# Patient Record
Sex: FEMALE | Race: BLACK OR AFRICAN AMERICAN | Employment: OTHER | URBAN - METROPOLITAN AREA
[De-identification: names, ages, dates, MRNs, and addresses within clinical notes are randomized per-mention and may not be internally consistent; named-entity substitution may affect disease eponyms.]

---

## 2017-05-16 ENCOUNTER — TELEPHONE (OUTPATIENT)
Dept: CARDIOLOGY CLINIC | Age: 77
End: 2017-05-16

## 2017-05-17 RX ORDER — ATENOLOL 25 MG/1
25 TABLET ORAL DAILY
Qty: 30 TABLET | Refills: 0 | Status: SHIPPED | OUTPATIENT
Start: 2017-05-17 | End: 2017-05-18 | Stop reason: SDUPTHER

## 2017-05-17 RX ORDER — HYDROCHLOROTHIAZIDE 12.5 MG/1
12.5 CAPSULE, GELATIN COATED ORAL DAILY
Qty: 30 CAPSULE | Refills: 0 | Status: SHIPPED | OUTPATIENT
Start: 2017-05-17 | End: 2017-05-22 | Stop reason: SDUPTHER

## 2017-05-18 ENCOUNTER — OFFICE VISIT (OUTPATIENT)
Dept: CARDIOLOGY CLINIC | Age: 77
End: 2017-05-18

## 2017-05-18 VITALS
HEART RATE: 68 BPM | WEIGHT: 142.4 LBS | DIASTOLIC BLOOD PRESSURE: 70 MMHG | SYSTOLIC BLOOD PRESSURE: 120 MMHG | BODY MASS INDEX: 23.7 KG/M2

## 2017-05-18 DIAGNOSIS — E78.00 PURE HYPERCHOLESTEROLEMIA: ICD-10-CM

## 2017-05-18 DIAGNOSIS — M51.26 HERNIATED LUMBAR DISC WITHOUT MYELOPATHY: ICD-10-CM

## 2017-05-18 DIAGNOSIS — N18.2 CHRONIC RENAL IMPAIRMENT, STAGE 2 (MILD): ICD-10-CM

## 2017-05-18 DIAGNOSIS — I25.10 CORONARY ARTERY DISEASE WITHOUT ANGINA PECTORIS, UNSPECIFIED VESSEL OR LESION TYPE, UNSPECIFIED WHETHER NATIVE OR TRANSPLANTED HEART: Primary | ICD-10-CM

## 2017-05-18 DIAGNOSIS — I10 ESSENTIAL HYPERTENSION: ICD-10-CM

## 2017-05-18 DIAGNOSIS — I25.110 CORONARY ARTERY DISEASE INVOLVING NATIVE CORONARY ARTERY OF NATIVE HEART WITH UNSTABLE ANGINA PECTORIS (HCC): ICD-10-CM

## 2017-05-18 PROCEDURE — 1036F TOBACCO NON-USER: CPT | Performed by: NURSE PRACTITIONER

## 2017-05-18 PROCEDURE — G8599 NO ASA/ANTIPLAT THER USE RNG: HCPCS | Performed by: NURSE PRACTITIONER

## 2017-05-18 PROCEDURE — 4040F PNEUMOC VAC/ADMIN/RCVD: CPT | Performed by: NURSE PRACTITIONER

## 2017-05-18 PROCEDURE — 1090F PRES/ABSN URINE INCON ASSESS: CPT | Performed by: NURSE PRACTITIONER

## 2017-05-18 PROCEDURE — 99204 OFFICE O/P NEW MOD 45 MIN: CPT | Performed by: NURSE PRACTITIONER

## 2017-05-18 PROCEDURE — G8427 DOCREV CUR MEDS BY ELIG CLIN: HCPCS | Performed by: NURSE PRACTITIONER

## 2017-05-18 PROCEDURE — G8420 CALC BMI NORM PARAMETERS: HCPCS | Performed by: NURSE PRACTITIONER

## 2017-05-18 PROCEDURE — 93000 ELECTROCARDIOGRAM COMPLETE: CPT | Performed by: NURSE PRACTITIONER

## 2017-05-18 RX ORDER — ATENOLOL 25 MG/1
25 TABLET ORAL DAILY
Qty: 90 TABLET | Refills: 3 | Status: SHIPPED | OUTPATIENT
Start: 2017-05-18 | End: 2018-06-07 | Stop reason: SDUPTHER

## 2017-05-18 RX ORDER — LATANOPROST 50 UG/ML
1 SOLUTION/ DROPS OPHTHALMIC NIGHTLY
COMMUNITY

## 2017-05-22 ENCOUNTER — TELEPHONE (OUTPATIENT)
Dept: CARDIOLOGY CLINIC | Age: 77
End: 2017-05-22

## 2017-05-22 RX ORDER — HYDROCHLOROTHIAZIDE 12.5 MG/1
25 CAPSULE, GELATIN COATED ORAL DAILY
Qty: 30 CAPSULE | Refills: 3 | Status: SHIPPED | OUTPATIENT
Start: 2017-05-22 | End: 2017-11-01

## 2017-05-22 RX ORDER — HYDROCHLOROTHIAZIDE 25 MG/1
25 TABLET ORAL DAILY
Qty: 30 TABLET | Refills: 3 | Status: SHIPPED | OUTPATIENT
Start: 2017-05-22 | End: 2017-09-28 | Stop reason: SDUPTHER

## 2017-07-27 ENCOUNTER — HOSPITAL ENCOUNTER (OUTPATIENT)
Dept: MAMMOGRAPHY | Age: 77
Discharge: HOME | End: 2017-07-27
Attending: FAMILY MEDICINE | Admitting: FAMILY MEDICINE

## 2017-07-27 DIAGNOSIS — Z12.31 VISIT FOR SCREENING MAMMOGRAM: ICD-10-CM

## 2017-09-22 ENCOUNTER — TELEPHONE (OUTPATIENT)
Dept: PRIMARY CARE CLINIC | Age: 77
End: 2017-09-22

## 2017-09-28 RX ORDER — HYDROCHLOROTHIAZIDE 25 MG/1
25 TABLET ORAL DAILY
Qty: 30 TABLET | Refills: 4 | Status: SHIPPED | OUTPATIENT
Start: 2017-09-28 | End: 2017-11-30 | Stop reason: SDUPTHER

## 2017-09-28 RX ORDER — HYDROCHLOROTHIAZIDE 25 MG/1
25 TABLET ORAL DAILY
Qty: 30 TABLET | Refills: 4 | Status: SHIPPED | OUTPATIENT
Start: 2017-09-28 | End: 2017-09-28 | Stop reason: SDUPTHER

## 2017-09-29 DIAGNOSIS — M51.26 HERNIATED LUMBAR DISC WITHOUT MYELOPATHY: Primary | ICD-10-CM

## 2017-11-01 ENCOUNTER — OFFICE VISIT (OUTPATIENT)
Dept: PRIMARY CARE CLINIC | Age: 77
End: 2017-11-01

## 2017-11-01 VITALS
HEART RATE: 70 BPM | OXYGEN SATURATION: 98 % | SYSTOLIC BLOOD PRESSURE: 122 MMHG | TEMPERATURE: 98.6 F | DIASTOLIC BLOOD PRESSURE: 64 MMHG | BODY MASS INDEX: 24.86 KG/M2 | HEIGHT: 64 IN | WEIGHT: 145.6 LBS

## 2017-11-01 DIAGNOSIS — E11.8 TYPE 2 DIABETES MELLITUS WITH COMPLICATION, WITHOUT LONG-TERM CURRENT USE OF INSULIN (HCC): ICD-10-CM

## 2017-11-01 DIAGNOSIS — Z91.81 AT HIGH RISK FOR FALLS: ICD-10-CM

## 2017-11-01 DIAGNOSIS — I10 ESSENTIAL HYPERTENSION: Primary | ICD-10-CM

## 2017-11-01 DIAGNOSIS — Z78.0 MENOPAUSE: ICD-10-CM

## 2017-11-01 DIAGNOSIS — M81.6 LOCALIZED OSTEOPOROSIS WITHOUT CURRENT PATHOLOGICAL FRACTURE: ICD-10-CM

## 2017-11-01 DIAGNOSIS — N18.30 STAGE 3 CHRONIC KIDNEY DISEASE (HCC): ICD-10-CM

## 2017-11-01 DIAGNOSIS — Z12.11 COLON CANCER SCREENING: ICD-10-CM

## 2017-11-01 DIAGNOSIS — K59.00 CONSTIPATION, UNSPECIFIED CONSTIPATION TYPE: ICD-10-CM

## 2017-11-01 DIAGNOSIS — E55.9 VITAMIN D DEFICIENCY: ICD-10-CM

## 2017-11-01 DIAGNOSIS — E78.00 PURE HYPERCHOLESTEROLEMIA: ICD-10-CM

## 2017-11-01 DIAGNOSIS — M51.26 HERNIATED LUMBAR DISC WITHOUT MYELOPATHY: ICD-10-CM

## 2017-11-01 PROBLEM — N18.2 CHRONIC RENAL IMPAIRMENT, STAGE 2 (MILD): Status: RESOLVED | Noted: 2017-05-18 | Resolved: 2017-11-01

## 2017-11-01 PROCEDURE — G8419 CALC BMI OUT NRM PARAM NOF/U: HCPCS | Performed by: INTERNAL MEDICINE

## 2017-11-01 PROCEDURE — G8427 DOCREV CUR MEDS BY ELIG CLIN: HCPCS | Performed by: INTERNAL MEDICINE

## 2017-11-01 PROCEDURE — 4005F PHARM THX FOR OP RXD: CPT | Performed by: INTERNAL MEDICINE

## 2017-11-01 PROCEDURE — 1123F ACP DISCUSS/DSCN MKR DOCD: CPT | Performed by: INTERNAL MEDICINE

## 2017-11-01 PROCEDURE — 1036F TOBACCO NON-USER: CPT | Performed by: INTERNAL MEDICINE

## 2017-11-01 PROCEDURE — G8400 PT W/DXA NO RESULTS DOC: HCPCS | Performed by: INTERNAL MEDICINE

## 2017-11-01 PROCEDURE — G8484 FLU IMMUNIZE NO ADMIN: HCPCS | Performed by: INTERNAL MEDICINE

## 2017-11-01 PROCEDURE — 99215 OFFICE O/P EST HI 40 MIN: CPT | Performed by: INTERNAL MEDICINE

## 2017-11-01 PROCEDURE — 1090F PRES/ABSN URINE INCON ASSESS: CPT | Performed by: INTERNAL MEDICINE

## 2017-11-01 PROCEDURE — G8599 NO ASA/ANTIPLAT THER USE RNG: HCPCS | Performed by: INTERNAL MEDICINE

## 2017-11-01 PROCEDURE — 4040F PNEUMOC VAC/ADMIN/RCVD: CPT | Performed by: INTERNAL MEDICINE

## 2017-11-01 RX ORDER — DOCUSATE SODIUM 100 MG/1
100 CAPSULE, LIQUID FILLED ORAL 2 TIMES DAILY
Qty: 60 CAPSULE | Refills: 5 | Status: SHIPPED | OUTPATIENT
Start: 2017-11-01 | End: 2019-01-31

## 2017-11-01 ASSESSMENT — PATIENT HEALTH QUESTIONNAIRE - PHQ9
SUM OF ALL RESPONSES TO PHQ QUESTIONS 1-9: 0
SUM OF ALL RESPONSES TO PHQ9 QUESTIONS 1 & 2: 0
1. LITTLE INTEREST OR PLEASURE IN DOING THINGS: 0
2. FEELING DOWN, DEPRESSED OR HOPELESS: 0

## 2017-11-01 ASSESSMENT — ENCOUNTER SYMPTOMS
RESPIRATORY NEGATIVE: 1
GASTROINTESTINAL NEGATIVE: 1
ALLERGIC/IMMUNOLOGIC NEGATIVE: 1
EYES NEGATIVE: 1

## 2017-11-01 NOTE — PROGRESS NOTES
Subjective:      Patient ID: Anna Ho is a 68 y.o. female. HPI  Patient presents to establish care:  Past Medical History:   Diagnosis Date    Angina     Chronic kidney disease     Fibromyalgia     Hyperlipidemia     Hypertension     MVP (mitral valve prolapse)     s 5/18/2017    Type II or unspecified type diabetes mellitus without mention of complication, not stated as uncontrolled      Past Surgical History:   Procedure Laterality Date    CHOLECYSTECTOMY      CORONARY ANGIOPLASTY WITH STENT PLACEMENT      2 stents    HYSTERECTOMY      LUMBAR DISCECTOMY  10/03/12    lumbar discectomy, 3-4 left     Social History   Substance Use Topics    Smoking status: Never Smoker    Smokeless tobacco: Not on file    Alcohol use No     Current Outpatient Prescriptions   Medication Sig Dispense Refill    docusate sodium (COLACE) 100 MG capsule Take 1 capsule by mouth 2 times daily 60 capsule 5    hydrochlorothiazide (HYDRODIURIL) 25 MG tablet Take 1 tablet by mouth daily 30 tablet 4    latanoprost (XALATAN) 0.005 % ophthalmic solution 1 drop nightly      atenolol (TENORMIN) 25 MG tablet Take 1 tablet by mouth daily 90 tablet 3    tiZANidine (ZANAFLEX) 4 MG tablet Take 1/2 tablet in AM, 1/2 tablet around 5:00 PM, and 1 full tablet before bedtime as needed for muscle spasms. 30 tablet 3    Coenzyme Q10 (COQ10) 100 MG CAPS Take  by mouth.  Potassium Chloride (KLOR-CON 10 PO) Take  by mouth.  Cyanocobalamin (B-12) 500 MCG TABS Take  by mouth.  gemfibrozil (LOPID) 600 MG tablet Take 600 mg by mouth daily.  insulin glargine (LANTUS SOLOSTAR) 100 UNIT/ML injection Inject  into the skin nightly.  FOLIC ACID by Does not apply route.  Lansoprazole (PREVACID PO) Take  by mouth. No current facility-administered medications for this visit.       Allergies   Allergen Reactions    Actos [Pioglitazone Hydrochloride]     Celebrex [Celecoxib]     Cipro Xr     Demerol Helena to complete a self tracking handout on Blood Sugars , Blood Pressures  and Weights and instructed them to bring it with them to her next appointment. Discussed use, benefit, and side effects of prescribed medications. Barriers to medication compliance addressed. All patient questions answered. Pt voiced understanding. Patient is taking over the counter meds and discussed as to how they interact with prescription medications. Return in one month.

## 2017-11-01 NOTE — PROGRESS NOTES
On the basis of positive falls risk screening, assessment and plan is as follows: False positive screen, patient has good balance.

## 2017-11-07 ENCOUNTER — TELEPHONE (OUTPATIENT)
Dept: PAIN MANAGEMENT | Age: 77
End: 2017-11-07

## 2017-11-20 NOTE — TELEPHONE ENCOUNTER
Second message was left for this patient to call [facility] regarding their referral.  Pt states she wants to speak to her referring provider first regarding this. She states should have been referred for fibro. 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 Advised that whatever dx she is referred for, we would need imaging for that area.  She will discuss with her

## 2017-11-27 NOTE — TELEPHONE ENCOUNTER
Final message was left for the patient to call [facility] regarding their referral. If we do not receive a call back within a week, referral will be cancelled per office protocol.

## 2017-11-30 ENCOUNTER — OFFICE VISIT (OUTPATIENT)
Dept: CARDIOLOGY CLINIC | Age: 77
End: 2017-11-30

## 2017-11-30 VITALS
SYSTOLIC BLOOD PRESSURE: 118 MMHG | HEART RATE: 78 BPM | DIASTOLIC BLOOD PRESSURE: 60 MMHG | BODY MASS INDEX: 25.63 KG/M2 | WEIGHT: 147 LBS

## 2017-11-30 DIAGNOSIS — I10 ESSENTIAL HYPERTENSION: ICD-10-CM

## 2017-11-30 DIAGNOSIS — N18.30 STAGE 3 CHRONIC KIDNEY DISEASE (HCC): ICD-10-CM

## 2017-11-30 DIAGNOSIS — E78.00 PURE HYPERCHOLESTEROLEMIA: Primary | ICD-10-CM

## 2017-11-30 PROCEDURE — 1036F TOBACCO NON-USER: CPT | Performed by: INTERNAL MEDICINE

## 2017-11-30 PROCEDURE — G8484 FLU IMMUNIZE NO ADMIN: HCPCS | Performed by: INTERNAL MEDICINE

## 2017-11-30 PROCEDURE — 1123F ACP DISCUSS/DSCN MKR DOCD: CPT | Performed by: INTERNAL MEDICINE

## 2017-11-30 PROCEDURE — 99214 OFFICE O/P EST MOD 30 MIN: CPT | Performed by: NURSE PRACTITIONER

## 2017-11-30 PROCEDURE — G8419 CALC BMI OUT NRM PARAM NOF/U: HCPCS | Performed by: INTERNAL MEDICINE

## 2017-11-30 PROCEDURE — 4040F PNEUMOC VAC/ADMIN/RCVD: CPT | Performed by: INTERNAL MEDICINE

## 2017-11-30 PROCEDURE — 1090F PRES/ABSN URINE INCON ASSESS: CPT | Performed by: INTERNAL MEDICINE

## 2017-11-30 PROCEDURE — G8427 DOCREV CUR MEDS BY ELIG CLIN: HCPCS | Performed by: INTERNAL MEDICINE

## 2017-11-30 PROCEDURE — G8599 NO ASA/ANTIPLAT THER USE RNG: HCPCS | Performed by: INTERNAL MEDICINE

## 2017-11-30 PROCEDURE — G8400 PT W/DXA NO RESULTS DOC: HCPCS | Performed by: INTERNAL MEDICINE

## 2017-11-30 RX ORDER — HYDROCHLOROTHIAZIDE 25 MG/1
25 TABLET ORAL DAILY
Qty: 90 TABLET | Refills: 4 | Status: SHIPPED | OUTPATIENT
Start: 2017-11-30 | End: 2018-06-27 | Stop reason: SDUPTHER

## 2018-01-03 ENCOUNTER — OFFICE VISIT (OUTPATIENT)
Dept: PRIMARY CARE CLINIC | Age: 78
End: 2018-01-03

## 2018-01-03 VITALS
DIASTOLIC BLOOD PRESSURE: 68 MMHG | TEMPERATURE: 99.4 F | HEART RATE: 64 BPM | BODY MASS INDEX: 25.78 KG/M2 | OXYGEN SATURATION: 98 % | WEIGHT: 151 LBS | SYSTOLIC BLOOD PRESSURE: 124 MMHG | HEIGHT: 64 IN

## 2018-01-03 DIAGNOSIS — R09.82 POST-NASAL DRAINAGE: ICD-10-CM

## 2018-01-03 DIAGNOSIS — I10 ESSENTIAL HYPERTENSION: Primary | ICD-10-CM

## 2018-01-03 PROCEDURE — 1123F ACP DISCUSS/DSCN MKR DOCD: CPT | Performed by: INTERNAL MEDICINE

## 2018-01-03 PROCEDURE — 1036F TOBACCO NON-USER: CPT | Performed by: INTERNAL MEDICINE

## 2018-01-03 PROCEDURE — G8427 DOCREV CUR MEDS BY ELIG CLIN: HCPCS | Performed by: INTERNAL MEDICINE

## 2018-01-03 PROCEDURE — G8419 CALC BMI OUT NRM PARAM NOF/U: HCPCS | Performed by: INTERNAL MEDICINE

## 2018-01-03 PROCEDURE — G8400 PT W/DXA NO RESULTS DOC: HCPCS | Performed by: INTERNAL MEDICINE

## 2018-01-03 PROCEDURE — 1090F PRES/ABSN URINE INCON ASSESS: CPT | Performed by: INTERNAL MEDICINE

## 2018-01-03 PROCEDURE — 99213 OFFICE O/P EST LOW 20 MIN: CPT | Performed by: INTERNAL MEDICINE

## 2018-01-03 PROCEDURE — 4040F PNEUMOC VAC/ADMIN/RCVD: CPT | Performed by: INTERNAL MEDICINE

## 2018-01-03 PROCEDURE — G8484 FLU IMMUNIZE NO ADMIN: HCPCS | Performed by: INTERNAL MEDICINE

## 2018-01-03 RX ORDER — LORATADINE 10 MG/1
10 TABLET ORAL DAILY
Qty: 30 TABLET | Refills: 5 | Status: SHIPPED | OUTPATIENT
Start: 2018-01-03 | End: 2019-01-15

## 2018-01-03 RX ORDER — AMOXICILLIN 250 MG/1
250 CAPSULE ORAL 3 TIMES DAILY
Qty: 30 CAPSULE | Refills: 0 | Status: SHIPPED | OUTPATIENT
Start: 2018-01-03 | End: 2018-01-13

## 2018-01-13 ASSESSMENT — ENCOUNTER SYMPTOMS
GASTROINTESTINAL NEGATIVE: 1
ALLERGIC/IMMUNOLOGIC NEGATIVE: 1
RESPIRATORY NEGATIVE: 1
EYES NEGATIVE: 1

## 2018-01-13 ASSESSMENT — PATIENT HEALTH QUESTIONNAIRE - PHQ9
2. FEELING DOWN, DEPRESSED OR HOPELESS: 0
SUM OF ALL RESPONSES TO PHQ QUESTIONS 1-9: 0
1. LITTLE INTEREST OR PLEASURE IN DOING THINGS: 0
SUM OF ALL RESPONSES TO PHQ9 QUESTIONS 1 & 2: 0

## 2018-02-09 NOTE — TELEPHONE ENCOUNTER
Patient requesting refill on her medications cyclabenzaprine 10 mg taken 3 times daily  and also estradiol 10 mg qd sent to the [facility] in [place] please advise

## 2018-02-12 RX ORDER — BACLOFEN 10 MG/1
10 TABLET ORAL 3 TIMES DAILY
Qty: 90 TABLET | Refills: 5 | Status: SHIPPED | OUTPATIENT
Start: 2018-02-12 | End: 2018-06-27 | Stop reason: SDUPTHER

## 2018-03-29 RX ORDER — POTASSIUM CHLORIDE 750 MG/1
10 TABLET, FILM COATED, EXTENDED RELEASE ORAL DAILY
Qty: 90 TABLET | Refills: 0 | Status: SHIPPED | OUTPATIENT
Start: 2018-03-29 | End: 2018-06-27 | Stop reason: SDUPTHER

## 2018-05-23 ENCOUNTER — TELEPHONE (OUTPATIENT)
Dept: PRIMARY CARE CLINIC | Age: 78
End: 2018-05-23

## 2018-05-23 NOTE — TELEPHONE ENCOUNTER
Patient calling to report estradiol 0.025 giving the patient water retention and bloating just not feeling very well please advise [phone] and cell [phone] please advise

## 2018-06-07 RX ORDER — ATENOLOL 25 MG/1
25 TABLET ORAL DAILY
Qty: 90 TABLET | Refills: 0 | Status: SHIPPED | OUTPATIENT
Start: 2018-06-07 | End: 2018-12-28 | Stop reason: SDUPTHER

## 2018-06-27 ENCOUNTER — OFFICE VISIT (OUTPATIENT)
Dept: PRIMARY CARE CLINIC | Age: 78
End: 2018-06-27

## 2018-06-27 VITALS
TEMPERATURE: 98 F | RESPIRATION RATE: 18 BRPM | BODY MASS INDEX: 29.12 KG/M2 | DIASTOLIC BLOOD PRESSURE: 55 MMHG | OXYGEN SATURATION: 97 % | HEART RATE: 68 BPM | WEIGHT: 167 LBS | SYSTOLIC BLOOD PRESSURE: 125 MMHG

## 2018-06-27 DIAGNOSIS — E11.8 TYPE 2 DIABETES MELLITUS WITH COMPLICATION, WITHOUT LONG-TERM CURRENT USE OF INSULIN (HCC): ICD-10-CM

## 2018-06-27 DIAGNOSIS — R53.83 OTHER FATIGUE: Primary | ICD-10-CM

## 2018-06-27 DIAGNOSIS — I10 ESSENTIAL HYPERTENSION: ICD-10-CM

## 2018-06-27 DIAGNOSIS — E55.9 VITAMIN D DEFICIENCY: ICD-10-CM

## 2018-06-27 DIAGNOSIS — R53.83 OTHER FATIGUE: ICD-10-CM

## 2018-06-27 DIAGNOSIS — N18.30 STAGE 3 CHRONIC KIDNEY DISEASE (HCC): ICD-10-CM

## 2018-06-27 DIAGNOSIS — M79.7 FIBROMYALGIA: ICD-10-CM

## 2018-06-27 DIAGNOSIS — E78.00 PURE HYPERCHOLESTEROLEMIA: ICD-10-CM

## 2018-06-27 LAB
A/G RATIO: 1.4 (ref 1.1–2.2)
ALBUMIN SERPL-MCNC: 4.4 G/DL (ref 3.4–5)
ALP BLD-CCNC: 76 U/L (ref 40–129)
ALT SERPL-CCNC: 15 U/L (ref 10–40)
ANION GAP SERPL CALCULATED.3IONS-SCNC: 12 MMOL/L (ref 3–16)
AST SERPL-CCNC: 18 U/L (ref 15–37)
BASOPHILS ABSOLUTE: 0 K/UL (ref 0–0.2)
BASOPHILS RELATIVE PERCENT: 0.9 %
BILIRUB SERPL-MCNC: 0.4 MG/DL (ref 0–1)
BILIRUBIN URINE: NEGATIVE
BLOOD, URINE: NEGATIVE
BUN BLDV-MCNC: 23 MG/DL (ref 7–20)
CALCIUM SERPL-MCNC: 9.9 MG/DL (ref 8.3–10.6)
CHLORIDE BLD-SCNC: 101 MMOL/L (ref 99–110)
CLARITY: CLEAR
CO2: 29 MMOL/L (ref 21–32)
COLOR: YELLOW
CREAT SERPL-MCNC: 1 MG/DL (ref 0.6–1.2)
CREATININE URINE: 89.1 MG/DL (ref 28–259)
EOSINOPHILS ABSOLUTE: 0.2 K/UL (ref 0–0.6)
EOSINOPHILS RELATIVE PERCENT: 3.9 %
GFR AFRICAN AMERICAN: >60
GFR NON-AFRICAN AMERICAN: 54
GLOBULIN: 3.1 G/DL
GLUCOSE BLD-MCNC: 126 MG/DL (ref 70–99)
GLUCOSE URINE: NEGATIVE MG/DL
HCT VFR BLD CALC: 33.5 % (ref 36–48)
HEMOGLOBIN: 11.3 G/DL (ref 12–16)
KETONES, URINE: NEGATIVE MG/DL
LEUKOCYTE ESTERASE, URINE: NEGATIVE
LYMPHOCYTES ABSOLUTE: 1.2 K/UL (ref 1–5.1)
LYMPHOCYTES RELATIVE PERCENT: 27.5 %
MCH RBC QN AUTO: 33.5 PG (ref 26–34)
MCHC RBC AUTO-ENTMCNC: 33.7 G/DL (ref 31–36)
MCV RBC AUTO: 99.3 FL (ref 80–100)
MICROALBUMIN UR-MCNC: <1.2 MG/DL
MICROALBUMIN/CREAT UR-RTO: NORMAL MG/G (ref 0–30)
MICROSCOPIC EXAMINATION: NORMAL
MONOCYTES ABSOLUTE: 0.3 K/UL (ref 0–1.3)
MONOCYTES RELATIVE PERCENT: 6.5 %
NEUTROPHILS ABSOLUTE: 2.7 K/UL (ref 1.7–7.7)
NEUTROPHILS RELATIVE PERCENT: 61.2 %
NITRITE, URINE: NEGATIVE
PDW BLD-RTO: 14.5 % (ref 12.4–15.4)
PH UA: 5.5
PLATELET # BLD: 231 K/UL (ref 135–450)
PMV BLD AUTO: 8.7 FL (ref 5–10.5)
POTASSIUM SERPL-SCNC: 4.1 MMOL/L (ref 3.5–5.1)
PROTEIN UA: NEGATIVE MG/DL
RBC # BLD: 3.37 M/UL (ref 4–5.2)
SODIUM BLD-SCNC: 142 MMOL/L (ref 136–145)
SPECIFIC GRAVITY UA: 1.02
TOTAL PROTEIN: 7.5 G/DL (ref 6.4–8.2)
TSH REFLEX FT4: 1.19 UIU/ML (ref 0.27–4.2)
URINE TYPE: NORMAL
UROBILINOGEN, URINE: 0.2 E.U./DL
WBC # BLD: 4.4 K/UL (ref 4–11)

## 2018-06-27 PROCEDURE — 1036F TOBACCO NON-USER: CPT | Performed by: INTERNAL MEDICINE

## 2018-06-27 PROCEDURE — G8427 DOCREV CUR MEDS BY ELIG CLIN: HCPCS | Performed by: INTERNAL MEDICINE

## 2018-06-27 PROCEDURE — G8400 PT W/DXA NO RESULTS DOC: HCPCS | Performed by: INTERNAL MEDICINE

## 2018-06-27 PROCEDURE — 4040F PNEUMOC VAC/ADMIN/RCVD: CPT | Performed by: INTERNAL MEDICINE

## 2018-06-27 PROCEDURE — G8419 CALC BMI OUT NRM PARAM NOF/U: HCPCS | Performed by: INTERNAL MEDICINE

## 2018-06-27 PROCEDURE — 99214 OFFICE O/P EST MOD 30 MIN: CPT | Performed by: INTERNAL MEDICINE

## 2018-06-27 PROCEDURE — 1090F PRES/ABSN URINE INCON ASSESS: CPT | Performed by: INTERNAL MEDICINE

## 2018-06-27 PROCEDURE — 1123F ACP DISCUSS/DSCN MKR DOCD: CPT | Performed by: INTERNAL MEDICINE

## 2018-06-27 RX ORDER — HYDROCHLOROTHIAZIDE 25 MG/1
25 TABLET ORAL DAILY
Qty: 90 TABLET | Refills: 4 | Status: SHIPPED | OUTPATIENT
Start: 2018-06-27 | End: 2018-12-28

## 2018-06-27 RX ORDER — FOLIC ACID 1 MG/1
1 TABLET ORAL DAILY
Qty: 90 TABLET | Refills: 3 | Status: SHIPPED | OUTPATIENT
Start: 2018-06-27 | End: 2019-07-03 | Stop reason: SDUPTHER

## 2018-06-27 RX ORDER — POTASSIUM CHLORIDE 750 MG/1
10 TABLET, FILM COATED, EXTENDED RELEASE ORAL DAILY
Qty: 90 TABLET | Refills: 0 | Status: SHIPPED | OUTPATIENT
Start: 2018-06-27 | End: 2018-09-08 | Stop reason: SDUPTHER

## 2018-06-27 RX ORDER — BACLOFEN 10 MG/1
10 TABLET ORAL 3 TIMES DAILY
Qty: 90 TABLET | Refills: 5 | Status: SHIPPED | OUTPATIENT
Start: 2018-06-27 | End: 2019-02-15 | Stop reason: SDUPTHER

## 2018-06-27 NOTE — PROGRESS NOTES
Subjective:      Patient ID: Toya Baca is a 66 y.o. female. HPI  Patient presents for evaluation of the followin. Other fatigue present for over a month. NO associated fever or chills. Patient has been treated for fibromyalgia for years. Patient has not noticed an increase in muscle pain , just fatigue. No shortness of breath or leg edema. Some cold intolerance. No rashes or joint swelling. 2. Essential hypertension . Patient is controlled on HCTZ 25 mg qd and atenolol 25 mg qd. It has been present for years. History of stage 2 CKD. No headaches or dizziness. No history of CHF. BP Readings from Last 3 Encounters:   18 (!) 125/55   18 124/68   17 118/60                   3. Pure hypercholesterolemia treated with  Lopid 600 mg qd. NO myalgias or weakness. Hyperlipidemia present for years. 4. Stage 3 chronic kidney disease has been stable for years. Patient knows to avoid NSAIDs. Work on bp control. 5. Vitamin D deficiency on supplement . Goal 50-80. 6. Type 2 diabetes mellitus with complication, without long-term current use of insulin (HCC) has been controlled with diet. NO polydipsia or polyuria. Patient has stage 2 CKD but no CHF. 7. Fibromyalgia stable on Chronic Narcotic Therapy by pain specialist.      Current Outpatient Prescriptions on File Prior to Visit   Medication Sig Dispense Refill    atenolol (TENORMIN) 25 MG tablet Take 1 tablet by mouth daily 90 tablet 0    loratadine (CLARITIN) 10 MG tablet Take 1 tablet by mouth daily 30 tablet 5    docusate sodium (COLACE) 100 MG capsule Take 1 capsule by mouth 2 times daily 60 capsule 5    latanoprost (XALATAN) 0.005 % ophthalmic solution 1 drop nightly      Coenzyme Q10 (COQ10) 100 MG CAPS Take  by mouth.  Cyanocobalamin (B-12) 500 MCG TABS Take  by mouth.  gemfibrozil (LOPID) 600 MG tablet Take 600 mg by mouth daily.         insulin glargine (LANTUS SOLOSTAR) Creatinine Urine Ratio   7. Fibromyalgia stable under care of pain management. Needs refill on baclofen. baclofen (LIORESAL) 10 MG tablet           Plan:      Helena received counseling on the following healthy behaviors: medication adherence    Patient given educational materials on After visit summary with diagnosis and treatment plan. I have instructed Helena to complete a self tracking handout on Blood Sugars , Blood Pressures  and Weights and instructed them to bring it with them to her next appointment. Discussed use, benefit, and side effects of prescribed medications. Barriers to medication compliance addressed. All patient questions answered. Pt voiced understanding. Patient is taking over the counter meds and discussed as to how they interact with prescription medications.

## 2018-06-28 LAB
ESTIMATED AVERAGE GLUCOSE: 171.4 MG/DL
HBA1C MFR BLD: 7.6 %
VITAMIN B-12: >2000 PG/ML (ref 211–911)
VITAMIN D 25-HYDROXY: 69.2 NG/ML

## 2018-06-29 DIAGNOSIS — D64.9 NORMOCHROMIC NORMOCYTIC ANEMIA: Primary | ICD-10-CM

## 2018-07-01 PROBLEM — E11.8 TYPE 2 DIABETES MELLITUS WITH COMPLICATION, WITHOUT LONG-TERM CURRENT USE OF INSULIN (HCC): Status: ACTIVE | Noted: 2018-07-01

## 2018-07-01 PROBLEM — E55.9 VITAMIN D DEFICIENCY: Status: ACTIVE | Noted: 2018-07-01

## 2018-07-01 PROBLEM — M79.7 FIBROMYALGIA: Status: ACTIVE | Noted: 2018-07-01

## 2018-07-01 ASSESSMENT — ENCOUNTER SYMPTOMS
RESPIRATORY NEGATIVE: 1
ALLERGIC/IMMUNOLOGIC NEGATIVE: 1
GASTROINTESTINAL NEGATIVE: 1
EYES NEGATIVE: 1

## 2018-07-27 ENCOUNTER — TELEPHONE (OUTPATIENT)
Dept: PRIMARY CARE CLINIC | Age: 78
End: 2018-07-27

## 2018-07-27 DIAGNOSIS — D64.9 NORMOCHROMIC NORMOCYTIC ANEMIA: ICD-10-CM

## 2018-07-27 LAB
BASOPHILS ABSOLUTE: 0 K/UL (ref 0–0.2)
BASOPHILS RELATIVE PERCENT: 0.8 %
EOSINOPHILS ABSOLUTE: 0.2 K/UL (ref 0–0.6)
EOSINOPHILS RELATIVE PERCENT: 4.3 %
FOLATE: >20 NG/ML (ref 4.78–24.2)
HCT VFR BLD CALC: 33.6 % (ref 36–48)
HEMOGLOBIN: 11.3 G/DL (ref 12–16)
IRON SATURATION: 31 % (ref 15–50)
IRON: 103 UG/DL (ref 37–145)
LYMPHOCYTES ABSOLUTE: 1.2 K/UL (ref 1–5.1)
LYMPHOCYTES RELATIVE PERCENT: 29.5 %
MCH RBC QN AUTO: 33.4 PG (ref 26–34)
MCHC RBC AUTO-ENTMCNC: 33.6 G/DL (ref 31–36)
MCV RBC AUTO: 99.4 FL (ref 80–100)
MONOCYTES ABSOLUTE: 0.3 K/UL (ref 0–1.3)
MONOCYTES RELATIVE PERCENT: 7.2 %
NEUTROPHILS ABSOLUTE: 2.3 K/UL (ref 1.7–7.7)
NEUTROPHILS RELATIVE PERCENT: 58.2 %
PDW BLD-RTO: 14.1 % (ref 12.4–15.4)
PLATELET # BLD: 225 K/UL (ref 135–450)
PMV BLD AUTO: 8.7 FL (ref 5–10.5)
RBC # BLD: 3.38 M/UL (ref 4–5.2)
TOTAL IRON BINDING CAPACITY: 328 UG/DL (ref 260–445)
WBC # BLD: 4 K/UL (ref 4–11)

## 2018-08-08 ENCOUNTER — TELEPHONE (OUTPATIENT)
Dept: PRIMARY CARE CLINIC | Age: 78
End: 2018-08-08

## 2018-08-08 NOTE — TELEPHONE ENCOUNTER
Patient requesting refills on her lantus solostar 100 units with 8mm syringes patient injects 25 units hs per sliding scale. Patient also requesting xray of her left arm had discuss the issue with the doctor at the last visit.  Please advise [phone]  Patient wants meds sent to the [facility] [phone] [facility] in [place]

## 2018-08-09 DIAGNOSIS — Z79.4 TYPE 2 DIABETES MELLITUS WITH COMPLICATION, WITH LONG-TERM CURRENT USE OF INSULIN (HCC): Primary | ICD-10-CM

## 2018-08-09 DIAGNOSIS — M79.2 RADICULAR PAIN IN LEFT ARM: Primary | ICD-10-CM

## 2018-08-09 DIAGNOSIS — E11.8 TYPE 2 DIABETES MELLITUS WITH COMPLICATION, WITH LONG-TERM CURRENT USE OF INSULIN (HCC): Primary | ICD-10-CM

## 2018-08-14 ENCOUNTER — TELEPHONE (OUTPATIENT)
Dept: PRIMARY CARE CLINIC | Age: 78
End: 2018-08-14

## 2018-08-17 DIAGNOSIS — E11.8 TYPE 2 DIABETES MELLITUS WITH COMPLICATION, WITH LONG-TERM CURRENT USE OF INSULIN (HCC): Primary | ICD-10-CM

## 2018-08-17 DIAGNOSIS — Z79.4 TYPE 2 DIABETES MELLITUS WITH COMPLICATION, WITH LONG-TERM CURRENT USE OF INSULIN (HCC): Primary | ICD-10-CM

## 2018-08-21 DIAGNOSIS — Z79.4 TYPE 2 DIABETES MELLITUS WITH COMPLICATION, WITH LONG-TERM CURRENT USE OF INSULIN (HCC): ICD-10-CM

## 2018-08-21 DIAGNOSIS — E11.8 TYPE 2 DIABETES MELLITUS WITH COMPLICATION, WITH LONG-TERM CURRENT USE OF INSULIN (HCC): ICD-10-CM

## 2018-08-23 NOTE — TELEPHONE ENCOUNTER
[facility] in approved [facility]  Also called [facility] pharmacy to have them put the lantus prescription on profile for the patient in the event she runs out or needs a refill she can access it at the local pharmacy

## 2018-09-08 DIAGNOSIS — I10 ESSENTIAL HYPERTENSION: ICD-10-CM

## 2018-09-11 RX ORDER — POTASSIUM CHLORIDE 750 MG/1
TABLET, FILM COATED, EXTENDED RELEASE ORAL
Qty: 90 TABLET | Refills: 0 | Status: SHIPPED | OUTPATIENT
Start: 2018-09-11 | End: 2019-01-16 | Stop reason: SDUPTHER

## 2018-09-27 ENCOUNTER — PROCEDURE VISIT (OUTPATIENT)
Dept: NEUROLOGY | Age: 78
End: 2018-09-27
Payer: MEDICARE

## 2018-09-27 DIAGNOSIS — G56.03 BILATERAL CARPAL TUNNEL SYNDROME: Primary | ICD-10-CM

## 2018-09-27 PROCEDURE — 95886 MUSC TEST DONE W/N TEST COMP: CPT | Performed by: PSYCHIATRY & NEUROLOGY

## 2018-09-27 PROCEDURE — 95911 NRV CNDJ TEST 9-10 STUDIES: CPT | Performed by: PSYCHIATRY & NEUROLOGY

## 2018-10-02 ENCOUNTER — TELEPHONE (OUTPATIENT)
Dept: PRIMARY CARE CLINIC | Age: 78
End: 2018-10-02

## 2018-10-03 NOTE — TELEPHONE ENCOUNTER
IT is safer to stay off of the estrogen. Start taking over the counter estroven and take one daily.  This will help the estrogen withdrawal.

## 2018-10-05 ENCOUNTER — TELEPHONE (OUTPATIENT)
Dept: PRIMARY CARE CLINIC | Age: 78
End: 2018-10-05

## 2018-10-09 PROBLEM — G56.03 BILATERAL CARPAL TUNNEL SYNDROME: Status: ACTIVE | Noted: 2018-10-09

## 2018-11-26 DIAGNOSIS — G56.03 BILATERAL CARPAL TUNNEL SYNDROME: Primary | ICD-10-CM

## 2018-12-04 RX ORDER — HYDROCHLOROTHIAZIDE 25 MG/1
TABLET ORAL
Qty: 14 TABLET | Refills: 0 | Status: SHIPPED | OUTPATIENT
Start: 2018-12-04 | End: 2018-12-28

## 2018-12-28 ENCOUNTER — TELEPHONE (OUTPATIENT)
Dept: CARDIOLOGY CLINIC | Age: 78
End: 2018-12-28

## 2018-12-28 RX ORDER — ATENOLOL 25 MG/1
25 TABLET ORAL DAILY
Qty: 90 TABLET | Refills: 0 | Status: SHIPPED | OUTPATIENT
Start: 2018-12-28 | End: 2018-12-28 | Stop reason: SDUPTHER

## 2018-12-28 RX ORDER — HYDROCHLOROTHIAZIDE 25 MG/1
TABLET ORAL
Qty: 90 TABLET | Refills: 0 | Status: SHIPPED | OUTPATIENT
Start: 2018-12-28 | End: 2018-12-28 | Stop reason: SDUPTHER

## 2018-12-28 RX ORDER — ATENOLOL 25 MG/1
25 TABLET ORAL DAILY
Qty: 90 TABLET | Refills: 0 | Status: SHIPPED | OUTPATIENT
Start: 2018-12-28 | End: 2019-04-09 | Stop reason: SDUPTHER

## 2018-12-28 RX ORDER — HYDROCHLOROTHIAZIDE 25 MG/1
TABLET ORAL
Qty: 90 TABLET | Refills: 0 | Status: SHIPPED | OUTPATIENT
Start: 2018-12-28 | End: 2019-03-28 | Stop reason: SDUPTHER

## 2019-01-15 ENCOUNTER — OFFICE VISIT (OUTPATIENT)
Dept: CARDIOLOGY CLINIC | Age: 79
End: 2019-01-15
Payer: MEDICARE

## 2019-01-15 VITALS
DIASTOLIC BLOOD PRESSURE: 61 MMHG | HEART RATE: 66 BPM | BODY MASS INDEX: 27.55 KG/M2 | SYSTOLIC BLOOD PRESSURE: 121 MMHG | WEIGHT: 158 LBS

## 2019-01-15 DIAGNOSIS — E78.00 PURE HYPERCHOLESTEROLEMIA: Primary | ICD-10-CM

## 2019-01-15 DIAGNOSIS — I10 ESSENTIAL HYPERTENSION: ICD-10-CM

## 2019-01-15 PROCEDURE — G8484 FLU IMMUNIZE NO ADMIN: HCPCS | Performed by: INTERNAL MEDICINE

## 2019-01-15 PROCEDURE — 4040F PNEUMOC VAC/ADMIN/RCVD: CPT | Performed by: INTERNAL MEDICINE

## 2019-01-15 PROCEDURE — G8419 CALC BMI OUT NRM PARAM NOF/U: HCPCS | Performed by: INTERNAL MEDICINE

## 2019-01-15 PROCEDURE — 1090F PRES/ABSN URINE INCON ASSESS: CPT | Performed by: INTERNAL MEDICINE

## 2019-01-15 PROCEDURE — G8427 DOCREV CUR MEDS BY ELIG CLIN: HCPCS | Performed by: INTERNAL MEDICINE

## 2019-01-15 PROCEDURE — 1123F ACP DISCUSS/DSCN MKR DOCD: CPT | Performed by: INTERNAL MEDICINE

## 2019-01-15 PROCEDURE — 1101F PT FALLS ASSESS-DOCD LE1/YR: CPT | Performed by: INTERNAL MEDICINE

## 2019-01-15 PROCEDURE — 1036F TOBACCO NON-USER: CPT | Performed by: INTERNAL MEDICINE

## 2019-01-15 PROCEDURE — G8400 PT W/DXA NO RESULTS DOC: HCPCS | Performed by: INTERNAL MEDICINE

## 2019-01-15 PROCEDURE — 99214 OFFICE O/P EST MOD 30 MIN: CPT | Performed by: INTERNAL MEDICINE

## 2019-01-16 DIAGNOSIS — I10 ESSENTIAL HYPERTENSION: ICD-10-CM

## 2019-01-17 RX ORDER — POTASSIUM CHLORIDE 750 MG/1
TABLET, FILM COATED, EXTENDED RELEASE ORAL
Qty: 90 TABLET | Refills: 0 | Status: SHIPPED | OUTPATIENT
Start: 2019-01-17 | End: 2019-04-19 | Stop reason: SDUPTHER

## 2019-01-18 ENCOUNTER — TELEPHONE (OUTPATIENT)
Dept: PRIMARY CARE CLINIC | Age: 79
End: 2019-01-18

## 2019-01-21 ENCOUNTER — NURSE TRIAGE (OUTPATIENT)
Dept: OTHER | Facility: CLINIC | Age: 79
End: 2019-01-21

## 2019-01-21 ENCOUNTER — TELEPHONE (OUTPATIENT)
Dept: PRIMARY CARE CLINIC | Age: 79
End: 2019-01-21

## 2019-01-29 ENCOUNTER — TELEPHONE (OUTPATIENT)
Dept: PRIMARY CARE CLINIC | Age: 79
End: 2019-01-29

## 2019-01-31 ENCOUNTER — OFFICE VISIT (OUTPATIENT)
Dept: PRIMARY CARE CLINIC | Age: 79
End: 2019-01-31
Payer: MEDICARE

## 2019-01-31 VITALS
HEART RATE: 60 BPM | HEIGHT: 64 IN | OXYGEN SATURATION: 100 % | WEIGHT: 152.2 LBS | TEMPERATURE: 97.8 F | DIASTOLIC BLOOD PRESSURE: 62 MMHG | SYSTOLIC BLOOD PRESSURE: 128 MMHG | BODY MASS INDEX: 25.99 KG/M2 | RESPIRATION RATE: 12 BRPM

## 2019-01-31 DIAGNOSIS — K52.9 CHRONIC DIARRHEA: Primary | ICD-10-CM

## 2019-01-31 DIAGNOSIS — K52.9 CHRONIC DIARRHEA: ICD-10-CM

## 2019-01-31 PROCEDURE — G8484 FLU IMMUNIZE NO ADMIN: HCPCS | Performed by: INTERNAL MEDICINE

## 2019-01-31 PROCEDURE — G8400 PT W/DXA NO RESULTS DOC: HCPCS | Performed by: INTERNAL MEDICINE

## 2019-01-31 PROCEDURE — 1036F TOBACCO NON-USER: CPT | Performed by: INTERNAL MEDICINE

## 2019-01-31 PROCEDURE — 99213 OFFICE O/P EST LOW 20 MIN: CPT | Performed by: INTERNAL MEDICINE

## 2019-01-31 PROCEDURE — G8419 CALC BMI OUT NRM PARAM NOF/U: HCPCS | Performed by: INTERNAL MEDICINE

## 2019-01-31 PROCEDURE — 4040F PNEUMOC VAC/ADMIN/RCVD: CPT | Performed by: INTERNAL MEDICINE

## 2019-01-31 PROCEDURE — 1123F ACP DISCUSS/DSCN MKR DOCD: CPT | Performed by: INTERNAL MEDICINE

## 2019-01-31 PROCEDURE — G8427 DOCREV CUR MEDS BY ELIG CLIN: HCPCS | Performed by: INTERNAL MEDICINE

## 2019-01-31 PROCEDURE — 1090F PRES/ABSN URINE INCON ASSESS: CPT | Performed by: INTERNAL MEDICINE

## 2019-01-31 PROCEDURE — 1101F PT FALLS ASSESS-DOCD LE1/YR: CPT | Performed by: INTERNAL MEDICINE

## 2019-01-31 RX ORDER — LOPERAMIDE HYDROCHLORIDE 2 MG/1
2 CAPSULE ORAL 4 TIMES DAILY PRN
Qty: 60 CAPSULE | Refills: 1 | Status: SHIPPED | OUTPATIENT
Start: 2019-01-31 | End: 2019-01-31 | Stop reason: SDUPTHER

## 2019-01-31 RX ORDER — LOPERAMIDE HYDROCHLORIDE 2 MG/1
2 CAPSULE ORAL 4 TIMES DAILY PRN
Qty: 60 CAPSULE | Refills: 1 | Status: SHIPPED | OUTPATIENT
Start: 2019-01-31 | End: 2019-02-10

## 2019-01-31 RX ORDER — BRIMONIDINE TARTRATE, TIMOLOL MALEATE 2; 5 MG/ML; MG/ML
1 SOLUTION/ DROPS OPHTHALMIC EVERY 12 HOURS
COMMUNITY
End: 2020-11-24

## 2019-01-31 ASSESSMENT — PATIENT HEALTH QUESTIONNAIRE - PHQ9
SUM OF ALL RESPONSES TO PHQ9 QUESTIONS 1 & 2: 0
1. LITTLE INTEREST OR PLEASURE IN DOING THINGS: 0
2. FEELING DOWN, DEPRESSED OR HOPELESS: 0
SUM OF ALL RESPONSES TO PHQ QUESTIONS 1-9: 0
SUM OF ALL RESPONSES TO PHQ QUESTIONS 1-9: 0

## 2019-01-31 ASSESSMENT — ENCOUNTER SYMPTOMS
DIARRHEA: 1
CONSTIPATION: 0
ABDOMINAL PAIN: 0
VOMITING: 0
SHORTNESS OF BREATH: 0
COUGH: 0
NAUSEA: 0

## 2019-02-01 LAB
ALBUMIN SERPL-MCNC: 4.5 G/DL (ref 3.4–5)
ALP BLD-CCNC: 85 U/L (ref 40–129)
ALT SERPL-CCNC: 11 U/L (ref 10–40)
ANION GAP SERPL CALCULATED.3IONS-SCNC: 13 MMOL/L (ref 3–16)
AST SERPL-CCNC: 15 U/L (ref 15–37)
BASOPHILS ABSOLUTE: 0 K/UL (ref 0–0.2)
BASOPHILS RELATIVE PERCENT: 0.3 %
BILIRUB SERPL-MCNC: 0.3 MG/DL (ref 0–1)
BILIRUBIN DIRECT: <0.2 MG/DL (ref 0–0.3)
BILIRUBIN, INDIRECT: NORMAL MG/DL (ref 0–1)
BUN BLDV-MCNC: 18 MG/DL (ref 7–20)
CALCIUM SERPL-MCNC: 10.2 MG/DL (ref 8.3–10.6)
CHLORIDE BLD-SCNC: 100 MMOL/L (ref 99–110)
CO2: 29 MMOL/L (ref 21–32)
CREAT SERPL-MCNC: 1 MG/DL (ref 0.6–1.2)
EOSINOPHILS ABSOLUTE: 0.1 K/UL (ref 0–0.6)
EOSINOPHILS RELATIVE PERCENT: 1.1 %
GFR AFRICAN AMERICAN: >60
GFR NON-AFRICAN AMERICAN: 54
GLUCOSE BLD-MCNC: 125 MG/DL (ref 70–99)
HCT VFR BLD CALC: 33.7 % (ref 36–48)
HEMOGLOBIN: 11.6 G/DL (ref 12–16)
LYMPHOCYTES ABSOLUTE: 1.1 K/UL (ref 1–5.1)
LYMPHOCYTES RELATIVE PERCENT: 23.4 %
MCH RBC QN AUTO: 33.7 PG (ref 26–34)
MCHC RBC AUTO-ENTMCNC: 34.3 G/DL (ref 31–36)
MCV RBC AUTO: 98.4 FL (ref 80–100)
MONOCYTES ABSOLUTE: 0.4 K/UL (ref 0–1.3)
MONOCYTES RELATIVE PERCENT: 9 %
NEUTROPHILS ABSOLUTE: 3.1 K/UL (ref 1.7–7.7)
NEUTROPHILS RELATIVE PERCENT: 66.2 %
PDW BLD-RTO: 14.7 % (ref 12.4–15.4)
PHOSPHORUS: 3.2 MG/DL (ref 2.5–4.9)
PLATELET # BLD: 276 K/UL (ref 135–450)
PMV BLD AUTO: 8.9 FL (ref 5–10.5)
POTASSIUM SERPL-SCNC: 3.7 MMOL/L (ref 3.5–5.1)
RBC # BLD: 3.42 M/UL (ref 4–5.2)
SODIUM BLD-SCNC: 142 MMOL/L (ref 136–145)
TOTAL PROTEIN: 7.8 G/DL (ref 6.4–8.2)
WBC # BLD: 4.7 K/UL (ref 4–11)

## 2019-02-06 LAB — C DIFFICILE TOXIN, EIA: NORMAL

## 2019-02-07 ENCOUNTER — TELEPHONE (OUTPATIENT)
Dept: PRIMARY CARE CLINIC | Age: 79
End: 2019-02-07

## 2019-02-07 DIAGNOSIS — A04.72 C. DIFFICILE DIARRHEA: Primary | ICD-10-CM

## 2019-02-07 LAB
CLOSTRIDIUM DIFFICILE DNA AMPLIFICATION: ABNORMAL
CLOSTRIDIUM DIFFICILE DNA AMPLIFICATION: ABNORMAL
ORGANISM: ABNORMAL

## 2019-02-07 RX ORDER — VANCOMYCIN HYDROCHLORIDE 125 MG/1
125 CAPSULE ORAL 4 TIMES DAILY
Qty: 40 CAPSULE | Refills: 0 | Status: SHIPPED | OUTPATIENT
Start: 2019-02-07 | End: 2019-02-25 | Stop reason: SDUPTHER

## 2019-02-15 DIAGNOSIS — M79.7 FIBROMYALGIA: ICD-10-CM

## 2019-02-18 RX ORDER — BACLOFEN 10 MG/1
TABLET ORAL
Qty: 90 TABLET | Refills: 4 | Status: SHIPPED | OUTPATIENT
Start: 2019-02-18 | End: 2019-06-17

## 2019-02-25 ENCOUNTER — OFFICE VISIT (OUTPATIENT)
Dept: PRIMARY CARE CLINIC | Age: 79
End: 2019-02-25
Payer: MEDICARE

## 2019-02-25 VITALS
SYSTOLIC BLOOD PRESSURE: 121 MMHG | HEART RATE: 56 BPM | OXYGEN SATURATION: 98 % | BODY MASS INDEX: 26.85 KG/M2 | RESPIRATION RATE: 18 BRPM | WEIGHT: 154 LBS | TEMPERATURE: 98 F | DIASTOLIC BLOOD PRESSURE: 59 MMHG

## 2019-02-25 DIAGNOSIS — A09 DIARRHEA OF INFECTIOUS ORIGIN: Primary | ICD-10-CM

## 2019-02-25 DIAGNOSIS — E11.8 TYPE 2 DIABETES MELLITUS WITH COMPLICATION, WITH LONG-TERM CURRENT USE OF INSULIN (HCC): ICD-10-CM

## 2019-02-25 DIAGNOSIS — A04.72 C. DIFFICILE DIARRHEA: ICD-10-CM

## 2019-02-25 DIAGNOSIS — Z79.4 TYPE 2 DIABETES MELLITUS WITH COMPLICATION, WITH LONG-TERM CURRENT USE OF INSULIN (HCC): ICD-10-CM

## 2019-02-25 DIAGNOSIS — I10 ESSENTIAL HYPERTENSION: ICD-10-CM

## 2019-02-25 DIAGNOSIS — G56.03 BILATERAL CARPAL TUNNEL SYNDROME: ICD-10-CM

## 2019-02-25 PROCEDURE — 1090F PRES/ABSN URINE INCON ASSESS: CPT | Performed by: INTERNAL MEDICINE

## 2019-02-25 PROCEDURE — 1036F TOBACCO NON-USER: CPT | Performed by: INTERNAL MEDICINE

## 2019-02-25 PROCEDURE — 4040F PNEUMOC VAC/ADMIN/RCVD: CPT | Performed by: INTERNAL MEDICINE

## 2019-02-25 PROCEDURE — G8419 CALC BMI OUT NRM PARAM NOF/U: HCPCS | Performed by: INTERNAL MEDICINE

## 2019-02-25 PROCEDURE — G8400 PT W/DXA NO RESULTS DOC: HCPCS | Performed by: INTERNAL MEDICINE

## 2019-02-25 PROCEDURE — G8484 FLU IMMUNIZE NO ADMIN: HCPCS | Performed by: INTERNAL MEDICINE

## 2019-02-25 PROCEDURE — G8427 DOCREV CUR MEDS BY ELIG CLIN: HCPCS | Performed by: INTERNAL MEDICINE

## 2019-02-25 PROCEDURE — 99214 OFFICE O/P EST MOD 30 MIN: CPT | Performed by: INTERNAL MEDICINE

## 2019-02-25 PROCEDURE — 1123F ACP DISCUSS/DSCN MKR DOCD: CPT | Performed by: INTERNAL MEDICINE

## 2019-02-25 PROCEDURE — 1101F PT FALLS ASSESS-DOCD LE1/YR: CPT | Performed by: INTERNAL MEDICINE

## 2019-02-25 RX ORDER — GREEN TEA/HOODIA GORDONII 315-12.5MG
1 CAPSULE ORAL DAILY
Qty: 30 TABLET | Refills: 5 | Status: SHIPPED | OUTPATIENT
Start: 2019-02-25 | End: 2019-03-27

## 2019-02-25 RX ORDER — VANCOMYCIN HYDROCHLORIDE 125 MG/1
125 CAPSULE ORAL 4 TIMES DAILY
Qty: 40 CAPSULE | Refills: 0 | Status: SHIPPED | OUTPATIENT
Start: 2019-02-25 | End: 2019-09-13 | Stop reason: SDUPTHER

## 2019-02-25 ASSESSMENT — ENCOUNTER SYMPTOMS
ABDOMINAL PAIN: 0
BLOATING: 0
COUGH: 0
VOMITING: 0
FLATUS: 0
DIARRHEA: 1

## 2019-03-06 ENCOUNTER — TELEPHONE (OUTPATIENT)
Dept: PRIMARY CARE CLINIC | Age: 79
End: 2019-03-06

## 2019-03-06 DIAGNOSIS — A04.72 C. DIFFICILE DIARRHEA: ICD-10-CM

## 2019-03-06 DIAGNOSIS — Z79.4 TYPE 2 DIABETES MELLITUS WITH COMPLICATION, WITH LONG-TERM CURRENT USE OF INSULIN (HCC): ICD-10-CM

## 2019-03-06 DIAGNOSIS — E11.8 TYPE 2 DIABETES MELLITUS WITH COMPLICATION, WITH LONG-TERM CURRENT USE OF INSULIN (HCC): ICD-10-CM

## 2019-03-07 LAB
CREATININE URINE: 76.2 MG/DL (ref 28–259)
MICROALBUMIN UR-MCNC: <1.2 MG/DL
MICROALBUMIN/CREAT UR-RTO: NORMAL MG/G (ref 0–30)

## 2019-03-07 ASSESSMENT — ENCOUNTER SYMPTOMS
VISUAL CHANGE: 0
ORTHOPNEA: 0
EYES NEGATIVE: 1
ALLERGIC/IMMUNOLOGIC NEGATIVE: 1
RESPIRATORY NEGATIVE: 1
BLURRED VISION: 0
SHORTNESS OF BREATH: 0

## 2019-03-07 ASSESSMENT — PATIENT HEALTH QUESTIONNAIRE - PHQ9
SUM OF ALL RESPONSES TO PHQ QUESTIONS 1-9: 2
1. LITTLE INTEREST OR PLEASURE IN DOING THINGS: 1
SUM OF ALL RESPONSES TO PHQ9 QUESTIONS 1 & 2: 2
SUM OF ALL RESPONSES TO PHQ QUESTIONS 1-9: 2
2. FEELING DOWN, DEPRESSED OR HOPELESS: 1

## 2019-03-08 LAB — C DIFFICILE TOXIN, EIA: NORMAL

## 2019-03-28 RX ORDER — HYDROCHLOROTHIAZIDE 25 MG/1
TABLET ORAL
Qty: 90 TABLET | Refills: 2 | Status: SHIPPED | OUTPATIENT
Start: 2019-03-28 | End: 2019-04-19

## 2019-04-10 RX ORDER — ATENOLOL 25 MG/1
25 TABLET ORAL DAILY
Qty: 90 TABLET | Refills: 3 | Status: SHIPPED | OUTPATIENT
Start: 2019-04-10 | End: 2019-05-09

## 2019-04-12 ENCOUNTER — TELEPHONE (OUTPATIENT)
Dept: PRIMARY CARE CLINIC | Age: 79
End: 2019-04-12

## 2019-04-19 ENCOUNTER — OFFICE VISIT (OUTPATIENT)
Dept: PRIMARY CARE CLINIC | Age: 79
End: 2019-04-19
Payer: MEDICARE

## 2019-04-19 VITALS
WEIGHT: 156 LBS | HEART RATE: 63 BPM | SYSTOLIC BLOOD PRESSURE: 150 MMHG | TEMPERATURE: 98.4 F | RESPIRATION RATE: 16 BRPM | BODY MASS INDEX: 27.64 KG/M2 | DIASTOLIC BLOOD PRESSURE: 70 MMHG | HEIGHT: 63 IN | OXYGEN SATURATION: 97 %

## 2019-04-19 DIAGNOSIS — I10 ESSENTIAL HYPERTENSION: ICD-10-CM

## 2019-04-19 DIAGNOSIS — Z23 NEED FOR PNEUMOCOCCAL VACCINATION: ICD-10-CM

## 2019-04-19 DIAGNOSIS — N18.30 STAGE 3 CHRONIC KIDNEY DISEASE (HCC): ICD-10-CM

## 2019-04-19 DIAGNOSIS — E11.8 TYPE 2 DIABETES MELLITUS WITH COMPLICATION, WITH LONG-TERM CURRENT USE OF INSULIN (HCC): ICD-10-CM

## 2019-04-19 DIAGNOSIS — A04.72 C. DIFFICILE DIARRHEA: ICD-10-CM

## 2019-04-19 DIAGNOSIS — Z79.4 TYPE 2 DIABETES MELLITUS WITH COMPLICATION, WITH LONG-TERM CURRENT USE OF INSULIN (HCC): ICD-10-CM

## 2019-04-19 DIAGNOSIS — Z78.0 MENOPAUSE: ICD-10-CM

## 2019-04-19 DIAGNOSIS — E55.9 VITAMIN D DEFICIENCY: ICD-10-CM

## 2019-04-19 DIAGNOSIS — D53.9 MACROCYTIC ANEMIA: Primary | ICD-10-CM

## 2019-04-19 PROCEDURE — 1123F ACP DISCUSS/DSCN MKR DOCD: CPT | Performed by: INTERNAL MEDICINE

## 2019-04-19 PROCEDURE — G8400 PT W/DXA NO RESULTS DOC: HCPCS | Performed by: INTERNAL MEDICINE

## 2019-04-19 PROCEDURE — 99214 OFFICE O/P EST MOD 30 MIN: CPT | Performed by: INTERNAL MEDICINE

## 2019-04-19 PROCEDURE — 4040F PNEUMOC VAC/ADMIN/RCVD: CPT | Performed by: INTERNAL MEDICINE

## 2019-04-19 PROCEDURE — 1036F TOBACCO NON-USER: CPT | Performed by: INTERNAL MEDICINE

## 2019-04-19 PROCEDURE — G8419 CALC BMI OUT NRM PARAM NOF/U: HCPCS | Performed by: INTERNAL MEDICINE

## 2019-04-19 PROCEDURE — G8427 DOCREV CUR MEDS BY ELIG CLIN: HCPCS | Performed by: INTERNAL MEDICINE

## 2019-04-19 PROCEDURE — 90670 PCV13 VACCINE IM: CPT | Performed by: INTERNAL MEDICINE

## 2019-04-19 PROCEDURE — G0009 ADMIN PNEUMOCOCCAL VACCINE: HCPCS | Performed by: INTERNAL MEDICINE

## 2019-04-19 PROCEDURE — 1090F PRES/ABSN URINE INCON ASSESS: CPT | Performed by: INTERNAL MEDICINE

## 2019-04-19 RX ORDER — VANCOMYCIN HYDROCHLORIDE 125 MG/1
125 CAPSULE ORAL 4 TIMES DAILY
Qty: 84 CAPSULE | Refills: 0 | Status: SHIPPED | OUTPATIENT
Start: 2019-04-19 | End: 2019-05-10

## 2019-04-19 RX ORDER — POTASSIUM CHLORIDE 750 MG/1
TABLET, FILM COATED, EXTENDED RELEASE ORAL
Qty: 90 TABLET | Refills: 3 | Status: SHIPPED | OUTPATIENT
Start: 2019-04-19 | End: 2019-06-17

## 2019-04-19 RX ORDER — LISINOPRIL AND HYDROCHLOROTHIAZIDE 12.5; 1 MG/1; MG/1
0.5 TABLET ORAL DAILY
Qty: 45 TABLET | Refills: 3 | Status: SHIPPED | OUTPATIENT
Start: 2019-04-19 | End: 2019-04-29 | Stop reason: SINTOL

## 2019-04-19 ASSESSMENT — ENCOUNTER SYMPTOMS
DIARRHEA: 1
ABDOMINAL PAIN: 0
COUGH: 0
VOMITING: 0
ALLERGIC/IMMUNOLOGIC NEGATIVE: 1
EYES NEGATIVE: 1
RESPIRATORY NEGATIVE: 1
SHORTNESS OF BREATH: 0

## 2019-04-19 NOTE — PROGRESS NOTES
2019     Franci Sow (:  1940) is a 66 y.o. female, here for evaluation of the following medical concerns:    HPI   Diagnosis Orders   1. Macrocytic anemia noted on last cbc, need to rule out folate or B 12 deficiency. Not on any medications that will cause it. Some fatigue. 2. Essential hypertension  Not controlled on hct and atenolol. With diabetes would like renal protection. Labs Renal Latest Ref Rng & Units 2019 2018 10/3/2012 10/5/2011   BUN 7 - 20 mg/dL 18 23(H) 11 20   Cr 0.6 - 1.2 mg/dL 1.0 1.0 1.0 1.32(H)   K 3.5 - 5.1 mmol/L 3.7 4.1 3. 0(L) 3. 2(L)   Na 136 - 145 mmol/L 142 142 139 141      BP Readings from Last 3 Encounters:   19 (!) 122/53   19 (!) 150/70   19 (!) 121/59   no headaches or dizziness or leg edema. No HERRERA or shortness of breath. Htn has been present for years. 3. Stage 3 chronic kidney disease ([facility] [address] improved with normal GFR now. Continue to monitor and avoid nephrotoxic med's. 4. Type 2 diabetes mellitus with complication, with long-term current use of insulin (HCC) need a1c to further evaluate. Treated with lantus 25 untis nightly. No hypoglycemia. No polydipsia or polyuria. No neuropathic foot pain. Lab Results   Component Value Date    LABA1C 7.6 2018    LABA1C 8.1 (H) 10/05/2011     Lab Results   Component Value Date    LABMICR <1.20 2019    LDLCALC 80 10/05/2011    CREATININE 1.0 2019                     5. Vitamin D deficiency level of 69  2018, needs follow up level. Taking d2 50,000 every two weeks, and nephro cap and folic acid daily. 6. Need for pneumococcal vaccination     7. C. difficile diarrhea with symptoms resolved with vancomycin, but returned within one week of completion of antibiotic but not as bad. 8. Menopause for over 20 years, needs bone density study.       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    Medication Sig Taking? Authorizing Provider   atenolol (TENORMIN) 25 MG tablet Take 1 tablet by mouth daily  Estle Foot, APRN - CNP   hydrochlorothiazide (HYDRODIURIL) 25 MG tablet TAKE ONE TABLET BY MOUTH DAILY  Estle Foot, APRN - CNP   baclofen (LIORESAL) 10 MG tablet TAKE ONE TABLET BY MOUTH THREE TIMES A DAY  Miguel Goodson MD   brimonidine-timolol (COMBIGAN) 0.2-0.5 % ophthalmic solution Place 1 drop into both eyes every 12 hours  Historical Provider, MD   potassium chloride (KLOR-CON) 10 MEQ extended release tablet TAKE ONE TABLET BY MOUTH DAILY  Miguel Goodson MD   insulin glargine (LANTUS SOLOSTAR) 100 UNIT/ML injection pen Inject 25 Units into the skin nightly Lease refill 8/23/18 per patient requests  Miguel Goodson MD   Insulin Pen Needle 31G X 8 MM MISC 1 each by Does not apply route daily  Miguel Goodson MD   folic acid (FOLVITE) 1 MG tablet Take 1 tablet by mouth daily  Miguel Goodson MD   latanoprost (XALATAN) 0.005 % ophthalmic solution 1 drop nightly  Historical Provider, MD   Coenzyme Q10 (COQ10) 100 MG CAPS Take  by mouth. Historical Provider, MD   Cyanocobalamin (B-12) 500 MCG TABS Take  by mouth. Historical Provider, MD   Lansoprazole (PREVACID PO) Take  by mouth. Historical Provider, MD        Social History     Tobacco Use    Smoking status: Never Smoker    Smokeless tobacco: Never Used   Substance Use Topics    Alcohol use: No        There were no vitals filed for this visit. Estimated body mass index is 26.85 kg/m² as calculated from the following:    Height as of 1/31/19: 5' 3.5\" (1.613 m). Weight as of 2/25/19: 154 lb (69.9 kg). Physical Exam   Constitutional: She is oriented to person, place, and time. She appears well-developed and well-nourished. No distress. HENT:   Head: Normocephalic and atraumatic. Nose: Nose normal.   Mouth/Throat: No oropharyngeal exudate.    Eyes: Pupils are equal, round, and reactive to light. Conjunctivae and EOM are normal. Right eye exhibits no discharge. Left eye exhibits no discharge. Neck: Normal range of motion. Neck supple. Cardiovascular: Normal rate, regular rhythm, normal heart sounds and intact distal pulses. Exam reveals no gallop and no friction rub. No murmur heard. Pulmonary/Chest: Effort normal and breath sounds normal. No respiratory distress. She has no wheezes. Abdominal: Soft. Bowel sounds are normal. She exhibits no distension and no mass. There is no tenderness. There is no rebound and no guarding. Musculoskeletal: Normal range of motion. She exhibits no edema or tenderness. Neurological: She is alert and oriented to person, place, and time. She has normal reflexes. No cranial nerve deficit. Coordination normal.   Sensory intact of feet with tuning fork 256. Skin: Skin is warm and dry. No rash noted. She is not diaphoretic. No erythema. Psychiatric: She has a normal mood and affect. Her behavior is normal.   Vitals reviewed. ASSESSMENT/PLAN:   Diagnosis Orders   1. Macrocytic anemia : see HPI CBC Auto Differential    Vitamin B12    FOLATE   2. Essential hypertension not controlled stop hctz and start lisinopril hct 10/12.5 mg , 1/2 qd for diabetic renal protection. TSH WITH REFLEX TO FT4    potassium chloride (KLOR-CON) 10 MEQ extended release tablet    DISCONTINUED: lisinopril-hydrochlorothiazide (PRINZIDE;ZESTORETIC) 10-12.5 MG per tablet   3. Stage 3 chronic kidney disease (HCC)improved. Labs Renal Latest Ref Rng & Units 1/31/2019 6/27/2018 10/3/2012 10/5/2011   BUN 7 - 20 mg/dL 18 23(H) 11 20   Cr 0.6 - 1.2 mg/dL 1.0 1.0 1.0 1.32(H)   K 3.5 - 5.1 mmol/L 3.7 4.1 3. 0(L) 3. 2(L)   Na 136 - 145 mmol/L 142 142 139 141        4. Type 2 diabetes mellitus with complication, with long-term current use of insulin (HCC) not symptomatic monitor a1c and use ace for renal protection.  Hemoglobin A1C    Comprehensive Metabolic Panel    Microalbumin / Creatinine

## 2019-04-29 ENCOUNTER — OFFICE VISIT (OUTPATIENT)
Dept: PRIMARY CARE CLINIC | Age: 79
End: 2019-04-29
Payer: MEDICARE

## 2019-04-29 ENCOUNTER — NURSE TRIAGE (OUTPATIENT)
Dept: OTHER | Facility: CLINIC | Age: 79
End: 2019-04-29

## 2019-04-29 VITALS
TEMPERATURE: 98.2 F | DIASTOLIC BLOOD PRESSURE: 53 MMHG | RESPIRATION RATE: 12 BRPM | WEIGHT: 152.2 LBS | HEART RATE: 50 BPM | BODY MASS INDEX: 26.97 KG/M2 | OXYGEN SATURATION: 100 % | SYSTOLIC BLOOD PRESSURE: 122 MMHG | HEIGHT: 63 IN

## 2019-04-29 DIAGNOSIS — R53.83 FATIGUE, UNSPECIFIED TYPE: ICD-10-CM

## 2019-04-29 DIAGNOSIS — T78.40XA ALLERGIC REACTION, INITIAL ENCOUNTER: Primary | ICD-10-CM

## 2019-04-29 DIAGNOSIS — I10 ESSENTIAL HYPERTENSION: ICD-10-CM

## 2019-04-29 PROCEDURE — 1123F ACP DISCUSS/DSCN MKR DOCD: CPT | Performed by: FAMILY MEDICINE

## 2019-04-29 PROCEDURE — G8427 DOCREV CUR MEDS BY ELIG CLIN: HCPCS | Performed by: FAMILY MEDICINE

## 2019-04-29 PROCEDURE — G8419 CALC BMI OUT NRM PARAM NOF/U: HCPCS | Performed by: FAMILY MEDICINE

## 2019-04-29 PROCEDURE — G8400 PT W/DXA NO RESULTS DOC: HCPCS | Performed by: FAMILY MEDICINE

## 2019-04-29 PROCEDURE — 99213 OFFICE O/P EST LOW 20 MIN: CPT | Performed by: FAMILY MEDICINE

## 2019-04-29 PROCEDURE — 4040F PNEUMOC VAC/ADMIN/RCVD: CPT | Performed by: FAMILY MEDICINE

## 2019-04-29 PROCEDURE — 1090F PRES/ABSN URINE INCON ASSESS: CPT | Performed by: FAMILY MEDICINE

## 2019-04-29 PROCEDURE — 1036F TOBACCO NON-USER: CPT | Performed by: FAMILY MEDICINE

## 2019-04-29 NOTE — TELEPHONE ENCOUNTER
Reason for Disposition   Swelling began after taking a drug    Protocols used: FACE SWELLING-ADULT-[place]    Received call from Kimberly Wilburn in [address]. Is on Lisinopril. developed cough. Takes Zyrtec for allergies. Cough. Woke this morning with Lips swollen , left side of jaw around mouth also swollen. Visible to others. No swelling to tongue or throat. No respiratory difficulty. She did take some sugar free cough medicine last night. She state she is not able to take Benadryl due to her glaucoma. Call soft transferred to Louis Torres in [address] to schedule appointment.

## 2019-04-29 NOTE — TELEPHONE ENCOUNTER
Reason for Disposition   Message left on identified voicemail    Protocols used: NO CONTACT OR DUPLICATE CONTACT CALL-ADULT-[place]    Received voicemail at [phone]. Called patient back, and left message for patient to call back.

## 2019-04-29 NOTE — PATIENT INSTRUCTIONS
feeling better. Symptoms can come back after a shot. · Wear medical alert jewelry that lists your allergies. You can buy this at most drugstores. · If your child has a severe allergy, make sure that his or her teachers, babysitters, coaches, and other caregivers know about the allergy. They should have an epinephrine shot, know how and when to give it, and have a plan to take your child to the hospital.  When should you call for help? Give an epinephrine shot if:    · You think you are having a severe allergic reaction.     · You have symptoms in more than one body area, such as mild nausea and an itchy mouth.    After giving an epinephrine shot call 911, even if you feel better.   Call 911 if:    · You have symptoms of a severe allergic reaction. These may include:  ? Sudden raised, red areas (hives) all over your body. ? Swelling of the throat, mouth, lips, or tongue. ? Trouble breathing. ? Passing out (losing consciousness). Or you may feel very lightheaded or suddenly feel weak, confused, or restless.     · You have been given an epinephrine shot, even if you feel better.    Call your doctor now or seek immediate medical care if:    · You have symptoms of an allergic reaction, such as:  ? A rash or hives (raised, red areas on the skin). ? Itching. ? Swelling. ? Belly pain, nausea, or vomiting.    Watch closely for changes in your health, and be sure to contact your doctor if:    · You do not get better as expected. Where can you learn more? Go to [email or web address]://[email or web address].[email or web address] [email or web address] org and sign in to your Rennovia account. Enter B683 in the Handshake box to learn more about \"Allergic Reaction: Care Instructions. \"     If you do not have an account, please click on the \"Sign Up Now\" link. Current as of: June 27, 2018  Content Version: 11.9  © 4017-7931 [employer]. Care instructions adapted under license by [employer] [facility] ([facility]).  If you have questions about a medical condition or this instruction, always ask your healthcare professional. [address] any warranty or liability for your use of this information. Patient Education        Learning About ACE Inhibitors  Introduction    ACE (angiotensin-converting enzyme) inhibitors stop the release of an enzyme. This enzyme makes your blood vessels smaller. Without it, your blood vessels relax and get bigger. This lowers your blood pressure. These medicines also increase how much water and salt go into your urine. This also lowers blood pressure. You may take this kind of medicine if you have high blood pressure. Or you may take it if you have heart problems, kidney problems, or diabetes. If you have coronary artery disease, this medicine can help prevent heart attacks and strokes. Examples  · Benazepril (Lotensin)  · Lisinopril (Prinivil, Zestril)  · Ramipril (Altace)  This is not a complete list.  Possible side effects  Side effects may include:  · A cough. · Low blood pressure. This can make you feel dizzy or weak. · Too much potassium in your body. · Swelling. This may be a sign of an allergic reaction. You may have other side effects or reactions not listed here. Check the information that comes with your medicine. What to know about taking this medicine  · ACE inhibitors can cause a dry cough. Talk to your doctor if you have a dry cough. You may need a different medicine. · These medicines can cause an allergic reaction. This can cause a little swelling. Or it can cause red bumps on your skin that hurt. In rare cases, the swelling may make it hard for you to breathe. · Do not take this medicine if you are pregnant. And don't take it if you plan to become pregnant. · Take your medicines exactly as prescribed. Call your doctor if you think you are having a problem with your medicine. · Check with your doctor or pharmacist before you use any other medicines. This includes over-the-counter medicines. Make sure your doctor knows all of the medicines, vitamins, herbal products, and supplements you take. Taking some medicines together can cause problems. · You may need regular blood tests. Where can you learn more? Go to https://Arkadiumpeanastacioeweb.Appticles. org and sign in to your Kout account. Enter P050 in the [facility] box to learn more about \"Learning About ACE Inhibitors. \"     If you do not have an account, please click on the \"Sign Up Now\" link. Current as of: July 22, 2018  Content Version: 11.9  © 7581-6077 [employer]. Care instructions adapted under license by [facility] ([facility]). If you have questions about a medical condition or this instruction, always ask your healthcare professional. [address] any warranty or liability for your use of this information.

## 2019-04-30 ASSESSMENT — PATIENT HEALTH QUESTIONNAIRE - PHQ9
SUM OF ALL RESPONSES TO PHQ QUESTIONS 1-9: 0
1. LITTLE INTEREST OR PLEASURE IN DOING THINGS: 0
SUM OF ALL RESPONSES TO PHQ9 QUESTIONS 1 & 2: 0
2. FEELING DOWN, DEPRESSED OR HOPELESS: 0
SUM OF ALL RESPONSES TO PHQ QUESTIONS 1-9: 0

## 2019-05-02 DIAGNOSIS — R53.83 FATIGUE, UNSPECIFIED TYPE: ICD-10-CM

## 2019-05-02 LAB
HCT VFR BLD CALC: 30 % (ref 36–48)
HEMOGLOBIN: 10.2 G/DL (ref 12–16)
MCH RBC QN AUTO: 34.9 PG (ref 26–34)
MCHC RBC AUTO-ENTMCNC: 34 G/DL (ref 31–36)
MCV RBC AUTO: 102.5 FL (ref 80–100)
PDW BLD-RTO: 14.3 % (ref 12.4–15.4)
PLATELET # BLD: 258 K/UL (ref 135–450)
PMV BLD AUTO: 8.2 FL (ref 5–10.5)
RBC # BLD: 2.92 M/UL (ref 4–5.2)
TSH REFLEX: 2.14 UIU/ML (ref 0.27–4.2)
WBC # BLD: 3.5 K/UL (ref 4–11)

## 2019-05-08 ENCOUNTER — OFFICE VISIT (OUTPATIENT)
Dept: ORTHOPEDIC SURGERY | Age: 79
End: 2019-05-08
Payer: MEDICARE

## 2019-05-08 VITALS
DIASTOLIC BLOOD PRESSURE: 60 MMHG | SYSTOLIC BLOOD PRESSURE: 120 MMHG | RESPIRATION RATE: 16 BRPM | HEART RATE: 47 BPM | HEIGHT: 63 IN | BODY MASS INDEX: 26.93 KG/M2 | WEIGHT: 152 LBS

## 2019-05-08 DIAGNOSIS — G56.02 CARPAL TUNNEL SYNDROME OF LEFT WRIST: Primary | ICD-10-CM

## 2019-05-08 PROCEDURE — G8419 CALC BMI OUT NRM PARAM NOF/U: HCPCS | Performed by: ORTHOPAEDIC SURGERY

## 2019-05-08 PROCEDURE — 20526 THER INJECTION CARP TUNNEL: CPT | Performed by: ORTHOPAEDIC SURGERY

## 2019-05-08 PROCEDURE — 1090F PRES/ABSN URINE INCON ASSESS: CPT | Performed by: ORTHOPAEDIC SURGERY

## 2019-05-08 PROCEDURE — 99203 OFFICE O/P NEW LOW 30 MIN: CPT | Performed by: ORTHOPAEDIC SURGERY

## 2019-05-08 PROCEDURE — G8427 DOCREV CUR MEDS BY ELIG CLIN: HCPCS | Performed by: ORTHOPAEDIC SURGERY

## 2019-05-08 NOTE — Clinical Note
Dear  Madai Gomez MD,Thank you very much for your referral or Ms. Dajuan Atwood to me for evaluation and treatment of her Hand & Wrist condition. I appreciate your confidence in me and thank you for allowing me the opportunity to care for your patients. If I can be of any further assistance to you on this or any other patient, please do not hesitate to contact me. Sincerely,Herminio Zapata MD

## 2019-05-08 NOTE — PROGRESS NOTES
Ms. Kacy Muller is a 66 y.o. right handed retiree  who is seen today in Hand Surgical Consultation at the request of Roberto Nick MD.    She presents today regarding Left greater than Right symptoms which have been present for approximately many years. A history of antecedent trauma or injury is Absent. She reports symptoms to include moderate numbness & tingling in the Median Innervated Digits. Hand symptoms do Frequently awaken her from sleep. She reports moderate pain and burning located in the palmar left wrist. Symptoms are worsening over time. Previous treatment has included wrist splints used for several months, which has been not very effective. She does not claim relation of her symptoms to her required work activities. She has undergone electrodiagnostic testing. The patient's , past medical history, medications, allergies,  family history, social history, and review of systems have been reviewed, and dated and are recorded in the chart. Physical Exam:  Ms. Mitch Hernandez most recent vitals:  Vitals  BP: 120/60  Pulse: (!) 47  Resp: 16  Height: 5' 3\" (160 cm)  Weight: 152 lb (68.9 kg)    She is well nourished, oriented to person, place & time. She demonstrates appropriate mood and affect as well as normal gait and station. Skin: Skin color, texture, turgor normal. No rashes or lesions bilaterally. 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 Sensation is subjectively tingling in the Median Innervated Digits on the Left, greater than Right and objectively present in the same distribution bilaterally. Vascular examination reveals normal and good capillary refill bilaterally  Swelling is mild in the distal volar forearm on the Left, greater than Right  Muscular strength is clinically appropriate bilaterally.   Examination for Carpal Tunnel Syndrome shows Carpal Tunnel Compression Test to be Mildly Positive on the right & Moderately Positive on the left. The patient displays moderate baseline symptoms to potentially confound the exam.  The thenar musculature is mildly atrophied & weakened. Examination for Stenosing Tenosynovitis demonstrates no evidence of tenderness, thickening or nodularity at the A-1 pulleys of the digits bilaterally. There no palpable triggering or any finger or thumb. Examination of the first dorsal extensor compartments of the wrist demonstrates no thickening or fullness. There is no tenderness to palpation over the extensor tendons. Pain is not elicited with resisted thumb extension, and Finklestein's maneuver is negative bilaterally. Review of Electrodiagnostic Testing:  Test performed on: 9/27/18    NERVE CONDUCTION STUDY:  RIGHT   Median Nerve: Sensory Latency: 4.58  Motor Latency: 4.53  Ulnar Nerve:  Conduction Velocity:  60    LEFT  Median Nerve: Sensory Latency: 4.69  Motor Latency: 4.32  Ulnar Nerve:  Conduction Velocity:  60    EMG:  RIGHT  Normal    LEFT  Normal        Impression:  Ms. Sosa Gonzalez is showing clinical evidence of Carpal Tunnel Syndrome and presents requesting further treatment. Plan:  After discussion of the treatment options available for carpal tunnel syndrome and review of Ms. Myriam Epps's previous treatments, we have selected to proceed with an injection to the left carpal tunnel(s). I have outlined for her the nature of the injection, and the pre, alicja and post injection considerations and the appropriate expectations for this injection. We discussed appropriate expectations for symptom resolution & likely duration of the relief. She voiced an understanding of these, had her questions answered fully and did wish to proceed. Procedure: left Carpal Tunnel Injection  [first Injection]:   After full discussion of the nature of this process and outlining a treatment plan with Ms. Dajuan Atwood, we discussed the complications, limitations, expectations, alternatives, and risks of injection to the carpal tunnel. She understood this information well and verbally consented to this treatment. The skin of the symptomatic extremity was prepped with Isopropyl Alcohol and under aseptic conditions the carpal tunnel was injected with a combination of 1 ml of 0.25% Bupivacaine without Epinephrine and 40 mg of Triamcinolone (40 mg/ml). There was good filling of the carpal tunnel. A  dry, sterile bandage was applied and she tolerated the injection without difficulty. I advised her of the expected response, possible reactions and the instructions for care of the hand. I advised the patient of the expected response, possible reactions and the instructions for care of the hand. I have also discussed with Ms. Dajuan Atwood the other treatment options available to her for this condition. We have today selected to proceed with treatment by injection with steroid medication. She and I have agreed that if our current course of Injection treatment does not prove to be effective over the short term future, that she will schedule a follow-up appointment to discuss and select an alternate course of therapy including possibly further conservative treatment or surgical treatment. With regard to her right wrist:  After discussion of the treatment options available for carpal tunnel syndrome, I have outlined for Ms. Dajuan Atwood a conservative plan of treatment consisting of: the use of wrist splints, activity modification, and the judicious use of over-the-counter anti-inflammatory medications. Appropriately sized removable carpal tunnel orthosis already has. Instructions were given regarding splint use and wear as well as suggestions for use of the other modalities discussed. She voiced an appropriate understanding of our discussion and of the expectations of treatment.     I have also discussed with Ms. Steffanie Ashford the other treatment options available to her for this condition. We have today selected to proceed with conservative management. She and I have agreed that if our current course of conservative treatment does not prove to be effective over the short term future, that she will schedule a follow-up appointment to discuss and select an alternate course of therapy including possibly injection or surgical treatment. Ms. Steffanie Ashford has been given a full verbal list of instructions and precautions related to her present condition. I have asked her to followup with me in the office at the prescribed time. She is also specifically requested to call or return to the office sooner if her symptoms change or worsen prior to the next scheduled appointment.

## 2019-05-09 ENCOUNTER — OFFICE VISIT (OUTPATIENT)
Dept: PRIMARY CARE CLINIC | Age: 79
End: 2019-05-09
Payer: MEDICARE

## 2019-05-09 VITALS
OXYGEN SATURATION: 97 % | RESPIRATION RATE: 18 BRPM | HEART RATE: 52 BPM | TEMPERATURE: 98.5 F | DIASTOLIC BLOOD PRESSURE: 65 MMHG | BODY MASS INDEX: 27.28 KG/M2 | SYSTOLIC BLOOD PRESSURE: 136 MMHG | WEIGHT: 154 LBS

## 2019-05-09 DIAGNOSIS — R22.0 FACIAL SWELLING: ICD-10-CM

## 2019-05-09 DIAGNOSIS — A09 DIARRHEA OF INFECTIOUS ORIGIN: Primary | ICD-10-CM

## 2019-05-09 PROCEDURE — 1123F ACP DISCUSS/DSCN MKR DOCD: CPT | Performed by: INTERNAL MEDICINE

## 2019-05-09 PROCEDURE — 1036F TOBACCO NON-USER: CPT | Performed by: INTERNAL MEDICINE

## 2019-05-09 PROCEDURE — 99213 OFFICE O/P EST LOW 20 MIN: CPT | Performed by: INTERNAL MEDICINE

## 2019-05-09 PROCEDURE — G8400 PT W/DXA NO RESULTS DOC: HCPCS | Performed by: INTERNAL MEDICINE

## 2019-05-09 PROCEDURE — 1090F PRES/ABSN URINE INCON ASSESS: CPT | Performed by: INTERNAL MEDICINE

## 2019-05-09 PROCEDURE — G8427 DOCREV CUR MEDS BY ELIG CLIN: HCPCS | Performed by: INTERNAL MEDICINE

## 2019-05-09 PROCEDURE — G8419 CALC BMI OUT NRM PARAM NOF/U: HCPCS | Performed by: INTERNAL MEDICINE

## 2019-05-09 PROCEDURE — 4040F PNEUMOC VAC/ADMIN/RCVD: CPT | Performed by: INTERNAL MEDICINE

## 2019-05-09 RX ORDER — ATENOLOL 25 MG/1
12.5 TABLET ORAL DAILY
Qty: 45 TABLET | Refills: 3 | Status: SHIPPED | OUTPATIENT
Start: 2019-05-09 | End: 2019-05-23

## 2019-05-09 ASSESSMENT — ENCOUNTER SYMPTOMS
SORE THROAT: 0
NAUSEA: 0
VISUAL CHANGE: 0
FLATUS: 0
SWOLLEN GLANDS: 0
BLOATING: 0
COUGH: 0
EYES NEGATIVE: 1
ALLERGIC/IMMUNOLOGIC NEGATIVE: 1
DIARRHEA: 1
RESPIRATORY NEGATIVE: 1
ABDOMINAL PAIN: 0
VOMITING: 0

## 2019-05-09 ASSESSMENT — PATIENT HEALTH QUESTIONNAIRE - PHQ9
SUM OF ALL RESPONSES TO PHQ QUESTIONS 1-9: 0
1. LITTLE INTEREST OR PLEASURE IN DOING THINGS: 0
SUM OF ALL RESPONSES TO PHQ9 QUESTIONS 1 & 2: 0
SUM OF ALL RESPONSES TO PHQ QUESTIONS 1-9: 0
2. FEELING DOWN, DEPRESSED OR HOPELESS: 0

## 2019-05-09 NOTE — PROGRESS NOTES
2019     Jerry Gonzales (:  1940) is a 66 y.o. female, here for evaluation of the following medical concerns:    Diarrhea    This is a new (Patient was diagnosed with C. diff over the bowel and treated with vancomycin for a week and improved after week symptoms return and was treated for 10 days with vancomycin. Stool starting to form narrowing no more diarrhea. Tinea to monitor.) problem. The current episode started 1 to 4 weeks ago. The problem has been gradually improving. Diarrhea characteristics: Starting to form. No watery stools no blood. The patient states that diarrhea does not awaken her from sleep. Pertinent negatives include no abdominal pain, arthralgias, bloating, chills, coughing, fever, headaches, increased  flatus, myalgias, sweats, URI, vomiting or weight loss. Nothing aggravates the symptoms. There are no known risk factors. Treatments tried: Treated with vancomycin twice. The treatment provided significant relief. There is no history of inflammatory bowel disease or a recent abdominal surgery. Other   Chronicity: Last visit patient presented with facial swelling and lip swelling and cough. She was on lisinopril that was discontinued. The problem has been resolved (Symptoms resolved within 3 days of discontinuing lisinopril.). Pertinent negatives include no abdominal pain, arthralgias, chills, coughing, fatigue, fever, headaches, joint swelling, myalgias, nausea, neck pain, rash, sore throat, swollen glands, urinary symptoms, vertigo, visual change, vomiting or weakness. Exacerbated by: Lisinopril cough symptoms. This was placed on allergy list. Treatments tried: Discontinuation of lisinopril. The treatment provided significant relief.      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    Pure hypercholesterolemia    Essential hypertension    Stage 3 chronic kidney disease (HCC)    Vitamin D deficiency    Fibromyalgia    Type 2 vancomycin (VANCOCIN) 125 MG capsule Take 1 capsule by mouth 4 times daily for 21 days Yes Marcelo Noriega MD   potassium chloride (KLOR-CON) 10 MEQ extended release tablet TAKE ONE TABLET BY MOUTH DAILY Yes Marcelo Noriega MD   baclofen (LIORESAL) 10 MG tablet TAKE ONE TABLET BY MOUTH THREE TIMES A DAY Yes Marcelo Noriega MD   brimonidine-timolol (COMBIGAN) 0.2-0.5 % ophthalmic solution Place 1 drop into both eyes every 12 hours Yes Historical Provider, MD   insulin glargine (LANTUS SOLOSTAR) 100 UNIT/ML injection pen Inject 25 Units into the skin nightly Lease refill 8/23/18 per patient requests Yes Marcelo Noriega MD   Insulin Pen Needle 31G X 8 MM MISC 1 each by Does not apply route daily Yes Marcelo Noriega MD   folic acid (FOLVITE) 1 MG tablet Take 1 tablet by mouth daily Yes Marcelo Noriega MD   latanoprost (XALATAN) 0.005 % ophthalmic solution 1 drop nightly Yes Historical Provider, MD   Coenzyme Q10 (COQ10) 100 MG CAPS Take  by mouth. Yes Historical Provider, MD   Cyanocobalamin (B-12) 500 MCG TABS Take  by mouth. Yes Historical Provider, MD   Lansoprazole (PREVACID PO) Take  by mouth. Yes Historical Provider, MD        Social History     Tobacco Use    Smoking status: Never Smoker    Smokeless tobacco: Never Used   Substance Use Topics    Alcohol use: No        Vitals:    05/09/19 0841   BP: 136/65   Pulse: 52   Resp: 18   Temp: 98.5 °F (36.9 °C)   TempSrc: Oral   SpO2: 97%   Weight: 154 lb (69.9 kg)     Estimated body mass index is 27.28 kg/m² as calculated from the following:    Height as of 5/8/19: 5' 3\" (1.6 m). Weight as of this encounter: 154 lb (69.9 kg). Physical Exam   Constitutional: She is oriented to person, place, and time. She appears well-developed and well-nourished. No distress. HENT:   Head: Normocephalic and atraumatic.    Right Ear: External ear normal.   Left Ear: External ear normal.   Nose: Nose normal.   Mouth/Throat: Oropharynx is clear and moist. No oropharyngeal exudate. Eyes: Pupils are equal, round, and reactive to light. Conjunctivae and EOM are normal. Right eye exhibits no discharge. Left eye exhibits no discharge. No scleral icterus. Neck: Normal range of motion. Neck supple. No JVD present. No tracheal deviation present. No thyromegaly present. Cardiovascular: Normal rate, regular rhythm, normal heart sounds and intact distal pulses. Exam reveals no gallop. No murmur heard. Pulmonary/Chest: Effort normal and breath sounds normal. No respiratory distress. She has no wheezes. She has no rales. She exhibits no tenderness. Abdominal: Soft. Bowel sounds are normal. She exhibits no distension and no mass. There is no tenderness. There is no rebound and no guarding. Musculoskeletal: Normal range of motion. She exhibits no edema, tenderness or deformity. Neurological: She is alert and oriented to person, place, and time. No cranial nerve deficit. She exhibits normal muscle tone. Coordination normal.   Skin: Skin is warm and dry. No rash noted. She is not diaphoretic. No erythema. No pallor. Psychiatric: She has a normal mood and affect. Her behavior is normal. Judgment and thought content normal.       ASSESSMENT/PLAN:   Diagnosis Orders   1. Diarrhea of infectious origin with positive C. diff culture resolved after second round of vancomycin. Continue to monitor. 2. Facial swelling secondary to ACE inhibitor with cough. Symptoms have resolved. Blood pressure controlled with atenolol 25 mg daily with pulse rates low normal 52 and patient notices that it's 47 intermittently at home. Reduce atenolol 25 mg daily-12.5 mg daily. The patient is unable to split tablet to take it every other day. And will follow-up. Helena received counseling on the following healthy behaviors: medication adherence    Patient given educational materials on after visit summary with diagnosis and treatment plan.     I have instructed Helena to complete a self tracking handout on Blood Pressures  and Weights and instructed them to bring it with them to her next appointment. Discussed use, benefit, and side effects of prescribed medications. Barriers to medication compliance addressed. All patient questions answered. Pt voiced understanding. An electronic signature was used to authenticate this note.     --Itzel Smiley MD on 5/9/2019 at 9:52 AM

## 2019-05-10 DIAGNOSIS — Z79.4 TYPE 2 DIABETES MELLITUS WITH COMPLICATION, WITH LONG-TERM CURRENT USE OF INSULIN (HCC): ICD-10-CM

## 2019-05-10 DIAGNOSIS — I10 ESSENTIAL HYPERTENSION: ICD-10-CM

## 2019-05-10 DIAGNOSIS — E11.8 TYPE 2 DIABETES MELLITUS WITH COMPLICATION, WITH LONG-TERM CURRENT USE OF INSULIN (HCC): ICD-10-CM

## 2019-05-10 DIAGNOSIS — D53.9 MACROCYTIC ANEMIA: ICD-10-CM

## 2019-05-10 LAB
A/G RATIO: 1.2 (ref 1.1–2.2)
ALBUMIN SERPL-MCNC: 3.9 G/DL (ref 3.4–5)
ALP BLD-CCNC: 68 U/L (ref 40–129)
ALT SERPL-CCNC: 16 U/L (ref 10–40)
ANION GAP SERPL CALCULATED.3IONS-SCNC: 14 MMOL/L (ref 3–16)
AST SERPL-CCNC: 15 U/L (ref 15–37)
BASOPHILS ABSOLUTE: 0 K/UL (ref 0–0.2)
BASOPHILS RELATIVE PERCENT: 0.6 %
BILIRUB SERPL-MCNC: <0.2 MG/DL (ref 0–1)
BUN BLDV-MCNC: 25 MG/DL (ref 7–20)
CALCIUM SERPL-MCNC: 9.6 MG/DL (ref 8.3–10.6)
CHLORIDE BLD-SCNC: 103 MMOL/L (ref 99–110)
CHOLESTEROL, TOTAL: 245 MG/DL (ref 0–199)
CO2: 23 MMOL/L (ref 21–32)
CREAT SERPL-MCNC: 1.2 MG/DL (ref 0.6–1.2)
CREATININE URINE: 169.9 MG/DL (ref 28–259)
EOSINOPHILS ABSOLUTE: 0 K/UL (ref 0–0.6)
EOSINOPHILS RELATIVE PERCENT: 0.1 %
FOLATE: >20 NG/ML (ref 4.78–24.2)
GFR AFRICAN AMERICAN: 52
GFR NON-AFRICAN AMERICAN: 43
GLOBULIN: 3.3 G/DL
GLUCOSE BLD-MCNC: 113 MG/DL (ref 70–99)
HCT VFR BLD CALC: 30.3 % (ref 36–48)
HDLC SERPL-MCNC: 60 MG/DL (ref 40–60)
HEMOGLOBIN: 10.1 G/DL (ref 12–16)
LDL CHOLESTEROL CALCULATED: 168 MG/DL
LYMPHOCYTES ABSOLUTE: 0.8 K/UL (ref 1–5.1)
LYMPHOCYTES RELATIVE PERCENT: 14.8 %
MCH RBC QN AUTO: 34.8 PG (ref 26–34)
MCHC RBC AUTO-ENTMCNC: 33.5 G/DL (ref 31–36)
MCV RBC AUTO: 103.9 FL (ref 80–100)
MICROALBUMIN UR-MCNC: 3.6 MG/DL
MICROALBUMIN/CREAT UR-RTO: 21.2 MG/G (ref 0–30)
MONOCYTES ABSOLUTE: 0.3 K/UL (ref 0–1.3)
MONOCYTES RELATIVE PERCENT: 5.7 %
NEUTROPHILS ABSOLUTE: 4.1 K/UL (ref 1.7–7.7)
NEUTROPHILS RELATIVE PERCENT: 78.8 %
PDW BLD-RTO: 14.5 % (ref 12.4–15.4)
PLATELET # BLD: 249 K/UL (ref 135–450)
PMV BLD AUTO: 8.7 FL (ref 5–10.5)
POTASSIUM SERPL-SCNC: 4 MMOL/L (ref 3.5–5.1)
RBC # BLD: 2.91 M/UL (ref 4–5.2)
SODIUM BLD-SCNC: 140 MMOL/L (ref 136–145)
TOTAL PROTEIN: 7.2 G/DL (ref 6.4–8.2)
TRIGL SERPL-MCNC: 86 MG/DL (ref 0–150)
TSH REFLEX FT4: 2.2 UIU/ML (ref 0.27–4.2)
VITAMIN B-12: >2000 PG/ML (ref 211–911)
VLDLC SERPL CALC-MCNC: 17 MG/DL
WBC # BLD: 5.2 K/UL (ref 4–11)

## 2019-05-11 LAB
ESTIMATED AVERAGE GLUCOSE: 148.5 MG/DL
HBA1C MFR BLD: 6.8 %

## 2019-05-12 DIAGNOSIS — E78.00 PURE HYPERCHOLESTEROLEMIA: Primary | ICD-10-CM

## 2019-05-12 RX ORDER — EZETIMIBE 10 MG/1
10 TABLET ORAL DAILY
Qty: 30 TABLET | Refills: 3 | Status: SHIPPED | OUTPATIENT
Start: 2019-05-12 | End: 2019-10-01 | Stop reason: SDUPTHER

## 2019-05-22 ENCOUNTER — NURSE TRIAGE (OUTPATIENT)
Dept: OTHER | Facility: CLINIC | Age: 79
End: 2019-05-22

## 2019-05-23 ENCOUNTER — OFFICE VISIT (OUTPATIENT)
Dept: PRIMARY CARE CLINIC | Age: 79
End: 2019-05-23
Payer: MEDICARE

## 2019-05-23 VITALS
HEART RATE: 50 BPM | SYSTOLIC BLOOD PRESSURE: 140 MMHG | HEIGHT: 63 IN | DIASTOLIC BLOOD PRESSURE: 60 MMHG | WEIGHT: 149 LBS | BODY MASS INDEX: 26.4 KG/M2

## 2019-05-23 DIAGNOSIS — I10 ESSENTIAL HYPERTENSION: Primary | ICD-10-CM

## 2019-05-23 DIAGNOSIS — R42 DIZZINESS: ICD-10-CM

## 2019-05-23 DIAGNOSIS — R00.1 BRADYCARDIA: ICD-10-CM

## 2019-05-23 PROCEDURE — G8427 DOCREV CUR MEDS BY ELIG CLIN: HCPCS | Performed by: INTERNAL MEDICINE

## 2019-05-23 PROCEDURE — 4040F PNEUMOC VAC/ADMIN/RCVD: CPT | Performed by: INTERNAL MEDICINE

## 2019-05-23 PROCEDURE — G8419 CALC BMI OUT NRM PARAM NOF/U: HCPCS | Performed by: INTERNAL MEDICINE

## 2019-05-23 PROCEDURE — 1090F PRES/ABSN URINE INCON ASSESS: CPT | Performed by: INTERNAL MEDICINE

## 2019-05-23 PROCEDURE — 1036F TOBACCO NON-USER: CPT | Performed by: INTERNAL MEDICINE

## 2019-05-23 PROCEDURE — 99214 OFFICE O/P EST MOD 30 MIN: CPT | Performed by: INTERNAL MEDICINE

## 2019-05-23 PROCEDURE — 1123F ACP DISCUSS/DSCN MKR DOCD: CPT | Performed by: INTERNAL MEDICINE

## 2019-05-23 PROCEDURE — G8400 PT W/DXA NO RESULTS DOC: HCPCS | Performed by: INTERNAL MEDICINE

## 2019-05-23 RX ORDER — AMLODIPINE BESYLATE 2.5 MG/1
2.5 TABLET ORAL DAILY
Qty: 30 TABLET | Refills: 3 | Status: SHIPPED | OUTPATIENT
Start: 2019-05-23 | End: 2019-06-17 | Stop reason: SINTOL

## 2019-05-23 ASSESSMENT — ENCOUNTER SYMPTOMS
SWOLLEN GLANDS: 0
ALLERGIC/IMMUNOLOGIC NEGATIVE: 1
RESPIRATORY NEGATIVE: 1
VISUAL CHANGE: 0
EYES NEGATIVE: 1
NAUSEA: 0
VOMITING: 0
SORE THROAT: 0
BLOATING: 0
ABDOMINAL PAIN: 0
COUGH: 0
FLATUS: 0

## 2019-05-23 NOTE — PROGRESS NOTES
Negative for rash. Allergic/Immunologic: Negative. Neurological: Positive for dizziness and light-headedness. Negative for vertigo, weakness and headaches. Hematological: Negative. Psychiatric/Behavioral: Negative. Prior to Visit Medications    Medication Sig Taking? Authorizing Provider   ezetimibe (ZETIA) 10 MG tablet Take 1 tablet by mouth daily Yes Mathew Quezada MD   atenolol (TENORMIN) 25 MG tablet Take 0.5 tablets by mouth daily Does not need yet, letting you know of dosage change. Yes Mathew Quezada MD   potassium chloride (KLOR-CON) 10 MEQ extended release tablet TAKE ONE TABLET BY MOUTH DAILY Yes Mathew Quezada MD   baclofen (LIORESAL) 10 MG tablet TAKE ONE TABLET BY MOUTH THREE TIMES A DAY Yes Mathew Quezada MD   brimonidine-timolol (COMBIGAN) 0.2-0.5 % ophthalmic solution Place 1 drop into both eyes every 12 hours Yes Historical Provider, MD   insulin glargine (LANTUS SOLOSTAR) 100 UNIT/ML injection pen Inject 25 Units into the skin nightly Lease refill 8/23/18 per patient requests Yes Mathew Quezada MD   Insulin Pen Needle 31G X 8 MM MISC 1 each by Does not apply route daily Yes Mathew Quezada MD   folic acid (FOLVITE) 1 MG tablet Take 1 tablet by mouth daily Yes Mathew Quezada MD   latanoprost (XALATAN) 0.005 % ophthalmic solution 1 drop nightly Yes Historical Provider, MD   Coenzyme Q10 (COQ10) 100 MG CAPS Take  by mouth. Yes Historical Provider, MD   Cyanocobalamin (B-12) 500 MCG TABS Take  by mouth. Yes Historical Provider, MD   Lansoprazole (PREVACID PO) Take  by mouth.    Yes Historical Provider, MD        Social History     Tobacco Use    Smoking status: Never Smoker    Smokeless tobacco: Never Used   Substance Use Topics    Alcohol use: No        Vitals:    05/23/19 0933   BP: (!) 149/66   Pulse: 52   Weight: 149 lb (67.6 kg)   Height: 5' 3\" (1.6 m)     Estimated body mass index is 26.39 kg/m² as calculated from the following:    Height as of this encounter: 5' 3\" (1.6 m). Weight as of this encounter: 149 lb (67.6 kg). Physical Exam   Constitutional: She is oriented to person, place, and time. She appears well-nourished. No distress. Eyes: Conjunctivae and EOM are normal.   Neck: Normal range of motion. Neck supple. No thyromegaly present. Cardiovascular: Regular rhythm and normal heart sounds. Bradycardia present. Pulmonary/Chest: Breath sounds normal.   Abdominal: Soft. She exhibits no distension. Musculoskeletal: Normal range of motion. She exhibits no edema. Neurological: She is alert and oriented to person, place, and time. Skin: Skin is warm and dry. No rash noted. No erythema. No pallor. Psychiatric: She has a normal mood and affect. Her behavior is normal. Judgment and thought content normal.     Helena was seen today for dizziness. Diagnoses and all orders for this visit:    Essential hypertension replacing  Atenolol with Norvasc b/e bradycardia   -     amLODIPine (NORVASC) 2.5 MG tablet;  Take 1 tablet by mouth daily    Dizziness  SE of meds     Bradycardia  DC Atenolol         --Ann Vogt MD on 5/23/2019 at 10:08 AM

## 2019-06-17 ENCOUNTER — OFFICE VISIT (OUTPATIENT)
Dept: PRIMARY CARE CLINIC | Age: 79
End: 2019-06-17
Payer: MEDICARE

## 2019-06-17 VITALS
SYSTOLIC BLOOD PRESSURE: 150 MMHG | OXYGEN SATURATION: 96 % | BODY MASS INDEX: 27.99 KG/M2 | DIASTOLIC BLOOD PRESSURE: 66 MMHG | RESPIRATION RATE: 18 BRPM | TEMPERATURE: 98 F | HEART RATE: 66 BPM | WEIGHT: 158 LBS

## 2019-06-17 DIAGNOSIS — R51.9 LEFT TEMPORAL HEADACHE: Primary | ICD-10-CM

## 2019-06-17 PROCEDURE — 1123F ACP DISCUSS/DSCN MKR DOCD: CPT | Performed by: INTERNAL MEDICINE

## 2019-06-17 PROCEDURE — 1090F PRES/ABSN URINE INCON ASSESS: CPT | Performed by: INTERNAL MEDICINE

## 2019-06-17 PROCEDURE — G8427 DOCREV CUR MEDS BY ELIG CLIN: HCPCS | Performed by: INTERNAL MEDICINE

## 2019-06-17 PROCEDURE — 99213 OFFICE O/P EST LOW 20 MIN: CPT | Performed by: INTERNAL MEDICINE

## 2019-06-17 PROCEDURE — G8400 PT W/DXA NO RESULTS DOC: HCPCS | Performed by: INTERNAL MEDICINE

## 2019-06-17 PROCEDURE — 1036F TOBACCO NON-USER: CPT | Performed by: INTERNAL MEDICINE

## 2019-06-17 PROCEDURE — 4040F PNEUMOC VAC/ADMIN/RCVD: CPT | Performed by: INTERNAL MEDICINE

## 2019-06-17 PROCEDURE — G8419 CALC BMI OUT NRM PARAM NOF/U: HCPCS | Performed by: INTERNAL MEDICINE

## 2019-06-17 RX ORDER — NEBIVOLOL 2.5 MG/1
2.5 TABLET ORAL DAILY
Qty: 30 TABLET | Refills: 5 | Status: SHIPPED | OUTPATIENT
Start: 2019-06-17 | End: 2019-12-26 | Stop reason: SDUPTHER

## 2019-06-17 RX ORDER — SPIRONOLACTONE 25 MG/1
25 TABLET ORAL DAILY
Qty: 30 TABLET | Refills: 5 | Status: SHIPPED | OUTPATIENT
Start: 2019-06-17 | End: 2019-12-12 | Stop reason: SDUPTHER

## 2019-06-17 ASSESSMENT — ENCOUNTER SYMPTOMS
VISUAL CHANGE: 0
ABDOMINAL PAIN: 0
SORE THROAT: 0
SHORTNESS OF BREATH: 0
PHOTOPHOBIA: 0
EYES NEGATIVE: 1
VOMITING: 0
EYE REDNESS: 0
DIARRHEA: 0
SCALP TENDERNESS: 0
SWOLLEN GLANDS: 0
ORTHOPNEA: 0
RESPIRATORY NEGATIVE: 1
SINUS PRESSURE: 0
EYE PAIN: 0
COUGH: 0
BLURRED VISION: 0
NAUSEA: 0
ALLERGIC/IMMUNOLOGIC NEGATIVE: 1

## 2019-06-17 NOTE — PROGRESS NOTES
2019     Liliya Mahmood (:  1940) is a 78 y.o. female, here for evaluation of the following medical concerns:    Headache    This is a new (started 19) problem. The current episode started 1 to 4 weeks ago. The problem occurs constantly. The problem has been waxing and waning. The pain is located in the left unilateral (left parietal area. radiates to left neck.) region. The pain quality is not similar to prior headaches (rarely has headaches prior to 19.). The quality of the pain is described as aching. The pain is at a severity of 8/10. The pain is severe. Associated symptoms include dizziness and a loss of balance. Pertinent negatives include no abdominal pain, abnormal behavior, anorexia, blurred vision, coughing, ear pain, eye pain, eye redness, fever, hearing loss, muscle aches, nausea, neck pain, numbness, phonophobia, photophobia, scalp tenderness, seizures, sinus pressure, sore throat, swollen glands, tingling, tinnitus, visual change, vomiting, weakness or weight loss. Nothing aggravates the symptoms. She has tried acetaminophen for the symptoms. The treatment provided no relief. Hypertension   This is a chronic problem. The current episode started more than 1 year ago. The problem is unchanged. The problem is uncontrolled. Pertinent negatives include no anxiety, blurred vision, chest pain, headaches, malaise/fatigue, neck pain, orthopnea, palpitations, peripheral edema, PND, shortness of breath or sweats. There are no associated agents to hypertension. Risk factors for coronary artery disease include dyslipidemia, diabetes mellitus and post-menopausal state.      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    Pure hypercholesterolemia    Essential hypertension    Stage 3 chronic kidney disease (HCC)    Vitamin D deficiency    Fibromyalgia    Type 2 diabetes mellitus with complication, with long-term current use of insulin ([address]   

## 2019-06-24 ENCOUNTER — APPOINTMENT (OUTPATIENT)
Dept: CT IMAGING | Age: 79
DRG: 390 | End: 2019-06-24
Payer: MEDICARE

## 2019-06-24 ENCOUNTER — HOSPITAL ENCOUNTER (INPATIENT)
Age: 79
LOS: 3 days | Discharge: HOME | DRG: 390 | End: 2019-06-27
Attending: EMERGENCY MEDICINE | Admitting: INTERNAL MEDICINE
Payer: MEDICARE

## 2019-06-24 DIAGNOSIS — K56.609 SMALL BOWEL OBSTRUCTION (HCC): Primary | ICD-10-CM

## 2019-06-24 LAB
A/G RATIO: 1.2 (ref 1.1–2.2)
ALBUMIN SERPL-MCNC: 4.5 G/DL (ref 3.4–5)
ALP BLD-CCNC: 94 U/L (ref 40–129)
ALT SERPL-CCNC: 17 U/L (ref 10–40)
ANION GAP SERPL CALCULATED.3IONS-SCNC: 14 MMOL/L (ref 3–16)
AST SERPL-CCNC: 31 U/L (ref 15–37)
BACTERIA: ABNORMAL /HPF
BASE EXCESS VENOUS: 5 (ref -3–3)
BASOPHILS ABSOLUTE: 0 K/UL (ref 0–0.2)
BASOPHILS ABSOLUTE: 0 K/UL (ref 0–0.2)
BASOPHILS RELATIVE PERCENT: 0.1 %
BASOPHILS RELATIVE PERCENT: 0.3 %
BILIRUB SERPL-MCNC: 0.3 MG/DL (ref 0–1)
BILIRUBIN URINE: NEGATIVE
BLOOD, URINE: NEGATIVE
BUN BLDV-MCNC: 15 MG/DL (ref 7–20)
CALCIUM SERPL-MCNC: 10.3 MG/DL (ref 8.3–10.6)
CHLORIDE BLD-SCNC: 102 MMOL/L (ref 99–110)
CLARITY: CLEAR
CO2: 25 MMOL/L (ref 21–32)
COLOR: YELLOW
CREAT SERPL-MCNC: 0.8 MG/DL (ref 0.6–1.2)
EOSINOPHILS ABSOLUTE: 0 K/UL (ref 0–0.6)
EOSINOPHILS ABSOLUTE: 0.1 K/UL (ref 0–0.6)
EOSINOPHILS RELATIVE PERCENT: 0.1 %
EOSINOPHILS RELATIVE PERCENT: 0.7 %
EPITHELIAL CELLS, UA: ABNORMAL /HPF
GFR AFRICAN AMERICAN: >60
GFR NON-AFRICAN AMERICAN: >60
GLOBULIN: 3.9 G/DL
GLUCOSE BLD-MCNC: 117 MG/DL (ref 70–99)
GLUCOSE BLD-MCNC: 144 MG/DL (ref 70–99)
GLUCOSE URINE: NEGATIVE MG/DL
HCO3 VENOUS: 29.2 MMOL/L (ref 23–29)
HCT VFR BLD CALC: 35.7 % (ref 36–48)
HCT VFR BLD CALC: 35.9 % (ref 36–48)
HEMOGLOBIN: 11.5 G/DL (ref 12–16)
HEMOGLOBIN: 12.1 G/DL (ref 12–16)
KETONES, URINE: ABNORMAL MG/DL
LACTATE: 0.94 MMOL/L (ref 0.4–2)
LACTIC ACID: 1.7 MMOL/L (ref 0.4–2)
LEUKOCYTE ESTERASE, URINE: NEGATIVE
LYMPHOCYTES ABSOLUTE: 0.6 K/UL (ref 1–5.1)
LYMPHOCYTES ABSOLUTE: 0.8 K/UL (ref 1–5.1)
LYMPHOCYTES RELATIVE PERCENT: 5.1 %
LYMPHOCYTES RELATIVE PERCENT: 9.6 %
MCH RBC QN AUTO: 32.9 PG (ref 26–34)
MCH RBC QN AUTO: 33.8 PG (ref 26–34)
MCHC RBC AUTO-ENTMCNC: 32.4 G/DL (ref 31–36)
MCHC RBC AUTO-ENTMCNC: 33.6 G/DL (ref 31–36)
MCV RBC AUTO: 100.6 FL (ref 80–100)
MCV RBC AUTO: 101.6 FL (ref 80–100)
MICROSCOPIC EXAMINATION: YES
MONOCYTES ABSOLUTE: 0.3 K/UL (ref 0–1.3)
MONOCYTES ABSOLUTE: 0.3 K/UL (ref 0–1.3)
MONOCYTES RELATIVE PERCENT: 2.6 %
MONOCYTES RELATIVE PERCENT: 3.7 %
NEUTROPHILS ABSOLUTE: 10.3 K/UL (ref 1.7–7.7)
NEUTROPHILS ABSOLUTE: 7.6 K/UL (ref 1.7–7.7)
NEUTROPHILS RELATIVE PERCENT: 85.7 %
NEUTROPHILS RELATIVE PERCENT: 92.1 %
NITRITE, URINE: NEGATIVE
O2 SAT, VEN: 83 %
PCO2, VEN: 42.4 MM HG (ref 40–50)
PDW BLD-RTO: 14.1 % (ref 12.4–15.4)
PDW BLD-RTO: 14.4 % (ref 12.4–15.4)
PERFORMED ON: ABNORMAL
PERFORMED ON: ABNORMAL
PH UA: 5.5 (ref 5–8)
PH VENOUS: 7.45 (ref 7.35–7.45)
PLATELET # BLD: 260 K/UL (ref 135–450)
PLATELET # BLD: 270 K/UL (ref 135–450)
PMV BLD AUTO: 8 FL (ref 5–10.5)
PMV BLD AUTO: 8.4 FL (ref 5–10.5)
PO2, VEN: 46 MM HG
POC SAMPLE TYPE: ABNORMAL
POTASSIUM REFLEX MAGNESIUM: 4.2 MMOL/L (ref 3.5–5.1)
PROTEIN UA: ABNORMAL MG/DL
RBC # BLD: 3.51 M/UL (ref 4–5.2)
RBC # BLD: 3.57 M/UL (ref 4–5.2)
RBC UA: ABNORMAL /HPF (ref 0–2)
SODIUM BLD-SCNC: 141 MMOL/L (ref 136–145)
SPECIFIC GRAVITY UA: 1.02 (ref 1–1.03)
TCO2 CALC VENOUS: 30 MMOL/L
TOTAL PROTEIN: 8.4 G/DL (ref 6.4–8.2)
URINE TYPE: ABNORMAL
UROBILINOGEN, URINE: 0.2 E.U./DL
WBC # BLD: 11.2 K/UL (ref 4–11)
WBC # BLD: 8.9 K/UL (ref 4–11)
WBC UA: ABNORMAL /HPF (ref 0–5)

## 2019-06-24 PROCEDURE — 85025 COMPLETE CBC W/AUTO DIFF WBC: CPT

## 2019-06-24 PROCEDURE — 74176 CT ABD & PELVIS W/O CONTRAST: CPT

## 2019-06-24 PROCEDURE — 96376 TX/PRO/DX INJ SAME DRUG ADON: CPT

## 2019-06-24 PROCEDURE — 99222 1ST HOSP IP/OBS MODERATE 55: CPT | Performed by: SURGERY

## 2019-06-24 PROCEDURE — 96375 TX/PRO/DX INJ NEW DRUG ADDON: CPT

## 2019-06-24 PROCEDURE — 4500000025 HC ED LEVEL 5 PROCEDURE

## 2019-06-24 PROCEDURE — 83605 ASSAY OF LACTIC ACID: CPT

## 2019-06-24 PROCEDURE — 0D9670Z DRAINAGE OF STOMACH WITH DRAINAGE DEVICE, VIA NATURAL OR ARTIFICIAL OPENING: ICD-10-PCS | Performed by: INTERNAL MEDICINE

## 2019-06-24 PROCEDURE — 36415 COLL VENOUS BLD VENIPUNCTURE: CPT

## 2019-06-24 PROCEDURE — 6360000002 HC RX W HCPCS: Performed by: PHYSICIAN ASSISTANT

## 2019-06-24 PROCEDURE — 96374 THER/PROPH/DIAG INJ IV PUSH: CPT

## 2019-06-24 PROCEDURE — 82803 BLOOD GASES ANY COMBINATION: CPT

## 2019-06-24 PROCEDURE — 81001 URINALYSIS AUTO W/SCOPE: CPT

## 2019-06-24 PROCEDURE — 99285 EMERGENCY DEPT VISIT HI MDM: CPT

## 2019-06-24 PROCEDURE — 2580000003 HC RX 258: Performed by: STUDENT IN AN ORGANIZED HEALTH CARE EDUCATION/TRAINING PROGRAM

## 2019-06-24 PROCEDURE — 2580000003 HC RX 258: Performed by: PHYSICIAN ASSISTANT

## 2019-06-24 PROCEDURE — 1200000000 HC SEMI PRIVATE

## 2019-06-24 PROCEDURE — 80053 COMPREHEN METABOLIC PANEL: CPT

## 2019-06-24 PROCEDURE — 96361 HYDRATE IV INFUSION ADD-ON: CPT

## 2019-06-24 RX ORDER — ACETAMINOPHEN 325 MG/1
650 TABLET ORAL EVERY 4 HOURS PRN
Status: CANCELLED | OUTPATIENT
Start: 2019-06-24

## 2019-06-24 RX ORDER — VIT B COMP NO.3/FOLIC/C/BIOTIN 1 MG-60 MG
1 TABLET ORAL
COMMUNITY
End: 2020-01-27 | Stop reason: SDUPTHER

## 2019-06-24 RX ORDER — ERGOCALCIFEROL 1.25 MG/1
50000 CAPSULE ORAL
COMMUNITY

## 2019-06-24 RX ORDER — MORPHINE SULFATE 4 MG/ML
4 INJECTION, SOLUTION INTRAMUSCULAR; INTRAVENOUS ONCE
Status: COMPLETED | OUTPATIENT
Start: 2019-06-24 | End: 2019-06-24

## 2019-06-24 RX ORDER — NEBIVOLOL 5 MG/1
2.5 TABLET ORAL DAILY
Status: DISCONTINUED | OUTPATIENT
Start: 2019-06-25 | End: 2019-06-24

## 2019-06-24 RX ORDER — DOCUSATE SODIUM 100 MG/1
100 CAPSULE, LIQUID FILLED ORAL DAILY PRN
COMMUNITY
End: 2019-08-09 | Stop reason: SDUPTHER

## 2019-06-24 RX ORDER — ONDANSETRON 2 MG/ML
4 INJECTION INTRAMUSCULAR; INTRAVENOUS EVERY 6 HOURS PRN
Status: DISCONTINUED | OUTPATIENT
Start: 2019-06-24 | End: 2019-06-24

## 2019-06-24 RX ORDER — ACETAMINOPHEN 325 MG/1
650 TABLET ORAL EVERY 6 HOURS PRN
COMMUNITY

## 2019-06-24 RX ORDER — ONDANSETRON 2 MG/ML
4 INJECTION INTRAMUSCULAR; INTRAVENOUS EVERY 6 HOURS PRN
Status: DISCONTINUED | OUTPATIENT
Start: 2019-06-24 | End: 2019-06-27 | Stop reason: HOSPADM

## 2019-06-24 RX ORDER — SPIRONOLACTONE 25 MG/1
25 TABLET ORAL DAILY
Status: DISCONTINUED | OUTPATIENT
Start: 2019-06-25 | End: 2019-06-27 | Stop reason: HOSPADM

## 2019-06-24 RX ORDER — SODIUM CHLORIDE 0.9 % (FLUSH) 0.9 %
10 SYRINGE (ML) INJECTION PRN
Status: DISCONTINUED | OUTPATIENT
Start: 2019-06-24 | End: 2019-06-27 | Stop reason: HOSPADM

## 2019-06-24 RX ORDER — ASPIRIN 81 MG/1
81 TABLET ORAL DAILY
COMMUNITY

## 2019-06-24 RX ORDER — SODIUM CHLORIDE 0.9 % (FLUSH) 0.9 %
10 SYRINGE (ML) INJECTION PRN
Status: CANCELLED | OUTPATIENT
Start: 2019-06-24

## 2019-06-24 RX ORDER — NICOTINE POLACRILEX 4 MG
15 LOZENGE BUCCAL PRN
Status: DISCONTINUED | OUTPATIENT
Start: 2019-06-24 | End: 2019-06-27 | Stop reason: HOSPADM

## 2019-06-24 RX ORDER — SODIUM CHLORIDE 0.9 % (FLUSH) 0.9 %
10 SYRINGE (ML) INJECTION EVERY 12 HOURS SCHEDULED
Status: CANCELLED | OUTPATIENT
Start: 2019-06-24

## 2019-06-24 RX ORDER — DEXTROSE MONOHYDRATE 25 G/50ML
12.5 INJECTION, SOLUTION INTRAVENOUS PRN
Status: DISCONTINUED | OUTPATIENT
Start: 2019-06-24 | End: 2019-06-24 | Stop reason: SDUPTHER

## 2019-06-24 RX ORDER — 0.9 % SODIUM CHLORIDE 0.9 %
1000 INTRAVENOUS SOLUTION INTRAVENOUS ONCE
Status: COMPLETED | OUTPATIENT
Start: 2019-06-24 | End: 2019-06-24

## 2019-06-24 RX ORDER — BACLOFEN 10 MG/1
10 TABLET ORAL 3 TIMES DAILY PRN
Status: ON HOLD | COMMUNITY
End: 2019-06-27 | Stop reason: HOSPADM

## 2019-06-24 RX ORDER — SODIUM CHLORIDE 0.9 % (FLUSH) 0.9 %
10 SYRINGE (ML) INJECTION EVERY 12 HOURS SCHEDULED
Status: DISCONTINUED | OUTPATIENT
Start: 2019-06-24 | End: 2019-06-27 | Stop reason: HOSPADM

## 2019-06-24 RX ORDER — SODIUM CHLORIDE 9 MG/ML
INJECTION, SOLUTION INTRAVENOUS CONTINUOUS
Status: DISCONTINUED | OUTPATIENT
Start: 2019-06-24 | End: 2019-06-25

## 2019-06-24 RX ORDER — CYCLOPENTOLATE HYDROCHLORIDE 10 MG/ML
1 SOLUTION/ DROPS OPHTHALMIC DAILY
COMMUNITY

## 2019-06-24 RX ORDER — DEXTROSE MONOHYDRATE 50 MG/ML
100 INJECTION, SOLUTION INTRAVENOUS PRN
Status: DISCONTINUED | OUTPATIENT
Start: 2019-06-24 | End: 2019-06-27 | Stop reason: HOSPADM

## 2019-06-24 RX ORDER — DEXTROSE MONOHYDRATE 25 G/50ML
12.5 INJECTION, SOLUTION INTRAVENOUS PRN
Status: DISCONTINUED | OUTPATIENT
Start: 2019-06-24 | End: 2019-06-27 | Stop reason: HOSPADM

## 2019-06-24 RX ADMIN — ONDANSETRON 4 MG: 2 INJECTION INTRAMUSCULAR; INTRAVENOUS at 15:03

## 2019-06-24 RX ADMIN — SODIUM CHLORIDE 1000 ML: 9 INJECTION, SOLUTION INTRAVENOUS at 15:02

## 2019-06-24 RX ADMIN — MORPHINE SULFATE 4 MG: 4 INJECTION INTRAVENOUS at 15:02

## 2019-06-24 RX ADMIN — SODIUM CHLORIDE: 9 INJECTION, SOLUTION INTRAVENOUS at 18:03

## 2019-06-24 RX ADMIN — MORPHINE SULFATE 4 MG: 4 INJECTION INTRAVENOUS at 16:20

## 2019-06-24 ASSESSMENT — ENCOUNTER SYMPTOMS
DIARRHEA: 0
NAUSEA: 1
CHEST TIGHTNESS: 0
ABDOMINAL DISTENTION: 1
ABDOMINAL PAIN: 1
WHEEZING: 0
COUGH: 0
VOMITING: 1
SHORTNESS OF BREATH: 0

## 2019-06-24 ASSESSMENT — PAIN SCALES - GENERAL
PAINLEVEL_OUTOF10: 3
PAINLEVEL_OUTOF10: 10
PAINLEVEL_OUTOF10: 0

## 2019-06-24 ASSESSMENT — PAIN DESCRIPTION - PAIN TYPE
TYPE: ACUTE PAIN

## 2019-06-24 ASSESSMENT — PAIN DESCRIPTION - DESCRIPTORS
DESCRIPTORS: SHARP
DESCRIPTORS: SHARP

## 2019-06-24 ASSESSMENT — PAIN DESCRIPTION - ONSET: ONSET: GRADUAL

## 2019-06-24 ASSESSMENT — PAIN DESCRIPTION - FREQUENCY: FREQUENCY: INTERMITTENT

## 2019-06-24 ASSESSMENT — PAIN DESCRIPTION - LOCATION
LOCATION: ABDOMEN

## 2019-06-24 ASSESSMENT — PAIN DESCRIPTION - PROGRESSION: CLINICAL_PROGRESSION: GRADUALLY IMPROVING

## 2019-06-24 NOTE — ED TRIAGE NOTES
Pt to ed c/o abdominal pain starting this morning at 10am. She was visiting her son at the hospital and she ate a granola bar

## 2019-06-25 PROBLEM — E11.9 TYPE II OR UNSPECIFIED TYPE DIABETES MELLITUS WITHOUT MENTION OF COMPLICATION, NOT STATED AS UNCONTROLLED: Status: ACTIVE | Noted: 2018-08-09

## 2019-06-25 LAB
ABO/RH: NORMAL
ALBUMIN SERPL-MCNC: 4 G/DL (ref 3.4–5)
ALP BLD-CCNC: 78 U/L (ref 40–129)
ALT SERPL-CCNC: 12 U/L (ref 10–40)
ANION GAP SERPL CALCULATED.3IONS-SCNC: 11 MMOL/L (ref 3–16)
ANION GAP SERPL CALCULATED.3IONS-SCNC: 12 MMOL/L (ref 3–16)
ANTIBODY SCREEN: NORMAL
AST SERPL-CCNC: 18 U/L (ref 15–37)
BASOPHILS ABSOLUTE: 0 K/UL (ref 0–0.2)
BASOPHILS RELATIVE PERCENT: 0.2 %
BILIRUB SERPL-MCNC: 0.4 MG/DL (ref 0–1)
BILIRUBIN DIRECT: <0.2 MG/DL (ref 0–0.3)
BILIRUBIN, INDIRECT: NORMAL MG/DL (ref 0–1)
BUN BLDV-MCNC: 14 MG/DL (ref 7–20)
BUN BLDV-MCNC: 14 MG/DL (ref 7–20)
CALCIUM SERPL-MCNC: 9 MG/DL (ref 8.3–10.6)
CALCIUM SERPL-MCNC: 9 MG/DL (ref 8.3–10.6)
CHLORIDE BLD-SCNC: 107 MMOL/L (ref 99–110)
CHLORIDE BLD-SCNC: 108 MMOL/L (ref 99–110)
CO2: 23 MMOL/L (ref 21–32)
CO2: 24 MMOL/L (ref 21–32)
CREAT SERPL-MCNC: 0.8 MG/DL (ref 0.6–1.2)
CREAT SERPL-MCNC: 0.8 MG/DL (ref 0.6–1.2)
EOSINOPHILS ABSOLUTE: 0.1 K/UL (ref 0–0.6)
EOSINOPHILS RELATIVE PERCENT: 1.8 %
GFR AFRICAN AMERICAN: >60
GFR AFRICAN AMERICAN: >60
GFR NON-AFRICAN AMERICAN: >60
GFR NON-AFRICAN AMERICAN: >60
GLUCOSE BLD-MCNC: 76 MG/DL (ref 70–99)
GLUCOSE BLD-MCNC: 79 MG/DL (ref 70–99)
GLUCOSE BLD-MCNC: 79 MG/DL (ref 70–99)
GLUCOSE BLD-MCNC: 81 MG/DL (ref 70–99)
GLUCOSE BLD-MCNC: 86 MG/DL (ref 70–99)
GLUCOSE BLD-MCNC: 87 MG/DL (ref 70–99)
GLUCOSE BLD-MCNC: 93 MG/DL (ref 70–99)
HCT VFR BLD CALC: 33.8 % (ref 36–48)
HEMOGLOBIN: 11.2 G/DL (ref 12–16)
INR BLD: 1.04 (ref 0.86–1.14)
LACTIC ACID: 0.8 MMOL/L (ref 0.4–2)
LACTIC ACID: 1.4 MMOL/L (ref 0.4–2)
LYMPHOCYTES ABSOLUTE: 1.3 K/UL (ref 1–5.1)
LYMPHOCYTES RELATIVE PERCENT: 16.4 %
MAGNESIUM: 1.4 MG/DL (ref 1.8–2.4)
MCH RBC QN AUTO: 33.3 PG (ref 26–34)
MCHC RBC AUTO-ENTMCNC: 33 G/DL (ref 31–36)
MCV RBC AUTO: 100.9 FL (ref 80–100)
MONOCYTES ABSOLUTE: 0.5 K/UL (ref 0–1.3)
MONOCYTES RELATIVE PERCENT: 5.8 %
NEUTROPHILS ABSOLUTE: 6.1 K/UL (ref 1.7–7.7)
NEUTROPHILS RELATIVE PERCENT: 75.8 %
PDW BLD-RTO: 14.5 % (ref 12.4–15.4)
PERFORMED ON: NORMAL
PHOSPHORUS: 3.1 MG/DL (ref 2.5–4.9)
PLATELET # BLD: 254 K/UL (ref 135–450)
PMV BLD AUTO: 8.3 FL (ref 5–10.5)
POTASSIUM REFLEX MAGNESIUM: 3.5 MMOL/L (ref 3.5–5.1)
POTASSIUM SERPL-SCNC: 3.5 MMOL/L (ref 3.5–5.1)
PROTHROMBIN TIME: 11.8 SEC (ref 9.8–13)
RBC # BLD: 3.35 M/UL (ref 4–5.2)
SODIUM BLD-SCNC: 142 MMOL/L (ref 136–145)
SODIUM BLD-SCNC: 143 MMOL/L (ref 136–145)
TOTAL PROTEIN: 7.2 G/DL (ref 6.4–8.2)
WBC # BLD: 8.1 K/UL (ref 4–11)

## 2019-06-25 PROCEDURE — 85610 PROTHROMBIN TIME: CPT

## 2019-06-25 PROCEDURE — C9113 INJ PANTOPRAZOLE SODIUM, VIA: HCPCS | Performed by: STUDENT IN AN ORGANIZED HEALTH CARE EDUCATION/TRAINING PROGRAM

## 2019-06-25 PROCEDURE — 86900 BLOOD TYPING SEROLOGIC ABO: CPT

## 2019-06-25 PROCEDURE — 36415 COLL VENOUS BLD VENIPUNCTURE: CPT

## 2019-06-25 PROCEDURE — 2500000003 HC RX 250 WO HCPCS: Performed by: STUDENT IN AN ORGANIZED HEALTH CARE EDUCATION/TRAINING PROGRAM

## 2019-06-25 PROCEDURE — 83735 ASSAY OF MAGNESIUM: CPT

## 2019-06-25 PROCEDURE — 2580000003 HC RX 258: Performed by: STUDENT IN AN ORGANIZED HEALTH CARE EDUCATION/TRAINING PROGRAM

## 2019-06-25 PROCEDURE — 99233 SBSQ HOSP IP/OBS HIGH 50: CPT | Performed by: SURGERY

## 2019-06-25 PROCEDURE — 85025 COMPLETE CBC W/AUTO DIFF WBC: CPT

## 2019-06-25 PROCEDURE — 1200000000 HC SEMI PRIVATE

## 2019-06-25 PROCEDURE — 83605 ASSAY OF LACTIC ACID: CPT

## 2019-06-25 PROCEDURE — 84100 ASSAY OF PHOSPHORUS: CPT

## 2019-06-25 PROCEDURE — 86850 RBC ANTIBODY SCREEN: CPT

## 2019-06-25 PROCEDURE — 86901 BLOOD TYPING SEROLOGIC RH(D): CPT

## 2019-06-25 PROCEDURE — 80076 HEPATIC FUNCTION PANEL: CPT

## 2019-06-25 PROCEDURE — 6360000002 HC RX W HCPCS: Performed by: STUDENT IN AN ORGANIZED HEALTH CARE EDUCATION/TRAINING PROGRAM

## 2019-06-25 PROCEDURE — 6370000000 HC RX 637 (ALT 250 FOR IP): Performed by: INTERNAL MEDICINE

## 2019-06-25 PROCEDURE — 6360000002 HC RX W HCPCS: Performed by: INTERNAL MEDICINE

## 2019-06-25 PROCEDURE — 80048 BASIC METABOLIC PNL TOTAL CA: CPT

## 2019-06-25 PROCEDURE — 2580000003 HC RX 258: Performed by: INTERNAL MEDICINE

## 2019-06-25 RX ORDER — DEXTROSE, SODIUM CHLORIDE, AND POTASSIUM CHLORIDE 5; .45; .15 G/100ML; G/100ML; G/100ML
INJECTION INTRAVENOUS CONTINUOUS
Status: DISCONTINUED | OUTPATIENT
Start: 2019-06-25 | End: 2019-06-26

## 2019-06-25 RX ORDER — ACETAMINOPHEN 10 MG/ML
1000 INJECTION, SOLUTION INTRAVENOUS EVERY 6 HOURS PRN
Status: DISCONTINUED | OUTPATIENT
Start: 2019-06-25 | End: 2019-06-26

## 2019-06-25 RX ORDER — PANTOPRAZOLE SODIUM 40 MG/10ML
40 INJECTION, POWDER, LYOPHILIZED, FOR SOLUTION INTRAVENOUS DAILY
Status: DISCONTINUED | OUTPATIENT
Start: 2019-06-25 | End: 2019-06-26

## 2019-06-25 RX ORDER — HYDRALAZINE HYDROCHLORIDE 20 MG/ML
5 INJECTION INTRAMUSCULAR; INTRAVENOUS EVERY 6 HOURS PRN
Status: DISCONTINUED | OUTPATIENT
Start: 2019-06-25 | End: 2019-06-27 | Stop reason: HOSPADM

## 2019-06-25 RX ADMIN — POTASSIUM CHLORIDE, DEXTROSE MONOHYDRATE AND SODIUM CHLORIDE: 150; 5; 450 INJECTION, SOLUTION INTRAVENOUS at 18:35

## 2019-06-25 RX ADMIN — PANTOPRAZOLE SODIUM 40 MG: 40 INJECTION, POWDER, FOR SOLUTION INTRAVENOUS at 18:37

## 2019-06-25 RX ADMIN — SODIUM CHLORIDE: 9 INJECTION, SOLUTION INTRAVENOUS at 14:17

## 2019-06-25 RX ADMIN — SPIRONOLACTONE 25 MG: 25 TABLET, FILM COATED ORAL at 08:31

## 2019-06-25 RX ADMIN — SODIUM CHLORIDE: 9 INJECTION, SOLUTION INTRAVENOUS at 02:35

## 2019-06-25 RX ADMIN — DEXTROSE MONOHYDRATE 100 ML/HR: 50 INJECTION, SOLUTION INTRAVENOUS at 09:35

## 2019-06-25 RX ADMIN — ACETAMINOPHEN 1000 MG: 10 INJECTION, SOLUTION INTRAVENOUS at 23:37

## 2019-06-25 RX ADMIN — SODIUM CHLORIDE 10 ML: 9 INJECTION, SOLUTION INTRAMUSCULAR; INTRAVENOUS; SUBCUTANEOUS at 18:38

## 2019-06-25 RX ADMIN — Medication 10 ML: at 08:31

## 2019-06-25 RX ADMIN — INSULIN GLARGINE 5 UNITS: 100 INJECTION, SOLUTION SUBCUTANEOUS at 02:35

## 2019-06-25 RX ADMIN — ENOXAPARIN SODIUM 40 MG: 40 INJECTION SUBCUTANEOUS at 08:31

## 2019-06-25 RX ADMIN — Medication 10 ML: at 19:53

## 2019-06-25 ASSESSMENT — PAIN DESCRIPTION - LOCATION
LOCATION: ABDOMEN
LOCATION: ABDOMEN
LOCATION: HEAD
LOCATION: GENERALIZED

## 2019-06-25 ASSESSMENT — PAIN DESCRIPTION - PAIN TYPE
TYPE: ACUTE PAIN
TYPE: ACUTE PAIN
TYPE: CHRONIC PAIN
TYPE: ACUTE PAIN

## 2019-06-25 ASSESSMENT — PAIN DESCRIPTION - DESCRIPTORS
DESCRIPTORS: SHARP
DESCRIPTORS: HEADACHE

## 2019-06-25 ASSESSMENT — PAIN SCALES - GENERAL
PAINLEVEL_OUTOF10: 6
PAINLEVEL_OUTOF10: 0
PAINLEVEL_OUTOF10: 5
PAINLEVEL_OUTOF10: 0

## 2019-06-25 ASSESSMENT — PAIN DESCRIPTION - ORIENTATION
ORIENTATION: LEFT
ORIENTATION: MID

## 2019-06-25 ASSESSMENT — PAIN - FUNCTIONAL ASSESSMENT: PAIN_FUNCTIONAL_ASSESSMENT: ACTIVITIES ARE NOT PREVENTED

## 2019-06-25 ASSESSMENT — PAIN DESCRIPTION - FREQUENCY: FREQUENCY: INTERMITTENT

## 2019-06-25 ASSESSMENT — PAIN DESCRIPTION - ONSET: ONSET: GRADUAL

## 2019-06-25 ASSESSMENT — PAIN DESCRIPTION - PROGRESSION: CLINICAL_PROGRESSION: GRADUALLY IMPROVING

## 2019-06-25 NOTE — H&P
nerves: II-XII intact, grossly non-focal.  Psychiatric:  Alert and oriented, thought content appropriate, normal insight  Capillary Refill: Brisk,< 3 seconds   Peripheral Pulses: +2 palpable, equal bilaterally       Labs:     Recent Labs     06/24/19  1445   WBC 8.9   HGB 12.1   HCT 35.9*        Recent Labs     06/24/19  1445      K 4.2      CO2 25   BUN 15   CREATININE 0.8   CALCIUM 10.3     Recent Labs     06/24/19  1445   AST 31   ALT 17   BILITOT 0.3   ALKPHOS 94     No results for input(s): INR in the last 72 hours. No results for input(s): Ebonie Roam in the last 72 hours. Urinalysis:      Lab Results   Component Value Date    NITRU Negative 06/24/2019    WBCUA 3-5 06/24/2019    BACTERIA 1+ 06/24/2019    RBCUA 0-2 06/24/2019    BLOODU Negative 06/24/2019    SPECGRAV 1.025 06/24/2019    GLUCOSEU Negative 06/24/2019       Radiology:     CXR: I have reviewed the CXR with the following interpretation: N/A  EKG:  I have reviewed the EKG with the following interpretation: N/A    CT ABDOMEN PELVIS WO CONTRAST Additional Contrast? None   Final Result      High-grade small bowel obstruction with transition point noted. Transition point appears to be in the mid to lower abdomen difficult to visualize well. It is near images 148 through 151. There is inflammation in the left upper quadrant surrounding a significantly dilated loop of small bowel, indeterminate                ASSESSMENT/PLAN:    Active Hospital Problems    Diagnosis Date Noted    SBO (small bowel obstruction) ([facility] [address] [K56.609] 06/24/2019     Small bowel obstruction: Abdominal pain progressively worse, diffuse tenderness with guarding on admission. CT with high-grade small bowel obstruction with transition point. General surgery following. Patient n.p.o.  NG tube in place for decompression. Lactic acid levels ordered, negative till now.   Continue IV fluids    Type 2 diabetes insulin-dependent: Decreased p.o. intake today, as well as n.p.o. status now. We will just give 5 units of Lantus tonight. Will follow blood glucose trend and adjust accordingly. Hypertension: She has had multiple side effects from antihypertensive medications  Currently she is only on spironolactone 25 mill grams once daily which we will continue    Chronic medical problems  Hypertension  Fibromyalgia  Hyperlipidemia  CKD stage III      DVT Prophylaxis: Lovenox  Diet: Diet NPO Effective Now  Code Status: Prior    PT/OT Eval Status: N/A    Dispo -Admit as inpatient. I anticipate hospitalization spanning more than two midnights for investigation and treatment of the above medically necessary diagnoses. Tata Gomez MD    Thank you Evin Tavarez MD for the opportunity to be involved in this patient's care. If you have any questions or concerns please feel free to contact me at [phone].

## 2019-06-25 NOTE — ED PROVIDER NOTES
[facility]          PHYSICIAN ASSISTANT NOTE     Date of evaluation: 6/24/2019    ADDENDUM:      Care of this patient was assumed from my colleague, Mrs. Mendel Sleeper. The patient was seen for Abdominal Pain  . The patient's initial evaluation and plan have been discussed with the prior provider who initially evaluated the patient. Nursing Notes, Past Medical Hx, Past Surgical Hx, Social Hx, Allergies, and Family Hx were all reviewed. Diagnostic Results     EKG   Please prior provider note for any ECG interpretation. RADIOLOGY:  CT ABDOMEN PELVIS WO CONTRAST Additional Contrast? None   Final Result      High-grade small bowel obstruction with transition point noted. Transition point appears to be in the mid to lower abdomen difficult to visualize well. It is near images 148 through 151.        There is inflammation in the left upper quadrant surrounding a significantly dilated loop of small bowel, indeterminate                LABS:   Results for orders placed or performed during the hospital encounter of 06/24/19   CBC Auto Differential   Result Value Ref Range    WBC 8.9 4.0 - 11.0 K/uL    RBC 3.57 (L) 4.00 - 5.20 M/uL    Hemoglobin 12.1 12.0 - 16.0 g/dL    Hematocrit 35.9 (L) 36.0 - 48.0 %    .6 (H) 80.0 - 100.0 fL    MCH 33.8 26.0 - 34.0 pg    MCHC 33.6 31.0 - 36.0 g/dL    RDW 14.4 12.4 - 15.4 %    Platelets 604 215 - 073 K/uL    MPV 8.0 5.0 - 10.5 fL    Neutrophils % 85.7 %    Lymphocytes % 9.6 %    Monocytes % 3.7 %    Eosinophils % 0.7 %    Basophils % 0.3 %    Neutrophils # 7.6 1.7 - 7.7 K/uL    Lymphocytes # 0.8 (L) 1.0 - 5.1 K/uL    Monocytes # 0.3 0.0 - 1.3 K/uL    Eosinophils # 0.1 0.0 - 0.6 K/uL    Basophils # 0.0 0.0 - 0.2 K/uL   Comprehensive Metabolic Panel w/ Reflex to MG   Result Value Ref Range    Sodium 141 136 - 145 mmol/L    Potassium reflex Magnesium 4.2 3.5 - 5.1 mmol/L    Chloride 102 99 - 110 mmol/L    CO2 25 21 - 32 mmol/L    Anion Gap 14  0.9 % sodium chloride infusion       CONSULTS:  [address] / ASSESSMENT / Bert Ronit is admitted to the Emergency Department for evaluation of her chief complaint as described in the history of present illness. Complete history and physical was performed by me and my attending. Nursing notes, past medical history, surgical history, family history and social history were reviewed and addressed in the HPI. Angela Reinoso is a 78 y.o. female who presents to the emergency department with a complaint of abdominal pain, nausea and vomiting. On CT scan, the patient demonstrates a high-grade SBO. Her labs are overall unremarkable and reassuring. At the time of turnover, the patient was pending surgery evaluation and recommendation. Surgery saw the patient in the ED and placed an NG for decompression. They recommend admission to medicine given her comorbidities and will follow for possible surgical intervention. The case was discussed with the Hospitalist physician who will assume care of the patient for continued evaluation and management. I discussed this plan at length the patient who verbalizes understanding and is in agreement. The patient was seen and evaluated by myself and the attending physician, Guy Bartlett MD who agrees with my assessment, treatment and plan. Clinical Impression     1.  Small bowel obstruction [facility])        Disposition     DISPOSITION Admitted 06/24/2019 06:45:39 PM         [address] [place] [address]  06/24/19 2006

## 2019-06-26 LAB
ALBUMIN SERPL-MCNC: 3.8 G/DL (ref 3.4–5)
ANION GAP SERPL CALCULATED.3IONS-SCNC: 12 MMOL/L (ref 3–16)
BASOPHILS ABSOLUTE: 0 K/UL (ref 0–0.2)
BASOPHILS RELATIVE PERCENT: 0.3 %
BUN BLDV-MCNC: 9 MG/DL (ref 7–20)
CALCIUM SERPL-MCNC: 9.5 MG/DL (ref 8.3–10.6)
CHLORIDE BLD-SCNC: 104 MMOL/L (ref 99–110)
CO2: 25 MMOL/L (ref 21–32)
CREAT SERPL-MCNC: 0.8 MG/DL (ref 0.6–1.2)
EOSINOPHILS ABSOLUTE: 0.2 K/UL (ref 0–0.6)
EOSINOPHILS RELATIVE PERCENT: 4.8 %
GFR AFRICAN AMERICAN: >60
GFR NON-AFRICAN AMERICAN: >60
GLUCOSE BLD-MCNC: 105 MG/DL (ref 70–99)
GLUCOSE BLD-MCNC: 111 MG/DL (ref 70–99)
GLUCOSE BLD-MCNC: 129 MG/DL (ref 70–99)
GLUCOSE BLD-MCNC: 62 MG/DL (ref 70–99)
GLUCOSE BLD-MCNC: 69 MG/DL (ref 70–99)
GLUCOSE BLD-MCNC: 91 MG/DL (ref 70–99)
GLUCOSE BLD-MCNC: 99 MG/DL (ref 70–99)
HCT VFR BLD CALC: 31.6 % (ref 36–48)
HEMOGLOBIN: 10.5 G/DL (ref 12–16)
LYMPHOCYTES ABSOLUTE: 1 K/UL (ref 1–5.1)
LYMPHOCYTES RELATIVE PERCENT: 24 %
MAGNESIUM: 1.3 MG/DL (ref 1.8–2.4)
MCH RBC QN AUTO: 33.3 PG (ref 26–34)
MCHC RBC AUTO-ENTMCNC: 33.2 G/DL (ref 31–36)
MCV RBC AUTO: 100.1 FL (ref 80–100)
MONOCYTES ABSOLUTE: 0.3 K/UL (ref 0–1.3)
MONOCYTES RELATIVE PERCENT: 6.9 %
NEUTROPHILS ABSOLUTE: 2.7 K/UL (ref 1.7–7.7)
NEUTROPHILS RELATIVE PERCENT: 64 %
PDW BLD-RTO: 14.4 % (ref 12.4–15.4)
PERFORMED ON: ABNORMAL
PERFORMED ON: NORMAL
PERFORMED ON: NORMAL
PHOSPHORUS: 2.3 MG/DL (ref 2.5–4.9)
PLATELET # BLD: 229 K/UL (ref 135–450)
PMV BLD AUTO: 8.2 FL (ref 5–10.5)
POTASSIUM SERPL-SCNC: 3.3 MMOL/L (ref 3.5–5.1)
RBC # BLD: 3.16 M/UL (ref 4–5.2)
SODIUM BLD-SCNC: 141 MMOL/L (ref 136–145)
WBC # BLD: 4.2 K/UL (ref 4–11)

## 2019-06-26 PROCEDURE — 1200000000 HC SEMI PRIVATE

## 2019-06-26 PROCEDURE — 85025 COMPLETE CBC W/AUTO DIFF WBC: CPT

## 2019-06-26 PROCEDURE — 36415 COLL VENOUS BLD VENIPUNCTURE: CPT

## 2019-06-26 PROCEDURE — 2580000003 HC RX 258: Performed by: INTERNAL MEDICINE

## 2019-06-26 PROCEDURE — 2580000003 HC RX 258: Performed by: STUDENT IN AN ORGANIZED HEALTH CARE EDUCATION/TRAINING PROGRAM

## 2019-06-26 PROCEDURE — 6360000002 HC RX W HCPCS: Performed by: STUDENT IN AN ORGANIZED HEALTH CARE EDUCATION/TRAINING PROGRAM

## 2019-06-26 PROCEDURE — C9113 INJ PANTOPRAZOLE SODIUM, VIA: HCPCS | Performed by: STUDENT IN AN ORGANIZED HEALTH CARE EDUCATION/TRAINING PROGRAM

## 2019-06-26 PROCEDURE — 6370000000 HC RX 637 (ALT 250 FOR IP): Performed by: INTERNAL MEDICINE

## 2019-06-26 PROCEDURE — 6360000002 HC RX W HCPCS: Performed by: INTERNAL MEDICINE

## 2019-06-26 PROCEDURE — 80069 RENAL FUNCTION PANEL: CPT

## 2019-06-26 PROCEDURE — 99232 SBSQ HOSP IP/OBS MODERATE 35: CPT | Performed by: SURGERY

## 2019-06-26 PROCEDURE — 2500000003 HC RX 250 WO HCPCS: Performed by: STUDENT IN AN ORGANIZED HEALTH CARE EDUCATION/TRAINING PROGRAM

## 2019-06-26 PROCEDURE — 6370000000 HC RX 637 (ALT 250 FOR IP): Performed by: STUDENT IN AN ORGANIZED HEALTH CARE EDUCATION/TRAINING PROGRAM

## 2019-06-26 PROCEDURE — 83735 ASSAY OF MAGNESIUM: CPT

## 2019-06-26 RX ORDER — MAGNESIUM SULFATE IN WATER 40 MG/ML
4 INJECTION, SOLUTION INTRAVENOUS ONCE
Status: COMPLETED | OUTPATIENT
Start: 2019-06-26 | End: 2019-06-26

## 2019-06-26 RX ORDER — ACETAMINOPHEN 500 MG
1000 TABLET ORAL EVERY 6 HOURS PRN
Status: DISCONTINUED | OUTPATIENT
Start: 2019-06-26 | End: 2019-06-27 | Stop reason: HOSPADM

## 2019-06-26 RX ORDER — CARVEDILOL 6.25 MG/1
6.25 TABLET ORAL 2 TIMES DAILY WITH MEALS
Status: DISCONTINUED | OUTPATIENT
Start: 2019-06-26 | End: 2019-06-27 | Stop reason: HOSPADM

## 2019-06-26 RX ORDER — PANTOPRAZOLE SODIUM 40 MG/1
40 TABLET, DELAYED RELEASE ORAL
Status: DISCONTINUED | OUTPATIENT
Start: 2019-06-27 | End: 2019-06-27 | Stop reason: HOSPADM

## 2019-06-26 RX ADMIN — CARVEDILOL 6.25 MG: 6.25 TABLET, FILM COATED ORAL at 18:31

## 2019-06-26 RX ADMIN — HYDRALAZINE HYDROCHLORIDE 5 MG: 20 INJECTION INTRAMUSCULAR; INTRAVENOUS at 03:11

## 2019-06-26 RX ADMIN — ACETAMINOPHEN 1000 MG: 500 TABLET ORAL at 23:20

## 2019-06-26 RX ADMIN — HYDRALAZINE HYDROCHLORIDE 5 MG: 20 INJECTION INTRAMUSCULAR; INTRAVENOUS at 16:47

## 2019-06-26 RX ADMIN — MAGNESIUM SULFATE HEPTAHYDRATE 4 G: 40 INJECTION, SOLUTION INTRAVENOUS at 08:10

## 2019-06-26 RX ADMIN — Medication 10 ML: at 21:00

## 2019-06-26 RX ADMIN — ENOXAPARIN SODIUM 40 MG: 40 INJECTION SUBCUTANEOUS at 08:11

## 2019-06-26 RX ADMIN — POTASSIUM CHLORIDE, DEXTROSE MONOHYDRATE AND SODIUM CHLORIDE: 150; 5; 450 INJECTION, SOLUTION INTRAVENOUS at 03:06

## 2019-06-26 RX ADMIN — Medication 10 ML: at 08:11

## 2019-06-26 RX ADMIN — POTASSIUM PHOSPHATE, MONOBASIC AND POTASSIUM PHOSPHATE, DIBASIC 30 MMOL: 224; 236 INJECTION, SOLUTION INTRAVENOUS at 08:10

## 2019-06-26 RX ADMIN — CARVEDILOL 6.25 MG: 6.25 TABLET, FILM COATED ORAL at 11:33

## 2019-06-26 RX ADMIN — PANTOPRAZOLE SODIUM 40 MG: 40 INJECTION, POWDER, FOR SOLUTION INTRAVENOUS at 08:10

## 2019-06-26 ASSESSMENT — PAIN SCALES - GENERAL
PAINLEVEL_OUTOF10: 0
PAINLEVEL_OUTOF10: 2
PAINLEVEL_OUTOF10: 0
PAINLEVEL_OUTOF10: 3

## 2019-06-26 ASSESSMENT — PAIN DESCRIPTION - LOCATION
LOCATION: ABDOMEN
LOCATION: ABDOMEN

## 2019-06-26 ASSESSMENT — PAIN DESCRIPTION - PAIN TYPE
TYPE: ACUTE PAIN
TYPE: ACUTE PAIN

## 2019-06-26 ASSESSMENT — PAIN DESCRIPTION - PROGRESSION: CLINICAL_PROGRESSION: GRADUALLY IMPROVING

## 2019-06-26 NOTE — PLAN OF CARE
Problem: Falls - Risk of:  Goal: Will remain free from falls  Description  Will remain free from falls  Outcome: Ongoing     Problem: Pain:  Goal: Pain level will decrease  Description  Pain level will decrease  Outcome: Ongoing     Problem: Nausea/Vomiting:  Goal: Absence of nausea/vomiting  Description  Absence of nausea/vomiting  6/26/2019 0127 by Dana Cunningham RN  Outcome: Ongoing

## 2019-06-27 ENCOUNTER — TELEPHONE (OUTPATIENT)
Dept: PRIMARY CARE CLINIC | Age: 79
End: 2019-06-27

## 2019-06-27 VITALS
HEART RATE: 79 BPM | HEIGHT: 63 IN | WEIGHT: 154 LBS | OXYGEN SATURATION: 98 % | SYSTOLIC BLOOD PRESSURE: 155 MMHG | TEMPERATURE: 98.4 F | RESPIRATION RATE: 17 BRPM | BODY MASS INDEX: 27.29 KG/M2 | DIASTOLIC BLOOD PRESSURE: 71 MMHG

## 2019-06-27 LAB
ALBUMIN SERPL-MCNC: 3.6 G/DL (ref 3.4–5)
ANION GAP SERPL CALCULATED.3IONS-SCNC: 11 MMOL/L (ref 3–16)
BASOPHILS ABSOLUTE: 0 K/UL (ref 0–0.2)
BASOPHILS RELATIVE PERCENT: 0.2 %
BUN BLDV-MCNC: 10 MG/DL (ref 7–20)
CALCIUM SERPL-MCNC: 9.2 MG/DL (ref 8.3–10.6)
CHLORIDE BLD-SCNC: 103 MMOL/L (ref 99–110)
CO2: 25 MMOL/L (ref 21–32)
CREAT SERPL-MCNC: 0.9 MG/DL (ref 0.6–1.2)
EOSINOPHILS ABSOLUTE: 0.1 K/UL (ref 0–0.6)
EOSINOPHILS RELATIVE PERCENT: 3.9 %
GFR AFRICAN AMERICAN: >60
GFR NON-AFRICAN AMERICAN: >60
GLUCOSE BLD-MCNC: 115 MG/DL (ref 70–99)
GLUCOSE BLD-MCNC: 118 MG/DL (ref 70–99)
HCT VFR BLD CALC: 31.4 % (ref 36–48)
HEMOGLOBIN: 10.6 G/DL (ref 12–16)
LYMPHOCYTES ABSOLUTE: 1 K/UL (ref 1–5.1)
LYMPHOCYTES RELATIVE PERCENT: 26.3 %
MAGNESIUM: 1.7 MG/DL (ref 1.8–2.4)
MCH RBC QN AUTO: 34.2 PG (ref 26–34)
MCHC RBC AUTO-ENTMCNC: 33.7 G/DL (ref 31–36)
MCV RBC AUTO: 101.4 FL (ref 80–100)
MONOCYTES ABSOLUTE: 0.4 K/UL (ref 0–1.3)
MONOCYTES RELATIVE PERCENT: 9.8 %
NEUTROPHILS ABSOLUTE: 2.2 K/UL (ref 1.7–7.7)
NEUTROPHILS RELATIVE PERCENT: 59.8 %
PDW BLD-RTO: 14.7 % (ref 12.4–15.4)
PERFORMED ON: ABNORMAL
PHOSPHORUS: 3.7 MG/DL (ref 2.5–4.9)
PLATELET # BLD: 215 K/UL (ref 135–450)
PMV BLD AUTO: 8.2 FL (ref 5–10.5)
POTASSIUM SERPL-SCNC: 3.5 MMOL/L (ref 3.5–5.1)
RBC # BLD: 3.1 M/UL (ref 4–5.2)
SODIUM BLD-SCNC: 139 MMOL/L (ref 136–145)
WBC # BLD: 3.7 K/UL (ref 4–11)

## 2019-06-27 PROCEDURE — 36415 COLL VENOUS BLD VENIPUNCTURE: CPT

## 2019-06-27 PROCEDURE — 85025 COMPLETE CBC W/AUTO DIFF WBC: CPT

## 2019-06-27 PROCEDURE — 2580000003 HC RX 258: Performed by: INTERNAL MEDICINE

## 2019-06-27 PROCEDURE — 99232 SBSQ HOSP IP/OBS MODERATE 35: CPT | Performed by: SURGERY

## 2019-06-27 PROCEDURE — 6370000000 HC RX 637 (ALT 250 FOR IP): Performed by: INTERNAL MEDICINE

## 2019-06-27 PROCEDURE — 83735 ASSAY OF MAGNESIUM: CPT

## 2019-06-27 PROCEDURE — 6360000002 HC RX W HCPCS: Performed by: INTERNAL MEDICINE

## 2019-06-27 PROCEDURE — 80069 RENAL FUNCTION PANEL: CPT

## 2019-06-27 PROCEDURE — 6370000000 HC RX 637 (ALT 250 FOR IP): Performed by: STUDENT IN AN ORGANIZED HEALTH CARE EDUCATION/TRAINING PROGRAM

## 2019-06-27 RX ORDER — POTASSIUM CHLORIDE 20 MEQ/1
40 TABLET, EXTENDED RELEASE ORAL ONCE
Status: COMPLETED | OUTPATIENT
Start: 2019-06-27 | End: 2019-06-27

## 2019-06-27 RX ADMIN — Medication 10 ML: at 08:53

## 2019-06-27 RX ADMIN — ENOXAPARIN SODIUM 40 MG: 40 INJECTION SUBCUTANEOUS at 08:52

## 2019-06-27 RX ADMIN — PANTOPRAZOLE SODIUM 40 MG: 40 TABLET, DELAYED RELEASE ORAL at 06:55

## 2019-06-27 RX ADMIN — CARVEDILOL 6.25 MG: 6.25 TABLET, FILM COATED ORAL at 08:52

## 2019-06-27 RX ADMIN — POTASSIUM CHLORIDE 40 MEQ: 20 TABLET, EXTENDED RELEASE ORAL at 08:52

## 2019-06-27 RX ADMIN — MAGNESIUM OXIDE TAB 400 MG (241.3 MG ELEMENTAL MG) 400 MG: 400 (241.3 MG) TAB at 08:52

## 2019-06-27 ASSESSMENT — PAIN SCALES - GENERAL
PAINLEVEL_OUTOF10: 0
PAINLEVEL_OUTOF10: 0

## 2019-06-27 ASSESSMENT — PAIN DESCRIPTION - LOCATION: LOCATION: ABDOMEN

## 2019-06-27 ASSESSMENT — PAIN DESCRIPTION - PAIN TYPE: TYPE: ACUTE PAIN

## 2019-06-27 NOTE — DISCHARGE SUMMARY
Supple, with full range of motion. No jugular venous distention. Trachea midline. Respiratory:  Normal respiratory effort. Clear to auscultation, bilaterally without Rales/Wheezes/Rhonchi. Cardiovascular:  Regular rate and rhythm with normal S1/S2 without murmurs, rubs or gallops. Abdomen: Soft, non-tender, non-distended with normal bowel sounds. Musculoskeletal:  No clubbing, cyanosis or edema bilaterally. Full range of motion without deformity. Skin: Skin color, texture, turgor normal.  No rashes or lesions. Neurologic:  Neurovascularly intact without any focal sensory/motor deficits. Cranial nerves: II-XII intact, grossly non-focal.  Psychiatric:  Alert and oriented, thought content appropriate, normal insight  Capillary Refill: Brisk,< 3 seconds   Peripheral Pulses: +2 palpable, equal bilaterally       Labs: For convenience and continuity at follow-up the following most recent labs are provided:      CBC:    Lab Results   Component Value Date    WBC 3.7 06/27/2019    HGB 10.6 06/27/2019    HCT 31.4 06/27/2019     06/27/2019       Renal:    Lab Results   Component Value Date     06/27/2019    K 3.5 06/27/2019    K 3.5 06/25/2019     06/27/2019    CO2 25 06/27/2019    BUN 10 06/27/2019    CREATININE 0.9 06/27/2019    CALCIUM 9.2 06/27/2019    PHOS 3.7 06/27/2019         Significant Diagnostic Studies    Radiology:   CT ABDOMEN PELVIS WO CONTRAST Additional Contrast? None   Final Result      High-grade small bowel obstruction with transition point noted. Transition point appears to be in the mid to lower abdomen difficult to visualize well. It is near images 148 through 151.        There is inflammation in the left upper quadrant surrounding a significantly dilated loop of small bowel, indeterminate                   Consults:     IP CONSULT TO GENERAL SURGERY  IP CONSULT TO HOSPITALIST    Disposition:  Home      Condition at Discharge: Stable    Discharge Instructions/Follow-up:  Follow

## 2019-06-27 NOTE — TELEPHONE ENCOUNTER
Patient called stating she'd been admitted into [facility]. 6/24/19-6/27/19, for bowel blockage. Patient was instructed to follow up with dr. Cesia Brown in one week but Dr. Cesia Brown does not have any appts available in July. Patient also stated that there was a medication nebivolol (BYSTOLIC) 2.5 MG tablet      that needed a prior auth. Patient Nerissa Maria is waiting for prior auth so they can fill the prescription for her.      Please Advise    Call Back Number [phone]

## 2019-06-27 NOTE — PROGRESS NOTES
Interval note:      Patient was examined at bedside. Patient states she continues to feel better. Denies abdominal pain. NG tube connected to LWS and was flushed. AVSS. Abdomen is non tender to palpation, non distended, no guarding rigidity. NG tube with minimal gastric output, 60 cm at the nare.  Will continue to monitor.        Adal Bill MD  General Surgery Resident  06/25/19 3:05 AM  [phone]
Patient alert and oriented times 4, BP elevated, scheduled medicatoins given, all other VS WNL, afebrile, on room air. Patient denies pain and NV. Eating and tolerating breakfast, call light in place, calls out appropriately when in need. Patient is UAL, voids without difficulty and has had a BM this morning.
Pt alert and oriented. VSS. Pt tolerating diet. Ambulates with a steady gait. Call light within reach.  Will continue to monitor
Surgery Daily Progress Note      CC: abdominal pain    Subjective :  Doing well, passing flatus having bowel movements. Wound like to go home. ROS: A 14 point review of systems was conducted, significant findings as noted in HPI. All other systems negative. Objective     Exam:  Vitals:    06/26/19 1831 06/26/19 2011 06/26/19 2327 06/27/19 0412   BP:  (!) 149/67 (!) 146/73 (!) 161/79   Pulse: 85 76 77 78   Resp:  16 16 16   Temp:  98.3 °F (36.8 °C) 97.9 °F (36.6 °C) 98.2 °F (36.8 °C)   TempSrc:  Oral Oral Oral   SpO2:  99% 98% 98%   Weight:       Height:             General appearance: alert, no acute distress, grooming appropriate  Neuro: A&Ox3, no focal deficits, sensation intact  Chest/Lungs: normal effort, on RA  Cardiovascular: RRR, no murmurs/gallops/rubs  Abdomen: soft, non-tender, non-distended, no guarding/rigidity  Extremities: no edema, no cyanosis    ASSESSMENT/PLAN:   This is a 78 y.o. female who presented with high grade bowel obstruction - resolved.     - having bowel function  - tolerating diet  - pan for discharge home today     Louie Kumari MD   Gen Surg PGY 1  6/27/2019  6:26 AM  [phone]
Surgery Daily Progress Note      CC: abdominal pain    Subjective :  Patient reports pain resolved. NG tube with minimal output. Denies hunger this am. Passing flatus. No BM. Ambulating without difficulty. ROS: A 14 point review of systems was conducted, significant findings as noted in HPI. All other systems negative. Objective     Exam:  Vitals:    06/24/19 2045 06/24/19 2330 06/24/19 2352 06/25/19 0245   BP: (!) 167/76 135/78 (!) 149/64 (!) 148/76   Pulse: 85 88 78 85   Resp: 16 16 16 16   Temp: 99 °F (37.2 °C) 98.9 °F (37.2 °C) 98.3 °F (36.8 °C) 99.4 °F (37.4 °C)   TempSrc: Oral Oral Oral Oral   SpO2: 98% 96% 97% 98%   Weight:       Height:             General appearance: alert, no acute distress, grooming appropriate  Neuro: A&Ox3, no focal deficits, sensation intact  HEENT: NG tube at 60 cm - light green - gastric  Chest/Lungs: normal effort, on RA  Cardiovascular: RRR, no murmurs/gallops/rubs  Abdomen: soft, non-tender, non-distended, no guarding/rigidity  Extremities: no edema, no cyanosis      ASSESSMENT/PLAN:   This is a 78 y.o. female who presented with severe abdominal pain, nausea, and vomiting. Presenting symptoms, physical exam and radiographic findings consistent with high grade bowel obstruction. CT scan reviewed with radiology. Given patients past medical history, she would be a high risk for surgery. Will manage with NGT decompression, NPO/IVF, serial abdominal exams, q6 hours lactic acids. - pain resolved  - NG tube clamp trial, will reassess at 10:15am if < 200cc will remove and advance to clears. - IVF  - Encourage IS and deep breathing  - Encourage OOB, ambulation  - will discuss with staff      Stanly Felty, MD  Gen.  Surgery Resident PGY1  06/25/19  6:15 AM  [phone]
confirmed that she is not taking any antibiotics currently.     Melissa Adorno  PharmD Candidate [id number]  6/24/2019 9:16 PM
less than 2 seconds. She is not diaphoretic. Psychiatric: She has a normal mood and affect. Her behavior is normal.         Labs:   Recent Labs     06/24/19  2133 06/25/19  0602 06/26/19  0533   WBC 11.2* 8.1 4.2   HGB 11.5* 11.2* 10.5*   HCT 35.7* 33.8* 31.6*    254 229     Recent Labs     06/24/19  1445 06/25/19  0602 06/26/19  0533    142  143 141   K 4.2 3.5  3.5 3.3*    107  108 104   CO2 25 24  23 25   BUN 15 14  14 9   CREATININE 0.8 0.8  0.8 0.8   CALCIUM 10.3 9.0  9.0 9.5   PHOS  --  3.1 2.3*     Recent Labs     06/24/19  1445 06/25/19  0602   AST 31 18   ALT 17 12   BILIDIR  --  <0.2   BILITOT 0.3 0.4   ALKPHOS 94 78     Recent Labs     06/25/19  0601   INR 1.04     No results for input(s): CKTOTAL, TROPONINI in the last 72 hours. Urinalysis:      Lab Results   Component Value Date    NITRU Negative 06/24/2019    WBCUA 3-5 06/24/2019    BACTERIA 1+ 06/24/2019    RBCUA 0-2 06/24/2019    BLOODU Negative 06/24/2019    SPECGRAV 1.025 06/24/2019    GLUCOSEU Negative 06/24/2019       Radiology:  CT ABDOMEN PELVIS WO CONTRAST Additional Contrast? None   Final Result      High-grade small bowel obstruction with transition point noted. Transition point appears to be in the mid to lower abdomen difficult to visualize well. It is near images 148 through 151. There is inflammation in the left upper quadrant surrounding a significantly dilated loop of small bowel, indeterminate                    Assessment & Plan  Principal Problem:    Small bowel obstruction (HCC)  Active Problems:    Hypertension    Type II or unspecified type diabetes mellitus without mention of complication, not stated as uncontrolled  Resolved Problems:    * No resolved hospital problems. *     Small bowel obstruction  Likely related to previous surgeries. Clinically appears to be improving. NG is out. Continue with the clear liquid diet.   Diet advancement per

## 2019-07-02 ENCOUNTER — HOSPITAL ENCOUNTER (OUTPATIENT)
Dept: MRI IMAGING | Age: 79
Discharge: HOME | End: 2019-07-02
Payer: MEDICARE

## 2019-07-02 DIAGNOSIS — R51.9 LEFT TEMPORAL HEADACHE: ICD-10-CM

## 2019-07-02 PROCEDURE — 70551 MRI BRAIN STEM W/O DYE: CPT

## 2019-07-03 ENCOUNTER — OFFICE VISIT (OUTPATIENT)
Dept: PRIMARY CARE CLINIC | Age: 79
End: 2019-07-03
Payer: MEDICARE

## 2019-07-03 VITALS
SYSTOLIC BLOOD PRESSURE: 150 MMHG | TEMPERATURE: 98.2 F | WEIGHT: 153 LBS | BODY MASS INDEX: 27.11 KG/M2 | OXYGEN SATURATION: 98 % | HEIGHT: 63 IN | DIASTOLIC BLOOD PRESSURE: 62 MMHG | HEART RATE: 66 BPM | RESPIRATION RATE: 12 BRPM

## 2019-07-03 DIAGNOSIS — I10 ESSENTIAL HYPERTENSION: ICD-10-CM

## 2019-07-03 DIAGNOSIS — E78.00 PURE HYPERCHOLESTEROLEMIA: ICD-10-CM

## 2019-07-03 DIAGNOSIS — K56.609 SMALL BOWEL OBSTRUCTION (HCC): ICD-10-CM

## 2019-07-03 PROCEDURE — 1123F ACP DISCUSS/DSCN MKR DOCD: CPT | Performed by: INTERNAL MEDICINE

## 2019-07-03 PROCEDURE — 1036F TOBACCO NON-USER: CPT | Performed by: INTERNAL MEDICINE

## 2019-07-03 PROCEDURE — 1090F PRES/ABSN URINE INCON ASSESS: CPT | Performed by: INTERNAL MEDICINE

## 2019-07-03 PROCEDURE — G8427 DOCREV CUR MEDS BY ELIG CLIN: HCPCS | Performed by: INTERNAL MEDICINE

## 2019-07-03 PROCEDURE — G8400 PT W/DXA NO RESULTS DOC: HCPCS | Performed by: INTERNAL MEDICINE

## 2019-07-03 PROCEDURE — 4040F PNEUMOC VAC/ADMIN/RCVD: CPT | Performed by: INTERNAL MEDICINE

## 2019-07-03 PROCEDURE — 99214 OFFICE O/P EST MOD 30 MIN: CPT | Performed by: INTERNAL MEDICINE

## 2019-07-03 PROCEDURE — 1111F DSCHRG MED/CURRENT MED MERGE: CPT | Performed by: INTERNAL MEDICINE

## 2019-07-03 PROCEDURE — G8419 CALC BMI OUT NRM PARAM NOF/U: HCPCS | Performed by: INTERNAL MEDICINE

## 2019-07-03 RX ORDER — FOLIC ACID 1 MG/1
1 TABLET ORAL DAILY
Qty: 90 TABLET | Refills: 3 | Status: SHIPPED | OUTPATIENT
Start: 2019-07-03 | End: 2020-07-08 | Stop reason: SDUPTHER

## 2019-07-03 ASSESSMENT — ENCOUNTER SYMPTOMS
ABDOMINAL PAIN: 1
EYE DISCHARGE: 0
RESPIRATORY NEGATIVE: 1
EYES NEGATIVE: 1

## 2019-07-03 NOTE — PATIENT INSTRUCTIONS
Stay on your current dose of Bystolic and spironolactone over the next month every day and see Dr. Gemma Valenzuela for a blood pressure check in a month. Continue all of your other medications.

## 2019-07-03 NOTE — PROGRESS NOTES
CAPS Take 1 capsule by mouth daily       Cyanocobalamin (B-12) 500 MCG TABS Take 1 tablet by mouth Twice a Week       lansoprazole (PREVACID) 15 MG delayed release capsule Take 15 mg by mouth daily          Immunization History   Administered Date(s) Administered    Influenza Vaccine, unspecified formulation 01/13/2007    Influenza Virus Vaccine 10/04/2012    Pneumococcal Conjugate 13-valent ([id number]) 04/19/2019    Pneumococcal Polysaccharide ([id number]) 01/13/2007       Past Medical History:   Diagnosis Date    Angina     Bilateral carpal tunnel syndrome 10/9/2018    Chronic kidney disease     Fibromyalgia     Hyperlipidemia     Hypertension     MVP (mitral valve prolapse)     s 5/18/2017    Type II or unspecified type diabetes mellitus without mention of complication, not stated as uncontrolled      Past Surgical History:   Procedure Laterality Date    CHOLECYSTECTOMY      CORONARY ANGIOPLASTY WITH STENT PLACEMENT      2 stents    HYSTERECTOMY      LUMBAR DISCECTOMY  10/03/12    lumbar discectomy, 3-4 left    SMALL INTESTINE SURGERY      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 HENT: Negative. Eyes: Negative. Negative for discharge. Respiratory: Negative. Gastrointestinal: Positive for abdominal pain. Genitourinary: Negative for difficulty urinating. Musculoskeletal: Negative for arthralgias. Skin: Negative for rash. Neurological: Negative. Psychiatric/Behavioral: Negative. Negative for agitation and confusion. The patient is not nervous/anxious. All other systems reviewed and are negative.       Vitals:    07/03/19 1523 07/03/19 1526 07/03/19 1537   BP: (!) 180/80 (!) 188/80 (!) 150/62   Pulse: 66     Resp: 12     Temp: 98.2 °F (36.8 °C)     SpO2: 98%     Weight: 153 lb coming down. Stay on current medication doses      1. Pure hypercholesterolemia    - folic acid (FOLVITE) 1 MG tablet; Take 1 tablet by mouth daily  Dispense: 90 tablet; Refill: 3    2. Small bowel obstruction ([facility] [place]      3. Essential hypertension         No follow-ups on file.

## 2019-07-04 ASSESSMENT — PATIENT HEALTH QUESTIONNAIRE - PHQ9
SUM OF ALL RESPONSES TO PHQ9 QUESTIONS 1 & 2: 2
SUM OF ALL RESPONSES TO PHQ QUESTIONS 1-9: 2
1. LITTLE INTEREST OR PLEASURE IN DOING THINGS: 1
SUM OF ALL RESPONSES TO PHQ QUESTIONS 1-9: 2
2. FEELING DOWN, DEPRESSED OR HOPELESS: 1

## 2019-07-30 ENCOUNTER — HOSPITAL ENCOUNTER (OUTPATIENT)
Dept: MAMMOGRAPHY | Age: 79
Discharge: HOME | End: 2019-08-04
Payer: MEDICARE

## 2019-07-30 DIAGNOSIS — Z12.31 VISIT FOR SCREENING MAMMOGRAM: ICD-10-CM

## 2019-07-30 PROCEDURE — 77067 SCR MAMMO BI INCL CAD: CPT

## 2019-08-02 NOTE — PROGRESS NOTES
Patient not reached. Preop instructions left on voice mail.  Number___[phone]____________    -Date__8/13/19_____time__0930_____arrival__0800 hosp-endo__________  -Nothing to eat or drink after midnight  -Responsible adult 25 or older to stay on site while you are here and drive you home and stay with you after  -Follow any instructions your doctors office has given you  -Bring a complete list of all your medications and supplements  -If you normally take the following medications in the morning please do so with a small    sip of water-heart,blood pressure,seizure,breathing or thyroid-avoid water pilll Do not take blood pressure medications ending in \"jimmy\" or \"pril\" the AM of surgery or the martin prior  -You may use your inhalers  -Take half of your normal dose of any long acting insulins the night before-do not take    any diabetic medications in the morning  -Follow your doctors instructions regarding blood thinners  -Any questions call your surgeons office  Anesthesia attempts to review all Endo charts prior to surgery and will place any PAT orders,Surgery patients will have orders placed based on history in chart which may not be complete  ENDOSCOPY PATIENTS ONLY-FOLLOW YOUR DOCTORS BOWEL PREP INSTRUCTIONS,THIS MAY INCLUDE TAKING A SECOND PORTION OF YOUR PREP AFTER MIDNIGHT

## 2019-08-05 ENCOUNTER — ANESTHESIA EVENT (OUTPATIENT)
Dept: ENDOSCOPY | Age: 79
End: 2019-08-05
Payer: MEDICARE

## 2019-08-09 ENCOUNTER — OFFICE VISIT (OUTPATIENT)
Dept: PRIMARY CARE CLINIC | Age: 79
End: 2019-08-09
Payer: MEDICARE

## 2019-08-09 VITALS
HEART RATE: 56 BPM | BODY MASS INDEX: 27.1 KG/M2 | SYSTOLIC BLOOD PRESSURE: 138 MMHG | WEIGHT: 153 LBS | TEMPERATURE: 97.7 F | DIASTOLIC BLOOD PRESSURE: 68 MMHG | RESPIRATION RATE: 18 BRPM | OXYGEN SATURATION: 98 %

## 2019-08-09 DIAGNOSIS — R10.9 CHRONIC ABDOMINAL PAIN: ICD-10-CM

## 2019-08-09 DIAGNOSIS — E11.8 TYPE 2 DIABETES MELLITUS WITH COMPLICATION, WITH LONG-TERM CURRENT USE OF INSULIN (HCC): ICD-10-CM

## 2019-08-09 DIAGNOSIS — I10 HYPERTENSION, ESSENTIAL: ICD-10-CM

## 2019-08-09 DIAGNOSIS — Z79.4 TYPE 2 DIABETES MELLITUS WITH COMPLICATION, WITH LONG-TERM CURRENT USE OF INSULIN (HCC): ICD-10-CM

## 2019-08-09 DIAGNOSIS — G89.29 CHRONIC ABDOMINAL PAIN: ICD-10-CM

## 2019-08-09 DIAGNOSIS — N18.30 STAGE 3 CHRONIC KIDNEY DISEASE (HCC): Primary | ICD-10-CM

## 2019-08-09 PROCEDURE — 1123F ACP DISCUSS/DSCN MKR DOCD: CPT | Performed by: INTERNAL MEDICINE

## 2019-08-09 PROCEDURE — G8427 DOCREV CUR MEDS BY ELIG CLIN: HCPCS | Performed by: INTERNAL MEDICINE

## 2019-08-09 PROCEDURE — 1036F TOBACCO NON-USER: CPT | Performed by: INTERNAL MEDICINE

## 2019-08-09 PROCEDURE — 4040F PNEUMOC VAC/ADMIN/RCVD: CPT | Performed by: INTERNAL MEDICINE

## 2019-08-09 PROCEDURE — G8419 CALC BMI OUT NRM PARAM NOF/U: HCPCS | Performed by: INTERNAL MEDICINE

## 2019-08-09 PROCEDURE — 1090F PRES/ABSN URINE INCON ASSESS: CPT | Performed by: INTERNAL MEDICINE

## 2019-08-09 PROCEDURE — G8400 PT W/DXA NO RESULTS DOC: HCPCS | Performed by: INTERNAL MEDICINE

## 2019-08-09 PROCEDURE — 99214 OFFICE O/P EST MOD 30 MIN: CPT | Performed by: INTERNAL MEDICINE

## 2019-08-09 RX ORDER — POLYETHYLENE GLYCOL 3350 17 G/17G
17 POWDER, FOR SOLUTION ORAL DAILY
Qty: 1530 G | Refills: 5 | Status: SHIPPED | OUTPATIENT
Start: 2019-08-09 | End: 2019-09-08

## 2019-08-09 RX ORDER — DOCUSATE SODIUM 100 MG/1
100 CAPSULE, LIQUID FILLED ORAL 3 TIMES DAILY
Qty: 90 CAPSULE | Refills: 5 | Status: SHIPPED | OUTPATIENT
Start: 2019-08-09 | End: 2020-04-03

## 2019-08-09 ASSESSMENT — ENCOUNTER SYMPTOMS
EYES NEGATIVE: 1
EYE REDNESS: 0
PHOTOPHOBIA: 0
VOMITING: 0
RESPIRATORY NEGATIVE: 1
DIARRHEA: 0
SORE THROAT: 0
ALLERGIC/IMMUNOLOGIC NEGATIVE: 1
EYE PAIN: 0
SHORTNESS OF BREATH: 0
COUGH: 0
ABDOMINAL PAIN: 0
SINUS PRESSURE: 0
NAUSEA: 0

## 2019-08-09 ASSESSMENT — PATIENT HEALTH QUESTIONNAIRE - PHQ9
SUM OF ALL RESPONSES TO PHQ QUESTIONS 1-9: 0
SUM OF ALL RESPONSES TO PHQ9 QUESTIONS 1 & 2: 0
2. FEELING DOWN, DEPRESSED OR HOPELESS: 0
SUM OF ALL RESPONSES TO PHQ QUESTIONS 1-9: 0
1. LITTLE INTEREST OR PLEASURE IN DOING THINGS: 0

## 2019-08-09 NOTE — PROGRESS NOTES
2019     Abdirashid Duong (:  1940) is a 78 y.o. female, here for evaluation of the following medical concerns:    HPI  2019 10:31 AM MD Lucas Noel MD Results   Orders Requiring a Screening Form     Procedure Order Status Form Status    MRI BRAIN WO CONTRAST Completed Created   Radiation Dose Estimates     No radiation information found for this patient   Narrative   EXAM: MRI BRAIN WO CONTRAST       INDICATION: Left temporal headache.       COMPARISON: None       TECHNIQUE: Multiplanar, multisequence MR imaging of the head obtained. IV contrast: None.       FINDINGS:       MIDLINE STRUCTURES: The sella turcica and pituitary gland are normal. Craniovertebral alignment and cerebellar tonsils are normal.       VENTRICLES: Diffuse cerebral atrophy and superimposed ventriculomegaly.       INTRACRANIAL HEMORRHAGE: None.       BRAIN PARENCHYMA: No evidence of acute diffusion restriction.  Foci of T2 and FLAIR hyperintensity are demonstrated in the bilateral cerebral hemisphere periventricular and, to a lesser extent, subcortical white matter structures. There are no    extra-axial collections. Intravenous contrast is not administered.       INTRACRANIAL VASCULATURE: Major intracranial vessels are grossly patent.       ORBITS: Normal.       BONE MARROW: Bone marrow signal within normal limits.       VISUALIZED CERVICAL SPINE: Normal       PARANASAL SINUSES/MASTOID BONES: No acute sinusitis or mastoiditis.       EXTRACRANIAL SOFT TISSUES: Normal           Impression       1.  Diffuse cerebral atrophy and superimposed moderately advanced small vessel ischemic disease.  No evidence of recent infarct, intracranial hemorrhage, or mass lesion.         Narrative   BILATERAL DIGITAL SCREENING MAMMOGRAM WITH CAD ON 2019       HISTORY: No current breast complaints       COMPARISON: Mammograms 12/10/2014, 2017       LIMITATIONS: None       LIFETIME RISK: 2.2%       FINDINGS:

## 2019-08-13 ENCOUNTER — HOSPITAL ENCOUNTER (OUTPATIENT)
Age: 79
Setting detail: OUTPATIENT SURGERY
Discharge: HOME | End: 2019-08-13
Attending: INTERNAL MEDICINE | Admitting: INTERNAL MEDICINE
Payer: MEDICARE

## 2019-08-13 ENCOUNTER — ANESTHESIA (OUTPATIENT)
Dept: ENDOSCOPY | Age: 79
End: 2019-08-13
Payer: MEDICARE

## 2019-08-13 VITALS
WEIGHT: 152.06 LBS | TEMPERATURE: 96.8 F | RESPIRATION RATE: 10 BRPM | SYSTOLIC BLOOD PRESSURE: 123 MMHG | DIASTOLIC BLOOD PRESSURE: 52 MMHG | BODY MASS INDEX: 26.94 KG/M2 | HEIGHT: 63 IN | HEART RATE: 55 BPM | OXYGEN SATURATION: 100 %

## 2019-08-13 VITALS — SYSTOLIC BLOOD PRESSURE: 97 MMHG | DIASTOLIC BLOOD PRESSURE: 47 MMHG | OXYGEN SATURATION: 99 %

## 2019-08-13 LAB
GLUCOSE BLD-MCNC: 93 MG/DL (ref 70–99)
GLUCOSE BLD-MCNC: 94 MG/DL (ref 70–99)
PERFORMED ON: NORMAL
PERFORMED ON: NORMAL

## 2019-08-13 PROCEDURE — 2580000003 HC RX 258: Performed by: ANESTHESIOLOGY

## 2019-08-13 PROCEDURE — 7100000010 HC PHASE II RECOVERY - FIRST 15 MIN: Performed by: INTERNAL MEDICINE

## 2019-08-13 PROCEDURE — 7100000011 HC PHASE II RECOVERY - ADDTL 15 MIN: Performed by: INTERNAL MEDICINE

## 2019-08-13 PROCEDURE — 6360000002 HC RX W HCPCS: Performed by: NURSE ANESTHETIST, CERTIFIED REGISTERED

## 2019-08-13 PROCEDURE — 3700000001 HC ADD 15 MINUTES (ANESTHESIA): Performed by: INTERNAL MEDICINE

## 2019-08-13 PROCEDURE — 2500000003 HC RX 250 WO HCPCS: Performed by: NURSE ANESTHETIST, CERTIFIED REGISTERED

## 2019-08-13 PROCEDURE — 3609009500 HC COLONOSCOPY DIAGNOSTIC OR SCREENING: Performed by: INTERNAL MEDICINE

## 2019-08-13 PROCEDURE — 2709999900 HC NON-CHARGEABLE SUPPLY: Performed by: INTERNAL MEDICINE

## 2019-08-13 PROCEDURE — 7100000000 HC PACU RECOVERY - FIRST 15 MIN: Performed by: INTERNAL MEDICINE

## 2019-08-13 PROCEDURE — 7100000001 HC PACU RECOVERY - ADDTL 15 MIN: Performed by: INTERNAL MEDICINE

## 2019-08-13 PROCEDURE — 3700000000 HC ANESTHESIA ATTENDED CARE: Performed by: INTERNAL MEDICINE

## 2019-08-13 RX ORDER — FENTANYL CITRATE 50 UG/ML
25 INJECTION, SOLUTION INTRAMUSCULAR; INTRAVENOUS EVERY 5 MIN PRN
Status: DISCONTINUED | OUTPATIENT
Start: 2019-08-13 | End: 2019-08-13 | Stop reason: HOSPADM

## 2019-08-13 RX ORDER — HYDROMORPHONE HCL 110MG/55ML
0.5 PATIENT CONTROLLED ANALGESIA SYRINGE INTRAVENOUS EVERY 5 MIN PRN
Status: DISCONTINUED | OUTPATIENT
Start: 2019-08-13 | End: 2019-08-13 | Stop reason: HOSPADM

## 2019-08-13 RX ORDER — DIPHENHYDRAMINE HYDROCHLORIDE 50 MG/ML
12.5 INJECTION INTRAMUSCULAR; INTRAVENOUS
Status: DISCONTINUED | OUTPATIENT
Start: 2019-08-13 | End: 2019-08-13 | Stop reason: HOSPADM

## 2019-08-13 RX ORDER — HYDROMORPHONE HCL 110MG/55ML
0.25 PATIENT CONTROLLED ANALGESIA SYRINGE INTRAVENOUS EVERY 5 MIN PRN
Status: DISCONTINUED | OUTPATIENT
Start: 2019-08-13 | End: 2019-08-13 | Stop reason: HOSPADM

## 2019-08-13 RX ORDER — SODIUM CHLORIDE 0.9 % (FLUSH) 0.9 %
10 SYRINGE (ML) INJECTION EVERY 12 HOURS SCHEDULED
Status: DISCONTINUED | OUTPATIENT
Start: 2019-08-13 | End: 2019-08-13 | Stop reason: HOSPADM

## 2019-08-13 RX ORDER — MEPERIDINE HYDROCHLORIDE 25 MG/ML
12.5 INJECTION INTRAMUSCULAR; INTRAVENOUS; SUBCUTANEOUS EVERY 5 MIN PRN
Status: DISCONTINUED | OUTPATIENT
Start: 2019-08-13 | End: 2019-08-13 | Stop reason: HOSPADM

## 2019-08-13 RX ORDER — PROPOFOL 10 MG/ML
INJECTION, EMULSION INTRAVENOUS PRN
Status: DISCONTINUED | OUTPATIENT
Start: 2019-08-13 | End: 2019-08-13 | Stop reason: SDUPTHER

## 2019-08-13 RX ORDER — SODIUM CHLORIDE 0.9 % (FLUSH) 0.9 %
10 SYRINGE (ML) INJECTION PRN
Status: DISCONTINUED | OUTPATIENT
Start: 2019-08-13 | End: 2019-08-13 | Stop reason: HOSPADM

## 2019-08-13 RX ORDER — LIDOCAINE HYDROCHLORIDE 20 MG/ML
INJECTION, SOLUTION INFILTRATION; PERINEURAL PRN
Status: DISCONTINUED | OUTPATIENT
Start: 2019-08-13 | End: 2019-08-13 | Stop reason: SDUPTHER

## 2019-08-13 RX ORDER — FENTANYL CITRATE 50 UG/ML
50 INJECTION, SOLUTION INTRAMUSCULAR; INTRAVENOUS EVERY 5 MIN PRN
Status: DISCONTINUED | OUTPATIENT
Start: 2019-08-13 | End: 2019-08-13 | Stop reason: HOSPADM

## 2019-08-13 RX ORDER — SODIUM CHLORIDE 9 MG/ML
INJECTION, SOLUTION INTRAVENOUS CONTINUOUS
Status: DISCONTINUED | OUTPATIENT
Start: 2019-08-13 | End: 2019-08-13 | Stop reason: HOSPADM

## 2019-08-13 RX ADMIN — PROPOFOL 50 MG: 10 INJECTION, EMULSION INTRAVENOUS at 08:56

## 2019-08-13 RX ADMIN — LIDOCAINE HYDROCHLORIDE 100 MG: 20 INJECTION, SOLUTION INFILTRATION; PERINEURAL at 08:54

## 2019-08-13 RX ADMIN — PROPOFOL 30 MG: 10 INJECTION, EMULSION INTRAVENOUS at 09:02

## 2019-08-13 RX ADMIN — PROPOFOL 30 MG: 10 INJECTION, EMULSION INTRAVENOUS at 08:54

## 2019-08-13 RX ADMIN — SODIUM CHLORIDE: 9 INJECTION, SOLUTION INTRAVENOUS at 08:41

## 2019-08-13 RX ADMIN — PROPOFOL 40 MG: 10 INJECTION, EMULSION INTRAVENOUS at 08:59

## 2019-08-13 RX ADMIN — PROPOFOL 30 MG: 10 INJECTION, EMULSION INTRAVENOUS at 09:09

## 2019-08-13 RX ADMIN — PHENYLEPHRINE HYDROCHLORIDE 100 MCG: 10 INJECTION INTRAVENOUS at 09:02

## 2019-08-13 ASSESSMENT — PULMONARY FUNCTION TESTS
PIF_VALUE: 0
PIF_VALUE: 0
PIF_VALUE: 1
PIF_VALUE: 0
PIF_VALUE: 1
PIF_VALUE: 0
PIF_VALUE: 0
PIF_VALUE: 1
PIF_VALUE: 1
PIF_VALUE: 0
PIF_VALUE: 1
PIF_VALUE: 0
PIF_VALUE: 1
PIF_VALUE: 1
PIF_VALUE: 0
PIF_VALUE: 1
PIF_VALUE: 1

## 2019-08-13 ASSESSMENT — PAIN - FUNCTIONAL ASSESSMENT: PAIN_FUNCTIONAL_ASSESSMENT: 0-10

## 2019-08-13 NOTE — ANESTHESIA PRE PROCEDURE
Department of Anesthesiology  Preprocedure Note       Name:  Gillian Sharma   Age:  78 y.o.  :  1940                                          MRN:  [id number]         Date:  2019      Surgeon: Clint Arteaga):  Layne Sanchez MD    Procedure: COLONOSCOPY DIAGNOSTIC (N/A )    Medications prior to admission:   Prior to Admission medications    Medication Sig Start Date End Date Taking?  Authorizing Provider   docusate sodium (COLACE) 100 MG capsule Take 1 capsule by mouth three times daily 19   Courtney Barreto MD   polyethylene glycol [facility]) powder Take 17 g by mouth daily 19  Courtney Barreto MD   folic acid (FOLVITE) 1 MG tablet Take 1 tablet by mouth daily 7/3/19   Saima Jimenez MD   aspirin 81 MG EC tablet Take 81 mg by mouth daily    Historical Provider, MD   vitamin D (ERGOCALCIFEROL) 93770 units CAPS capsule Take 50,000 Units by mouth every 14 days    Historical Provider, MD   acetaminophen (TYLENOL) 325 MG tablet Take 650 mg by mouth every 6 hours as needed for Pain    Historical Provider, MD   B complex-vitamin C-folic acid (NEPHRO-BARNEY) 1 MG tablet Take 1 tablet by mouth daily (with breakfast)    Historical Provider, MD   Multiple Vitamins-Minerals (OCUVITE EYE + MULTI) TABS Take 1 tablet by mouth daily    Historical Provider, MD   nebivolol (BYSTOLIC) 2.5 MG tablet Take 1 tablet by mouth daily 19   Courtney Barreto MD   spironolactone (ALDACTONE) 25 MG tablet Take 1 tablet by mouth daily Stop potassium 19   Courtney Barreto MD   ezetimibe (ZETIA) 10 MG tablet Take 1 tablet by mouth daily 19   Courtney Barreto MD   brimonidine-timolol (COMBIGAN) 0.2-0.5 % ophthalmic solution Place 1 drop into both eyes every 12 hours    Historical Provider, MD   insulin glargine (LANTUS SOLOSTAR) 100 UNIT/ML injection pen Inject 25 Units into the skin nightly Lease refill 18 per patient requests  Patient taking differently: Inject into the

## 2019-08-13 NOTE — H&P
tablet Take 1 tablet by mouth daily Stop potassium 30 tablet 5    ezetimibe (ZETIA) 10 MG tablet Take 1 tablet by mouth daily 30 tablet 3    brimonidine-timolol (COMBIGAN) 0.2-0.5 % ophthalmic solution Place 1 drop into both eyes every 12 hours      insulin glargine (LANTUS SOLOSTAR) 100 UNIT/ML injection pen Inject 25 Units into the skin nightly Lease refill 8/23/18 per patient requests (Patient taking differently: Inject into the skin nightly Indications: sliding scale up to 22 units Lease refill 8/23/18 per patient requests) 8 mL 5    latanoprost (XALATAN) 0.005 % ophthalmic solution Place 1 drop into both eyes nightly       Coenzyme Q10 (COQ10) 100 MG CAPS Take 1 capsule by mouth daily       Cyanocobalamin (B-12) 500 MCG TABS Take 1 tablet by mouth Twice a Week       lansoprazole (PREVACID) 15 MG delayed release capsule Take 15 mg by mouth daily           Allergies:  Sulfa antibiotics; Lisinopril-hydrochlorothiazide; Ace inhibitors; Actos [pioglitazone hydrochloride]; Celebrex [celecoxib]; Cipro xr; Demerol; Doxycycline; Doxycycline calcium; Elavil [amitriptyline hcl]; Estradiol; Levofloxacin; Neurontin [gabapentin]; Niaspan [niacin er]; Nsaids; Oxycodone; Phenytoin sodium extended; Prednisone; Pregabalin; and Simvastatin    History of allergic reaction to anesthesia:  No    Social History:   TOBACCO:   reports that she has never smoked. She has never used smokeless tobacco.  ETOH:   reports that she does not drink alcohol. DRUGS:   reports that she does not use drugs. Family History:       Problem Relation Age of Onset    Vision Loss Sister        PHYSICAL EXAM:      There were no vitals taken for this visit.  I        Heart:  Normal apical impulse, regular rate and rhythm, normal S1 and S2, no S3 or S4, and no murmur noted    Lungs:  No increased work of breathing, good air exchange, clear to auscultation bilaterally, no crackles or wheezing    Abdomen:  Normal bowel sounds, soft, non-distended, non-tender, no masses palpated, no hepatosplenomegally      ASA Grade:  ASA 2 - Patient with mild systemic disease with no functional limitations    Mallampati Class:  Class I: Soft palate, uvula, fauces, pillars visible  __________  Class II: Soft palate, uvula, fauces visible  ____X_____   Class III: Soft palate, base of uvula visible  __________  Class IV: Hard palate only visible   __________        ASSESSMENT AND PLAN:    1. Patient is a 78 y.o. female here for colonoscopy with anesthesia  2. Procedure options, risks and benefits reviewed with patient. Patient expresses understanding.      Lenyn Sharp MD  [address]  8/13/2019

## 2019-08-13 NOTE — PROGRESS NOTES
Adm to pacu from endo per cart awakening. Respirations easy & symmetrical. Abdomen soft. Denies pain. Family at bedside.

## 2019-08-28 ENCOUNTER — TELEPHONE (OUTPATIENT)
Dept: ADMINISTRATIVE | Age: 79
End: 2019-08-28

## 2019-09-09 ENCOUNTER — TELEPHONE (OUTPATIENT)
Dept: PRIMARY CARE CLINIC | Age: 79
End: 2019-09-09

## 2019-09-09 DIAGNOSIS — R19.7 ACUTE DIARRHEA: Primary | ICD-10-CM

## 2019-09-10 DIAGNOSIS — R19.7 ACUTE DIARRHEA: ICD-10-CM

## 2019-09-10 DIAGNOSIS — E11.8 TYPE 2 DIABETES MELLITUS WITH COMPLICATION, WITH LONG-TERM CURRENT USE OF INSULIN (HCC): ICD-10-CM

## 2019-09-10 DIAGNOSIS — Z79.4 TYPE 2 DIABETES MELLITUS WITH COMPLICATION, WITH LONG-TERM CURRENT USE OF INSULIN (HCC): ICD-10-CM

## 2019-09-10 DIAGNOSIS — N18.30 STAGE 3 CHRONIC KIDNEY DISEASE (HCC): ICD-10-CM

## 2019-09-10 LAB
A/G RATIO: 1.5 (ref 1.1–2.2)
ALBUMIN SERPL-MCNC: 4.6 G/DL (ref 3.4–5)
ALP BLD-CCNC: 86 U/L (ref 40–129)
ALT SERPL-CCNC: 12 U/L (ref 10–40)
ANION GAP SERPL CALCULATED.3IONS-SCNC: 12 MMOL/L (ref 3–16)
AST SERPL-CCNC: 17 U/L (ref 15–37)
BASOPHILS ABSOLUTE: 0 K/UL (ref 0–0.2)
BASOPHILS RELATIVE PERCENT: 0.9 %
BILIRUB SERPL-MCNC: 0.3 MG/DL (ref 0–1)
BUN BLDV-MCNC: 15 MG/DL (ref 7–20)
CALCIUM SERPL-MCNC: 10.2 MG/DL (ref 8.3–10.6)
CHLORIDE BLD-SCNC: 103 MMOL/L (ref 99–110)
CO2: 26 MMOL/L (ref 21–32)
CREAT SERPL-MCNC: 1 MG/DL (ref 0.6–1.2)
CREATININE URINE: 162.8 MG/DL (ref 28–259)
EOSINOPHILS ABSOLUTE: 0 K/UL (ref 0–0.6)
EOSINOPHILS RELATIVE PERCENT: 1.2 %
GFR AFRICAN AMERICAN: >60
GFR NON-AFRICAN AMERICAN: 53
GLOBULIN: 3 G/DL
GLUCOSE BLD-MCNC: 95 MG/DL (ref 70–99)
HCT VFR BLD CALC: 33.9 % (ref 36–48)
HEMOGLOBIN: 11.4 G/DL (ref 12–16)
LYMPHOCYTES ABSOLUTE: 1.3 K/UL (ref 1–5.1)
LYMPHOCYTES RELATIVE PERCENT: 34 %
MCH RBC QN AUTO: 33.9 PG (ref 26–34)
MCHC RBC AUTO-ENTMCNC: 33.6 G/DL (ref 31–36)
MCV RBC AUTO: 100.9 FL (ref 80–100)
MICROALBUMIN UR-MCNC: <1.2 MG/DL
MICROALBUMIN/CREAT UR-RTO: NORMAL MG/G (ref 0–30)
MONOCYTES ABSOLUTE: 0.4 K/UL (ref 0–1.3)
MONOCYTES RELATIVE PERCENT: 10.5 %
NEUTROPHILS ABSOLUTE: 2.1 K/UL (ref 1.7–7.7)
NEUTROPHILS RELATIVE PERCENT: 53.4 %
PDW BLD-RTO: 14.1 % (ref 12.4–15.4)
PHOSPHORUS: 4.2 MG/DL (ref 2.5–4.9)
PLATELET # BLD: 240 K/UL (ref 135–450)
PMV BLD AUTO: 8.5 FL (ref 5–10.5)
POTASSIUM SERPL-SCNC: 4.3 MMOL/L (ref 3.5–5.1)
RBC # BLD: 3.36 M/UL (ref 4–5.2)
SODIUM BLD-SCNC: 141 MMOL/L (ref 136–145)
TOTAL PROTEIN: 7.6 G/DL (ref 6.4–8.2)
WBC # BLD: 3.9 K/UL (ref 4–11)

## 2019-09-11 LAB
ESTIMATED AVERAGE GLUCOSE: 148.5 MG/DL
HBA1C MFR BLD: 6.8 %

## 2019-09-12 LAB — FRUCTOSAMINE: 268 UMOL/L (ref 170–285)

## 2019-09-13 ENCOUNTER — TELEPHONE (OUTPATIENT)
Dept: PRIMARY CARE CLINIC | Age: 79
End: 2019-09-13

## 2019-09-13 DIAGNOSIS — A04.72 C. DIFFICILE DIARRHEA: ICD-10-CM

## 2019-09-13 RX ORDER — VANCOMYCIN HYDROCHLORIDE 125 MG/1
125 CAPSULE ORAL 4 TIMES DAILY
Qty: 40 CAPSULE | Refills: 1 | OUTPATIENT
Start: 2019-09-13 | End: 2019-10-14 | Stop reason: SDUPTHER

## 2019-09-13 NOTE — TELEPHONE ENCOUNTER
vancomycin (VANCOCIN) 125 MG capsule     Called into pharmacy per Tracey Vital patient will  stool sample kit on Monday

## 2019-09-18 LAB — C. DIFFICILE TOXIN MOLECULAR: NORMAL

## 2019-10-14 ENCOUNTER — TELEPHONE (OUTPATIENT)
Dept: PRIMARY CARE CLINIC | Age: 79
End: 2019-10-14

## 2019-10-14 DIAGNOSIS — A09 DIARRHEA OF INFECTIOUS ORIGIN: Primary | ICD-10-CM

## 2019-10-14 DIAGNOSIS — A04.72 C. DIFFICILE DIARRHEA: ICD-10-CM

## 2019-10-14 RX ORDER — VANCOMYCIN HYDROCHLORIDE 125 MG/1
125 CAPSULE ORAL 4 TIMES DAILY
Qty: 40 CAPSULE | Refills: 1 | Status: SHIPPED | OUTPATIENT
Start: 2019-10-14 | End: 2019-10-24

## 2019-11-22 ENCOUNTER — OFFICE VISIT (OUTPATIENT)
Dept: PRIMARY CARE CLINIC | Age: 79
End: 2019-11-22
Payer: MEDICARE

## 2019-11-22 VITALS
SYSTOLIC BLOOD PRESSURE: 136 MMHG | DIASTOLIC BLOOD PRESSURE: 72 MMHG | TEMPERATURE: 98 F | BODY MASS INDEX: 27.81 KG/M2 | HEART RATE: 58 BPM | OXYGEN SATURATION: 99 % | WEIGHT: 157 LBS

## 2019-11-22 DIAGNOSIS — Z79.4 TYPE 2 DIABETES MELLITUS WITH COMPLICATION, WITH LONG-TERM CURRENT USE OF INSULIN (HCC): ICD-10-CM

## 2019-11-22 DIAGNOSIS — I10 HYPERTENSION, ESSENTIAL: ICD-10-CM

## 2019-11-22 DIAGNOSIS — R19.7 DIARRHEA, UNSPECIFIED TYPE: ICD-10-CM

## 2019-11-22 DIAGNOSIS — E11.8 TYPE 2 DIABETES MELLITUS WITH COMPLICATION, WITH LONG-TERM CURRENT USE OF INSULIN (HCC): ICD-10-CM

## 2019-11-22 DIAGNOSIS — K52.9 CHRONIC DIARRHEA: Primary | ICD-10-CM

## 2019-11-22 PROCEDURE — G8427 DOCREV CUR MEDS BY ELIG CLIN: HCPCS | Performed by: INTERNAL MEDICINE

## 2019-11-22 PROCEDURE — 99214 OFFICE O/P EST MOD 30 MIN: CPT | Performed by: INTERNAL MEDICINE

## 2019-11-22 PROCEDURE — G8400 PT W/DXA NO RESULTS DOC: HCPCS | Performed by: INTERNAL MEDICINE

## 2019-11-22 PROCEDURE — 4040F PNEUMOC VAC/ADMIN/RCVD: CPT | Performed by: INTERNAL MEDICINE

## 2019-11-22 PROCEDURE — G8417 CALC BMI ABV UP PARAM F/U: HCPCS | Performed by: INTERNAL MEDICINE

## 2019-11-22 PROCEDURE — 1090F PRES/ABSN URINE INCON ASSESS: CPT | Performed by: INTERNAL MEDICINE

## 2019-11-22 PROCEDURE — G8484 FLU IMMUNIZE NO ADMIN: HCPCS | Performed by: INTERNAL MEDICINE

## 2019-11-22 PROCEDURE — 1123F ACP DISCUSS/DSCN MKR DOCD: CPT | Performed by: INTERNAL MEDICINE

## 2019-11-22 PROCEDURE — 1036F TOBACCO NON-USER: CPT | Performed by: INTERNAL MEDICINE

## 2019-11-22 ASSESSMENT — ENCOUNTER SYMPTOMS
NAUSEA: 0
SHORTNESS OF BREATH: 0
EYES NEGATIVE: 1
COUGH: 0
SORE THROAT: 0
SINUS PRESSURE: 0
RESPIRATORY NEGATIVE: 1
EYE REDNESS: 0
ABDOMINAL PAIN: 1
EYE PAIN: 0
VOMITING: 0
PHOTOPHOBIA: 0
ALLERGIC/IMMUNOLOGIC NEGATIVE: 1
DIARRHEA: 0

## 2019-11-24 PROBLEM — R19.7 DIARRHEA: Status: ACTIVE | Noted: 2019-11-24

## 2019-11-24 PROBLEM — K52.9 CHRONIC DIARRHEA: Status: ACTIVE | Noted: 2019-11-24

## 2019-12-11 DIAGNOSIS — K52.9 CHRONIC DIARRHEA: ICD-10-CM

## 2019-12-11 DIAGNOSIS — E11.8 TYPE 2 DIABETES MELLITUS WITH COMPLICATION, WITH LONG-TERM CURRENT USE OF INSULIN (HCC): ICD-10-CM

## 2019-12-11 DIAGNOSIS — Z79.4 TYPE 2 DIABETES MELLITUS WITH COMPLICATION, WITH LONG-TERM CURRENT USE OF INSULIN (HCC): ICD-10-CM

## 2019-12-12 ENCOUNTER — TELEPHONE (OUTPATIENT)
Dept: PRIMARY CARE CLINIC | Age: 79
End: 2019-12-12

## 2019-12-12 DIAGNOSIS — K52.9 CHRONIC DIARRHEA: Primary | ICD-10-CM

## 2019-12-12 LAB
C DIFF TOXIN/ANTIGEN: NORMAL
C. DIFFICILE TOXIN MOLECULAR: ABNORMAL
CRYPTOSPORIDIUM ANTIGEN STOOL: NORMAL
ESTIMATED AVERAGE GLUCOSE: 139.9 MG/DL
GIARDIA ANTIGEN STOOL: NORMAL
HBA1C MFR BLD: 6.5 %
ORGANISM: ABNORMAL
WHITE BLOOD CELLS (WBC), STOOL: ABNORMAL

## 2019-12-12 RX ORDER — SPIRONOLACTONE 25 MG/1
25 TABLET ORAL DAILY
Qty: 30 TABLET | Refills: 5 | Status: SHIPPED | OUTPATIENT
Start: 2019-12-12 | End: 2020-05-12 | Stop reason: SDUPTHER

## 2019-12-12 RX ORDER — METRONIDAZOLE 500 MG/1
500 TABLET ORAL 3 TIMES DAILY
Qty: 42 TABLET | Refills: 0 | Status: SHIPPED | OUTPATIENT
Start: 2019-12-12 | End: 2019-12-26

## 2019-12-13 LAB
ALLERGEN CLAMS IGE: <0.1 KU/L
ALLERGEN CODFISH IGE: <0.1 KU/L
ALLERGEN CORN IGE: <0.1 KU/L
ALLERGEN COW MILK IGE: <0.1 KU/L
ALLERGEN EGG WHITE IGE: <0.1 KU/L
ALLERGEN PEANUT (F13) IGE: <0.1 KU/L
ALLERGEN SCALLOP IGE: <0.1 KU/L
ALLERGEN SEE NOTE: NORMAL
ALLERGEN SHRIMP IGE: <0.1 KU/L
ALLERGEN SOYBEAN IGE: <0.1 KU/L
ALLERGEN WALNUT IGE: <0.1 KU/L
ALLERGEN WHEAT IGE: 0.18 KU/L
FECAL NEUTRAL FAT: NORMAL
FECAL SPLIT FATS: NORMAL
IGE: 78 KU/L

## 2019-12-17 LAB
OVA AND PARASITE TRICHROME: NORMAL
OVA AND PARASITE WET MOUNT: NEGATIVE

## 2019-12-24 ENCOUNTER — TELEPHONE (OUTPATIENT)
Dept: PRIMARY CARE CLINIC | Age: 79
End: 2019-12-24

## 2019-12-26 RX ORDER — NEBIVOLOL 2.5 MG/1
2.5 TABLET ORAL DAILY
Qty: 30 TABLET | Refills: 5 | Status: SHIPPED | OUTPATIENT
Start: 2019-12-26 | End: 2020-07-08 | Stop reason: SDUPTHER

## 2020-01-23 ENCOUNTER — TELEPHONE (OUTPATIENT)
Dept: PRIMARY CARE CLINIC | Age: 80
End: 2020-01-23

## 2020-01-23 NOTE — TELEPHONE ENCOUNTER
Patient states her Lantus insulin should have gone to [facility]. Can [facility] please [facility] which I have added into her chart.

## 2020-01-27 RX ORDER — VIT B COMP NO.3/FOLIC/C/BIOTIN 1 MG-60 MG
1 TABLET ORAL
Qty: 90 TABLET | Refills: 0 | Status: SHIPPED | OUTPATIENT
Start: 2020-01-27 | End: 2020-01-27 | Stop reason: SDUPTHER

## 2020-01-27 RX ORDER — VIT B COMP NO.3/FOLIC/C/BIOTIN 1 MG-60 MG
1 TABLET ORAL
Qty: 90 TABLET | Refills: 0 | Status: SHIPPED | OUTPATIENT
Start: 2020-01-27 | End: 2020-05-07 | Stop reason: SDUPTHER

## 2020-01-27 NOTE — TELEPHONE ENCOUNTER
Pt needs Nephrovite refill. Pt is out of this med. PHARMACY:   [facility].      PATIENT:  [phone]

## 2020-03-02 ENCOUNTER — TELEPHONE (OUTPATIENT)
Dept: PRIMARY CARE CLINIC | Age: 80
End: 2020-03-02

## 2020-03-06 ENCOUNTER — OFFICE VISIT (OUTPATIENT)
Dept: PRIMARY CARE CLINIC | Age: 80
End: 2020-03-06
Payer: MEDICARE

## 2020-03-06 ENCOUNTER — TELEPHONE (OUTPATIENT)
Dept: PRIMARY CARE CLINIC | Age: 80
End: 2020-03-06

## 2020-03-06 VITALS
DIASTOLIC BLOOD PRESSURE: 76 MMHG | WEIGHT: 163 LBS | TEMPERATURE: 97.5 F | OXYGEN SATURATION: 96 % | HEIGHT: 63 IN | RESPIRATION RATE: 16 BRPM | HEART RATE: 68 BPM | BODY MASS INDEX: 28.88 KG/M2 | SYSTOLIC BLOOD PRESSURE: 147 MMHG

## 2020-03-06 DIAGNOSIS — Z79.4 TYPE 2 DIABETES MELLITUS WITH COMPLICATION, WITH LONG-TERM CURRENT USE OF INSULIN (HCC): ICD-10-CM

## 2020-03-06 DIAGNOSIS — R51.9 LEFT-SIDED HEADACHE: ICD-10-CM

## 2020-03-06 DIAGNOSIS — E55.9 VITAMIN D DEFICIENCY: ICD-10-CM

## 2020-03-06 DIAGNOSIS — J45.901 ASTHMATIC BRONCHITIS WITH ACUTE EXACERBATION, UNSPECIFIED ASTHMA SEVERITY, UNSPECIFIED WHETHER PERSISTENT: ICD-10-CM

## 2020-03-06 DIAGNOSIS — I10 ESSENTIAL HYPERTENSION: ICD-10-CM

## 2020-03-06 DIAGNOSIS — E11.8 TYPE 2 DIABETES MELLITUS WITH COMPLICATION, WITH LONG-TERM CURRENT USE OF INSULIN (HCC): ICD-10-CM

## 2020-03-06 LAB
A/G RATIO: 1.4 (ref 1.1–2.2)
ALBUMIN SERPL-MCNC: 4.4 G/DL (ref 3.4–5)
ALP BLD-CCNC: 91 U/L (ref 40–129)
ALT SERPL-CCNC: 13 U/L (ref 10–40)
ANION GAP SERPL CALCULATED.3IONS-SCNC: 15 MMOL/L (ref 3–16)
AST SERPL-CCNC: 18 U/L (ref 15–37)
BASOPHILS ABSOLUTE: 0 K/UL (ref 0–0.2)
BASOPHILS RELATIVE PERCENT: 0.4 %
BILIRUB SERPL-MCNC: <0.2 MG/DL (ref 0–1)
BILIRUBIN URINE: NEGATIVE
BLOOD, URINE: NEGATIVE
BUN BLDV-MCNC: 18 MG/DL (ref 7–20)
CALCIUM SERPL-MCNC: 10 MG/DL (ref 8.3–10.6)
CHLORIDE BLD-SCNC: 104 MMOL/L (ref 99–110)
CLARITY: CLEAR
CO2: 24 MMOL/L (ref 21–32)
COLOR: YELLOW
CREAT SERPL-MCNC: 1 MG/DL (ref 0.6–1.2)
CREATININE URINE: 115.3 MG/DL (ref 28–259)
EOSINOPHILS ABSOLUTE: 0.2 K/UL (ref 0–0.6)
EOSINOPHILS RELATIVE PERCENT: 4.1 %
EPITHELIAL CELLS, UA: 2 /HPF (ref 0–5)
GFR AFRICAN AMERICAN: >60
GFR NON-AFRICAN AMERICAN: 53
GLOBULIN: 3.2 G/DL
GLUCOSE BLD-MCNC: 97 MG/DL (ref 70–99)
GLUCOSE URINE: NEGATIVE MG/DL
HCT VFR BLD CALC: 32.4 % (ref 36–48)
HEMOGLOBIN: 11 G/DL (ref 12–16)
HYALINE CASTS: 1 /LPF (ref 0–8)
KETONES, URINE: NEGATIVE MG/DL
LEUKOCYTE ESTERASE, URINE: ABNORMAL
LYMPHOCYTES ABSOLUTE: 1.3 K/UL (ref 1–5.1)
LYMPHOCYTES RELATIVE PERCENT: 30.7 %
MCH RBC QN AUTO: 34.6 PG (ref 26–34)
MCHC RBC AUTO-ENTMCNC: 33.9 G/DL (ref 31–36)
MCV RBC AUTO: 102 FL (ref 80–100)
MICROALBUMIN UR-MCNC: 1.3 MG/DL
MICROALBUMIN/CREAT UR-RTO: 11.3 MG/G (ref 0–30)
MICROSCOPIC EXAMINATION: YES
MONOCYTES ABSOLUTE: 0.3 K/UL (ref 0–1.3)
MONOCYTES RELATIVE PERCENT: 6.9 %
NEUTROPHILS ABSOLUTE: 2.4 K/UL (ref 1.7–7.7)
NEUTROPHILS RELATIVE PERCENT: 57.9 %
NITRITE, URINE: NEGATIVE
PDW BLD-RTO: 13 % (ref 12.4–15.4)
PH UA: 6 (ref 5–8)
PLATELET # BLD: 243 K/UL (ref 135–450)
PMV BLD AUTO: 8.4 FL (ref 5–10.5)
POTASSIUM SERPL-SCNC: 4.5 MMOL/L (ref 3.5–5.1)
PROTEIN UA: NEGATIVE MG/DL
RBC # BLD: 3.18 M/UL (ref 4–5.2)
RBC UA: 1 /HPF (ref 0–4)
SEDIMENTATION RATE, ERYTHROCYTE: 44 MM/HR (ref 0–30)
SODIUM BLD-SCNC: 143 MMOL/L (ref 136–145)
SPECIFIC GRAVITY UA: 1.02 (ref 1–1.03)
TOTAL PROTEIN: 7.6 G/DL (ref 6.4–8.2)
TSH REFLEX FT4: 0.98 UIU/ML (ref 0.27–4.2)
URINE TYPE: ABNORMAL
UROBILINOGEN, URINE: 0.2 E.U./DL
VITAMIN B-12: >2000 PG/ML (ref 211–911)
VITAMIN D 25-HYDROXY: 76.5 NG/ML
WBC # BLD: 4.2 K/UL (ref 4–11)
WBC UA: 3 /HPF (ref 0–5)

## 2020-03-06 PROCEDURE — 99214 OFFICE O/P EST MOD 30 MIN: CPT | Performed by: INTERNAL MEDICINE

## 2020-03-06 RX ORDER — VANCOMYCIN HYDROCHLORIDE 250 MG/1
1000 CAPSULE ORAL
COMMUNITY
Start: 2019-12-21 | End: 2020-03-06

## 2020-03-06 RX ORDER — NIFEDIPINE 30 MG/1
30 TABLET, EXTENDED RELEASE ORAL DAILY
Qty: 30 TABLET | Refills: 5 | Status: SHIPPED | OUTPATIENT
Start: 2020-03-06 | End: 2020-05-12

## 2020-03-06 RX ORDER — NIFEDIPINE 30 MG/1
30 TABLET, EXTENDED RELEASE ORAL DAILY
Qty: 30 TABLET | Refills: 5 | Status: SHIPPED | OUTPATIENT
Start: 2020-03-06 | End: 2020-03-06 | Stop reason: SDUPTHER

## 2020-03-06 RX ORDER — ACETAMINOPHEN AND CODEINE PHOSPHATE 300; 30 MG/1; MG/1
TABLET ORAL
COMMUNITY
Start: 2020-02-14 | End: 2020-03-06

## 2020-03-06 ASSESSMENT — ENCOUNTER SYMPTOMS
WHEEZING: 0
SORE THROAT: 0
VOMITING: 0
EYES NEGATIVE: 1
COUGH: 1
DIARRHEA: 0
EYE PAIN: 0
PHOTOPHOBIA: 0
ABDOMINAL PAIN: 0
ALLERGIC/IMMUNOLOGIC NEGATIVE: 1
EYE REDNESS: 0
NAUSEA: 0
SINUS PRESSURE: 0
HEMOPTYSIS: 0
SHORTNESS OF BREATH: 0

## 2020-03-06 ASSESSMENT — PATIENT HEALTH QUESTIONNAIRE - PHQ9
1. LITTLE INTEREST OR PLEASURE IN DOING THINGS: 0
SUM OF ALL RESPONSES TO PHQ QUESTIONS 1-9: 0
DEPRESSION UNABLE TO ASSESS: FUNCTIONAL CAPACITY MOTIVATION LIMITS ACCURACY
2. FEELING DOWN, DEPRESSED OR HOPELESS: 0
SUM OF ALL RESPONSES TO PHQ9 QUESTIONS 1 & 2: 0
SUM OF ALL RESPONSES TO PHQ QUESTIONS 1-9: 0

## 2020-03-06 NOTE — PROGRESS NOTES
136/72   08/13/19 (!) 97/47     Pulse Readings from Last 3 Encounters:   03/06/20 68   11/22/19 58   08/13/19 55      MRI BRAIN WO CONTRAST Completed Created   Radiation Dose Estimates     No radiation information found for this patient   Narrative   EXAM: MRI BRAIN WO CONTRAST       INDICATION: Left temporal headache.       COMPARISON: None       TECHNIQUE: Multiplanar, multisequence MR imaging of the head obtained. IV contrast: None.       FINDINGS:       MIDLINE STRUCTURES: The sella turcica and pituitary gland are normal. Craniovertebral alignment and cerebellar tonsils are normal.       VENTRICLES: Diffuse cerebral atrophy and superimposed ventriculomegaly.       INTRACRANIAL HEMORRHAGE: None.       BRAIN PARENCHYMA: No evidence of acute diffusion restriction.  Foci of T2 and FLAIR hyperintensity are demonstrated in the bilateral cerebral hemisphere periventricular and, to a lesser extent, subcortical white matter structures. There are no    extra-axial collections. Intravenous contrast is not administered.       INTRACRANIAL VASCULATURE: Major intracranial vessels are grossly patent.       ORBITS: Normal.       BONE MARROW: Bone marrow signal within normal limits.       VISUALIZED CERVICAL SPINE: Normal       PARANASAL SINUSES/MASTOID BONES: No acute sinusitis or mastoiditis.       EXTRACRANIAL SOFT TISSUES: Normal           Impression       1.  Diffuse cerebral atrophy and superimposed moderately advanced small vessel ischemic disease.  No evidence of recent infarct, intracranial hemorrhage, or mass lesion.           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    Pure hypercholesterolemia    Hypertension, essential    Stage 3 chronic kidney disease ([address]    Vitamin D deficiency    Fibromyalgia    Type 2 diabetes mellitus with complication, with long-term current use of insulin (HCC)    Bilateral carpal tunnel syndrome    SBO (small bowel obstruction) ([address]    Small bowel obstruction (HCC)    Chronic abdominal pain    Diarrhea    Chronic diarrhea     Allergies   Allergen Reactions    Flagyl [Metronidazole]      ataxia    Sulfa Antibiotics     Lisinopril-Hydrochlorothiazide Swelling     Lip And facial swelling and coughing    Ace Inhibitors Other (See Comments)     Edema lip swelling and coughing.  Actos [Pioglitazone Hydrochloride]     Celebrex [Celecoxib]     Cipro Xr     Demerol     Doxycycline     Doxycycline Calcium     Elavil [Amitriptyline Hcl]     Estradiol      Estrogen patch, rapid weight gain, arm pain , dizziness, mentally not clear. Patient took estrace 1 mg daily orally for years and insurance no longer covered so switched to patch and felt so bad , she discontinued.  Levofloxacin     Neurontin [Gabapentin]     Niaspan [Niacin Er]     Nsaids     Oxycodone     Phenytoin Sodium Extended     Prednisone     Pregabalin     Simvastatin          Review of Systems   Constitutional: Negative. Negative for chills, fatigue and fever. HENT: Positive for postnasal drip. Negative for ear pain, hearing loss, sinus pressure, sore throat and tinnitus. Eyes: Negative. Negative for photophobia, pain and redness. Respiratory: Positive for cough. Negative for hemoptysis, shortness of breath and wheezing. Cardiovascular: Negative. Negative for chest pain and palpitations. Hyperlipidemia   Gastrointestinal: Negative for abdominal pain, diarrhea, nausea and vomiting. Bowel obstruction in past with abdominal surgery. Diarrhea resolved with vancomycin times 2 for c dif. No futher diarrhea after treatment of c dif in December of 2019. Formed stools now. Endocrine: Negative. Genitourinary: Negative. Musculoskeletal: Negative for arthralgias, joint swelling, myalgias and neck pain. Fibromyalgia for years. Skin: Negative. Negative for rash. Allergic/Immunologic: Negative.     Neurological: Positive for dizziness, light-headedness and headaches. Negative for seizures, syncope, weakness, numbness and loss of balance. Hematological: Negative. Psychiatric/Behavioral: Negative. Prior to Visit Medications    Medication Sig Taking?  Authorizing Provider   acetaminophen-codeine (TYLENOL #3) 300-30 MG per tablet  Yes Historical Provider, MD   vancomycin (VANCOCIN) 250 MG capsule Take 1,000 mg by mouth Yes Historical Provider, MD   B complex-vitamin C-folic acid (NEPHRO-BARNEY) 1 MG tablet Take 1 tablet by mouth daily (with breakfast) Yes Nomra Marrero MD   insulin glargine (LANTUS SOLOSTAR) 100 UNIT/ML injection pen Inject 22 Units into the skin nightly Indications: sliding scale up to 22 units Lease refill 8/23/18 per patient requests Yes Leigh Love MD   nebivolol (BYSTOLIC) 2.5 MG tablet Take 1 tablet by mouth daily Yes Leigh Love MD   spironolactone (ALDACTONE) 25 MG tablet Take 1 tablet by mouth daily Stop potassium Yes Leigh Love MD   ezetimibe (ZETIA) 10 MG tablet TAKE ONE TABLET BY MOUTH DAILY Yes Leigh Love MD   folic acid (FOLVITE) 1 MG tablet Take 1 tablet by mouth daily Yes Carla Gusman MD   aspirin 81 MG EC tablet Take 81 mg by mouth daily Yes Historical Provider, MD   vitamin D (ERGOCALCIFEROL) 59032 units CAPS capsule Take 50,000 Units by mouth every 14 days Yes Historical Provider, MD   acetaminophen (TYLENOL) 325 MG tablet Take 650 mg by mouth every 6 hours as needed for Pain Yes Historical Provider, MD   Multiple Vitamins-Minerals (OCUVITE EYE + MULTI) TABS Take 1 tablet by mouth daily Yes Historical Provider, MD   brimonidine-timolol (COMBIGAN) 0.2-0.5 % ophthalmic solution Place 1 drop into both eyes every 12 hours Yes Historical Provider, MD   latanoprost (XALATAN) 0.005 % ophthalmic solution Place 1 drop into both eyes nightly  Yes Historical Provider, MD   Coenzyme Q10 (COQ10) 100 MG CAPS Take 1 capsule by mouth daily  Yes respiratory distress. Breath sounds: Normal breath sounds. No wheezing or rales. Chest:      Chest wall: No tenderness. Abdominal:      General: Bowel sounds are normal. There is no distension. Palpations: Abdomen is soft. There is no mass. Tenderness: There is no abdominal tenderness. There is no guarding or rebound. Musculoskeletal: Normal range of motion. General: No tenderness or deformity. Skin:     General: Skin is warm and dry. Coloration: Skin is not pale. Findings: No erythema or rash. Neurological:      Mental Status: She is alert and oriented to person, place, and time. Cranial Nerves: No cranial nerve deficit. Motor: No abnormal muscle tone. Coordination: Coordination normal.   Psychiatric:         Behavior: Behavior normal.         Thought Content: Thought content normal.         Judgment: Judgment normal.         ASSESSMENT/PLAN:   Diagnosis Orders   1. Asthmatic bronchitis with acute exacerbation, unspecified asthma severity, unspecified whether persistent  With no purulent mucous, most likely viral.  Gave sample of Trelegy and showed patient how to use and verbalized felt better in 15 minutes. fluticasone-umeclidin-vilant (TRELEGY ELLIPTA) 100-62.5-25 MCG/INH AEPB    CBC Auto Differential   2. Type 2 diabetes mellitus with complication, with long-term current use of insulin (HCC) stable will get follow up labs. Hemoglobin A1C    Microalbumin / Creatinine Urine Ratio    Vitamin B12   3. Vitamin D deficiency  On supplement, will check follow up level. Vitamin D 25 Hydroxy   4. Stage 3 chronic kidney disease ([facility] [address] will monitor , avoiding nephrotoxic medicaitons. 5. Essential hypertension not controlled on bystolic, can not increase due to pulse, will add procardia xl 30 mg qd and continue . Continue spironolactone 25 mg qd.  TSH WITH REFLEX TO FT4    Comprehensive Metabolic Panel    Urinalysis    NIFEdipine (PROCARDIA XL) 30 MG extended release

## 2020-03-07 LAB
ESTIMATED AVERAGE GLUCOSE: 139.9 MG/DL
HBA1C MFR BLD: 6.5 %

## 2020-04-03 PROBLEM — K63.89 BACTERIAL OVERGROWTH SYNDROME: Status: ACTIVE | Noted: 2020-04-03

## 2020-04-03 PROBLEM — M65.331 TRIGGER MIDDLE FINGER OF RIGHT HAND: Status: ACTIVE | Noted: 2020-04-03

## 2020-04-03 PROBLEM — G56.02 CARPAL TUNNEL SYNDROME, LEFT: Status: ACTIVE | Noted: 2020-04-03

## 2020-05-07 RX ORDER — VIT B COMP NO.3/FOLIC/C/BIOTIN 1 MG-60 MG
1 TABLET ORAL
Qty: 90 TABLET | Refills: 1 | Status: SHIPPED | OUTPATIENT
Start: 2020-05-07 | End: 2020-11-16 | Stop reason: SDUPTHER

## 2020-05-12 ENCOUNTER — VIRTUAL VISIT (OUTPATIENT)
Dept: PRIMARY CARE CLINIC | Age: 80
End: 2020-05-12
Payer: MEDICARE

## 2020-05-12 VITALS — SYSTOLIC BLOOD PRESSURE: 147 MMHG | DIASTOLIC BLOOD PRESSURE: 68 MMHG | HEART RATE: 66 BPM | OXYGEN SATURATION: 97 %

## 2020-05-12 PROCEDURE — 99213 OFFICE O/P EST LOW 20 MIN: CPT | Performed by: INTERNAL MEDICINE

## 2020-05-12 RX ORDER — SPIRONOLACTONE 25 MG/1
25 TABLET ORAL 2 TIMES DAILY
Qty: 60 TABLET | Refills: 3 | Status: SHIPPED | OUTPATIENT
Start: 2020-05-12 | End: 2020-05-12 | Stop reason: SDUPTHER

## 2020-05-12 RX ORDER — SPIRONOLACTONE 25 MG/1
25 TABLET ORAL 2 TIMES DAILY
Qty: 60 TABLET | Refills: 3 | Status: SHIPPED | OUTPATIENT
Start: 2020-05-12 | End: 2020-07-31

## 2020-05-12 ASSESSMENT — ENCOUNTER SYMPTOMS
NAUSEA: 0
ABDOMINAL PAIN: 0
EYE PAIN: 0
WHEEZING: 0
SINUS PRESSURE: 0
SORE THROAT: 0
ORTHOPNEA: 0
EYES NEGATIVE: 1
ALLERGIC/IMMUNOLOGIC NEGATIVE: 1
EYE REDNESS: 0
BLURRED VISION: 0
COUGH: 0
DIARRHEA: 0
SHORTNESS OF BREATH: 0
VOMITING: 0
PHOTOPHOBIA: 0

## 2020-05-15 ENCOUNTER — OFFICE VISIT (OUTPATIENT)
Dept: PRIMARY CARE CLINIC | Age: 80
End: 2020-05-15
Payer: MEDICARE

## 2020-05-15 VITALS — TEMPERATURE: 98.9 F | HEART RATE: 74 BPM | OXYGEN SATURATION: 97 %

## 2020-05-15 PROCEDURE — 99213 OFFICE O/P EST LOW 20 MIN: CPT | Performed by: NURSE PRACTITIONER

## 2020-05-15 NOTE — PROGRESS NOTES
5/15/2020  John Peacock (:  1940)    FLU/COVID-19 CLINIC EVALUATION    HPI:Possible exposure to COVID-19  SYMPTOMS:    Symptom duration, days:  [] 1   [] 2   [] 3   [] 4 - 7   [] 8 - 10   [] 11 - 13   [] >14    [] Fevers    [] Symptom (not measured)  [] Measured (Result:  degrees)  [] Chills  [] Cough  [] Coughing up blood  }  [] Chest Congestion  [x] Nasal Congestion/Sinus drainage and hx of Allergies  [] Sneezing  [] Feeling short of breath  [] Sometimes  [] Frequently   [] All the time     [] Chest pain     [] Headaches  []Tolerable  [] Severe     [] Sore throat  [] Muscle aches  [] Nausea  [] Vomiting  []Unable to keep fluids down     [] Diarrhea  []Severe       [] OTHER SYMPTOMS:      Symptom course:   [] Worsening     [] Stable     [] Improving    RISK FACTORS:1}  [] Pregnant or possibly pregnant  [] Age over 61  [x] Diabetes  [x] Heart disease  [] Asthma  [] COPD/Other chronic lung diseases  [] Active Cancer  [] On Chemotherapy  [] Taking oral steroids  [] History Lymphoma/Leukemia  [] Close contact with a lab confirmed COVID-19 patient within 14 days of symptom onset  [] History of travel from affected geographical areas within 14 days of symptom onset     SOCIAL HISTORY:  Number of people living in patient's home (counting the patient as 1):  [] 1   [x] 2   [] 3   [] 4   [] 5   [] >6      PHYSICAL EXAMINATION:  Vitals:    05/15/20 1423   Pulse: 74   Temp: 98.9 °F (37.2 °C)   SpO2: 97%        INSTRUCTIONS:  \"[x]\" Indicates a positive item  \"[]\" Indicates a negative item    DELETE ALL ITEMS NOT EXAMINED    [x] Alert  [x] Oriented to person/place/time    [x] No apparent distress  [] Toxic appearing    [] Face flushed appearing [] Sclera clear  [] Lips are cyanotic      [x] Breathing appears normal  [] Appears tachypneic      [] Rash on visible skin    [] Cranial Nerves II-XII grossly intact    [] Motor grossly intact in visible upper extremities    [] Motor grossly intact in visible lower

## 2020-05-18 LAB
SARS-COV-2: NOT DETECTED
SOURCE: NORMAL

## 2020-07-08 ENCOUNTER — VIRTUAL VISIT (OUTPATIENT)
Dept: PRIMARY CARE CLINIC | Age: 80
End: 2020-07-08
Payer: MEDICARE

## 2020-07-08 VITALS
HEART RATE: 52 BPM | DIASTOLIC BLOOD PRESSURE: 73 MMHG | WEIGHT: 169 LBS | HEIGHT: 63 IN | BODY MASS INDEX: 29.95 KG/M2 | RESPIRATION RATE: 16 BRPM | OXYGEN SATURATION: 97 % | SYSTOLIC BLOOD PRESSURE: 149 MMHG | TEMPERATURE: 98.4 F

## 2020-07-08 PROBLEM — K51.90 ULCERATIVE COLITIS (HCC): Status: ACTIVE | Noted: 2020-07-08

## 2020-07-08 PROBLEM — Z95.820 S/P ANGIOPLASTY WITH STENT: Status: ACTIVE | Noted: 2020-07-08

## 2020-07-08 PROBLEM — N28.9 RENAL INSUFFICIENCY SYNDROME: Status: ACTIVE | Noted: 2020-07-08

## 2020-07-08 PROCEDURE — 99214 OFFICE O/P EST MOD 30 MIN: CPT | Performed by: INTERNAL MEDICINE

## 2020-07-08 RX ORDER — NEBIVOLOL 2.5 MG/1
2.5 TABLET ORAL DAILY
Qty: 90 TABLET | Refills: 3 | Status: SHIPPED | OUTPATIENT
Start: 2020-07-08 | End: 2020-07-20 | Stop reason: SDUPTHER

## 2020-07-08 RX ORDER — HYDRALAZINE HYDROCHLORIDE 10 MG/1
10 TABLET, FILM COATED ORAL 2 TIMES DAILY
Qty: 60 TABLET | Refills: 3 | Status: SHIPPED | OUTPATIENT
Start: 2020-07-08 | End: 2020-07-31

## 2020-07-08 RX ORDER — FOLIC ACID 1 MG/1
1 TABLET ORAL DAILY
Qty: 90 TABLET | Refills: 3 | Status: SHIPPED | OUTPATIENT
Start: 2020-07-08 | End: 2021-06-26 | Stop reason: SDUPTHER

## 2020-07-08 NOTE — PROGRESS NOTES
2020    TELEHEALTH EVALUATION -- Audio/Visual (During GNTSY-71 public health emergency)    HPI:    Miesha Vila (:  1940) has requested an audio/video evaluation for the following concern(s):    Hypertension: Patient here for follow-up of elevated blood pressure. She is exercising and is adherent to low salt diet. Blood pressure is not well controlled at home. Cardiac symptoms fatigue. Patient denies chest pain, claudication, dyspnea, exertional chest pressure/discomfort, irregular heart beat, near-syncope, orthopnea, palpitations and paroxysmal nocturnal dyspnea. Cardiovascular risk factors: advanced age (older than 54 for men, 72 for women), diabetes mellitus, dyslipidemia and hypertension. Use of agents associated with hypertension: none. History of target organ damage: chronic kidney disease. Patient is taking a low fiber modified diet and stool softeners discontinued by GI and this eliminated the diarrhea. Review of Systems    Prior to Visit Medications    Medication Sig Taking? Authorizing Provider   spironolactone (ALDACTONE) 25 MG tablet Take 1 tablet by mouth 2 times daily Stop potassium  Stop nifedipine due to edema.   Nydia Stafford MD   B complex-vitamin C-folic acid (NEPHRO-BARNEY) 1 MG tablet Take 1 tablet by mouth daily (with breakfast)  Nydia Stafford MD   fluticasone-umeclidin-vilant (TRELEGY ELLIPTA) 100-62.5-25 MCG/INH AEPB Inhale 1 puff into the lungs daily Expiration date 2021  Lot (10) Vicky Hernadez MD   insulin glargine (LANTUS SOLOSTAR) 100 UNIT/ML injection pen Inject 22 Units into the skin nightly Indications: sliding scale up to 22 units Lease refill 18 per patient requests  Nydia Stafford MD   nebivolol (BYSTOLIC) 2.5 MG tablet Take 1 tablet by mouth daily  Nydia Stafford MD   ezetimibe (ZETIA) 10 MG tablet TAKE ONE TABLET BY MOUTH DAILY  Nydia Stafford MD   folic acid (FOLVITE) 1 MG tablet Take 1 tablet by mouth daily  Santina Primrose, MD   aspirin 81 MG EC tablet Take 81 mg by mouth daily  Historical Provider, MD   vitamin D (ERGOCALCIFEROL) 31548 units CAPS capsule Take 50,000 Units by mouth every 14 days  Historical Provider, MD   acetaminophen (TYLENOL) 325 MG tablet Take 650 mg by mouth every 6 hours as needed for Pain  Historical Provider, MD   Multiple Vitamins-Minerals (OCUVITE EYE + MULTI) TABS Take 1 tablet by mouth daily  Historical Provider, MD   brimonidine-timolol (COMBIGAN) 0.2-0.5 % ophthalmic solution Place 1 drop into both eyes every 12 hours  Historical Provider, MD   latanoprost (XALATAN) 0.005 % ophthalmic solution Place 1 drop into both eyes nightly   Historical Provider, MD   Coenzyme Q10 (COQ10) 100 MG CAPS Take 1 capsule by mouth daily   Historical Provider, MD   Cyanocobalamin (B-12) 500 MCG TABS Take 1 tablet by mouth Twice a Week   Historical Provider, MD   lansoprazole (PREVACID) 15 MG delayed release capsule Take 15 mg by mouth daily   Historical Provider, MD       Social History     Tobacco Use    Smoking status: Never Smoker    Smokeless tobacco: Never Used   Substance Use Topics    Alcohol use: No    Drug use: No        Allergies   Allergen Reactions    Flagyl [Metronidazole]      ataxia    Sulfa Antibiotics     Lisinopril-Hydrochlorothiazide Swelling     Lip And facial swelling and coughing    Ace Inhibitors Other (See Comments)     Edema lip swelling and coughing.  Actos [Pioglitazone Hydrochloride]     Celebrex [Celecoxib]     Cipro Xr     Ciprofloxacin     Demerol     Doxycycline     Doxycycline Calcium     Elavil [Amitriptyline Hcl]     Estradiol      Estrogen patch, rapid weight gain, arm pain , dizziness, mentally not clear. Patient took estrace 1 mg daily orally for years and insurance no longer covered so switched to patch and felt so bad , she discontinued.     Levofloxacin     Levofloxacin     Metformin     Metoclopramide     Neurontin [Gabapentin]     Niaspan [Niacin Er]     Nsaids     Oxycodone     Phenytoin Sodium Extended     Pioglitazone     Prednisone     Pregabalin     Simvastatin    ,   Past Medical History:   Diagnosis Date    Angina     Bilateral carpal tunnel syndrome 10/9/2018    Chronic kidney disease     Fibromyalgia     Hyperlipidemia     Hypertension     MVP (mitral valve prolapse)     s 5/18/2017    Type II or unspecified type diabetes mellitus without mention of complication, not stated as uncontrolled    ,   Past Surgical History:   Procedure Laterality Date    CHOLECYSTECTOMY      COLONOSCOPY N/A 8/13/2019    COLONOSCOPY DIAGNOSTIC performed by Vladimir Victor MD at [place] [address] stents    HYSTERECTOMY      LUMBAR DISCECTOMY  10/03/12    lumbar discectomy, 3-4 left    SMALL INTESTINE SURGERY      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   ,   Social History     Tobacco Use    Smoking status: Never Smoker    Smokeless tobacco: Never Used   Substance Use Topics    Alcohol use: No    Drug use: No   ,   Family History   Problem Relation Age of Onset    Vision Loss Sister    ,   Immunization History   Administered Date(s) Administered    Influenza Vaccine, unspecified formulation 01/13/2007    Influenza Virus Vaccine 10/04/2012    Pneumococcal Conjugate 13-valent ([id number]) 04/19/2019    Pneumococcal Polysaccharide ([id number]) 01/13/2007   ,   Health Maintenance   Topic Date Due    DTaP/Tdap/Td vaccine (1 - Tdap) 05/16/1959    Shingles Vaccine (1 of 2) 05/16/1990    DEXA (modify frequency per FRAX score)  05/16/1995    Annual Wellness Visit (AWV)  07/22/2019    Flu vaccine (1) 09/01/2020    Potassium monitoring  03/06/2021    Creatinine monitoring  03/06/2021    Pneumococcal 65+ years Vaccine  Completed    Hepatitis A vaccine  Aged Out    Hib vaccine  Aged Out    Meningococcal (ACWY) vaccine  Aged Out       PHYSICAL EXAMINATION:  [ INSTRUCTIONS:  \"[x]\" Indicates a positive item  \"[]\" Indicates a negative item  -- DELETE ALL ITEMS NOT EXAMINED]  Vital Signs: (As obtained by patient/caregiver or practitioner observation)    Blood pressure-  Heart rate-    Respiratory rate-    Temperature-  Pulse oximetry-     Constitutional: [] Appears well-developed and well-nourished [] No apparent distress      [] Abnormal-   Mental status  [] Alert and awake  [] Oriented to person/place/time []Able to follow commands      Eyes:  EOM    []  Normal  [] Abnormal-  Sclera  []  Normal  [] Abnormal -         Discharge []  None visible  [] Abnormal -    HENT:   [] Normocephalic, atraumatic. [] Abnormal   [] Mouth/Throat: Mucous membranes are moist.     External Ears [] Normal  [] Abnormal-     Neck: [] No visualized mass     Pulmonary/Chest: [] Respiratory effort normal.  [] No visualized signs of difficulty breathing or respiratory distress        [] Abnormal-      Musculoskeletal:   [] Normal gait with no signs of ataxia         [] Normal range of motion of neck        [] Abnormal-       Neurological:        [] No Facial Asymmetry (Cranial nerve 7 motor function) (limited exam to video visit)          [] No gaze palsy        [] Abnormal-         Skin:        [] No significant exanthematous lesions or discoloration noted on facial skin         [] Abnormal-            Psychiatric:       [] Normal Affect [] No Hallucinations        [] Abnormal-     Other pertinent observable physical exam findings-     ASSESSMENT/PLAN:   Diagnosis Orders   1. Essential hypertension is not controlled with Bystolic 2.5 mg daily. Cannot increase due to pulse rate. Will add hydralazine. Continue Spironolactone 25 mg daily. Cannot increase the dosage due to chronic kidney disease. RENAL FUNCTION PANEL    hydrALAZINE (APRESOLINE) 10 MG tablet    CBC Auto Differential    nebivolol (BYSTOLIC) 2.5 MG tablet   2.  Type 2 diabetes mellitus with complication, with long-term current use of insulin (HCC) has been controlled on Lantus 22 units nightly. Patient is also following diet. Will monitor urine microalbumin in A1c. Microalbumin / Creatinine Urine Ratio    Hemoglobin A1C   3. Trigger middle finger of right hand refer to hand surgeon since symptomatic. Son Renae MD, Hand Surgery (Hand, Wrist, Upper Extremity), [facility]   4. Carpal tunnel syndrome, left, it is symptomatic will refer to hand surgeon. Son Renae MD, Hand Surgery (Hand, Wrist, Upper Extremity), [facility]   5. Pure hypercholesterolemia continue 81 mg aspirin and Zetia 81 mg daily  folic acid (FOLVITE) 1 MG tablet       Janine Valdes is a [de-identified] y.o. female being evaluated by a Virtual Visit (video visit) encounter to address concerns as mentioned above. A caregiver was present when appropriate. Due to this being a TeleHealth encounter (During KWOQ-86 public health emergency), evaluation of the following organ systems was limited: Vitals/Constitutional/EENT/Resp/CV/GI//MS/Neuro/Skin/Heme-Lymph-Imm. Pursuant to the emergency declaration under the [address] [place] [address], [address] [place] [address] and the [employer] and [employer] Act, this Virtual Visit was conducted with patient's (and/or legal guardian's) consent, to reduce the patient's risk of exposure to COVID-19 and provide necessary medical care. The patient (and/or legal guardian) has also been advised to contact this office for worsening conditions or problems, and seek emergency medical treatment and/or call 911 if deemed necessary. Patient identification was verified at the start of the visit: Yes    Total time spent on this encounter: 25 minnutes    Services were provided through a video synchronous discussion virtually to substitute for in-person clinic visit. Patient and provider were located at their individual homes.     --Trudy Real MD on 7/8/2020 at 1:08 PM    An electronic signature was used to authenticate this note.

## 2020-07-14 ENCOUNTER — TELEPHONE (OUTPATIENT)
Dept: PRIMARY CARE CLINIC | Age: 80
End: 2020-07-14

## 2020-07-15 ENCOUNTER — TELEPHONE (OUTPATIENT)
Dept: PRIMARY CARE CLINIC | Age: 80
End: 2020-07-15

## 2020-07-17 NOTE — TELEPHONE ENCOUNTER
ECC received a call from:    Name of Caller: Patient    Relationship to patient:Self     Organization name: [employer]     Best contact number: [phone]    Reason for call:     Patient called to advise that she no longer is requesting a refill for folic acid (FOLVITE) 1 MG tablet. It came in the mail today.

## 2020-07-20 ENCOUNTER — TELEPHONE (OUTPATIENT)
Dept: PRIMARY CARE CLINIC | Age: 80
End: 2020-07-20

## 2020-07-20 RX ORDER — NEBIVOLOL 2.5 MG/1
2.5 TABLET ORAL DAILY
Qty: 90 TABLET | Refills: 3 | Status: SHIPPED | OUTPATIENT
Start: 2020-07-20 | End: 2020-07-31

## 2020-07-20 NOTE — TELEPHONE ENCOUNTER
----- Message from Nadine Benedict sent at 7/20/2020  3:42 PM EDT -----  Subject: Message to Provider    QUESTIONS  Information for Provider? nebivolol (BYSTOLIC) 2.5 MG tablet on backorder   [facility]   will need alternative   ---------------------------------------------------------------------------  --------------  CALL BACK INFO  What is the best way for the office to contact you? OK to leave message on   voicemail  Preferred Call Back Phone Number? [phone]  ---------------------------------------------------------------------------  --------------  SCRIPT ANSWERS  Relationship to Patient?  Self

## 2020-07-20 NOTE — TELEPHONE ENCOUNTER
Called patient and explained that [facility] has Bystolic on back order so I will need to send a prescription to her local pharmacy. Hopefully [facility] will have about a time she finishes the prescription from the local pharmacy.

## 2020-07-20 NOTE — TELEPHONE ENCOUNTER
E[facility] received a call from:    Name of Caller: Makayla Rivas    Relationship to patient: Authorization rep    Organization name: [facility]    Best contact number: [phone]    Reason for call: Needing call back     Ref# [id number]

## 2020-07-21 RX ORDER — METOPROLOL SUCCINATE 25 MG/1
25 TABLET, EXTENDED RELEASE ORAL DAILY
Qty: 30 TABLET | Refills: 0 | Status: SHIPPED | OUTPATIENT
Start: 2020-07-21 | End: 2020-07-31

## 2020-07-22 ENCOUNTER — OFFICE VISIT (OUTPATIENT)
Dept: ORTHOPEDIC SURGERY | Age: 80
End: 2020-07-22
Payer: MEDICARE

## 2020-07-22 VITALS — TEMPERATURE: 97.3 F | BODY MASS INDEX: 29.06 KG/M2 | WEIGHT: 164 LBS | HEIGHT: 63 IN

## 2020-07-22 PROCEDURE — 20550 NJX 1 TENDON SHEATH/LIGAMENT: CPT | Performed by: ORTHOPAEDIC SURGERY

## 2020-07-22 PROCEDURE — 99214 OFFICE O/P EST MOD 30 MIN: CPT | Performed by: ORTHOPAEDIC SURGERY

## 2020-07-22 PROCEDURE — 20526 THER INJECTION CARP TUNNEL: CPT | Performed by: ORTHOPAEDIC SURGERY

## 2020-07-22 NOTE — PATIENT INSTRUCTIONS
Information & Instructions   After Finger, Hand, Wrist, or Elbow Injection    Marlo Jacques MD    You have received an injection of local anesthetic (Bupivicaine without Epinephrine) for comfort & a steroid (Kenalog) for its strong anti-inflammatory effects. In order to give the medication a chance to reduce your inflammation and discomfort, it is recommended that you take it easy for a day or so. You may use your hand and arm as you feel comfortable, but you should avoid highly strenuous activity and heavy use for several days. Relief from the injection will often not begin for several days, and you may not feel full relief for up to one month. It is not uncommon to experience some local discomfort or pain at or around the injection site for a few days. To relieve these symptoms you may do the following if you feel necessary:       Apply ice to the affected area 20 minutes on and 20 minutes off. Do not apply ice directly to the skin. Use a thin layer (T-shirt, pillowcase, towel, etc.) to protect the skin. - If allowed by your other medical physicians, you may take -     2 Tylenol extra strength tablets every 4-6 hours       1-2 Aleve tablets twice a day     2-3 Advil tablets two to three times a day    If you are diabetic, the steroid medication may increase your blood sugar, so you are advised to monitor your sugar more closely so you can adjust it accordingly for a few days following your injection. If you need assistance with the control of you blood sugar, please contact you primary care physician for further advice. I will request that you please call the office one month after your injection at [phone] if you have not experienced relief of your symptoms (unless I have instructed you otherwise). If your injection has given you good relief of you symptoms as expected, then you only need to call the office if your symptoms return.

## 2020-07-22 NOTE — PROGRESS NOTES
Ms. Vida Burch returns today in follow-up of her previously treated  bilateral Carpal Tunnel Syndrome. She was last seen by me in May, 2019 at which time she was treated with Steroid injection to the Left, Carpal Tunnel. She experienced moderate relief of her initial symptoms. She  has noticed symptom worsening over the last several months. She returns today with worsened symptoms of bilateral numbness in the Median Innervated digits, tingling in the Median Innervated digits and volar forearm aching, requesting further treatment. She also presents today regarding right sided symptoms which have been present for approximately 7 months. A history of antecedent trauma or injury is Absent. She reports symptoms to include moderate pain and stiffness with frequent popping, catching or locking of the right Middle Finger. Finger symptoms are Frequently worse in the morning or overnight. She reports moderate pain located at the base of the symptomatic finger(s). Symptoms are worsening over time. The patient's , past medical history, medications, allergies,  family history, social history, and review of systems have been reviewed and are recorded in the chart. Physical Exam:  Vitals  Temp: 97.3 °F (36.3 °C)  Temp Source: Temporal  Height: 5' 3\" (160 cm)  Weight: 164 lb (74.4 kg)  Ms. Vida Burch appears well, she is in no apparent distress, she demonstrates appropriate mood & affect. Skin: Normal in appearance, Normal Color and Free of Lesions Bilaterally   Digital range of motion is limited by pain on the Right, normal on the Left  Wrist range of motion is without significant limitation bilaterally  There is no evidence of gross joint instability bilaterally. Sensation is subjectively tingling in the Median Innervated Digits bilaterally and objectively present in the same distribution bilaterally. Vascular examination reveals normal and good capillary refill bilaterally.   Swelling is mild in the right Middle Finger and  Volar both wrists. Muscular strength is clinically appropriate bilaterally. Examination for Carpal Tunnel Syndrome shows Carpal Tunnel Compression Test to be Mildly Positive on the right & Moderately Positive on the left. The patient displays mild baseline symptoms to potentially confound the exam.  The thenar musculature is not atrophied & weakened. Examination for Stenosing Tenosynovitis demonstrates moderate tenderness, thickening & nodularity at the A-1 pulley(s) of the Right Middle Finger. There is a palpable Nota's Node. There is triggering on active flexion with pain. No other digits demonstrate evidence of Stenosing Tenosynovitis. Examination of the first dorsal extensor compartments of the wrist demonstrates no thickening or fullness. There is no tenderness to palpation over the extensor tendons. Pain is not elicited with resisted thumb extension, and Finklestein's maneuver is negative bilaterally. Impression:  Ms. Mary Chambers is showing evidence of recurrent Carpal Tunnel Syndrome after previous treatment & new onset right Middle Finger stenosing tenosynovitis . She requests additional treatment at this time. Plan:  I have had a thorough discussion with Ms. Mary Chambers regarding the treatment options available for her worsened Bilateral carpal tunnel syndrome, which is causing her functional limitations. I have outlined for Ms. Mary Chambers the benefits and consequences of the various treatment modalities, including a reasonable expectation for the long term success and the likelihood that further more aggressive treatment may be required for her current presenting condition. Based upon our current discussion and a reasonable understating of the options available to her, Ms. Mary Chambers has selected to proceed with  an injection to both carpal tunnel(s).   I have outlined for her the nature of the injection, and the pre, alicja and post injection considerations and the appropriate expectations for this injection. I have clearly explained to her that the above outlined treatment plan should not be expected to 'cure' her carpal tunnel syndrome, but we are rather treating the symptoms with which she presents. She has understood that in order to achieve long lasting relief of her symptoms and to prevent future worsening or further damage, that definitive surgical treatment would be required. Ms. Janine Valdes  voiced an appropriate understanding of our discussion, the options available to her, and of the expectations of her selected  treatment. She did wish to proceed with Bilateral carpal tunnel injection. Procedure: right Carpal Tunnel Injection  [first Injection]: After full discussion of the nature of this process and outlining a treatment plan with Ms. Janine Valdes, we discussed the complications, limitations, expectations, alternatives, and risks of injection to the carpal tunnel. She understood this information well and verbally consented to this treatment. The skin of the symptomatic extremity was prepped with Isopropyl Alcohol and under aseptic conditions the carpal tunnel was injected with a combination of 1 ml of 0.25% Bupivacaine without Epinephrine and 40 mg of Triamcinolone (40 mg/ml). There was good filling of the carpal tunnel. A  dry, sterile bandage was applied and she tolerated the injection without difficulty. I advised her of the expected response, possible reactions and the instructions for care of the hand. Procedure: left Carpal Tunnel Injection  [second Injection]: After full discussion of the nature of this process and outlining a treatment plan with Ms. Janine Valdes, we discussed the complications, limitations, expectations, alternatives, and risks of injection to the carpal tunnel. She understood this information well and verbally consented to this treatment.     The skin of the symptomatic extremity was prepped with Isopropyl Alcohol and under aseptic conditions the carpal tunnel was injected with a combination of 1 ml of 0.25% Bupivacaine without Epinephrine and 40 mg of Triamcinolone (40 mg/ml). There was good filling of the carpal tunnel. A  dry, sterile bandage was applied and she tolerated the injection without difficulty. I advised her of the expected response, possible reactions and the instructions for care of the hand. With regard to her  Right, Middle Finger:  I have had a thorough discussion with Ms. Vida Burch regarding the treatment options available for her new onset Right Middle Finger stenosing tenosynovitis, which is causing her functional limitations. I have outlined for Ms. Vida Burch the benefits and consequences of the various treatment modalities, including a reasonable expectation for the long term success and the likelihood that further more aggressive treatment may be required for her current presenting condition. Based upon our current discussion and a reasonable understating of the options available to her, Ms. Vida Burch has selected to proceed with  an injection to the right Middle Finger Flexor Tendon Sheath. I have outlined for her the nature of the injection, and the pre, alicja and post injection considerations and the appropriate expectations for this injection. I have clearly explained to her that the above outlined treatment plan should not be expected to 'cure' her stenosing tenosynovitis, but we are rather treating the symptoms with which she presents. She has understood that in order to achieve long lasting relief of her symptoms and to prevent future worsening or further damage, that definitive surgical treatment would be required. Ms. Vida Burch  voiced an appropriate understanding of our discussion, the options available to her, and of the expectations of her selected  treatment. She did wish to proceed with Right Middle Finger Flexor Tendon Sheath injection.     Procedure: right Middle Finger Trigger Finger Injection  [first Injection]: After full discussion of the nature of this process and outlining a treatment plan with Ms. Josh Em, we discussed the complications, limitations, expectations, alternatives, and risks of injection of the flexor tendon sheath. She understood this information well and verbally consented to this treatment. The skin of the symptomatic digit was prepped with Isopropyl Alcohol and under aseptic conditions the flexor tendon sheath was injected with a combination of 1/2 ml of 0.25% Bupivacaine without Epinephrine and 20 mg of Triamcinolone (40 mg/ml). Good filling of the flexor sheath was noted. A dry sterile bandage was applied and the patient tolerated the injection without difficulty. I advised the patient of the expected response, possible reactions and the instructions for care of the hand. I have also discussed with Ms. Josh Em the other treatment options available to her for this condition. We have today selected to proceed with treatment by injection with steroid medication. She and I have agreed that if our current course of Injection treatment does not prove to be effective over the short term future, that she will schedule a follow-up appointment to discuss and select an alternate course of therapy including possibly further conservative treatment or surgical treatment. Ms. Josh Em has been given a full verbal list of instructions and precautions related to her present condition. I have asked her to followup with me in the office at the prescribed time. She is also specifically requested to call or return to the office sooner if her symptoms change or worsen prior to the next scheduled appointment.

## 2020-07-31 RX ORDER — SPIRONOLACTONE 25 MG/1
25 TABLET ORAL DAILY
Qty: 30 TABLET | Refills: 5 | Status: SHIPPED | OUTPATIENT
Start: 2020-07-31 | End: 2021-01-14 | Stop reason: SDUPTHER

## 2020-07-31 RX ORDER — HYDRALAZINE HYDROCHLORIDE 25 MG/1
25 TABLET, FILM COATED ORAL 2 TIMES DAILY
Qty: 60 TABLET | Refills: 3 | Status: SHIPPED | OUTPATIENT
Start: 2020-07-31 | End: 2020-11-24 | Stop reason: SDUPTHER

## 2020-09-02 DIAGNOSIS — I10 ESSENTIAL HYPERTENSION: ICD-10-CM

## 2020-09-02 DIAGNOSIS — E11.8 TYPE 2 DIABETES MELLITUS WITH COMPLICATION, WITH LONG-TERM CURRENT USE OF INSULIN (HCC): ICD-10-CM

## 2020-09-02 DIAGNOSIS — Z79.4 TYPE 2 DIABETES MELLITUS WITH COMPLICATION, WITH LONG-TERM CURRENT USE OF INSULIN (HCC): ICD-10-CM

## 2020-09-02 LAB
ALBUMIN SERPL-MCNC: 4.4 G/DL (ref 3.4–5)
ANION GAP SERPL CALCULATED.3IONS-SCNC: 10 MMOL/L (ref 3–16)
BASOPHILS ABSOLUTE: 0.1 K/UL (ref 0–0.2)
BASOPHILS RELATIVE PERCENT: 2 %
BUN BLDV-MCNC: 19 MG/DL (ref 7–20)
CALCIUM SERPL-MCNC: 10 MG/DL (ref 8.3–10.6)
CHLORIDE BLD-SCNC: 106 MMOL/L (ref 99–110)
CO2: 25 MMOL/L (ref 21–32)
CREAT SERPL-MCNC: 1.1 MG/DL (ref 0.6–1.2)
CREATININE URINE: 123.4 MG/DL (ref 28–259)
EOSINOPHILS ABSOLUTE: 0.1 K/UL (ref 0–0.6)
EOSINOPHILS RELATIVE PERCENT: 2.9 %
GFR AFRICAN AMERICAN: 58
GFR NON-AFRICAN AMERICAN: 48
GLUCOSE BLD-MCNC: 90 MG/DL (ref 70–99)
HCT VFR BLD CALC: 35.1 % (ref 36–48)
HEMOGLOBIN: 11.6 G/DL (ref 12–16)
LYMPHOCYTES ABSOLUTE: 0.9 K/UL (ref 1–5.1)
LYMPHOCYTES RELATIVE PERCENT: 25.7 %
MCH RBC QN AUTO: 34.2 PG (ref 26–34)
MCHC RBC AUTO-ENTMCNC: 33 G/DL (ref 31–36)
MCV RBC AUTO: 103.5 FL (ref 80–100)
MICROALBUMIN UR-MCNC: <1.2 MG/DL
MICROALBUMIN/CREAT UR-RTO: NORMAL MG/G (ref 0–30)
MONOCYTES ABSOLUTE: 0.3 K/UL (ref 0–1.3)
MONOCYTES RELATIVE PERCENT: 8.9 %
NEUTROPHILS ABSOLUTE: 2 K/UL (ref 1.7–7.7)
NEUTROPHILS RELATIVE PERCENT: 60.5 %
PDW BLD-RTO: 14.3 % (ref 12.4–15.4)
PHOSPHORUS: 3.4 MG/DL (ref 2.5–4.9)
PLATELET # BLD: 274 K/UL (ref 135–450)
PMV BLD AUTO: 8.4 FL (ref 5–10.5)
POTASSIUM SERPL-SCNC: 4.8 MMOL/L (ref 3.5–5.1)
RBC # BLD: 3.4 M/UL (ref 4–5.2)
SODIUM BLD-SCNC: 141 MMOL/L (ref 136–145)
WBC # BLD: 3.3 K/UL (ref 4–11)

## 2020-09-03 LAB
ESTIMATED AVERAGE GLUCOSE: 145.6 MG/DL
HBA1C MFR BLD: 6.7 %

## 2020-10-05 ENCOUNTER — TELEPHONE (OUTPATIENT)
Dept: PRIMARY CARE CLINIC | Age: 80
End: 2020-10-05

## 2020-10-05 RX ORDER — EZETIMIBE 10 MG/1
TABLET ORAL
Qty: 90 TABLET | Refills: 3 | Status: SHIPPED | OUTPATIENT
Start: 2020-10-05 | End: 2020-10-06

## 2020-10-05 RX ORDER — EZETIMIBE 10 MG/1
TABLET ORAL
Qty: 90 TABLET | Refills: 3 | Status: CANCELLED | OUTPATIENT
Start: 2020-10-05

## 2020-10-05 NOTE — TELEPHONE ENCOUNTER
Medication:   Requested Prescriptions      No prescriptions requested or ordered in this encounter        Last Filled:      Patient Phone Number: [phone] (home) [phone] (work)    Last appt: 7/8/2020   Next appt: Visit date not found    Last OARRS: No flowsheet data found.

## 2020-10-06 RX ORDER — EZETIMIBE 10 MG/1
TABLET ORAL
Qty: 90 TABLET | Refills: 2 | Status: SHIPPED | OUTPATIENT
Start: 2020-10-06 | End: 2021-06-24 | Stop reason: SDUPTHER

## 2020-11-16 ENCOUNTER — TELEPHONE (OUTPATIENT)
Dept: PRIMARY CARE CLINIC | Age: 80
End: 2020-11-16

## 2020-11-16 RX ORDER — VIT B COMP NO.3/FOLIC/C/BIOTIN 1 MG-60 MG
1 TABLET ORAL
Qty: 90 TABLET | Refills: 1 | Status: SHIPPED | OUTPATIENT
Start: 2020-11-16 | End: 2020-11-19 | Stop reason: SDUPTHER

## 2020-11-19 ENCOUNTER — TELEPHONE (OUTPATIENT)
Dept: PRIMARY CARE CLINIC | Age: 80
End: 2020-11-19

## 2020-11-19 RX ORDER — VIT B COMP NO.3/FOLIC/C/BIOTIN 1 MG-60 MG
1 TABLET ORAL
Qty: 90 TABLET | Refills: 1 | Status: SHIPPED | OUTPATIENT
Start: 2020-11-19 | End: 2021-06-26 | Stop reason: SDUPTHER

## 2020-11-19 NOTE — TELEPHONE ENCOUNTER
Please forward medication for B-complex (NEPHRO-BARNEY) 1 mg please send to [address] [place] [address] [facility]

## 2020-11-24 ENCOUNTER — OFFICE VISIT (OUTPATIENT)
Dept: PRIMARY CARE CLINIC | Age: 80
End: 2020-11-24
Payer: MEDICARE

## 2020-11-24 VITALS
BODY MASS INDEX: 30.65 KG/M2 | SYSTOLIC BLOOD PRESSURE: 136 MMHG | HEIGHT: 63 IN | HEART RATE: 71 BPM | DIASTOLIC BLOOD PRESSURE: 72 MMHG | WEIGHT: 173 LBS | OXYGEN SATURATION: 100 % | TEMPERATURE: 97.1 F

## 2020-11-24 PROCEDURE — 99214 OFFICE O/P EST MOD 30 MIN: CPT | Performed by: INTERNAL MEDICINE

## 2020-11-24 RX ORDER — HYDRALAZINE HYDROCHLORIDE 25 MG/1
25 TABLET, FILM COATED ORAL 2 TIMES DAILY
Qty: 180 TABLET | Refills: 3 | Status: SHIPPED | OUTPATIENT
Start: 2020-11-24 | End: 2021-12-13 | Stop reason: SDUPTHER

## 2020-11-24 RX ORDER — DORZOLAMIDE HYDROCHLORIDE AND TIMOLOL MALEATE 20; 5 MG/ML; MG/ML
SOLUTION/ DROPS OPHTHALMIC
COMMUNITY
Start: 2020-09-03

## 2020-11-24 RX ORDER — TIMOLOL 5.12 MG/ML
1 SOLUTION/ DROPS OPHTHALMIC 2 TIMES DAILY
COMMUNITY

## 2020-11-24 ASSESSMENT — ENCOUNTER SYMPTOMS
PHOTOPHOBIA: 0
ABDOMINAL PAIN: 0
VOMITING: 0
ALLERGIC/IMMUNOLOGIC NEGATIVE: 1
WHEEZING: 0
EYE PAIN: 0
EYE REDNESS: 0
EYES NEGATIVE: 1
NAUSEA: 0
COUGH: 0
SINUS PRESSURE: 0
SHORTNESS OF BREATH: 0
DIARRHEA: 0
SORE THROAT: 0

## 2020-11-24 NOTE — PROGRESS NOTES
Change PERRL, conjunctiva normal      2020     Iram Stable (:  1940) is a [de-identified] y.o. female, here for evaluation of the following medical concerns:    HPI  Patient had laser surgery for right eye and pressure in eye down to 14. The shingrix vaccine , felt like hit with bricks. Needs to rest more and  Feels achy. Symptoms started the next day and had arm swelling. The arm swelling went away but still feels achy and tired. Patient has fibromyalgia and has baseline aching, but this is more. Started taking stool softener due to mild constipation. Had colonoscopy one year ago and told was normal and does not need any more. Hypertension: Patient here for follow-up of elevated blood pressure. She is not exercising but will start 30 minute daily walk indoors, exercising and is not adherent to low salt diet. Blood pressure is well controlled at home. Cardiac symptoms none. Patient denies none. Cardiovascular risk factors: advanced age (older than 54 for men, 72 for women), diabetes mellitus and hypertension. Use of agents associated with hypertension: none. History of target     organ damage: none.         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    Pure hypercholesterolemia    Essential hypertension    Stage 3 chronic kidney disease    Vitamin D deficiency    Fibromyalgia    Type 2 diabetes mellitus with complication, with long-term current use of insulin (HCC)    Bilateral carpal tunnel syndrome    SBO (small bowel obstruction) (HCC)    Small bowel obstruction (HCC)    Chronic abdominal pain    Diarrhea    Chronic diarrhea    Trigger middle finger of right hand    Carpal tunnel syndrome, left    Bacterial overgrowth syndrome    Plantar fasciitis    Primary open angle glaucoma    Renal insufficiency syndrome    Retrocalcaneal bursitis    S/P angioplasty with stent    Ulcerative colitis ([facility] [address]     Allergies   Allergen Reactions    Flagyl [Metronidazole]      ataxia    Sulfa Antibiotics     Lisinopril-Hydrochlorothiazide Swelling     Lip And facial swelling and coughing    Ace Inhibitors Other (See Comments)     Edema lip swelling and coughing.  Actos [Pioglitazone Hydrochloride]     Celebrex [Celecoxib]     Cipro Xr     Ciprofloxacin     Demerol     Doxycycline     Doxycycline Calcium     Elavil [Amitriptyline Hcl]     Estradiol      Estrogen patch, rapid weight gain, arm pain , dizziness, mentally not clear. Patient took estrace 1 mg daily orally for years and insurance no longer covered so switched to patch and felt so bad , she discontinued.  Levofloxacin     Levofloxacin     Metformin     Metoclopramide     Neurontin [Gabapentin]     Niaspan [Niacin Er]     Nsaids     Oxycodone     Phenytoin Sodium Extended     Pioglitazone     Prednisone     Pregabalin     Simvastatin        Review of Systems   Constitutional: Negative. Negative for chills, fatigue and fever. HENT: Negative for ear pain, hearing loss, postnasal drip, sinus pressure, sore throat and tinnitus. Eyes: Negative. Negative for photophobia, pain and redness. Respiratory: Negative for cough, shortness of breath and wheezing. Cardiovascular: Negative. Negative for chest pain and palpitations. Hyperlipidemia   Gastrointestinal: Negative for abdominal pain, diarrhea, nausea and vomiting. Bowel obstruction in past with abdominal surgery. Diarrhea resolved with vancomycin times 2 for c dif. No futher diarrhea after treatment of c dif in December of 2019. Formed stools now. Endocrine: Negative. Genitourinary: Negative. Musculoskeletal: Negative for arthralgias, joint swelling, myalgias and neck pain. Fibromyalgia for years. Skin: Negative. Negative for rash. Allergic/Immunologic: Negative. Neurological: Negative for dizziness, seizures, syncope, weakness, light-headedness, numbness and headaches. Hematological: Negative. Psychiatric/Behavioral: Negative. Prior to Visit Medications    Medication Sig Taking?  Authorizing Provider   B complex-vitamin C-folic acid (NEPHRO-BARNEY) 1 MG tablet TAKE ONE TABLET BY MOUTH DAILY WITH BREAKFAST Yes May Kent MD   B complex-vitamin C-folic acid (NEPHRO-BARNEY) 1 MG tablet Take 1 tablet by mouth daily (with breakfast) Yes May Kent MD   ezetimibe (ZETIA) 10 MG tablet TAKE ONE TABLET BY MOUTH DAILY Yes May Kent MD   spironolactone (ALDACTONE) 25 MG tablet Take 1 tablet by mouth daily Yes May Kent MD   hydrALAZINE (APRESOLINE) 25 MG tablet Take 1 tablet by mouth 2 times daily Yes May Kent MD   folic acid (FOLVITE) 1 MG tablet Take 1 tablet by mouth daily Yes May Kent MD   fluticasone-umeclidin-vilant (TRELEGY ELLIPTA) 100-62.5-25 MCG/INH AEPB Inhale 1 puff into the lungs daily Expiration date 7/ 2021  Lot (10) Harvey Day Yes May Kent MD   insulin glargine (LANTUS SOLOSTAR) 100 UNIT/ML injection pen Inject 22 Units into the skin nightly Indications: sliding scale up to 22 units Lease refill 8/23/18 per patient requests Yes May Kent MD   aspirin 81 MG EC tablet Take 81 mg by mouth daily Yes Historical Provider, MD   vitamin D (ERGOCALCIFEROL) 55856 units CAPS capsule Take 50,000 Units by mouth every 14 days Yes Historical Provider, MD   acetaminophen (TYLENOL) 325 MG tablet Take 650 mg by mouth every 6 hours as needed for Pain Yes Historical Provider, MD   Multiple Vitamins-Minerals (OCUVITE EYE + MULTI) TABS Take 1 tablet by mouth daily Yes Historical Provider, MD   brimonidine-timolol (COMBIGAN) 0.2-0.5 % ophthalmic solution Place 1 drop into both eyes every 12 hours Yes Historical Provider, MD   latanoprost (XALATAN) 0.005 % ophthalmic solution Place 1 drop into both eyes nightly  Yes Historical Provider, MD   Coenzyme Q10 (COQ10) 100 MG CAPS Take 1 capsule by mouth daily  Yes Historical Provider, MD   Cyanocobalamin (B-12) 500 MCG TABS Take 1 tablet by mouth Twice a Week  Yes Historical Provider, MD   lansoprazole (PREVACID) 15 MG delayed release capsule Take 15 mg by mouth daily  Yes Historical Provider, MD        Social History     Tobacco Use    Smoking status: Never Smoker    Smokeless tobacco: Never Used   Substance Use Topics    Alcohol use: No        Vitals:    11/24/20 1105   BP: 136/72   Pulse: 71   Temp: 97.1 °F (36.2 °C)   TempSrc: Oral   SpO2: 100%   Weight: 173 lb (78.5 kg)   Height: 5' 3\" (1.6 m)     Estimated body mass index is 30.65 kg/m² as calculated from the following:    Height as of this encounter: 5' 3\" (1.6 m). Weight as of this encounter: 173 lb (78.5 kg). Physical Exam  Constitutional:       Appearance: Normal appearance. She is obese. HENT:      Head: Normocephalic and atraumatic. Nose: Nose normal.   Eyes:      Extraocular Movements: Extraocular movements intact. Pulmonary:      Effort: Pulmonary effort is normal.   Abdominal:      Comments: Palpate her abdomen in all quadrants and nontender. Musculoskeletal:      Right lower leg: No edema. Left lower leg: No edema. Comments: Edema bilateral lower extremities. Neurological:      General: No focal deficit present. Mental Status: She is alert and oriented to person, place, and time. Cranial Nerves: No cranial nerve deficit. Coordination: Coordination normal.      Gait: Gait normal.   Psychiatric:         Mood and Affect: Mood normal.         Behavior: Behavior normal.         Thought Content: Thought content normal.         Judgment: Judgment normal.         ASSESSMENT/PLAN:   Diagnosis Orders   1. Myalgia  COVID-19 Ambulatory   2. Type 2 diabetes mellitus with complication, with long-term current use of insulin ([facility])  Hemoglobin A1C    Renal Function Panel    Lipid Panel   3.  Vitamin D deficiency 3/6/2020 10:44 PM - Josue Reveles Incoming Lab Results From Soft (Epic Adt)     Component  Value  Ref Range & Units  Status  Collected  Lab    Vit D, 25-Hydroxy  76.5  >=30 ng/mL  Final  03/06/2020  1:49 PM  [address] Lab    <=20 ng/mL. ........... Tori Sharper Deficient   21-29 ng/mL. ......... Tori Sharper Insufficient      Supplement decreased to 50,000 units every 14 days. 4. Stage 3b chronic kidney disease   Labs Renal Latest Ref Rng & Units 9/2/2020 3/6/2020 9/10/2019 6/27/2019 6/26/2019   BUN 7 - 20 mg/dL 19 18 15 10 9   Cr 0.6 - 1.2 mg/dL 1.1 1.0 1.0 0.9 0.8   K 3.5 - 5.1 mmol/L 4.8 4.5 4.3 3.5 3. 3(L)   Na 136 - 145 mmol/L 141 143 141 139 141   stable. 5. Other neutropenia (HCC) will further evaluate. VITAMIN B12 & FOLATE    CBC Auto Differential   6. Essential hypertension :  Controlled on current medications. BP Readings from Last 3 Encounters:   11/24/20 136/72   07/08/20 (!) 149/73   05/12/20 (!) 147/68    hydrALAZINE (APRESOLINE) 25 MG tablet     An electronic signature was used to authenticate this note.     --Zoe Bhardwaj MD on 11/24/2020 at 11:27 AM

## 2020-11-29 PROBLEM — M79.10 MYALGIA: Status: ACTIVE | Noted: 2020-11-29

## 2020-11-29 PROBLEM — D70.8 OTHER NEUTROPENIA (HCC): Status: ACTIVE | Noted: 2020-11-29

## 2020-12-22 ENCOUNTER — TELEPHONE (OUTPATIENT)
Dept: PRIMARY CARE CLINIC | Age: 80
End: 2020-12-22

## 2020-12-22 RX ORDER — INSULIN GLARGINE 100 [IU]/ML
22 INJECTION, SOLUTION SUBCUTANEOUS NIGHTLY
Qty: 20 ML | Refills: 3 | Status: SHIPPED | OUTPATIENT
Start: 2020-12-22 | End: 2021-01-14 | Stop reason: SDUPTHER

## 2020-12-28 ENCOUNTER — HOSPITAL ENCOUNTER (OUTPATIENT)
Dept: MAMMOGRAPHY | Age: 80
Discharge: HOME | End: 2021-01-02
Payer: MEDICARE

## 2020-12-28 DIAGNOSIS — Z12.31 VISIT FOR SCREENING MAMMOGRAM: ICD-10-CM

## 2020-12-28 PROCEDURE — 77063 BREAST TOMOSYNTHESIS BI: CPT

## 2021-01-05 ENCOUNTER — OFFICE VISIT (OUTPATIENT)
Dept: PRIMARY CARE CLINIC | Age: 81
End: 2021-01-05
Payer: MEDICARE

## 2021-01-05 DIAGNOSIS — E11.8 TYPE 2 DIABETES MELLITUS WITH COMPLICATION, WITH LONG-TERM CURRENT USE OF INSULIN (HCC): ICD-10-CM

## 2021-01-05 DIAGNOSIS — Z20.822 SUSPECTED COVID-19 VIRUS INFECTION: Primary | ICD-10-CM

## 2021-01-05 DIAGNOSIS — Z79.4 TYPE 2 DIABETES MELLITUS WITH COMPLICATION, WITH LONG-TERM CURRENT USE OF INSULIN (HCC): ICD-10-CM

## 2021-01-05 DIAGNOSIS — D70.8 OTHER NEUTROPENIA (HCC): ICD-10-CM

## 2021-01-05 LAB
ALBUMIN SERPL-MCNC: 4.8 G/DL (ref 3.4–5)
ANION GAP SERPL CALCULATED.3IONS-SCNC: 10 MMOL/L (ref 3–16)
BASOPHILS ABSOLUTE: 0 K/UL (ref 0–0.2)
BASOPHILS RELATIVE PERCENT: 0.7 %
BUN BLDV-MCNC: 26 MG/DL (ref 7–20)
CALCIUM SERPL-MCNC: 10.1 MG/DL (ref 8.3–10.6)
CHLORIDE BLD-SCNC: 104 MMOL/L (ref 99–110)
CHOLESTEROL, TOTAL: 212 MG/DL (ref 0–199)
CO2: 25 MMOL/L (ref 21–32)
CREAT SERPL-MCNC: 1.3 MG/DL (ref 0.6–1.2)
EOSINOPHILS ABSOLUTE: 0.1 K/UL (ref 0–0.6)
EOSINOPHILS RELATIVE PERCENT: 3.2 %
FOLATE: >20 NG/ML (ref 4.78–24.2)
GFR AFRICAN AMERICAN: 48
GFR NON-AFRICAN AMERICAN: 39
GLUCOSE BLD-MCNC: 147 MG/DL (ref 70–99)
HCT VFR BLD CALC: 33.4 % (ref 36–48)
HDLC SERPL-MCNC: 62 MG/DL (ref 40–60)
HEMOGLOBIN: 10.9 G/DL (ref 12–16)
LDL CHOLESTEROL CALCULATED: 119 MG/DL
LYMPHOCYTES ABSOLUTE: 1 K/UL (ref 1–5.1)
LYMPHOCYTES RELATIVE PERCENT: 25.1 %
MCH RBC QN AUTO: 32.8 PG (ref 26–34)
MCHC RBC AUTO-ENTMCNC: 32.6 G/DL (ref 31–36)
MCV RBC AUTO: 100.5 FL (ref 80–100)
MONOCYTES ABSOLUTE: 0.3 K/UL (ref 0–1.3)
MONOCYTES RELATIVE PERCENT: 7.2 %
NEUTROPHILS ABSOLUTE: 2.5 K/UL (ref 1.7–7.7)
NEUTROPHILS RELATIVE PERCENT: 63.8 %
PDW BLD-RTO: 14.5 % (ref 12.4–15.4)
PHOSPHORUS: 3.8 MG/DL (ref 2.5–4.9)
PLATELET # BLD: 244 K/UL (ref 135–450)
PMV BLD AUTO: 8.6 FL (ref 5–10.5)
POTASSIUM SERPL-SCNC: 4.4 MMOL/L (ref 3.5–5.1)
RBC # BLD: 3.32 M/UL (ref 4–5.2)
SODIUM BLD-SCNC: 139 MMOL/L (ref 136–145)
TRIGL SERPL-MCNC: 154 MG/DL (ref 0–150)
VITAMIN B-12: 1877 PG/ML (ref 211–911)
VLDLC SERPL CALC-MCNC: 31 MG/DL
WBC # BLD: 3.9 K/UL (ref 4–11)

## 2021-01-05 PROCEDURE — 99211 OFF/OP EST MAY X REQ PHY/QHP: CPT | Performed by: NURSE PRACTITIONER

## 2021-01-05 NOTE — PROGRESS NOTES
Andre Brandon received a viral test for COVID-19. They were educated on isolation and quarantine as appropriate. For any symptoms, they were directed to seek care from their PCP, given contact information to establish with a doctor, directed to an urgent care or the emergency room.

## 2021-01-06 LAB
ESTIMATED AVERAGE GLUCOSE: 142.7 MG/DL
HBA1C MFR BLD: 6.6 %
SARS-COV-2: NOT DETECTED

## 2021-01-10 DIAGNOSIS — E78.00 PURE HYPERCHOLESTEROLEMIA: Primary | ICD-10-CM

## 2021-01-10 DIAGNOSIS — E78.2 MIXED HYPERLIPIDEMIA: ICD-10-CM

## 2021-01-10 PROBLEM — R19.7 DIARRHEA: Status: RESOLVED | Noted: 2019-11-24 | Resolved: 2021-01-10

## 2021-01-10 PROBLEM — K52.9 CHRONIC DIARRHEA: Status: RESOLVED | Noted: 2019-11-24 | Resolved: 2021-01-10

## 2021-01-10 RX ORDER — FENOFIBRATE 48 MG/1
48 TABLET, COATED ORAL DAILY
Qty: 30 TABLET | Refills: 3 | Status: SHIPPED | OUTPATIENT
Start: 2021-01-10

## 2021-01-14 ENCOUNTER — OFFICE VISIT (OUTPATIENT)
Dept: PRIMARY CARE CLINIC | Age: 81
End: 2021-01-14
Payer: MEDICARE

## 2021-01-14 VITALS
WEIGHT: 171 LBS | HEART RATE: 75 BPM | SYSTOLIC BLOOD PRESSURE: 140 MMHG | DIASTOLIC BLOOD PRESSURE: 67 MMHG | TEMPERATURE: 97.4 F | OXYGEN SATURATION: 98 % | HEIGHT: 63 IN | BODY MASS INDEX: 30.3 KG/M2

## 2021-01-14 DIAGNOSIS — E11.8 TYPE 2 DIABETES MELLITUS WITH COMPLICATION, WITH LONG-TERM CURRENT USE OF INSULIN (HCC): ICD-10-CM

## 2021-01-14 DIAGNOSIS — Z78.0 MENOPAUSE: ICD-10-CM

## 2021-01-14 DIAGNOSIS — Z12.11 COLON CANCER SCREENING: ICD-10-CM

## 2021-01-14 DIAGNOSIS — I10 ESSENTIAL HYPERTENSION: ICD-10-CM

## 2021-01-14 DIAGNOSIS — D53.9 MACROCYTIC ANEMIA: Primary | ICD-10-CM

## 2021-01-14 DIAGNOSIS — Z79.4 TYPE 2 DIABETES MELLITUS WITH COMPLICATION, WITH LONG-TERM CURRENT USE OF INSULIN (HCC): ICD-10-CM

## 2021-01-14 PROCEDURE — 99213 OFFICE O/P EST LOW 20 MIN: CPT | Performed by: INTERNAL MEDICINE

## 2021-01-14 RX ORDER — SPIRONOLACTONE 25 MG/1
25 TABLET ORAL DAILY
Qty: 90 TABLET | Refills: 1 | Status: SHIPPED | OUTPATIENT
Start: 2021-01-14 | End: 2021-06-26 | Stop reason: SDUPTHER

## 2021-01-14 RX ORDER — INSULIN GLARGINE 100 [IU]/ML
22 INJECTION, SOLUTION SUBCUTANEOUS NIGHTLY
Qty: 20 ML | Refills: 3 | Status: SHIPPED | OUTPATIENT
Start: 2021-01-14 | End: 2022-08-09 | Stop reason: SDUPTHER

## 2021-01-14 ASSESSMENT — PATIENT HEALTH QUESTIONNAIRE - PHQ9
SUM OF ALL RESPONSES TO PHQ QUESTIONS 1-9: 0
SUM OF ALL RESPONSES TO PHQ9 QUESTIONS 1 & 2: 0

## 2021-01-14 NOTE — PROGRESS NOTES
Derek Mock (:  1940) is a [de-identified] y.o. female,Established patient, here for evaluation of the following chief complaint(s):  1 Month Follow-Up      ASSESSMENT/PLAN:     Diagnosis Orders   1. Macrocytic anemia :  B 12 and folic acid levels ordered. Lab Results   Component Value Date    WBC 3.9 (L) 2021    HGB 10.9 (L) 2021    HCT 33.4 (L) 2021    .5 (H) 2021     2021       2. Essential hypertension : controlled, continue medication. BP Readings from Last 3 Encounters:   21 (!) 140/67   20 136/72   20 (!) 149/73    spironolactone (ALDACTONE) 25 MG tablet   3. Type 2 diabetes mellitus with complication, with long-term current use of insulin (HCC) controlled on current regimen, continue. Lab Results   Component Value Date    LABA1C 6.6 2021    LABA1C 6.7 2020    LABA1C 6.5 2020     Lab Results   Component Value Date    LABMICR <1.20 2020    LDLCALC 119 (H) 2021    CREATININE 1.3 (H) 2021      insulin glargine (LANTUS SOLOSTAR) 100 UNIT/ML injection pen   4. Colon cancer screening  Blood Occult Stool Screen #1    Blood Occult Stool Screen #2    Blood Occult Stool Screen #3   5. Menopause  DEXA BONE DENSITY 2 SITES       Follow up in two months. SUBJECTIVE/OBJECTIVE:  Diabetes  She presents for her follow-up diabetic visit. She has type 2 diabetes mellitus. Her disease course has been stable. There are no hypoglycemic associated symptoms. Pertinent negatives for hypoglycemia include no dizziness, headaches or seizures. Pertinent negatives for diabetes include no blurred vision, no chest pain, no fatigue, no foot paresthesias, no foot ulcerations, no polydipsia, no polyphagia, no polyuria, no visual change, no weakness and no weight loss. There are no hypoglycemic complications. Symptoms are stable. Diabetic complications include heart disease and nephropathy. She is compliant with treatment most of the time.  Her weight is stable. Meal planning includes avoidance of concentrated sweets. She participates in exercise intermittently. An ACE inhibitor/angiotensin II receptor blocker is contraindicated. Ate cold slaw and has been bloated for a few days. Passing gas and stool. History of abdominal adhesions and partial bowel obstruction and surgery for bowel obstruction. Impression       No direct or indirect signs of malignancy.       ASSESSMENT: BI-RADS 1 Negative   RECOMMENDATION: Routine screening 1 year.             Review of Systems   Constitutional: Negative. Negative for chills, fatigue, fever and weight loss. HENT: Negative for ear pain, hearing loss, postnasal drip, sinus pressure, sore throat and tinnitus. Eyes: Negative. Negative for blurred vision, photophobia, pain and redness. Respiratory: Negative for cough, shortness of breath and wheezing. Cardiovascular: Negative. Negative for chest pain and palpitations. Hyperlipidemia   Gastrointestinal: Negative for abdominal pain, diarrhea, nausea and vomiting. Bowel obstruction in past with abdominal surgery. Diarrhea resolved with vancomycin times 2 for c dif. No futher diarrhea after treatment of c dif in December of 2019. Formed stools now. Endocrine: Negative. Negative for polydipsia, polyphagia and polyuria. Genitourinary: Negative. Musculoskeletal: Negative for arthralgias, joint swelling, myalgias and neck pain. Fibromyalgia for years. Skin: Negative. Negative for rash. Allergic/Immunologic: Negative. Neurological: Negative for dizziness, seizures, syncope, weakness, light-headedness, numbness and headaches. Hematological: Negative. Psychiatric/Behavioral: Negative. Physical Exam  Constitutional:       General: She is not in acute distress. Appearance: She is well-developed. She is not diaphoretic. HENT:      Head: Normocephalic and atraumatic.       Right Ear: External ear normal.      Left Ear: External ear normal.      Nose: Nose normal.      Mouth/Throat:      Pharynx: No oropharyngeal exudate. Eyes:      General: No scleral icterus. Right eye: No discharge. Left eye: No discharge. Conjunctiva/sclera: Conjunctivae normal.      Pupils: Pupils are equal, round, and reactive to light. Neck:      Musculoskeletal: Normal range of motion and neck supple. Thyroid: No thyromegaly. Vascular: No JVD. Trachea: No tracheal deviation. Cardiovascular:      Rate and Rhythm: Normal rate and regular rhythm. Heart sounds: Normal heart sounds. No murmur. No gallop. Pulmonary:      Effort: Pulmonary effort is normal. No respiratory distress. Breath sounds: Normal breath sounds. No wheezing or rales. Chest:      Chest wall: No tenderness. Abdominal:      General: Bowel sounds are normal. There is no distension. Palpations: Abdomen is soft. There is no mass. Tenderness: There is no abdominal tenderness. There is no guarding or rebound. Musculoskeletal: Normal range of motion. General: No tenderness or deformity. Skin:     General: Skin is warm and dry. Coloration: Skin is not pale. Findings: No erythema or rash. Neurological:      General: No focal deficit present. Mental Status: She is alert and oriented to person, place, and time. Cranial Nerves: No cranial nerve deficit. Sensory: No sensory deficit. Motor: No weakness or abnormal muscle tone. Coordination: Coordination normal.   Psychiatric:         Mood and Affect: Mood normal.         Behavior: Behavior normal.         Thought Content:  Thought content normal.         Judgment: Judgment normal.           On this date 01/17/21 I have spent 25 minutes reviewing previous notes, test results and face to face with the patient discussing the diagnosis and importance of compliance with the treatment plan as well as documenting on the day of the visit. An electronic signature was used to authenticate this note.     --Yaya Holt MD

## 2021-01-17 ASSESSMENT — ENCOUNTER SYMPTOMS
PHOTOPHOBIA: 0
VOMITING: 0
DIARRHEA: 0
VISUAL CHANGE: 0
EYE PAIN: 0
ABDOMINAL PAIN: 0
SORE THROAT: 0
COUGH: 0
ALLERGIC/IMMUNOLOGIC NEGATIVE: 1
BLURRED VISION: 0
NAUSEA: 0
SINUS PRESSURE: 0
EYES NEGATIVE: 1
WHEEZING: 0
SHORTNESS OF BREATH: 0
EYE REDNESS: 0

## 2021-01-21 NOTE — TELEPHONE ENCOUNTER
Medication:   Requested Prescriptions     Pending Prescriptions Disp Refills    Insulin Pen Needle ([employer] PEN NEEDLES 31G) 31G X 8 MM MISC 100 each 3     Si each by Does not apply route daily        Last Filled:      Patient Phone Number: [phone] (home) [phone] (work)    Last appt: 2021   Next appt: Visit date not found    Last OARRS: No flowsheet data found.

## 2021-01-22 RX ORDER — PEN NEEDLE, DIABETIC 31 GX5/16"
1 NEEDLE, DISPOSABLE MISCELLANEOUS DAILY
Qty: 100 EACH | Refills: 3 | Status: SHIPPED | OUTPATIENT
Start: 2021-01-22

## 2021-04-21 ENCOUNTER — TELEPHONE (OUTPATIENT)
Dept: INTERNAL MEDICINE CLINIC | Age: 81
End: 2021-04-21

## 2021-06-08 ENCOUNTER — OFFICE VISIT (OUTPATIENT)
Dept: INTERNAL MEDICINE CLINIC | Age: 81
End: 2021-06-08
Payer: MEDICARE

## 2021-06-08 ENCOUNTER — TELEPHONE (OUTPATIENT)
Dept: INTERNAL MEDICINE CLINIC | Age: 81
End: 2021-06-08

## 2021-06-08 VITALS
OXYGEN SATURATION: 100 % | HEART RATE: 64 BPM | DIASTOLIC BLOOD PRESSURE: 70 MMHG | HEIGHT: 63 IN | WEIGHT: 172 LBS | SYSTOLIC BLOOD PRESSURE: 124 MMHG | TEMPERATURE: 97.4 F | BODY MASS INDEX: 30.48 KG/M2

## 2021-06-08 DIAGNOSIS — I25.10 ATHEROSCLEROSIS OF NATIVE CORONARY ARTERY OF NATIVE HEART WITHOUT ANGINA PECTORIS: Primary | ICD-10-CM

## 2021-06-08 DIAGNOSIS — N18.32 STAGE 3B CHRONIC KIDNEY DISEASE (HCC): ICD-10-CM

## 2021-06-08 DIAGNOSIS — D70.8 OTHER NEUTROPENIA (HCC): ICD-10-CM

## 2021-06-08 DIAGNOSIS — E11.8 TYPE 2 DIABETES MELLITUS WITH COMPLICATION, WITH LONG-TERM CURRENT USE OF INSULIN (HCC): ICD-10-CM

## 2021-06-08 DIAGNOSIS — K51.80 OTHER ULCERATIVE COLITIS WITHOUT COMPLICATION (HCC): ICD-10-CM

## 2021-06-08 DIAGNOSIS — Z79.4 TYPE 2 DIABETES MELLITUS WITH COMPLICATION, WITH LONG-TERM CURRENT USE OF INSULIN (HCC): ICD-10-CM

## 2021-06-08 DIAGNOSIS — Z78.0 POST-MENOPAUSAL: ICD-10-CM

## 2021-06-08 PROBLEM — K56.609 SMALL BOWEL OBSTRUCTION (HCC): Status: RESOLVED | Noted: 2019-06-24 | Resolved: 2021-06-08

## 2021-06-08 PROCEDURE — 99214 OFFICE O/P EST MOD 30 MIN: CPT | Performed by: INTERNAL MEDICINE

## 2021-06-08 SDOH — ECONOMIC STABILITY: TRANSPORTATION INSECURITY
IN THE PAST 12 MONTHS, HAS LACK OF TRANSPORTATION KEPT YOU FROM MEETINGS, WORK, OR FROM GETTING THINGS NEEDED FOR DAILY LIVING?: NO

## 2021-06-08 SDOH — ECONOMIC STABILITY: FOOD INSECURITY: WITHIN THE PAST 12 MONTHS, YOU WORRIED THAT YOUR FOOD WOULD RUN OUT BEFORE YOU GOT MONEY TO BUY MORE.: NEVER TRUE

## 2021-06-08 SDOH — ECONOMIC STABILITY: FOOD INSECURITY: WITHIN THE PAST 12 MONTHS, THE FOOD YOU BOUGHT JUST DIDN'T LAST AND YOU DIDN'T HAVE MONEY TO GET MORE.: NEVER TRUE

## 2021-06-08 SDOH — HEALTH STABILITY: PHYSICAL HEALTH: ON AVERAGE, HOW MANY MINUTES DO YOU ENGAGE IN EXERCISE AT THIS LEVEL?: 20 MIN

## 2021-06-08 SDOH — HEALTH STABILITY: PHYSICAL HEALTH: ON AVERAGE, HOW MANY DAYS PER WEEK DO YOU ENGAGE IN MODERATE TO STRENUOUS EXERCISE (LIKE A BRISK WALK)?: 2 DAYS

## 2021-06-08 SDOH — ECONOMIC STABILITY: INCOME INSECURITY: IN THE LAST 12 MONTHS, WAS THERE A TIME WHEN YOU WERE NOT ABLE TO PAY THE MORTGAGE OR RENT ON TIME?: NO

## 2021-06-08 SDOH — ECONOMIC STABILITY: TRANSPORTATION INSECURITY
IN THE PAST 12 MONTHS, HAS THE LACK OF TRANSPORTATION KEPT YOU FROM MEDICAL APPOINTMENTS OR FROM GETTING MEDICATIONS?: NO

## 2021-06-08 ASSESSMENT — PATIENT HEALTH QUESTIONNAIRE - PHQ9
SUM OF ALL RESPONSES TO PHQ9 QUESTIONS 1 & 2: 0
SUM OF ALL RESPONSES TO PHQ QUESTIONS 1-9: 0
DEPRESSION UNABLE TO ASSESS: YES
SUM OF ALL RESPONSES TO PHQ9 QUESTIONS 1 & 2: 0
SUM OF ALL RESPONSES TO PHQ QUESTIONS 1-9: 0
1. LITTLE INTEREST OR PLEASURE IN DOING THINGS: 0
1. LITTLE INTEREST OR PLEASURE IN DOING THINGS: NOT AT ALL
SUM OF ALL RESPONSES TO PHQ QUESTIONS 1-9: 0
2. FEELING DOWN, DEPRESSED OR HOPELESS: NOT AT ALL
2. FEELING DOWN, DEPRESSED OR HOPELESS: 0

## 2021-06-08 ASSESSMENT — SOCIAL DETERMINANTS OF HEALTH (SDOH)
HOW HARD IS IT FOR YOU TO PAY FOR THE VERY BASICS LIKE FOOD, HOUSING, MEDICAL CARE, AND HEATING?: NOT HARD AT ALL
HOW OFTEN DO YOU ATTENT MEETINGS OF THE CLUB OR ORGANIZATION YOU BELONG TO?: MORE THAN 4 TIMES PER YEAR
WITHIN THE LAST YEAR, HAVE TO BEEN RAPED OR FORCED TO HAVE ANY KIND OF SEXUAL ACTIVITY BY YOUR PARTNER OR EX-PARTNER?: NO
WITHIN THE LAST YEAR, HAVE YOU BEEN AFRAID OF YOUR PARTNER OR EX-PARTNER?: NO
WITHIN THE LAST YEAR, HAVE YOU BEEN HUMILIATED OR EMOTIONALLY ABUSED IN OTHER WAYS BY YOUR PARTNER OR EX-PARTNER?: NO
HOW HARD IS IT FOR YOU TO PAY FOR THE VERY BASICS LIKE FOOD, HOUSING, MEDICAL CARE, AND HEATING?: NOT HARD AT ALL
HOW OFTEN DO YOU ATTEND CHURCH OR RELIGIOUS SERVICES?: MORE THAN 4 TIMES PER YEAR
DO YOU BELONG TO ANY CLUBS OR ORGANIZATIONS SUCH AS CHURCH GROUPS UNIONS, FRATERNAL OR ATHLETIC GROUPS, OR SCHOOL GROUPS?: NO
IN A TYPICAL WEEK, HOW MANY TIMES DO YOU TALK ON THE PHONE WITH FAMILY, FRIENDS, OR NEIGHBORS?: MORE THAN THREE TIMES A WEEK
HOW OFTEN DO YOU GET TOGETHER WITH FRIENDS OR RELATIVES?: MORE THAN THREE TIMES A WEEK
WITHIN THE LAST YEAR, HAVE YOU BEEN KICKED, HIT, SLAPPED, OR OTHERWISE PHYSICALLY HURT BY YOUR PARTNER OR EX-PARTNER?: NO

## 2021-06-08 ASSESSMENT — LIFESTYLE VARIABLES: HOW OFTEN DO YOU HAVE A DRINK CONTAINING ALCOHOL: NEVER

## 2021-06-08 NOTE — TELEPHONE ENCOUNTER
Pt Requested that Dr. Rebeca Cardona order a Nitroglycerin Cough suppressant spray from the [facility] pharmacy.    [id number]     [phone]

## 2021-06-08 NOTE — PROGRESS NOTES
Subjective:      Patient ID: Kassandra Zhang is a 80 y.o. female. Chief Complaint   Patient presents with    New Patient     establish care     Chief Complaint   Patient presents with    New Patient     establish care         HPI     81 yo female  has a past medical history of Angina, Bilateral carpal tunnel syndrome, Chronic kidney disease, Fibromyalgia, Hyperlipidemia, Hypertension, MVP (mitral valve prolapse), s, Small bowel obstruction ([address] and Type II or unspecified type diabetes mellitus without mention of complication, not stated as uncontrolled. She presents to establish care. She is doing well. Fibromyalgia:  Current musculoskeletal complaints include shoulder, legs and ankles pain and multiple joints. stiffness. Pain is unchanged. On average, pain is perceived as moderate (4-6 pain scale). Associated symptoms include aches or myalgia. The patient denies depressed mood, anxiety, difficulty concentrating, short-term memory loss, poor sleep, abdominal pain, diarrhea, constipation, headaches, paresthesias, dizziness/lightheadedness, chest pain, dyspnea, palpitations, urinary frequency and dysuria. Current treatment:  none, analgesic- tylenol. Medication side effects:  none. Recent diagnostic testing: none. Irritable Bowel Syndrome  Patient complains of abdominal cramping. Onset of symptoms was several years ago. Current symptoms: crampy abdominal pain: moderate: location: in the periumbilical area. Patient denies anorexia, dysuria, fever, flatus, frequency, headache, hematochezia and hematuria. Symptoms have stabilized.      Past Medical History:   Diagnosis Date    Angina     Bilateral carpal tunnel syndrome 10/9/2018    Chronic kidney disease     Fibromyalgia     Hyperlipidemia     Hypertension     MVP (mitral valve prolapse)     s 5/18/2017    Small bowel obstruction ([address] 6/24/2019    Type II or unspecified type diabetes mellitus without mention of complication, not stated as than three times a week    Attends Sabianism Services: More than 4 times per year    Active Member of Clubs or Organizations: No    Attends Club or Organization Meetings: More than 4 times per year    Marital Status:    Intimate Partner Violence: Not At Risk    Fear of Current or Ex-Partner: No    Emotionally Abused: No    Physically Abused: No    Sexually Abused: No         Review of Systems   All other systems reviewed and are negative. Allergies   Allergen Reactions    Flagyl [Metronidazole]      ataxia    Sulfa Antibiotics     Lisinopril-Hydrochlorothiazide Swelling     Lip And facial swelling and coughing    Ace Inhibitors Other (See Comments)     Edema lip swelling and coughing.  Actos [Pioglitazone Hydrochloride]     Celebrex [Celecoxib]     Cipro Xr     Ciprofloxacin     Demerol     Doxycycline     Elavil [Amitriptyline Hcl]     Estradiol      Estrogen patch, rapid weight gain, arm pain , dizziness, mentally not clear. Patient took estrace 1 mg daily orally for years and insurance no longer covered so switched to patch and felt so bad , she discontinued.     Levofloxacin     Metformin     Metoclopramide     Metolazone Other (See Comments)    Neurontin [Gabapentin]     Niaspan [Niacin Er]     Nifedipine Itching    Nsaids     Oxycodone     Phenytoin Sodium Extended     Pioglitazone     Prednisone     Pregabalin     Simvastatin        Current Outpatient Medications   Medication Sig Dispense Refill    Insulin Pen Needle ([employer] PEN NEEDLES 31G) 31G X 8 MM MISC 1 each by Does not apply route daily 100 each 3    spironolactone (ALDACTONE) 25 MG tablet Take 1 tablet by mouth daily 90 tablet 1    insulin glargine (LANTUS SOLOSTAR) 100 UNIT/ML injection pen Inject 22 Units into the skin nightly Indications: sliding scale up to 22 units Lease refill 8/23/18 per patient requests 20 mL 3    fenofibrate (TRICOR) 48 MG tablet Take 1 tablet by mouth daily 30 tablet 3    Netarsudil-Latanoprost 0.02-0.005 % SOLN Apply 1 drop to eye nightly      timolol (BETIMOL) 0.5 % ophthalmic solution 1 drop 2 times daily      dorzolamide-timolol (COSOPT) 22.3-6.8 MG/ML ophthalmic solution INSTILL 1 DROP INTO BOTH EYES EVERY 12 HOURS      metoprolol tartrate (LOPRESSOR) 25 MG tablet Take 25 mg by mouth 2 times daily      hydrALAZINE (APRESOLINE) 25 MG tablet Take 1 tablet by mouth 2 times daily 180 tablet 3    B complex-vitamin C-folic acid (NEPHRO-BARNEY) 1 MG tablet TAKE ONE TABLET BY MOUTH DAILY WITH BREAKFAST 30 tablet 5    B complex-vitamin C-folic acid (NEPHRO-BARNEY) 1 MG tablet Take 1 tablet by mouth daily (with breakfast) 90 tablet 1    ezetimibe (ZETIA) 10 MG tablet TAKE ONE TABLET BY MOUTH DAILY 90 tablet 2    folic acid (FOLVITE) 1 MG tablet Take 1 tablet by mouth daily 90 tablet 3    fluticasone-umeclidin-vilant (TRELEGY ELLIPTA) 100-62.5-25 MCG/INH AEPB Inhale 1 puff into the lungs daily Expiration date 7/ 2021  Lot (10) [id number] 1 each 0    aspirin 81 MG EC tablet Take 81 mg by mouth daily      vitamin D (ERGOCALCIFEROL) 14802 units CAPS capsule Take 50,000 Units by mouth every 14 days      acetaminophen (TYLENOL) 325 MG tablet Take 650 mg by mouth every 6 hours as needed for Pain      Multiple Vitamins-Minerals (OCUVITE EYE + MULTI) TABS Take 1 tablet by mouth daily      latanoprost (XALATAN) 0.005 % ophthalmic solution Place 1 drop into both eyes nightly       Coenzyme Q10 (COQ10) 100 MG CAPS Take 1 capsule by mouth daily       Cyanocobalamin (B-12) 500 MCG TABS Take 1 tablet by mouth Twice a Week       lansoprazole (PREVACID) 15 MG delayed release capsule Take 15 mg by mouth daily        No current facility-administered medications for this visit. Vitals:    06/08/21 1359   BP: 124/70   Pulse: 64   Temp: 97.4 °F (36.3 °C)   SpO2: 100%   Weight: 172 lb (78 kg)   Height: 5' 3\" (1.6 m)     Body mass index is 30.47 kg/m².      Wt Readings from Last 3 Encounters: 06/08/21 172 lb (78 kg)   01/14/21 171 lb (77.6 kg)   11/24/20 173 lb (78.5 kg)     BP Readings from Last 3 Encounters:   06/08/21 124/70   01/14/21 (!) 140/67   11/24/20 136/72       Objective:   Physical Exam  Vitals and nursing note reviewed. Constitutional:       General: She is not in acute distress. Appearance: Normal appearance. She is well-developed. She is obese. She is not ill-appearing, toxic-appearing or diaphoretic. HENT:      Head: Normocephalic and atraumatic. Right Ear: Tympanic membrane, ear canal and external ear normal.      Left Ear: Tympanic membrane, ear canal and external ear normal.      Nose: Nose normal. No congestion or rhinorrhea. Mouth/Throat:      Mouth: Mucous membranes are moist.   Eyes:      Extraocular Movements: Extraocular movements intact. Conjunctiva/sclera: Conjunctivae normal.      Pupils: Pupils are equal, round, and reactive to light. Cardiovascular:      Rate and Rhythm: Normal rate and regular rhythm. Pulses: Normal pulses. Heart sounds: Normal heart sounds. No murmur heard. Pulmonary:      Effort: Pulmonary effort is normal. No respiratory distress. Breath sounds: Normal breath sounds. Abdominal:      General: Bowel sounds are normal. There is distension. Palpations: Abdomen is soft. There is no mass. Tenderness: There is no abdominal tenderness. There is no guarding or rebound. Hernia: No hernia is present. Musculoskeletal:         General: Normal range of motion. Cervical back: Normal range of motion and neck supple. Skin:     General: Skin is warm. Capillary Refill: Capillary refill takes less than 2 seconds. Neurological:      General: No focal deficit present. Mental Status: She is alert and oriented to person, place, and time. Mental status is at baseline. Psychiatric:         Mood and Affect: Mood normal.         Behavior: Behavior normal.         Thought Content:  Thought content normal.         Judgment: Judgment normal.         Assessment/Plan:  Omari Cruz was seen today for new patient. Diagnoses and all orders for this visit:    Atherosclerosis of native coronary artery of native heart without angina pectoris  -Continue Zetia and fenofibrate  -Check cholesterol  Other neutropenia (HCC)  -Follow closely  Other ulcerative colitis without complication ([facility]  -Followed by gi  Stage 3b chronic kidney disease ([facility]  -Discussed the importance of more plant-based diet, controlling blood pressure continuing to manage high blood pressure  -Patient may be a candidate for for Farxiga  Type 2 diabetes mellitus with complication, with long-term current use of insulin ([facility]  -     Lipid Panel; Future  -     Hemoglobin A1C; Future    Post-menopausal  -     DEXA BONE DENSITY AXIAL SKELETON; Future  Return for [facility]  - december- blood work soon.         Elliott Headley MD

## 2021-06-09 ENCOUNTER — TELEPHONE (OUTPATIENT)
Dept: INTERNAL MEDICINE CLINIC | Age: 81
End: 2021-06-09

## 2021-06-09 NOTE — TELEPHONE ENCOUNTER
----- Message from Favio Cobb sent at 6/9/2021 10:55 AM EDT -----  Subject: Message to Provider    QUESTIONS  Information for Provider? Pt states she is calling back to give info on   pharmacy about medication she previously spoke about-Nitroglicerin, she   has pharmacy info from her med bottle on where to send Rx to. [facility] [address] [facility]. Phone # [phone] 1 spray by sublingual route, intermitently 5 min   as needed. Nitrolingual 0.4  ---------------------------------------------------------------------------  --------------  CALL BACK INFO  What is the best way for the office to contact you? OK to leave message on   voicemail  Preferred Call Back Phone Number? [phone]  ---------------------------------------------------------------------------  --------------  SCRIPT ANSWERS  Relationship to Patient?  Self

## 2021-06-09 NOTE — TELEPHONE ENCOUNTER
I am not familiar with this brand.  Please ask patient to have pharmacy send us a refill request. Or ask patient to spell the exact medication from her old prescription

## 2021-06-24 DIAGNOSIS — N18.32 STAGE 3B CHRONIC KIDNEY DISEASE (HCC): ICD-10-CM

## 2021-06-24 DIAGNOSIS — E78.00 PURE HYPERCHOLESTEROLEMIA: ICD-10-CM

## 2021-06-24 DIAGNOSIS — I10 ESSENTIAL HYPERTENSION: ICD-10-CM

## 2021-06-24 NOTE — TELEPHONE ENCOUNTER
Recent Visits  Date Type Provider Dept   06/08/21 Office Visit Ryley Dudley MD [facility]   01/14/21 Office Visit [facility]   11/24/20 Office Visit [facility]   05/15/20 Office Visit Igor Mccarthy APRN - NP [facility]   03/06/20 Office Visit Romy Gonzalez MD [facility]   Showing recent visits within past 540 days with a meds authorizing provider and meeting all other requirements  Future Appointments  Date Type Provider Dept   11/08/21 Appointment Ryley Dudley MD [facility]   Showing future appointments within next 150 days with a meds authorizing provider and meeting all other requirements     Visit date not found

## 2021-06-26 RX ORDER — SPIRONOLACTONE 25 MG/1
25 TABLET ORAL DAILY
Qty: 90 TABLET | Refills: 1 | Status: ON HOLD | OUTPATIENT
Start: 2021-06-26 | End: 2021-08-26 | Stop reason: HOSPADM

## 2021-06-26 RX ORDER — EZETIMIBE 10 MG/1
TABLET ORAL
Qty: 90 TABLET | Refills: 2 | Status: SHIPPED | OUTPATIENT
Start: 2021-06-26 | End: 2022-06-17

## 2021-06-26 RX ORDER — FOLIC ACID 1 MG/1
1 TABLET ORAL DAILY
Qty: 90 TABLET | Refills: 3 | Status: SHIPPED | OUTPATIENT
Start: 2021-06-26 | End: 2022-05-23

## 2021-06-26 RX ORDER — VIT B COMP NO.3/FOLIC/C/BIOTIN 1 MG-60 MG
1 TABLET ORAL
Qty: 90 TABLET | Refills: 1 | Status: SHIPPED | OUTPATIENT
Start: 2021-06-26

## 2021-06-30 RX ORDER — KETOROLAC TROMETHAMINE 30 MG/ML
60 INJECTION, SOLUTION INTRAMUSCULAR; INTRAVENOUS ONCE
Status: DISCONTINUED | OUTPATIENT
Start: 2021-06-30 | End: 2021-06-30

## 2021-07-27 ENCOUNTER — HOSPITAL ENCOUNTER (OUTPATIENT)
Dept: GENERAL RADIOLOGY | Age: 81
Discharge: HOME | End: 2021-07-27
Payer: MEDICARE

## 2021-07-27 DIAGNOSIS — Z78.0 POST-MENOPAUSAL: ICD-10-CM

## 2021-07-27 PROCEDURE — 77080 DXA BONE DENSITY AXIAL: CPT

## 2021-07-29 ENCOUNTER — HOSPITAL ENCOUNTER (OUTPATIENT)
Age: 81
Discharge: HOME | End: 2021-07-29
Payer: MEDICARE

## 2021-07-29 DIAGNOSIS — Z79.4 TYPE 2 DIABETES MELLITUS WITH COMPLICATION, WITH LONG-TERM CURRENT USE OF INSULIN (HCC): ICD-10-CM

## 2021-07-29 DIAGNOSIS — D72.810 LYMPHOPENIA: ICD-10-CM

## 2021-07-29 DIAGNOSIS — E11.8 TYPE 2 DIABETES MELLITUS WITH COMPLICATION, WITH LONG-TERM CURRENT USE OF INSULIN (HCC): ICD-10-CM

## 2021-07-29 LAB
CHOLESTEROL, TOTAL: 199 MG/DL (ref 0–199)
FOLATE: >20 NG/ML (ref 4.78–24.2)
HDLC SERPL-MCNC: 58 MG/DL (ref 40–60)
LDL CHOLESTEROL CALCULATED: 116 MG/DL
TRIGL SERPL-MCNC: 124 MG/DL (ref 0–150)
VITAMIN B-12: >2000 PG/ML (ref 211–911)
VLDLC SERPL CALC-MCNC: 25 MG/DL

## 2021-07-29 PROCEDURE — 83036 HEMOGLOBIN GLYCOSYLATED A1C: CPT

## 2021-07-29 PROCEDURE — 82746 ASSAY OF FOLIC ACID SERUM: CPT

## 2021-07-29 PROCEDURE — 36415 COLL VENOUS BLD VENIPUNCTURE: CPT

## 2021-07-29 PROCEDURE — 80061 LIPID PANEL: CPT

## 2021-07-29 PROCEDURE — 82607 VITAMIN B-12: CPT

## 2021-07-30 LAB
ESTIMATED AVERAGE GLUCOSE: 137 MG/DL
HBA1C MFR BLD: 6.4 %

## 2021-08-03 ENCOUNTER — HOSPITAL ENCOUNTER (OUTPATIENT)
Age: 81
Setting detail: SPECIMEN
Discharge: HOME | End: 2021-08-03
Payer: MEDICARE

## 2021-08-04 ENCOUNTER — HOSPITAL ENCOUNTER (OUTPATIENT)
Age: 81
Discharge: HOME | End: 2021-08-04
Payer: MEDICARE

## 2021-08-04 DIAGNOSIS — Z12.11 COLON CANCER SCREENING: ICD-10-CM

## 2021-08-04 LAB
REASON FOR REJECTION: NORMAL
REJECTED TEST: NORMAL

## 2021-08-09 ENCOUNTER — APPOINTMENT (OUTPATIENT)
Dept: CT IMAGING | Age: 81
DRG: 336 | End: 2021-08-09
Payer: MEDICARE

## 2021-08-09 ENCOUNTER — ANESTHESIA (OUTPATIENT)
Dept: OPERATING ROOM | Age: 81
DRG: 336 | End: 2021-08-09
Payer: MEDICARE

## 2021-08-09 ENCOUNTER — HOSPITAL ENCOUNTER (INPATIENT)
Age: 81
LOS: 6 days | Discharge: HOME HEALTH | DRG: 336 | End: 2021-08-15
Attending: INTERNAL MEDICINE | Admitting: INTERNAL MEDICINE
Payer: MEDICARE

## 2021-08-09 ENCOUNTER — ANESTHESIA EVENT (OUTPATIENT)
Dept: OPERATING ROOM | Age: 81
DRG: 336 | End: 2021-08-09
Payer: MEDICARE

## 2021-08-09 ENCOUNTER — APPOINTMENT (OUTPATIENT)
Dept: GENERAL RADIOLOGY | Age: 81
DRG: 336 | End: 2021-08-09
Payer: MEDICARE

## 2021-08-09 VITALS
SYSTOLIC BLOOD PRESSURE: 144 MMHG | TEMPERATURE: 97.2 F | RESPIRATION RATE: 1 BRPM | OXYGEN SATURATION: 100 % | DIASTOLIC BLOOD PRESSURE: 67 MMHG

## 2021-08-09 DIAGNOSIS — K43.6 INCARCERATED VENTRAL HERNIA: ICD-10-CM

## 2021-08-09 DIAGNOSIS — K56.609 INTESTINAL OBSTRUCTION, UNSPECIFIED CAUSE, UNSPECIFIED WHETHER PARTIAL OR COMPLETE (HCC): Primary | ICD-10-CM

## 2021-08-09 PROBLEM — I25.10 CORONARY ARTERY DISEASE WITHOUT ANGINA PECTORIS: Status: ACTIVE | Noted: 2021-08-09

## 2021-08-09 PROBLEM — E11.29 TYPE 2 DIABETES MELLITUS WITH KIDNEY COMPLICATION, WITH LONG-TERM CURRENT USE OF INSULIN (HCC): Status: ACTIVE | Noted: 2018-08-09

## 2021-08-09 PROBLEM — I10 ESSENTIAL HYPERTENSION: Status: ACTIVE | Noted: 2021-08-09

## 2021-08-09 LAB
A/G RATIO: 1.2 (ref 1.1–2.2)
ABO/RH: NORMAL
ALBUMIN SERPL-MCNC: 4.3 G/DL (ref 3.4–5)
ALP BLD-CCNC: 97 U/L (ref 40–129)
ALT SERPL-CCNC: 30 U/L (ref 10–40)
ANION GAP SERPL CALCULATED.3IONS-SCNC: 12 MMOL/L (ref 3–16)
ANTIBODY SCREEN: NORMAL
AST SERPL-CCNC: 33 U/L (ref 15–37)
ATYPICAL LYMPHOCYTE RELATIVE PERCENT: 1 % (ref 0–6)
BASOPHILS ABSOLUTE: 0 K/UL (ref 0–0.2)
BASOPHILS RELATIVE PERCENT: 0 %
BILIRUB SERPL-MCNC: 0.3 MG/DL (ref 0–1)
BILIRUBIN URINE: NEGATIVE
BLOOD, URINE: NEGATIVE
BUN BLDV-MCNC: 16 MG/DL (ref 7–20)
CALCIUM SERPL-MCNC: 9.6 MG/DL (ref 8.3–10.6)
CHLORIDE BLD-SCNC: 102 MMOL/L (ref 99–110)
CLARITY: CLEAR
CO2: 23 MMOL/L (ref 21–32)
COLOR: YELLOW
CREAT SERPL-MCNC: 1 MG/DL (ref 0.6–1.2)
EKG ATRIAL RATE: 54 BPM
EKG DIAGNOSIS: NORMAL
EKG P AXIS: 60 DEGREES
EKG P-R INTERVAL: 200 MS
EKG Q-T INTERVAL: 438 MS
EKG QRS DURATION: 100 MS
EKG QTC CALCULATION (BAZETT): 415 MS
EKG R AXIS: 32 DEGREES
EKG T AXIS: 5 DEGREES
EKG VENTRICULAR RATE: 54 BPM
EOSINOPHILS ABSOLUTE: 0.1 K/UL (ref 0–0.6)
EOSINOPHILS RELATIVE PERCENT: 2 %
EPITHELIAL CELLS, UA: 1 /HPF (ref 0–5)
GFR AFRICAN AMERICAN: >60
GFR NON-AFRICAN AMERICAN: 53
GLOBULIN: 3.6 G/DL
GLUCOSE BLD-MCNC: 102 MG/DL (ref 70–99)
GLUCOSE BLD-MCNC: 114 MG/DL (ref 70–99)
GLUCOSE BLD-MCNC: 121 MG/DL (ref 70–99)
GLUCOSE BLD-MCNC: 156 MG/DL (ref 70–99)
GLUCOSE BLD-MCNC: 99 MG/DL (ref 70–99)
GLUCOSE URINE: NEGATIVE MG/DL
HCT VFR BLD CALC: 33 % (ref 36–48)
HEMOGLOBIN: 11.1 G/DL (ref 12–16)
HYALINE CASTS: 1 /LPF (ref 0–8)
KETONES, URINE: NEGATIVE MG/DL
LACTIC ACID: 0.9 MMOL/L (ref 0.4–2)
LEUKOCYTE ESTERASE, URINE: ABNORMAL
LIPASE: 19 U/L (ref 13–60)
LYMPHOCYTES ABSOLUTE: 0.9 K/UL (ref 1–5.1)
LYMPHOCYTES RELATIVE PERCENT: 17 %
MAGNESIUM: 1.7 MG/DL (ref 1.8–2.4)
MCH RBC QN AUTO: 33.8 PG (ref 26–34)
MCHC RBC AUTO-ENTMCNC: 33.8 G/DL (ref 31–36)
MCV RBC AUTO: 100.1 FL (ref 80–100)
MICROSCOPIC EXAMINATION: YES
MONOCYTES ABSOLUTE: 0.1 K/UL (ref 0–1.3)
MONOCYTES RELATIVE PERCENT: 3 %
NEUTROPHILS ABSOLUTE: 3.8 K/UL (ref 1.7–7.7)
NEUTROPHILS RELATIVE PERCENT: 77 %
NITRITE, URINE: NEGATIVE
PDW BLD-RTO: 14.2 % (ref 12.4–15.4)
PERFORMED ON: ABNORMAL
PERFORMED ON: NORMAL
PH UA: 5.5 (ref 5–8)
PHOSPHORUS: 3.1 MG/DL (ref 2.5–4.9)
PLATELET # BLD: 194 K/UL (ref 135–450)
PMV BLD AUTO: 8.2 FL (ref 5–10.5)
POTASSIUM REFLEX MAGNESIUM: 4.2 MMOL/L (ref 3.5–5.1)
PROTEIN UA: 30 MG/DL
RBC # BLD: 3.3 M/UL (ref 4–5.2)
RBC # BLD: NORMAL 10*6/UL
RBC UA: 2 /HPF (ref 0–4)
SLIDE REVIEW: ABNORMAL
SODIUM BLD-SCNC: 137 MMOL/L (ref 136–145)
SPECIFIC GRAVITY UA: 1.02 (ref 1–1.03)
TOTAL PROTEIN: 7.9 G/DL (ref 6.4–8.2)
URINE REFLEX TO CULTURE: ABNORMAL
URINE TYPE: ABNORMAL
UROBILINOGEN, URINE: 0.2 E.U./DL
WBC # BLD: 4.9 K/UL (ref 4–11)
WBC UA: 9 /HPF (ref 0–5)

## 2021-08-09 PROCEDURE — 85025 COMPLETE CBC W/AUTO DIFF WBC: CPT

## 2021-08-09 PROCEDURE — C1781 MESH (IMPLANTABLE): HCPCS | Performed by: SURGERY

## 2021-08-09 PROCEDURE — 2720000010 HC SURG SUPPLY STERILE: Performed by: SURGERY

## 2021-08-09 PROCEDURE — 96375 TX/PRO/DX INJ NEW DRUG ADDON: CPT

## 2021-08-09 PROCEDURE — 6370000000 HC RX 637 (ALT 250 FOR IP): Performed by: INTERNAL MEDICINE

## 2021-08-09 PROCEDURE — 2500000003 HC RX 250 WO HCPCS: Performed by: NURSE ANESTHETIST, CERTIFIED REGISTERED

## 2021-08-09 PROCEDURE — 2500000003 HC RX 250 WO HCPCS: Performed by: ANESTHESIOLOGY

## 2021-08-09 PROCEDURE — 81001 URINALYSIS AUTO W/SCOPE: CPT

## 2021-08-09 PROCEDURE — 6360000002 HC RX W HCPCS: Performed by: NURSE PRACTITIONER

## 2021-08-09 PROCEDURE — 6360000002 HC RX W HCPCS: Performed by: NURSE ANESTHETIST, CERTIFIED REGISTERED

## 2021-08-09 PROCEDURE — 1200000000 HC SEMI PRIVATE

## 2021-08-09 PROCEDURE — 2500000003 HC RX 250 WO HCPCS: Performed by: SURGERY

## 2021-08-09 PROCEDURE — 0DNU4ZZ RELEASE OMENTUM, PERCUTANEOUS ENDOSCOPIC APPROACH: ICD-10-PCS | Performed by: SURGERY

## 2021-08-09 PROCEDURE — 7100000001 HC PACU RECOVERY - ADDTL 15 MIN: Performed by: SURGERY

## 2021-08-09 PROCEDURE — 6360000002 HC RX W HCPCS: Performed by: SURGERY

## 2021-08-09 PROCEDURE — 2580000003 HC RX 258: Performed by: SURGERY

## 2021-08-09 PROCEDURE — 2580000003 HC RX 258: Performed by: NURSE ANESTHETIST, CERTIFIED REGISTERED

## 2021-08-09 PROCEDURE — 49653 PR LAP, VENTRAL HERNIA REPAIR,INCARCERATED: CPT | Performed by: SURGERY

## 2021-08-09 PROCEDURE — 6360000002 HC RX W HCPCS: Performed by: INTERNAL MEDICINE

## 2021-08-09 PROCEDURE — 96374 THER/PROPH/DIAG INJ IV PUSH: CPT

## 2021-08-09 PROCEDURE — 84100 ASSAY OF PHOSPHORUS: CPT

## 2021-08-09 PROCEDURE — 36415 COLL VENOUS BLD VENIPUNCTURE: CPT

## 2021-08-09 PROCEDURE — 2580000003 HC RX 258: Performed by: INTERNAL MEDICINE

## 2021-08-09 PROCEDURE — 86900 BLOOD TYPING SEROLOGIC ABO: CPT

## 2021-08-09 PROCEDURE — 7100000000 HC PACU RECOVERY - FIRST 15 MIN: Performed by: SURGERY

## 2021-08-09 PROCEDURE — 83735 ASSAY OF MAGNESIUM: CPT

## 2021-08-09 PROCEDURE — 3700000000 HC ANESTHESIA ATTENDED CARE: Performed by: SURGERY

## 2021-08-09 PROCEDURE — 99223 1ST HOSP IP/OBS HIGH 75: CPT | Performed by: SURGERY

## 2021-08-09 PROCEDURE — 6360000002 HC RX W HCPCS: Performed by: ANESTHESIOLOGY

## 2021-08-09 PROCEDURE — APPSS60 APP SPLIT SHARED TIME 46-60 MINUTES: Performed by: NURSE PRACTITIONER

## 2021-08-09 PROCEDURE — 2580000003 HC RX 258: Performed by: PHYSICIAN ASSISTANT

## 2021-08-09 PROCEDURE — 3600000004 HC SURGERY LEVEL 4 BASE: Performed by: SURGERY

## 2021-08-09 PROCEDURE — 2709999900 HC NON-CHARGEABLE SUPPLY: Performed by: SURGERY

## 2021-08-09 PROCEDURE — 83605 ASSAY OF LACTIC ACID: CPT

## 2021-08-09 PROCEDURE — 93010 ELECTROCARDIOGRAM REPORT: CPT | Performed by: INTERNAL MEDICINE

## 2021-08-09 PROCEDURE — 71045 X-RAY EXAM CHEST 1 VIEW: CPT

## 2021-08-09 PROCEDURE — 99283 EMERGENCY DEPT VISIT LOW MDM: CPT

## 2021-08-09 PROCEDURE — 93005 ELECTROCARDIOGRAM TRACING: CPT | Performed by: INTERNAL MEDICINE

## 2021-08-09 PROCEDURE — 3700000001 HC ADD 15 MINUTES (ANESTHESIA): Performed by: SURGERY

## 2021-08-09 PROCEDURE — 83690 ASSAY OF LIPASE: CPT

## 2021-08-09 PROCEDURE — 6360000002 HC RX W HCPCS: Performed by: PHYSICIAN ASSISTANT

## 2021-08-09 PROCEDURE — 80053 COMPREHEN METABOLIC PANEL: CPT

## 2021-08-09 PROCEDURE — APPNB30 APP NON BILLABLE TIME 0-30 MINS: Performed by: NURSE PRACTITIONER

## 2021-08-09 PROCEDURE — 74176 CT ABD & PELVIS W/O CONTRAST: CPT

## 2021-08-09 PROCEDURE — 86901 BLOOD TYPING SEROLOGIC RH(D): CPT

## 2021-08-09 PROCEDURE — 86850 RBC ANTIBODY SCREEN: CPT

## 2021-08-09 PROCEDURE — 0WUF4JZ SUPPLEMENT ABDOMINAL WALL WITH SYNTHETIC SUBSTITUTE, PERCUTANEOUS ENDOSCOPIC APPROACH: ICD-10-PCS | Performed by: SURGERY

## 2021-08-09 PROCEDURE — 3600000014 HC SURGERY LEVEL 4 ADDTL 15MIN: Performed by: SURGERY

## 2021-08-09 DEVICE — MESH HERN W6XL8IN ELLIPSE W/ ECHO PS POS SYS VENTRALIGHT ST: Type: IMPLANTABLE DEVICE | Site: ABDOMEN | Status: FUNCTIONAL

## 2021-08-09 RX ORDER — FENTANYL CITRATE 50 UG/ML
25 INJECTION, SOLUTION INTRAMUSCULAR; INTRAVENOUS ONCE
Status: COMPLETED | OUTPATIENT
Start: 2021-08-09 | End: 2021-08-09

## 2021-08-09 RX ORDER — SODIUM CHLORIDE 0.9 % (FLUSH) 0.9 %
10 SYRINGE (ML) INJECTION PRN
Status: DISCONTINUED | OUTPATIENT
Start: 2021-08-09 | End: 2021-08-09 | Stop reason: HOSPADM

## 2021-08-09 RX ORDER — ONDANSETRON 2 MG/ML
4 INJECTION INTRAMUSCULAR; INTRAVENOUS EVERY 6 HOURS PRN
Status: DISCONTINUED | OUTPATIENT
Start: 2021-08-09 | End: 2021-08-09

## 2021-08-09 RX ORDER — SODIUM CHLORIDE 9 MG/ML
INJECTION, SOLUTION INTRAVENOUS CONTINUOUS PRN
Status: DISCONTINUED | OUTPATIENT
Start: 2021-08-09 | End: 2021-08-09 | Stop reason: SDUPTHER

## 2021-08-09 RX ORDER — ONDANSETRON 2 MG/ML
4 INJECTION INTRAMUSCULAR; INTRAVENOUS EVERY 6 HOURS PRN
Status: DISCONTINUED | OUTPATIENT
Start: 2021-08-09 | End: 2021-08-15 | Stop reason: HOSPADM

## 2021-08-09 RX ORDER — FENTANYL CITRATE 50 UG/ML
25 INJECTION, SOLUTION INTRAMUSCULAR; INTRAVENOUS EVERY 5 MIN PRN
Status: DISCONTINUED | OUTPATIENT
Start: 2021-08-09 | End: 2021-08-09 | Stop reason: HOSPADM

## 2021-08-09 RX ORDER — SODIUM CHLORIDE 0.9 % (FLUSH) 0.9 %
10 SYRINGE (ML) INJECTION EVERY 12 HOURS SCHEDULED
Status: DISCONTINUED | OUTPATIENT
Start: 2021-08-09 | End: 2021-08-09 | Stop reason: HOSPADM

## 2021-08-09 RX ORDER — LABETALOL HYDROCHLORIDE 5 MG/ML
5 INJECTION, SOLUTION INTRAVENOUS EVERY 10 MIN PRN
Status: DISCONTINUED | OUTPATIENT
Start: 2021-08-09 | End: 2021-08-09 | Stop reason: HOSPADM

## 2021-08-09 RX ORDER — SODIUM CHLORIDE 0.9 % (FLUSH) 0.9 %
5-40 SYRINGE (ML) INJECTION PRN
Status: DISCONTINUED | OUTPATIENT
Start: 2021-08-09 | End: 2021-08-15 | Stop reason: HOSPADM

## 2021-08-09 RX ORDER — HYDROMORPHONE HCL 110MG/55ML
PATIENT CONTROLLED ANALGESIA SYRINGE INTRAVENOUS PRN
Status: DISCONTINUED | OUTPATIENT
Start: 2021-08-09 | End: 2021-08-09 | Stop reason: SDUPTHER

## 2021-08-09 RX ORDER — HYDROMORPHONE HYDROCHLORIDE 1 MG/ML
1 INJECTION, SOLUTION INTRAMUSCULAR; INTRAVENOUS; SUBCUTANEOUS
Status: DISCONTINUED | OUTPATIENT
Start: 2021-08-09 | End: 2021-08-10

## 2021-08-09 RX ORDER — SODIUM CHLORIDE, SODIUM LACTATE, POTASSIUM CHLORIDE, CALCIUM CHLORIDE 600; 310; 30; 20 MG/100ML; MG/100ML; MG/100ML; MG/100ML
INJECTION, SOLUTION INTRAVENOUS CONTINUOUS
Status: DISCONTINUED | OUTPATIENT
Start: 2021-08-09 | End: 2021-08-12

## 2021-08-09 RX ORDER — INSULIN LISPRO 100 [IU]/ML
0-3 INJECTION, SOLUTION INTRAVENOUS; SUBCUTANEOUS NIGHTLY
Status: DISCONTINUED | OUTPATIENT
Start: 2021-08-09 | End: 2021-08-15 | Stop reason: HOSPADM

## 2021-08-09 RX ORDER — HYDRALAZINE HYDROCHLORIDE 20 MG/ML
INJECTION INTRAMUSCULAR; INTRAVENOUS
Status: DISPENSED
Start: 2021-08-09 | End: 2021-08-10

## 2021-08-09 RX ORDER — ONDANSETRON 4 MG/1
4 TABLET, ORALLY DISINTEGRATING ORAL EVERY 8 HOURS PRN
Status: DISCONTINUED | OUTPATIENT
Start: 2021-08-09 | End: 2021-08-15 | Stop reason: HOSPADM

## 2021-08-09 RX ORDER — BUPIVACAINE HYDROCHLORIDE AND EPINEPHRINE 5; 5 MG/ML; UG/ML
INJECTION, SOLUTION EPIDURAL; INTRACAUDAL; PERINEURAL
Status: COMPLETED | OUTPATIENT
Start: 2021-08-09 | End: 2021-08-09

## 2021-08-09 RX ORDER — TIMOLOL MALEATE 5 MG/ML
1 SOLUTION/ DROPS OPHTHALMIC 2 TIMES DAILY
Status: DISCONTINUED | OUTPATIENT
Start: 2021-08-09 | End: 2021-08-15 | Stop reason: HOSPADM

## 2021-08-09 RX ORDER — FENTANYL CITRATE 50 UG/ML
INJECTION, SOLUTION INTRAMUSCULAR; INTRAVENOUS PRN
Status: DISCONTINUED | OUTPATIENT
Start: 2021-08-09 | End: 2021-08-09 | Stop reason: SDUPTHER

## 2021-08-09 RX ORDER — ACETAMINOPHEN 325 MG/1
650 TABLET ORAL EVERY 6 HOURS PRN
Status: DISCONTINUED | OUTPATIENT
Start: 2021-08-09 | End: 2021-08-15 | Stop reason: HOSPADM

## 2021-08-09 RX ORDER — SODIUM CHLORIDE 9 MG/ML
25 INJECTION, SOLUTION INTRAVENOUS PRN
Status: DISCONTINUED | OUTPATIENT
Start: 2021-08-09 | End: 2021-08-15 | Stop reason: HOSPADM

## 2021-08-09 RX ORDER — ONDANSETRON 2 MG/ML
INJECTION INTRAMUSCULAR; INTRAVENOUS PRN
Status: DISCONTINUED | OUTPATIENT
Start: 2021-08-09 | End: 2021-08-09 | Stop reason: SDUPTHER

## 2021-08-09 RX ORDER — INSULIN LISPRO 100 [IU]/ML
0-6 INJECTION, SOLUTION INTRAVENOUS; SUBCUTANEOUS
Status: DISCONTINUED | OUTPATIENT
Start: 2021-08-09 | End: 2021-08-09

## 2021-08-09 RX ORDER — ROCURONIUM BROMIDE 10 MG/ML
INJECTION, SOLUTION INTRAVENOUS PRN
Status: DISCONTINUED | OUTPATIENT
Start: 2021-08-09 | End: 2021-08-09 | Stop reason: SDUPTHER

## 2021-08-09 RX ORDER — ONDANSETRON 2 MG/ML
4 INJECTION INTRAMUSCULAR; INTRAVENOUS ONCE
Status: COMPLETED | OUTPATIENT
Start: 2021-08-09 | End: 2021-08-09

## 2021-08-09 RX ORDER — DEXTROSE MONOHYDRATE 50 MG/ML
100 INJECTION, SOLUTION INTRAVENOUS PRN
Status: DISCONTINUED | OUTPATIENT
Start: 2021-08-09 | End: 2021-08-15 | Stop reason: HOSPADM

## 2021-08-09 RX ORDER — MORPHINE SULFATE 4 MG/ML
4 INJECTION, SOLUTION INTRAMUSCULAR; INTRAVENOUS ONCE
Status: COMPLETED | OUTPATIENT
Start: 2021-08-09 | End: 2021-08-09

## 2021-08-09 RX ORDER — MAGNESIUM HYDROXIDE 1200 MG/15ML
LIQUID ORAL CONTINUOUS PRN
Status: COMPLETED | OUTPATIENT
Start: 2021-08-09 | End: 2021-08-09

## 2021-08-09 RX ORDER — SODIUM CHLORIDE 9 MG/ML
25 INJECTION, SOLUTION INTRAVENOUS PRN
Status: DISCONTINUED | OUTPATIENT
Start: 2021-08-09 | End: 2021-08-09 | Stop reason: HOSPADM

## 2021-08-09 RX ORDER — PANTOPRAZOLE SODIUM 40 MG/10ML
40 INJECTION, POWDER, LYOPHILIZED, FOR SOLUTION INTRAVENOUS DAILY
Status: DISCONTINUED | OUTPATIENT
Start: 2021-08-10 | End: 2021-08-15 | Stop reason: HOSPADM

## 2021-08-09 RX ORDER — HYDRALAZINE HYDROCHLORIDE 20 MG/ML
5 INJECTION INTRAMUSCULAR; INTRAVENOUS EVERY 6 HOURS PRN
Status: DISCONTINUED | OUTPATIENT
Start: 2021-08-09 | End: 2021-08-15 | Stop reason: HOSPADM

## 2021-08-09 RX ORDER — PROPOFOL 10 MG/ML
INJECTION, EMULSION INTRAVENOUS PRN
Status: DISCONTINUED | OUTPATIENT
Start: 2021-08-09 | End: 2021-08-09 | Stop reason: SDUPTHER

## 2021-08-09 RX ORDER — MORPHINE SULFATE 4 MG/ML
4 INJECTION, SOLUTION INTRAMUSCULAR; INTRAVENOUS EVERY 6 HOURS PRN
Status: DISCONTINUED | OUTPATIENT
Start: 2021-08-09 | End: 2021-08-09

## 2021-08-09 RX ORDER — ONDANSETRON 2 MG/ML
4 INJECTION INTRAMUSCULAR; INTRAVENOUS
Status: DISCONTINUED | OUTPATIENT
Start: 2021-08-09 | End: 2021-08-09 | Stop reason: HOSPADM

## 2021-08-09 RX ORDER — PHENYLEPHRINE HCL IN 0.9% NACL 1 MG/10 ML
SYRINGE (ML) INTRAVENOUS PRN
Status: DISCONTINUED | OUTPATIENT
Start: 2021-08-09 | End: 2021-08-09 | Stop reason: SDUPTHER

## 2021-08-09 RX ORDER — INSULIN LISPRO 100 [IU]/ML
0-6 INJECTION, SOLUTION INTRAVENOUS; SUBCUTANEOUS EVERY 6 HOURS
Status: DISCONTINUED | OUTPATIENT
Start: 2021-08-09 | End: 2021-08-15 | Stop reason: HOSPADM

## 2021-08-09 RX ORDER — LATANOPROST 50 UG/ML
1 SOLUTION/ DROPS OPHTHALMIC NIGHTLY
Status: DISCONTINUED | OUTPATIENT
Start: 2021-08-09 | End: 2021-08-15 | Stop reason: HOSPADM

## 2021-08-09 RX ORDER — SODIUM CHLORIDE 0.9 % (FLUSH) 0.9 %
5-40 SYRINGE (ML) INJECTION EVERY 12 HOURS SCHEDULED
Status: DISCONTINUED | OUTPATIENT
Start: 2021-08-09 | End: 2021-08-15 | Stop reason: HOSPADM

## 2021-08-09 RX ORDER — 0.9 % SODIUM CHLORIDE 0.9 %
1000 INTRAVENOUS SOLUTION INTRAVENOUS ONCE
Status: COMPLETED | OUTPATIENT
Start: 2021-08-09 | End: 2021-08-09

## 2021-08-09 RX ORDER — LIDOCAINE HYDROCHLORIDE 10 MG/ML
1 INJECTION, SOLUTION EPIDURAL; INFILTRATION; INTRACAUDAL; PERINEURAL
Status: DISCONTINUED | OUTPATIENT
Start: 2021-08-09 | End: 2021-08-09 | Stop reason: HOSPADM

## 2021-08-09 RX ORDER — DORZOLAMIDE HYDROCHLORIDE AND TIMOLOL MALEATE 20; 5 MG/ML; MG/ML
1 SOLUTION/ DROPS OPHTHALMIC 2 TIMES DAILY
Status: DISCONTINUED | OUTPATIENT
Start: 2021-08-09 | End: 2021-08-09 | Stop reason: CLARIF

## 2021-08-09 RX ORDER — LIDOCAINE HYDROCHLORIDE 20 MG/ML
INJECTION, SOLUTION EPIDURAL; INFILTRATION; INTRACAUDAL; PERINEURAL PRN
Status: DISCONTINUED | OUTPATIENT
Start: 2021-08-09 | End: 2021-08-09 | Stop reason: SDUPTHER

## 2021-08-09 RX ORDER — DIPHENHYDRAMINE HYDROCHLORIDE 50 MG/ML
INJECTION INTRAMUSCULAR; INTRAVENOUS PRN
Status: DISCONTINUED | OUTPATIENT
Start: 2021-08-09 | End: 2021-08-09 | Stop reason: SDUPTHER

## 2021-08-09 RX ORDER — SODIUM CHLORIDE, SODIUM LACTATE, POTASSIUM CHLORIDE, CALCIUM CHLORIDE 600; 310; 30; 20 MG/100ML; MG/100ML; MG/100ML; MG/100ML
INJECTION, SOLUTION INTRAVENOUS CONTINUOUS
Status: DISCONTINUED | OUTPATIENT
Start: 2021-08-09 | End: 2021-08-09

## 2021-08-09 RX ORDER — NICOTINE POLACRILEX 4 MG
15 LOZENGE BUCCAL PRN
Status: DISCONTINUED | OUTPATIENT
Start: 2021-08-09 | End: 2021-08-15 | Stop reason: HOSPADM

## 2021-08-09 RX ORDER — PROCHLORPERAZINE EDISYLATE 5 MG/ML
5 INJECTION INTRAMUSCULAR; INTRAVENOUS
Status: DISCONTINUED | OUTPATIENT
Start: 2021-08-09 | End: 2021-08-09 | Stop reason: HOSPADM

## 2021-08-09 RX ORDER — DEXTROSE MONOHYDRATE 25 G/50ML
12.5 INJECTION, SOLUTION INTRAVENOUS PRN
Status: DISCONTINUED | OUTPATIENT
Start: 2021-08-09 | End: 2021-08-15 | Stop reason: HOSPADM

## 2021-08-09 RX ORDER — DORZOLAMIDE HCL 20 MG/ML
1 SOLUTION/ DROPS OPHTHALMIC 2 TIMES DAILY
Status: DISCONTINUED | OUTPATIENT
Start: 2021-08-09 | End: 2021-08-15 | Stop reason: HOSPADM

## 2021-08-09 RX ORDER — HYDRALAZINE HYDROCHLORIDE 20 MG/ML
5 INJECTION INTRAMUSCULAR; INTRAVENOUS EVERY 10 MIN PRN
Status: DISCONTINUED | OUTPATIENT
Start: 2021-08-09 | End: 2021-08-09 | Stop reason: HOSPADM

## 2021-08-09 RX ORDER — ACETAMINOPHEN 650 MG/1
650 SUPPOSITORY RECTAL EVERY 6 HOURS PRN
Status: DISCONTINUED | OUTPATIENT
Start: 2021-08-09 | End: 2021-08-15 | Stop reason: HOSPADM

## 2021-08-09 RX ORDER — GLYCOPYRROLATE 1 MG/5 ML
SYRINGE (ML) INTRAVENOUS PRN
Status: DISCONTINUED | OUTPATIENT
Start: 2021-08-09 | End: 2021-08-09 | Stop reason: SDUPTHER

## 2021-08-09 RX ORDER — SUCCINYLCHOLINE/SOD CL,ISO/PF 200MG/10ML
SYRINGE (ML) INTRAVENOUS PRN
Status: DISCONTINUED | OUTPATIENT
Start: 2021-08-09 | End: 2021-08-09 | Stop reason: SDUPTHER

## 2021-08-09 RX ORDER — HYDROMORPHONE HCL 110MG/55ML
0.25 PATIENT CONTROLLED ANALGESIA SYRINGE INTRAVENOUS EVERY 5 MIN PRN
Status: DISCONTINUED | OUTPATIENT
Start: 2021-08-09 | End: 2021-08-09 | Stop reason: HOSPADM

## 2021-08-09 RX ADMIN — Medication 100 MCG: at 16:44

## 2021-08-09 RX ADMIN — PROPOFOL 100 MG: 10 INJECTION, EMULSION INTRAVENOUS at 15:59

## 2021-08-09 RX ADMIN — ROCURONIUM BROMIDE 10 MG: 10 INJECTION, SOLUTION INTRAVENOUS at 16:35

## 2021-08-09 RX ADMIN — DIPHENHYDRAMINE HYDROCHLORIDE 6.25 MG: 50 INJECTION, SOLUTION INTRAMUSCULAR; INTRAVENOUS at 17:35

## 2021-08-09 RX ADMIN — ONDANSETRON 4 MG: 2 INJECTION INTRAMUSCULAR; INTRAVENOUS at 08:00

## 2021-08-09 RX ADMIN — Medication 100 MCG: at 17:24

## 2021-08-09 RX ADMIN — ONDANSETRON 4 MG: 2 INJECTION INTRAMUSCULAR; INTRAVENOUS at 15:55

## 2021-08-09 RX ADMIN — CEFAZOLIN 2000 MG: 10 INJECTION, POWDER, FOR SOLUTION INTRAVENOUS at 15:52

## 2021-08-09 RX ADMIN — HYDROMORPHONE HYDROCHLORIDE 0.2 MG: 2 INJECTION, SOLUTION INTRAMUSCULAR; INTRAVENOUS; SUBCUTANEOUS at 17:48

## 2021-08-09 RX ADMIN — FENTANYL CITRATE 50 MCG: 50 INJECTION, SOLUTION INTRAMUSCULAR; INTRAVENOUS at 15:59

## 2021-08-09 RX ADMIN — TIMOLOL MALEATE 1 DROP: 5 SOLUTION OPHTHALMIC at 21:14

## 2021-08-09 RX ADMIN — Medication 100 MCG: at 16:43

## 2021-08-09 RX ADMIN — Medication 100 MCG: at 16:04

## 2021-08-09 RX ADMIN — Medication 100 MCG: at 16:49

## 2021-08-09 RX ADMIN — MORPHINE SULFATE 4 MG: 4 INJECTION, SOLUTION INTRAMUSCULAR; INTRAVENOUS at 08:00

## 2021-08-09 RX ADMIN — ROCURONIUM BROMIDE 5 MG: 10 INJECTION, SOLUTION INTRAVENOUS at 17:17

## 2021-08-09 RX ADMIN — MORPHINE SULFATE 4 MG: 4 INJECTION, SOLUTION INTRAMUSCULAR; INTRAVENOUS at 13:01

## 2021-08-09 RX ADMIN — Medication 100 MCG: at 16:03

## 2021-08-09 RX ADMIN — FENTANYL CITRATE 25 MCG: 50 INJECTION, SOLUTION INTRAMUSCULAR; INTRAVENOUS at 17:30

## 2021-08-09 RX ADMIN — HYDRALAZINE HYDROCHLORIDE 5 MG: 20 INJECTION INTRAMUSCULAR; INTRAVENOUS at 23:03

## 2021-08-09 RX ADMIN — LATANOPROST 1 DROP: 50 SOLUTION OPHTHALMIC at 21:13

## 2021-08-09 RX ADMIN — ROCURONIUM BROMIDE 30 MG: 10 INJECTION, SOLUTION INTRAVENOUS at 16:06

## 2021-08-09 RX ADMIN — SODIUM CHLORIDE: 9 INJECTION, SOLUTION INTRAVENOUS at 16:50

## 2021-08-09 RX ADMIN — ONDANSETRON 4 MG: 2 INJECTION INTRAMUSCULAR; INTRAVENOUS at 23:01

## 2021-08-09 RX ADMIN — FENTANYL CITRATE 25 MCG: 50 INJECTION, SOLUTION INTRAMUSCULAR; INTRAVENOUS at 15:28

## 2021-08-09 RX ADMIN — HYDROMORPHONE HYDROCHLORIDE 0.2 MG: 2 INJECTION, SOLUTION INTRAMUSCULAR; INTRAVENOUS; SUBCUTANEOUS at 17:46

## 2021-08-09 RX ADMIN — HYDROMORPHONE HYDROCHLORIDE 0.2 MG: 2 INJECTION, SOLUTION INTRAMUSCULAR; INTRAVENOUS; SUBCUTANEOUS at 17:42

## 2021-08-09 RX ADMIN — Medication 140 MG: at 15:59

## 2021-08-09 RX ADMIN — HYDROMORPHONE HYDROCHLORIDE 1 MG: 1 INJECTION, SOLUTION INTRAMUSCULAR; INTRAVENOUS; SUBCUTANEOUS at 23:03

## 2021-08-09 RX ADMIN — Medication 100 MCG: at 16:10

## 2021-08-09 RX ADMIN — SODIUM CHLORIDE: 9 INJECTION, SOLUTION INTRAVENOUS at 15:52

## 2021-08-09 RX ADMIN — SODIUM CHLORIDE, POTASSIUM CHLORIDE, SODIUM LACTATE AND CALCIUM CHLORIDE: 600; 310; 30; 20 INJECTION, SOLUTION INTRAVENOUS at 13:01

## 2021-08-09 RX ADMIN — HYDRALAZINE HYDROCHLORIDE 5 MG: 20 INJECTION INTRAMUSCULAR; INTRAVENOUS at 13:23

## 2021-08-09 RX ADMIN — ONDANSETRON 4 MG: 2 INJECTION INTRAMUSCULAR; INTRAVENOUS at 13:01

## 2021-08-09 RX ADMIN — SODIUM CHLORIDE, POTASSIUM CHLORIDE, SODIUM LACTATE AND CALCIUM CHLORIDE: 600; 310; 30; 20 INJECTION, SOLUTION INTRAVENOUS at 19:59

## 2021-08-09 RX ADMIN — SODIUM CHLORIDE 1000 ML: 9 INJECTION, SOLUTION INTRAVENOUS at 08:00

## 2021-08-09 RX ADMIN — FENTANYL CITRATE 25 MCG: 50 INJECTION, SOLUTION INTRAMUSCULAR; INTRAVENOUS at 17:33

## 2021-08-09 RX ADMIN — FENTANYL CITRATE 25 MCG: 50 INJECTION, SOLUTION INTRAMUSCULAR; INTRAVENOUS at 18:10

## 2021-08-09 RX ADMIN — LIDOCAINE HYDROCHLORIDE 100 MG: 20 INJECTION, SOLUTION EPIDURAL; INFILTRATION; INTRACAUDAL; PERINEURAL at 15:59

## 2021-08-09 RX ADMIN — HYDROMORPHONE HYDROCHLORIDE 0.2 MG: 2 INJECTION, SOLUTION INTRAMUSCULAR; INTRAVENOUS; SUBCUTANEOUS at 17:44

## 2021-08-09 RX ADMIN — FENTANYL CITRATE 25 MCG: 50 INJECTION, SOLUTION INTRAMUSCULAR; INTRAVENOUS at 18:15

## 2021-08-09 RX ADMIN — Medication 100 MCG: at 17:00

## 2021-08-09 RX ADMIN — LABETALOL HYDROCHLORIDE 5 MG: 5 INJECTION INTRAVENOUS at 18:05

## 2021-08-09 RX ADMIN — Medication 10 ML: at 20:00

## 2021-08-09 RX ADMIN — Medication 0.2 MG: at 16:05

## 2021-08-09 RX ADMIN — DORZOLAMIDE HYDROCHLORIDE 1 DROP: 20 SOLUTION/ DROPS OPHTHALMIC at 21:13

## 2021-08-09 RX ADMIN — Medication 100 MCG: at 16:32

## 2021-08-09 ASSESSMENT — PULMONARY FUNCTION TESTS
PIF_VALUE: 23
PIF_VALUE: 3
PIF_VALUE: 22
PIF_VALUE: 31
PIF_VALUE: 21
PIF_VALUE: 32
PIF_VALUE: 33
PIF_VALUE: 21
PIF_VALUE: 33
PIF_VALUE: 18
PIF_VALUE: 21
PIF_VALUE: 22
PIF_VALUE: 33
PIF_VALUE: 22
PIF_VALUE: 32
PIF_VALUE: 28
PIF_VALUE: 33
PIF_VALUE: 18
PIF_VALUE: 32
PIF_VALUE: 21
PIF_VALUE: 20
PIF_VALUE: 33
PIF_VALUE: 29
PIF_VALUE: 21
PIF_VALUE: 32
PIF_VALUE: 22
PIF_VALUE: 31
PIF_VALUE: 22
PIF_VALUE: 0
PIF_VALUE: 2
PIF_VALUE: 32
PIF_VALUE: 20
PIF_VALUE: 33
PIF_VALUE: 33
PIF_VALUE: 32
PIF_VALUE: 33
PIF_VALUE: 21
PIF_VALUE: 32
PIF_VALUE: 32
PIF_VALUE: 22
PIF_VALUE: 31
PIF_VALUE: 22
PIF_VALUE: 27
PIF_VALUE: 28
PIF_VALUE: 33
PIF_VALUE: 21
PIF_VALUE: 0
PIF_VALUE: 22
PIF_VALUE: 32
PIF_VALUE: 30
PIF_VALUE: 32
PIF_VALUE: 32
PIF_VALUE: 20
PIF_VALUE: 32
PIF_VALUE: 2
PIF_VALUE: 24
PIF_VALUE: 32
PIF_VALUE: 32
PIF_VALUE: 21
PIF_VALUE: 30
PIF_VALUE: 4
PIF_VALUE: 16
PIF_VALUE: 1
PIF_VALUE: 31
PIF_VALUE: 32
PIF_VALUE: 33
PIF_VALUE: 32
PIF_VALUE: 28
PIF_VALUE: 2
PIF_VALUE: 32
PIF_VALUE: 26
PIF_VALUE: 31
PIF_VALUE: 33
PIF_VALUE: 21
PIF_VALUE: 20
PIF_VALUE: 21
PIF_VALUE: 18
PIF_VALUE: 31
PIF_VALUE: 24
PIF_VALUE: 33
PIF_VALUE: 33
PIF_VALUE: 31
PIF_VALUE: 22
PIF_VALUE: 32
PIF_VALUE: 21
PIF_VALUE: 22
PIF_VALUE: 30
PIF_VALUE: 32
PIF_VALUE: 31
PIF_VALUE: 0
PIF_VALUE: 0
PIF_VALUE: 22
PIF_VALUE: 34
PIF_VALUE: 31
PIF_VALUE: 32
PIF_VALUE: 22
PIF_VALUE: 21
PIF_VALUE: 22
PIF_VALUE: 21
PIF_VALUE: 22
PIF_VALUE: 33
PIF_VALUE: 32
PIF_VALUE: 16
PIF_VALUE: 0
PIF_VALUE: 33
PIF_VALUE: 3
PIF_VALUE: 33
PIF_VALUE: 2
PIF_VALUE: 21
PIF_VALUE: 33
PIF_VALUE: 34
PIF_VALUE: 28
PIF_VALUE: 16
PIF_VALUE: 31
PIF_VALUE: 32

## 2021-08-09 ASSESSMENT — PAIN DESCRIPTION - ONSET
ONSET: ON-GOING

## 2021-08-09 ASSESSMENT — PAIN SCALES - GENERAL
PAINLEVEL_OUTOF10: 5
PAINLEVEL_OUTOF10: 5
PAINLEVEL_OUTOF10: 9
PAINLEVEL_OUTOF10: 10
PAINLEVEL_OUTOF10: 5

## 2021-08-09 ASSESSMENT — PAIN DESCRIPTION - FREQUENCY
FREQUENCY: CONTINUOUS

## 2021-08-09 ASSESSMENT — PAIN - FUNCTIONAL ASSESSMENT
PAIN_FUNCTIONAL_ASSESSMENT: PREVENTS OR INTERFERES SOME ACTIVE ACTIVITIES AND ADLS

## 2021-08-09 ASSESSMENT — PAIN DESCRIPTION - LOCATION
LOCATION: ABDOMEN

## 2021-08-09 ASSESSMENT — ENCOUNTER SYMPTOMS
NAUSEA: 0
COLOR CHANGE: 0
SHORTNESS OF BREATH: 0
SHORTNESS OF BREATH: 0
VOMITING: 0
BACK PAIN: 0
DIARRHEA: 0
CHEST TIGHTNESS: 0
CONSTIPATION: 1
ABDOMINAL DISTENTION: 1
RESPIRATORY NEGATIVE: 1
COUGH: 0
ABDOMINAL PAIN: 1

## 2021-08-09 ASSESSMENT — PAIN DESCRIPTION - PAIN TYPE
TYPE: SURGICAL PAIN

## 2021-08-09 ASSESSMENT — PAIN DESCRIPTION - DESCRIPTORS
DESCRIPTORS: SORE

## 2021-08-09 ASSESSMENT — PAIN DESCRIPTION - ORIENTATION
ORIENTATION: LEFT;LOWER
ORIENTATION: LOWER;LEFT
ORIENTATION: LEFT;LOWER

## 2021-08-09 ASSESSMENT — PAIN DESCRIPTION - PROGRESSION: CLINICAL_PROGRESSION: GRADUALLY IMPROVING

## 2021-08-09 NOTE — CONSULTS
Luís Berry is an 80 y.o. Chief complaintabdominal pain    HPI: Pleasant 70-year-old female who presented to the hospital with a 3-day history of constant, sharp epigastric abdominal pain which radiates around the left side to the back. He presented today to the emergency room because of worsening pain (10/10). Nothing makes it better or worse. Associated symptoms include nausea, anorexia and constipation. No history of vomiting, fevers, chills or urinary symptoms. Past Medical History:   Diagnosis Date    Angina     Bilateral carpal tunnel syndrome 10/9/2018    Chronic kidney disease     Fibromyalgia     Hyperlipidemia     Hypertension     MVP (mitral valve prolapse)     s 5/18/2017    Small bowel obstruction ([address] 6/24/2019    Type II or unspecified type diabetes mellitus without mention of complication, not stated as uncontrolled        Past Surgical History:   Procedure Laterality Date    CHOLECYSTECTOMY      COLONOSCOPY N/A 8/13/2019    COLONOSCOPY DIAGNOSTIC performed by Doc Erazo MD at [facility]      2 stents    HYSTERECTOMY      LUMBAR DISCECTOMY  10/03/12    lumbar discectomy, 3-4 left    SMALL INTESTINE SURGERY      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       Social History     Socioeconomic History    Marital status:      Spouse name: Not on file    Number of children: Not on file    Years of education: Not on file    Highest education level: 12th grade   Occupational History    Not on file   Tobacco Use    Smoking status: Never Smoker    Smokeless tobacco: Never Used   Vaping Use    Vaping Use: Never assessed   Substance and Sexual Activity    Alcohol use: No    Drug use: No    Sexual activity: Not Currently     Partners: Male   Other Topics Concern    Not on file   Social History Narrative    Lives home and cares for her 51 yo paraplegic son (on disability).      Social Determinants of Health     Financial Resource Strain: Low Risk     Difficulty of Paying Living Expenses: Not hard at all   Food Insecurity: No Food Insecurity    Worried About Running Out of Food in the Last Year: Never true    Fabienne of Food in the Last Year: Never true   Transportation Needs: No Transportation Needs    Lack of Transportation (Medical): No    Lack of Transportation (Non-Medical): No   Physical Activity: Insufficiently Active    Days of Exercise per Week: 2 days    Minutes of Exercise per Session: 20 min   Stress: Stress Concern Present    Feeling of Stress : To some extent   Social Connections: Moderately Integrated    Frequency of Communication with Friends and Family: More than three times a week    Frequency of Social Gatherings with Friends and Family: More than three times a week    Attends Hinduism Services: More than 4 times per year    Active Member of [address] [place] [address] [employer] or Organizations: No    Attends Club or Organization Meetings: More than 4 times per year    Marital Status:    Intimate Partner Violence: Not At Risk    Fear of Current or Ex-Partner: No    Emotionally Abused: No    Physically Abused: No    Sexually Abused: No       Allergies: Allergies   Allergen Reactions    Flagyl [Metronidazole]      ataxia    Sulfa Antibiotics     Lisinopril-Hydrochlorothiazide Swelling     Lip And facial swelling and coughing    Ace Inhibitors Other (See Comments)     Edema lip swelling and coughing.  Actos [Pioglitazone Hydrochloride]     Celebrex [Celecoxib]     Cipro Xr     Ciprofloxacin     Demerol     Doxycycline     Elavil [Amitriptyline Hcl]     Estradiol      Estrogen patch, rapid weight gain, arm pain , dizziness, mentally not clear. Patient took estrace 1 mg daily orally for years and insurance no longer covered so switched to patch and felt so bad , she discontinued.     Levofloxacin     Metformin     Metoclopramide     Metolazone Other (See Comments)    Neurontin [Gabapentin]     Niaspan [Niacin Er]     Nifedipine Itching    Nsaids     Oxycodone     Phenytoin Sodium Extended     Pioglitazone     Prednisone     Pregabalin     Simvastatin     Iodine Rash     Rash all over. Pt does not want contrast anymore. Prior to Admission medications    Medication Sig Start Date End Date Taking?  Authorizing Provider   ezetimibe (ZETIA) 10 MG tablet TAKE ONE TABLET BY MOUTH DAILY 6/26/21   Bri Palacio MD   spironolactone (ALDACTONE) 25 MG tablet Take 1 tablet by mouth daily 6/26/21   Bri Palacio MD   folic acid (FOLVITE) 1 MG tablet Take 1 tablet by mouth daily 6/26/21   Bri Palacio MD   B complex-vitamin C-folic acid (NEPHRO-BARNEY) 1 MG tablet Take 1 tablet by mouth daily (with breakfast) 6/26/21   Bri Palacio MD   Insulin Pen Needle ([employer] PEN NEEDLES 31G) 31G X 8 MM MISC 1 each by Does not apply route daily 1/22/21   Juan Sorenson MD   insulin glargine (LANTUS SOLOSTAR) 100 UNIT/ML injection pen Inject 22 Units into the skin nightly Indications: sliding scale up to 22 units Lease refill 8/23/18 per patient requests 1/14/21   Juan Sorenson MD   fenofibrate (TRICOR) 48 MG tablet Take 1 tablet by mouth daily 1/10/21   Juan Sorenson MD   Netarsudil-Latanoprost 0.02-0.005 % SOLN Apply 1 drop to eye nightly    Historical Provider, MD   timolol (BETIMOL) 0.5 % ophthalmic solution 1 drop 2 times daily    Historical Provider, MD   dorzolamide-timolol (COSOPT) 22.3-6.8 MG/ML ophthalmic solution INSTILL 1 DROP INTO BOTH EYES EVERY 12 HOURS 9/3/20   Historical Provider, MD   metoprolol tartrate (LOPRESSOR) 25 MG tablet Take 25 mg by mouth 2 times daily 10/1/20   Historical Provider, MD   hydrALAZINE (APRESOLINE) 25 MG tablet Take 1 tablet by mouth 2 times daily 11/24/20   Juan Sorenson MD   fluticasone-umeclidin-vilant (TRELEGY ELLIPTA) 770-17.2-24 MCG/INH AEPB Inhale 1 puff into the lungs daily Expiration date 7/ 2021  Lot (10 KD5P 3/6/20   Courtney Abreu MD   aspirin 81 MG EC tablet Take 81 mg by mouth daily    Historical Provider, MD   vitamin D (ERGOCALCIFEROL) 51314 units CAPS capsule Take 50,000 Units by mouth every 14 days    Historical Provider, MD   acetaminophen (TYLENOL) 325 MG tablet Take 650 mg by mouth every 6 hours as needed for Pain    Historical Provider, MD   Multiple Vitamins-Minerals (OCUVITE EYE + MULTI) TABS Take 1 tablet by mouth daily    Historical Provider, MD   latanoprost (XALATAN) 0.005 % ophthalmic solution Place 1 drop into both eyes nightly     Historical Provider, MD   Coenzyme Q10 (COQ10) 100 MG CAPS Take 1 capsule by mouth daily     Historical Provider, MD   Cyanocobalamin (B-12) 500 MCG TABS Take 1 tablet by mouth Twice a Week     Historical Provider, MD   lansoprazole (PREVACID) 15 MG delayed release capsule Take 15 mg by mouth daily     Historical Provider, MD       Principal Problem:    Intestinal obstruction (HCC)  Active Problems:    CKD (chronic kidney disease) stage 3, GFR 30-59 ml/min ([facility])    Type 2 diabetes mellitus with kidney complication, with long-term current use of insulin ([facility])    Coronary artery disease without angina pectoris    Essential hypertension    Incarcerated ventral hernia  Resolved Problems:    * No resolved hospital problems. *      Blood pressure (!) 183/66, pulse 69, temperature 98.6 °F (37 °C), temperature source Oral, resp. rate 16, weight 164 lb (74.4 kg), SpO2 96 %, not currently breastfeeding. Review of Systems    Physical Exam  Constitutional:       Appearance: She is well-developed. HENT:      Head: Normocephalic and atraumatic. Right Ear: External ear normal.      Left Ear: External ear normal.   Eyes:      Conjunctiva/sclera: Conjunctivae normal.   Cardiovascular:      Rate and Rhythm: Normal rate and regular rhythm. Pulmonary:      Effort: Pulmonary effort is normal.      Breath sounds: Normal breath sounds.

## 2021-08-09 NOTE — PROGRESS NOTES
Pt seen in  ED, admission completed. Pt is alert and oriented x 4. Pt lives at home with her son, and is being admitted for SBO. Plan of care updated, all questions answered.

## 2021-08-09 NOTE — PROGRESS NOTES
Patient to PACU from OR. Drowsy. ABd soft, lap sites intact. Abd binder on. BP elevated, 195/61, will medicate as needed.

## 2021-08-09 NOTE — H&P
HOSPITALISTS HISTORY AND PHYSICAL    8/9/2021 2:52 PM    Patient Information:  Shay Cueto is a 80 y.o. female [id number]  PCP:  Toshia Narvaez MD (Tel: [phone] )    Chief complaint:    Chief Complaint   Patient presents with    Constipation     Pt in with liquid stool for one week. last normal BM was last week. used a fleet enema two days ago with no results. hx of bowel issues, pt of dr Monica Jane        History of Present Illness:  Yash Chilel is a 80 y.o. female with h/o CKD 3, DM and HTN, bowel obstruction managed conservatively, multiple prior abdominal surgeries and a ventral hernia presented from home with with complaints of severe, sharp abdominal pain. At baseline patient has chronic abdominal pain with bloating but over the past 3 days pain gradually worsened with associated anorexia, worsening nausea but no vomiting, constipation and abdominal distention. Pain was initially localized in the epigastric region but spread to involve the whole abdomen, wrapping around to the back. She denied any fever, chills, hematochezia, melena, urinary symptoms. In the ED vital and labs were unremarkable but CTshowed an acute moderate closed-loop obstruction of the mid small bowel related to a small supraumbilical ventral hernia. She was admitted for further surgical evaluation and management. History obtained from patient. REVIEW OF SYSTEMS:   Constitutional: Negative for fever,chills or night sweats  ENT: Negative for rhinorrhea, epistaxis, hoarseness, sore throat. Respiratory: Negative for shortness of breath,wheezing  Cardiovascular: Negative for chest pain, palpitations   Gastrointestinal: As above.   Genitourinary: Negative for polyuria, dysuria   Hematologic/Lymphatic: Negative for bleeding tendency, easy bruising  Musculoskeletal: Negative for myalgias and arthralgias  Neurologic: Negative for confusion,dysarthria. Skin: Negative for itching,rash  Psychiatric: Negative for depression,anxiety, agitation. Endocrine: Negative for polydipsia,polyuria,heat /cold intolerance. Past Medical History:   has a past medical history of Angina, Bilateral carpal tunnel syndrome, Chronic kidney disease, Fibromyalgia, Hyperlipidemia, Hypertension, MVP (mitral valve prolapse), s, Small bowel obstruction ([address] and Type II or unspecified type diabetes mellitus without mention of complication, not stated as uncontrolled. Past Surgical History:   has a past surgical history that includes Coronary angioplasty with stent; Hysterectomy; Cholecystectomy; lumbar discectomy (10/03/12); Small intestine surgery; Colonoscopy (N/A, 8/13/2019); and hernia repair. Medications:  No current facility-administered medications on file prior to encounter.      Current Outpatient Medications on File Prior to Encounter   Medication Sig Dispense Refill    ezetimibe (ZETIA) 10 MG tablet TAKE ONE TABLET BY MOUTH DAILY 90 tablet 2    spironolactone (ALDACTONE) 25 MG tablet Take 1 tablet by mouth daily 90 tablet 1    folic acid (FOLVITE) 1 MG tablet Take 1 tablet by mouth daily 90 tablet 3    B complex-vitamin C-folic acid (NEPHRO-BARNEY) 1 MG tablet Take 1 tablet by mouth daily (with breakfast) 90 tablet 1    Insulin Pen Needle ([employer] PEN NEEDLES 31G) 31G X 8 MM MISC 1 each by Does not apply route daily 100 each 3    insulin glargine (LANTUS SOLOSTAR) 100 UNIT/ML injection pen Inject 22 Units into the skin nightly Indications: sliding scale up to 22 units Lease refill 8/23/18 per patient requests 20 mL 3    fenofibrate (TRICOR) 48 MG tablet Take 1 tablet by mouth daily 30 tablet 3    Netarsudil-Latanoprost 0.02-0.005 % SOLN Apply 1 drop to eye nightly      timolol (BETIMOL) 0.5 % ophthalmic solution 1 drop 2 times daily      dorzolamide-timolol (COSOPT) 22.3-6.8 MG/ML ophthalmic solution INSTILL 1 DROP INTO BOTH EYES EVERY 12 HOURS      metoprolol tartrate (LOPRESSOR) 25 MG tablet Take 25 mg by mouth 2 times daily      hydrALAZINE (APRESOLINE) 25 MG tablet Take 1 tablet by mouth 2 times daily 180 tablet 3    fluticasone-umeclidin-vilant (TRELEGY ELLIPTA) 100-62.5-25 MCG/INH AEPB Inhale 1 puff into the lungs daily Expiration date 7/ 2021  Lot (10) KD5P 1 each 0    aspirin 81 MG EC tablet Take 81 mg by mouth daily      vitamin D (ERGOCALCIFEROL) 41309 units CAPS capsule Take 50,000 Units by mouth every 14 days      acetaminophen (TYLENOL) 325 MG tablet Take 650 mg by mouth every 6 hours as needed for Pain      Multiple Vitamins-Minerals (OCUVITE EYE + MULTI) TABS Take 1 tablet by mouth daily      latanoprost (XALATAN) 0.005 % ophthalmic solution Place 1 drop into both eyes nightly       Coenzyme Q10 (COQ10) 100 MG CAPS Take 1 capsule by mouth daily       Cyanocobalamin (B-12) 500 MCG TABS Take 1 tablet by mouth Twice a Week       lansoprazole (PREVACID) 15 MG delayed release capsule Take 15 mg by mouth daily          Allergies: Allergies   Allergen Reactions    Flagyl [Metronidazole]      ataxia    Sulfa Antibiotics     Lisinopril-Hydrochlorothiazide Swelling     Lip And facial swelling and coughing    Ace Inhibitors Other (See Comments)     Edema lip swelling and coughing.  Actos [Pioglitazone Hydrochloride]     Celebrex [Celecoxib]     Cipro Xr     Ciprofloxacin     Demerol     Doxycycline     Elavil [Amitriptyline Hcl]     Estradiol      Estrogen patch, rapid weight gain, arm pain , dizziness, mentally not clear. Patient took estrace 1 mg daily orally for years and insurance no longer covered so switched to patch and felt so bad , she discontinued.     Levofloxacin     Metformin     Metoclopramide     Metolazone Other (See Comments)    Neurontin [Gabapentin]     Niaspan [Niacin Er]     Nifedipine Itching    Nsaids     Oxycodone     Phenytoin Sodium Extended     Pioglitazone     Prednisone     Pregabalin     Simvastatin     Iodine Rash     Rash all over. Pt does not want contrast anymore. Social History:  Patient Lives at home with Family. reports that she has never smoked. She has never used smokeless tobacco. She reports that she does not drink alcohol and does not use drugs. Family History:  family history includes Colon Cancer in her father; Heart Attack in her mother; Ovarian Cancer in her daughter; Vision Loss in her sister. Physical Exam:  BP (!) 183/66   Pulse 69   Temp 98.6 °F (37 °C) (Oral)   Resp 16   Wt 164 lb (74.4 kg)   SpO2 96%   BMI 29.05 kg/m²     General appearance:  Appears comfortable. Well nourished  Eyes: Sclera clear, pupils equal  ENT: Moist mucus membranes, no thrush. Trachea midline. Cardiovascular: Regular rhythm, normal S1, S2. No murmur, gallop, rub. No edema in lower extremities  Respiratory: Clear to auscultation bilaterally, no wheeze, good inspiratory effort  Gastrointestinal: Well healed incisional scars, ventral hernia+, abdomen soft, generalized tenderness worse in the epigastric region, mildly distended, decreased bowel sounds. Musculoskeletal: No cyanosis in digits, neck supple  Neurology: Cranial nerves grossly intact. Alert and oriented in time, place and person. No speech or motor deficits  Psychiatry: Appropriate affect. Not agitated  Skin: Warm, dry, normal turgor, no rash  Brisk capillary refill, peripheral pulses palpable.           Labs:  CBC:   Lab Results   Component Value Date    WBC 4.9 08/09/2021    RBC 3.30 08/09/2021    HGB 11.1 08/09/2021    HCT 33.0 08/09/2021    .1 08/09/2021    MCH 33.8 08/09/2021    MCHC 33.8 08/09/2021    RDW 14.2 08/09/2021     08/09/2021    MPV 8.2 08/09/2021     BMP:    Lab Results   Component Value Date     08/09/2021    K 4.2 08/09/2021     08/09/2021    CO2 23 08/09/2021    BUN 16 08/09/2021    CREATININE 1.0 08/09/2021    CALCIUM 9.6 08/09/2021    GFRAA >60 08/09/2021    GFRAA >60 10/03/2012    LABGLOM 53 08/09/2021    GLUCOSE 156 08/09/2021    GLUCOSE 84 10/05/2011     XR CHEST PORTABLE   Final Result   No active cardiopulmonary disease         CT ABDOMEN PELVIS WO CONTRAST Additional Contrast? None   Final Result   Acute moderate closed loop obstruction of the mid small bowel related to   small supraumbilical ventral hernia. Adhesions suspected proximal to the   dilated loop. No other acute abnormality identified. RECOMMENDATIONS:   Urgent surgical consultation. Chest Xray & EKG: I reviewed EKG &Chest imaging. Problem List  Principal Problem:    Intestinal obstruction (HCC)  Active Problems:    CKD (chronic kidney disease) stage 3, GFR 30-59 ml/min (HCC)    Type 2 diabetes mellitus with kidney complication, with long-term current use of insulin (HCC)    Coronary artery disease without angina pectoris    Essential hypertension    Incarcerated ventral hernia  Resolved Problems:    * No resolved hospital problems. *        Assessment/Plan:     Incarcerated Ventral hernia with SBO:  CT: In the ED vital and labs were unremarkable but CTshowed an acute moderate closed-loop obstruction of the mid small bowel related to a small supraumbilical ventral hernia. Adhesions proximal to the dilated loop also noted. Surgery consulted: Appreciate input. Scheduled for ventral hernia repair with mesh and possible small bowel resection today. No NG tube placed as patient was not vomiting and her stomach/proximal small bowel are not dilated. Continue IV fluids and NPO. IV morphine and Zofran prn. T2DM on long term Insulin with CKD 3:  Monitor fingersticks every 6 while NPO with low dose CDI. CKD 3:  Serum creatinine at baseline. Monitor renal function. CAD s/p PCI:  Stable without any chest pain. EKG reviewed with no acute ST-T changes. Resume home medications when cleared by surgery.     Hypertension:   IV hydralazine as needed for hypertension until able to tolerate p.o..    Mild Macrocytic Anemia:  Hemoglobin stable. Will add B12 and folate. Glaucoma: Continue eyedrops. Medication reconciliation: Please reconcile other home medications when able to taking people      DVT prophylaxis: SCD. Heparin after surgery. Code status: Full code. Diet: NPO  IV access: peripheral.  Kelley Catheter: No    Admit as inpatient. I anticipate hospitalization spanning more than two midnights for investigation and treatment of the above medically necessary diagnoses. Please note that some part of this chart was generated using Dragon dictation software. Although every effort was made to ensure the accuracy of this automated transcription, some errors in transcription may have occurred inadvertently. If you may need any clarification, please do not hesitate to contact me through [facility].        Fabien Birmingham MD    8/9/2021 2:52 PM

## 2021-08-09 NOTE — ANESTHESIA PRE PROCEDURE
Department of Anesthesiology  Preprocedure Note       Name:  Miriam Armstrong   Age:  80 y.o.  :  1940                                          MRN:  [id number]         Date:  2021      Surgeon: Fernanda Wilson):  Abiola Smith MD    Procedure: Procedure(s):  LAPAROSCOPY EXPLORATORY, POSSIBLE EXPLORATORY LAPAROTOMY LYSIS OF ADHESIONS, VENTRAL HERNIA REPAIR, POSSIBLE BOWEL RESECTION    Medications prior to admission:   Prior to Admission medications    Medication Sig Start Date End Date Taking?  Authorizing Provider   ezetimibe (ZETIA) 10 MG tablet TAKE ONE TABLET BY MOUTH DAILY 21   Jojo Garay MD   spironolactone (ALDACTONE) 25 MG tablet Take 1 tablet by mouth daily 21   Jojo Garay MD   folic acid (FOLVITE) 1 MG tablet Take 1 tablet by mouth daily 21   Jojo Garay MD   B complex-vitamin C-folic acid (NEPHRO-BARNEY) 1 MG tablet Take 1 tablet by mouth daily (with breakfast) 21   Jojo Garay MD   Insulin Pen Needle ([employer] PEN NEEDLES 31G) 31G X 8 MM MISC 1 each by Does not apply route daily 21   Patti Sawyer MD   insulin glargine (LANTUS SOLOSTAR) 100 UNIT/ML injection pen Inject 22 Units into the skin nightly Indications: sliding scale up to 22 units Lease refill 18 per patient requests 21   Patti Sawyer MD   fenofibrate (TRICOR) 48 MG tablet Take 1 tablet by mouth daily 1/10/21   Patti Sawyer MD   Netarsudil-Latanoprost 0.02-0.005 % SOLN Apply 1 drop to eye nightly    Historical Provider, MD   timolol (BETIMOL) 0.5 % ophthalmic solution 1 drop 2 times daily    Historical Provider, MD   dorzolamide-timolol (COSOPT) 22.3-6.8 MG/ML ophthalmic solution INSTILL 1 DROP INTO BOTH EYES EVERY 12 HOURS 9/3/20   Historical Provider, MD   metoprolol tartrate (LOPRESSOR) 25 MG tablet Take 25 mg by mouth 2 times daily 10/1/20   Historical Provider, MD   hydrALAZINE (APRESOLINE) 25 MG tablet Take 1 tablet by mouth 2 times daily 11/24/20   Nelia Caro MD   fluticasone-umeclidin-vilant (TRELEGY ELLIPTA) 027-44.3-32 MCG/INH AEPB Inhale 1 puff into the lungs daily Expiration date 7/ 2021  Lot (10 KD5P 3/6/20   Nelia Caro MD   aspirin 81 MG EC tablet Take 81 mg by mouth daily    Historical Provider, MD   vitamin D (ERGOCALCIFEROL) 18412 units CAPS capsule Take 50,000 Units by mouth every 14 days    Historical Provider, MD   acetaminophen (TYLENOL) 325 MG tablet Take 650 mg by mouth every 6 hours as needed for Pain    Historical Provider, MD   Multiple Vitamins-Minerals (OCUVITE EYE + MULTI) TABS Take 1 tablet by mouth daily    Historical Provider, MD   latanoprost (XALATAN) 0.005 % ophthalmic solution Place 1 drop into both eyes nightly     Historical Provider, MD   Coenzyme Q10 (COQ10) 100 MG CAPS Take 1 capsule by mouth daily     Historical Provider, MD   Cyanocobalamin (B-12) 500 MCG TABS Take 1 tablet by mouth Twice a Week     Historical Provider, MD   lansoprazole (PREVACID) 15 MG delayed release capsule Take 15 mg by mouth daily     Historical Provider, MD       Current medications:    Current Facility-Administered Medications   Medication Dose Route Frequency Provider Last Rate Last Admin    hydrALAZINE (APRESOLINE) 20 MG/ML injection             iopamidol (ISOVUE-370) 76 % injection 75 mL  75 mL Intravenous ONCE PRN KEYSHAWN Marcus        ceFAZolin (ANCEF) 2000 mg in dextrose 5 % 50 mL IVPB  2,000 mg Intravenous On Call to [address] [place] [address], [facility]        morphine injection 4 mg  4 mg Intravenous Q6H PRN Ras Tucker MD        ondansetron [facility]) injection 4 mg  4 mg Intravenous Q6H PRN Ras Tucker MD        lactated ringers infusion   Intravenous Continuous Ras Tucker  mL/hr at 08/09/21 1301 New Bag at 08/09/21 1301    glucose (GLUTOSE) 40 % oral gel 15 g  15 g Oral PRN Rsa Tucker MD        dextrose 50 % IV solution  12.5 g Intravenous PRN Raj Marshall Cherise Peter MD        glucagon (rDNA) injection 1 mg  1 mg Intramuscular PRN Neelam Gibbons MD        dextrose 5 % solution  100 mL/hr Intravenous PRN Neelam Gibbons MD        insulin lispro (1 Unit Dial) 0-6 Units  0-6 Units Subcutaneous TID WC Neelam Gibbons MD        insulin lispro (1 Unit Dial) 0-3 Units  0-3 Units Subcutaneous Nightly Neelam Gibbons MD        hydrALAZINE (APRESOLINE) injection 5 mg  5 mg Intravenous Q6H PRN Neelam Gibbons MD   5 mg at 08/09/21 1323       Allergies: Allergies   Allergen Reactions    Flagyl [Metronidazole]      ataxia    Sulfa Antibiotics     Lisinopril-Hydrochlorothiazide Swelling     Lip And facial swelling and coughing    Ace Inhibitors Other (See Comments)     Edema lip swelling and coughing.  Actos [Pioglitazone Hydrochloride]     Celebrex [Celecoxib]     Cipro Xr     Ciprofloxacin     Demerol     Doxycycline     Elavil [Amitriptyline Hcl]     Estradiol      Estrogen patch, rapid weight gain, arm pain , dizziness, mentally not clear. Patient took estrace 1 mg daily orally for years and insurance no longer covered so switched to patch and felt so bad , she discontinued.  Levofloxacin     Metformin     Metoclopramide     Metolazone Other (See Comments)    Neurontin [Gabapentin]     Niaspan [Niacin Er]     Nifedipine Itching    Nsaids     Oxycodone     Phenytoin Sodium Extended     Pioglitazone     Prednisone     Pregabalin     Simvastatin     Iodine Rash     Rash all over. Pt does not want contrast anymore.        Problem List:    Patient Active Problem List   Diagnosis Code    Herniated lumbar disc without myelopathy M51.26    CKD (chronic kidney disease) stage 3, GFR 30-59 ml/min ([facility]) N18.30    Vitamin D deficiency E55.9    Fibromyalgia M79.7    Type 2 diabetes mellitus with kidney complication, with long-term current use of insulin ([facility]) E11.29, Z79.4    Bilateral carpal tunnel syndrome G56.03    Intestinal obstruction ([facility] [address] K56.609    Chronic abdominal pain R10.9, G89.29    Trigger middle finger of right hand M65.331    Carpal tunnel syndrome, left G56.02    Bacterial overgrowth syndrome K63.89    Plantar fasciitis M72.2    Primary open angle glaucoma H40.1190    Renal insufficiency syndrome N28.9    Retrocalcaneal bursitis M77.50    S/P angioplasty with stent Z95.820    Ulcerative colitis ([facility] [address] K51.90    Myalgia M79.10    Other neutropenia (HCC) D70.8    Mixed hyperlipidemia E78.2    Coronary artery disease without angina pectoris I25.10    Essential hypertension I10    Incarcerated ventral hernia K43.6       Past Medical History:        Diagnosis Date    Angina     Bilateral carpal tunnel syndrome 10/9/2018    Chronic kidney disease     Fibromyalgia     Hyperlipidemia     Hypertension     MVP (mitral valve prolapse)     s 5/18/2017    Small bowel obstruction ([facility] [address] 6/24/2019    Type II or unspecified type diabetes mellitus without mention of complication, not stated as uncontrolled        Past Surgical History:        Procedure Laterality Date    CHOLECYSTECTOMY      COLONOSCOPY N/A 8/13/2019    COLONOSCOPY DIAGNOSTIC performed [facility] Jae Ulrich MD [facility]      2 stents    HYSTERECTOMY      LUMBAR DISCECTOMY  10/03/12    lumbar discectomy, 3-4 left    SMALL INTESTINE SURGERY      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       Social History:    Social History     Tobacco Use    Smoking status: Never Smoker    Smokeless tobacco: Never Used   Substance Use Topics    Alcohol use:  No                                Counseling given: Not Answered      Vital Signs (Current):   Vitals:    08/09/21 1245 08/09/21 1300 08/09/21 1323 08/09/21 1357   BP: (!) 182/89 (!) 194/81 (!) 183/64 (!) 183/66   Pulse:    69   Resp:    16   Temp:    98.6 °F (37 °C)   TempSrc:    Oral   SpO2: 96% 99%  96%   Weight: BP Readings from Last 3 Encounters:   08/09/21 (!) 183/66   06/08/21 124/70   01/14/21 (!) 140/67       NPO Status:                                                                                 BMI:   Wt Readings from Last 3 Encounters:   08/09/21 164 lb (74.4 kg)   06/08/21 172 lb (78 kg)   01/14/21 171 lb (77.6 kg)     Body mass index is 29.05 kg/m². CBC:   Lab Results   Component Value Date    WBC 4.9 08/09/2021    RBC 3.30 08/09/2021    HGB 11.1 08/09/2021    HCT 33.0 08/09/2021    .1 08/09/2021    RDW 14.2 08/09/2021     08/09/2021       CMP:   Lab Results   Component Value Date     08/09/2021    K 4.2 08/09/2021     08/09/2021    CO2 23 08/09/2021    BUN 16 08/09/2021    CREATININE 1.0 08/09/2021    GFRAA >60 08/09/2021    GFRAA >60 10/03/2012    AGRATIO 1.2 08/09/2021    LABGLOM 53 08/09/2021    GLUCOSE 156 08/09/2021    GLUCOSE 84 10/05/2011    PROT 7.9 08/09/2021    PROT 7.6 10/05/2011    CALCIUM 9.6 08/09/2021    BILITOT 0.3 08/09/2021    ALKPHOS 97 08/09/2021    AST 33 08/09/2021    ALT 30 08/09/2021       POC Tests: No results for input(s): POCGLU, POCNA, POCK, POCCL, POCBUN, POCHEMO, POCHCT in the last 72 hours.     Coags:   Lab Results   Component Value Date    PROTIME 11.8 06/25/2019    INR 1.04 06/25/2019       HCG (If Applicable): No results found for: PREGTESTUR, PREGSERUM, HCG, HCGQUANT     ABGs: No results found for: PHART, PO2ART, VOR6PZC, XPL2IHR, BEART, [id number]     Type & Screen (If Applicable):  No results found for: LABABO, LABRH    Drug/Infectious Status (If Applicable):  No results found for: HIV, HEPCAB    COVID-19 Screening (If Applicable):   Lab Results   Component Value Date    COVID19 Not Detected 01/05/2021           Anesthesia Evaluation  Patient summary reviewed and Nursing notes reviewed no history of anesthetic complications:   Airway: Mallampati: I  TM distance: >3 FB   Neck ROM: full  Mouth opening: > = 3 FB Dental:    (+) upper dentures and lower dentures      Pulmonary:       (-) asthma and shortness of breath                           Cardiovascular:    (+) hypertension:, CAD:, CABG/stent: no interval change,                   Neuro/Psych:      (-) CVA           GI/Hepatic/Renal:   (+) PUD, renal disease: CRI,      (-) GERD and liver disease       Endo/Other:    (+) DiabetesType II DM, , .    (-) hypothyroidism               Abdominal:             Vascular:     - PVD. Other Findings:             Anesthesia Plan      general     ASA 3       Induction: intravenous. MIPS: Postoperative opioids intended and Prophylactic antiemetics administered. Anesthetic plan and risks discussed with patient. Use of blood products discussed with patient whom. Plan discussed with CRNA.                   Morris Woody MD   8/9/2021

## 2021-08-09 NOTE — ED PROVIDER NOTES
[facility]        Pt Name: Bernadine Centeno  MRN: [id number]  [place] 1940  Date of evaluation: 8/9/2021  Provider: KEYSHAWN Pina  PCP: Prabha Velasco MD  Note Started: 7:07 AM EDT       NADEEN. I have evaluated this patient. My supervising physician was available for consultation. CHIEF COMPLAINT       Chief Complaint   Patient presents with    Constipation     Pt in with liquid stool for one week. last normal BM was last week. used a fleet enema two days ago with no results. hx of bowel issues, pt of dr Silas Loyola   (Location, Timing/Onset, Context/Setting, Quality, Duration, Modifying Factors, Severity, Associated Signs and Symptoms)  Note limiting factors. Chief Complaint: Constipation/Abd Pain     Bernadine Centeno is a 80 y.o. female with past medical show chronic kidney disease, hyperlipidemia, hypertension, previous small bowel obstruction and type 2 diabetes who presents to the ED with complaint of constipation associate abdominal pain. Patient is for the past week she has had nothing but liquid stool. Patient states she feels constipated and has had abdominal bloating with associated upper abdominal pain that she describes as sharp rated 9/10. Patient states she had decreased oral intake. Denies any nausea or vomiting. Denies fever chills. Denies any blood, pus or mucus in her stool. Denies any urinary symptoms. Denies vaginal bleed or discharge. Denies chest pain or shortness of breath. Patient ages lungs and history of bowel problems. Patient states she has had history of cholecystectomy, hysterectomy and bowel resection for diverticulitis/perforation/colovaginal fistula in the past.  States she follows up with GI. Patient states she is been placed on fiber supplementation but states it has not improved her symptoms over the past week.     Nursing Notes were all reviewed and agreed with or any disagreements were addressed in the HPI. REVIEW OF SYSTEMS    (2-9 systems for level 4, 10 or more for level 5)     Review of Systems   Constitutional: Negative for activity change, appetite change, chills, diaphoresis, fatigue and fever. Respiratory: Negative. Negative for cough, chest tightness and shortness of breath. Cardiovascular: Negative. Negative for chest pain, palpitations and leg swelling. Gastrointestinal: Positive for abdominal distention, abdominal pain and constipation. Negative for diarrhea, nausea and vomiting. Genitourinary: Negative for decreased urine volume, difficulty urinating, dysuria, flank pain, frequency, hematuria, urgency, vaginal bleeding, vaginal discharge and vaginal pain. Musculoskeletal: Negative for arthralgias, back pain, myalgias, neck pain and neck stiffness. Skin: Negative for color change, pallor, rash and wound. Neurological: Negative for dizziness, light-headedness and headaches. Positives and Pertinent negatives as per HPI. Except as noted above in the ROS, all other systems were reviewed and negative.        PAST MEDICAL HISTORY     Past Medical History:   Diagnosis Date    Angina     Bilateral carpal tunnel syndrome 10/9/2018    Chronic kidney disease     Fibromyalgia     Hyperlipidemia     Hypertension     MVP (mitral valve prolapse)     s 5/18/2017    Small bowel obstruction ([facility] [address] 6/24/2019    Type II or unspecified type diabetes mellitus without mention of complication, not stated as uncontrolled          SURGICAL HISTORY     Past Surgical History:   Procedure Laterality Date    CHOLECYSTECTOMY      COLONOSCOPY N/A 8/13/2019    COLONOSCOPY DIAGNOSTIC performed by Ave Medrano MD at [address] [place] [address]      2 stents    HYSTERECTOMY      LUMBAR DISCECTOMY  10/03/12    lumbar discectomy, 3-4 left    SMALL INTESTINE SURGERY      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         CURRENTMEDICATIONS       Previous Medications    ACETAMINOPHEN (TYLENOL) 325 MG TABLET    Take 650 mg by mouth every 6 hours as needed for Pain    ASPIRIN 81 MG EC TABLET    Take 81 mg by mouth daily    B COMPLEX-VITAMIN C-FOLIC ACID (NEPHRO-BARNEY) 1 MG TABLET    Take 1 tablet by mouth daily (with breakfast)    COENZYME Q10 (COQ10) 100 MG CAPS    Take 1 capsule by mouth daily     CYANOCOBALAMIN (B-12) 500 MCG TABS    Take 1 tablet by mouth Twice a Week     DORZOLAMIDE-TIMOLOL (COSOPT) 22.3-6.8 MG/ML OPHTHALMIC SOLUTION    INSTILL 1 DROP INTO BOTH EYES EVERY 12 HOURS    EZETIMIBE (ZETIA) 10 MG TABLET    TAKE ONE TABLET BY MOUTH DAILY    FENOFIBRATE (TRICOR) 48 MG TABLET    Take 1 tablet by mouth daily    FLUTICASONE-UMECLIDIN-VILANT (TRELEGY ELLIPTA) 100-62.5-25 MCG/INH AEPB    Inhale 1 puff into the lungs daily Expiration date 7/ 2021  Lot (10) [id number]    FOLIC ACID (FOLVITE) 1 MG TABLET    Take 1 tablet by mouth daily    HYDRALAZINE (APRESOLINE) 25 MG TABLET    Take 1 tablet by mouth 2 times daily    INSULIN GLARGINE (LANTUS SOLOSTAR) 100 UNIT/ML INJECTION PEN    Inject 22 Units into the skin nightly Indications: sliding scale up to 22 units Lease refill 8/23/18 per patient requests    INSULIN PEN NEEDLE (ARVINDOGER PEN NEEDLES 31G) 31G X 8 MM MISC    1 each by Does not apply route daily    LANSOPRAZOLE (PREVACID) 15 MG DELAYED RELEASE CAPSULE    Take 15 mg by mouth daily     LATANOPROST (XALATAN) 0.005 % OPHTHALMIC SOLUTION    Place 1 drop into both eyes nightly     METOPROLOL TARTRATE (LOPRESSOR) 25 MG TABLET    Take 25 mg by mouth 2 times daily    MULTIPLE VITAMINS-MINERALS (OCUVITE EYE + MULTI) TABS    Take 1 tablet by mouth daily    NETARSUDIL-LATANOPROST 0.02-0.005 % SOLN    Apply 1 drop to eye nightly    SPIRONOLACTONE (ALDACTONE) 25 MG TABLET    Take 1 tablet by mouth daily    TIMOLOL (BETIMOL) 0.5 % OPHTHALMIC SOLUTION    1 drop 2 times daily    VITAMIN D (ERGOCALCIFEROL) 74995 UNITS CAPS CAPSULE    Take 50,000 Units by mouth every 14 days         ALLERGIES     Flagyl [metronidazole], Sulfa antibiotics, Lisinopril-hydrochlorothiazide, Ace inhibitors, Actos [pioglitazone hydrochloride], Celebrex [celecoxib], Cipro xr, Ciprofloxacin, Demerol, Doxycycline, Elavil [amitriptyline hcl], Estradiol, Levofloxacin, Metformin, Metoclopramide, Metolazone, Neurontin [gabapentin], Niaspan [niacin er], Nifedipine, Nsaids, Oxycodone, Phenytoin sodium extended, Pioglitazone, Prednisone, Pregabalin, Simvastatin, and Iodine    FAMILYHISTORY       Family History   Problem Relation Age of Onset    Vision Loss Sister           SOCIAL HISTORY       Social History     Tobacco Use    Smoking status: Never Smoker    Smokeless tobacco: Never Used   Vaping Use    Vaping Use: Never assessed   Substance Use Topics    Alcohol use: No    Drug use: No       SCREENINGS             PHYSICAL EXAM    (up to 7 for level 4, 8 or more for level 5)     ED Triage Vitals [08/09/21 0642]   BP Temp Temp Source Pulse Resp SpO2 Height Weight   (!) 183/63 98.2 °F (36.8 °C) Oral 60 16 97 % -- 164 lb (74.4 kg)       Physical Exam  Constitutional:       General: She is not in acute distress. Appearance: Normal appearance. She is well-developed. She is not ill-appearing, toxic-appearing or diaphoretic. HENT:      Head: Normocephalic and atraumatic. Right Ear: External ear normal.      Left Ear: External ear normal.   Eyes:      General:         Right eye: No discharge. Left eye: No discharge. Cardiovascular:      Rate and Rhythm: Normal rate and regular rhythm. Pulses: Normal pulses. Heart sounds: Normal heart sounds. No murmur heard. No friction rub. No gallop. Comments: 2+ radial pulse bilaterally. No pedal edema. No calf tenderness. No JVD. Pulmonary:      Effort: Pulmonary effort is normal. No respiratory distress. Breath sounds: Normal breath sounds. No stridor.  No wheezing, rhonchi or rales.   Chest:      Chest wall: No tenderness. Abdominal:      General: Abdomen is flat. Bowel sounds are normal. There is distension. Palpations: Abdomen is soft. There is no mass. Tenderness: There is generalized abdominal tenderness. There is no right CVA tenderness, left CVA tenderness, guarding or rebound. Negative signs include Ventura's sign, Rovsing's sign and McBurney's sign. Hernia: No hernia is present. Musculoskeletal:         General: Normal range of motion. Cervical back: Normal range of motion and neck supple. Skin:     General: Skin is warm and dry. Coloration: Skin is not pale. Findings: No erythema or rash. Neurological:      Mental Status: She is alert and oriented to person, place, and time.    Psychiatric:         Behavior: Behavior normal.         DIAGNOSTIC RESULTS   LABS:    Labs Reviewed   CBC WITH AUTO DIFFERENTIAL - Abnormal; Notable for the following components:       Result Value    RBC 3.30 (*)     Hemoglobin 11.1 (*)     Hematocrit 33.0 (*)     .1 (*)     Lymphocytes Absolute 0.9 (*)     All other components within normal limits    Narrative:     Performed at:  [facility] [address], [place], [address]   Phone [phone]   COMPREHENSIVE METABOLIC PANEL W/ REFLEX TO MG FOR LOW K - Abnormal; Notable for the following components:    Glucose 156 (*)     GFR Non- 53 (*)     All other components within normal limits    Narrative:     Performed at:  [facility] [address], [place], [address]   Phone [phone]   URINE RT REFLEX TO CULTURE - Abnormal; Notable for the following components:    Protein, UA 30 (*)     Leukocyte Esterase, Urine MODERATE (*)     All other components within normal limits    Narrative:     Performed at:  [facility] [address], [place], [address]   Phone [phone]   MICROSCOPIC URINALYSIS - Abnormal; Notable for the following components:    WBC, UA 9 (*)     All other components within normal limits    Narrative:     Performed at:  [facility]  [address] [facility],  [place], [address]   Phone [phone]   LIPASE    Narrative:     Performed at:  [facility]  [address] [facility],  [place], [address]   Phone [phone]   LACTIC ACID, PLASMA    Narrative:     Performed at:  [facility] [address] [facility],  [place], [address]   Phone [phone]       When ordered only abnormal lab results are displayed. All other labs were within normal range or not returned as of this dictation. EKG: When ordered, EKG's are interpreted by the Emergency Department Physician in the absence of a cardiologist.  Please see their note for interpretation of EKG. RADIOLOGY:   Non-plain film images such as CT, Ultrasound and MRI are read by the radiologist. Plain radiographic images are visualized and preliminarily interpreted by the ED Provider with the below findings:        Interpretation per the Radiologist below, if available at the time of this note:    CT ABDOMEN PELVIS WO CONTRAST Additional Contrast? None   Final Result   Acute moderate closed loop obstruction of the mid small bowel related to   small supraumbilical ventral hernia. Adhesions suspected proximal to the   dilated loop. No other acute abnormality identified. RECOMMENDATIONS:   Urgent surgical consultation. No results found. PROCEDURES   Unless otherwise noted below, none     Procedures    CRITICAL CARE TIME   The total critical care time spent while evaluating and treating this patient was 35 minutes. This excludes time spent doing separately billable procedures.   This includes time at the bedside, data interpretation, medication management, obtaining critical history from collateral sources if the patient is unable to provide it directly, and physician consultation. Specifics of interventions taken and potentially life-threatening diagnostic considerations are listed above in the medical decision making. CONSULTS:  IP CONSULT TO GENERAL SURGERY      EMERGENCY DEPARTMENT COURSE and DIFFERENTIAL DIAGNOSIS/MDM:   Vitals:    Vitals:    08/09/21 0642   BP: (!) 183/63   Pulse: 60   Resp: 16   Temp: 98.2 °F (36.8 °C)   TempSrc: Oral   SpO2: 97%   Weight: 164 lb (74.4 kg)       Patient was given the following medications:  Medications   iopamidol (ISOVUE-370) 76 % injection 75 mL (has no administration in time range)   ceFAZolin (ANCEF) 2000 mg in dextrose 5 % 50 mL IVPB (has no administration in time range)   morphine injection 4 mg (4 mg Intravenous Given 8/9/21 0800)   ondansetron (ZOFRAN) injection 4 mg (4 mg Intravenous Given 8/9/21 0800)   0.9 % sodium chloride bolus (0 mLs Intravenous Stopped 8/9/21 1054)           Patient is an 40-year-old female who presents to the ED with complaint of abdominal pain. Upon examination patient has what appears to be a hernia noted to the ventral abdomen with some tenderness and distention to the upper abdomen. States she is had nausea with some loose stool and history of obstruction in the past.  Is concerned for obstruction at this time. IV was established patient given morphine, Zofran and fluids here in the ED for symptom control. CBC showed normal white count and platelets. Hemoglobin 11.1. CMP relatively unremarkable. Lipase and lactic normal.  Urinalysis relatively unremarkable. CT of the abdomen pelvis with IV contrast ordered but patient states she has a history of previous allergy to IV contrast.  CT of the abdomen pelvis without contrast ordered. CT did show acute moderate closed-loop obstruction to the mid small bowel likely due to small supraumbilical ventral hernia with adhesions.   Case discussed with general surgery, Luzmaria Moon, who came and evaluated patient here in the ED. Will come evaluate with general surgeon, Dr. Carlos Martínez, but plan is probable surgery for further management. Surgery ordered Ancef here in the emergency department. General surgery did request hospitalist admission. Case will discussed with hospitalist for admission for what appears to be closed-loop obstruction with probable surgery/intervention this afternoon. FINAL IMPRESSION      1. Intestinal obstruction, unspecified cause, unspecified whether partial or complete [facility])          DISPOSITION/PLAN   DISPOSITION Decision To Admit 08/09/2021 10:26:12 AM      PATIENT REFERRED TO:  No follow-up provider specified.     DISCHARGE MEDICATIONS:  New Prescriptions    No medications on file       DISCONTINUED MEDICATIONS:  Discontinued Medications    No medications on file              (Please note that portions of this note were completed with a voice recognition program.  Efforts were made to edit the dictations but occasionally words are mis-transcribed.)    KEYSHAWN Hoskins (electronically signed)          KEYSHAWN Anaya  08/09/21 1112

## 2021-08-09 NOTE — PROGRESS NOTES
Pt transported from 4T to pre op. assessment completed. Vitals obtained. PIV started in left antecubital for surgery. Surgical consent obtained.

## 2021-08-09 NOTE — BRIEF OP NOTE
Brief Postoperative Note      Patient: Ruben Watkins  YOB: 1940  MRN: [id number]    Date of Procedure: 8/9/2021    Pre-Op Diagnosis: small bowel obstruction, ventral hernia    Post-Op Diagnosis: Same       Procedure(s):  LAPAROSCOPY EXPLORATORY,  LYSIS OF ADHESIONS, LAPAROSCOPIC ASSISTED VENTRAL HERNIA REPAIR WITH MESH. Surgeon(s):  Ila Zimmerman MD    Assistant:  Surgical Assistant: Susie Queen    Anesthesia: General    Estimated Blood Loss (mL): Minimal    Complications: None    Specimens:   * No specimens in log *    Implants:  Implant Name Type Inv.  Item Serial No.  Lot No. LRB No. Used Action   MESH GHADA [id number] ELLIPSE W/ ECHO PS POS SYS VENTRALIGHT ST  MESH GHADA [id number] ELLIPSE W/ ECHO PS POS SYS VENTRALIGHT ST  BARD DAVOL-WD [id number] N/A 1 Implanted         Drains:   [REMOVED] NG/OG/NJ/NE Tube Nasogastric (Removed)       Findings: incarcerated ventral hernia    Electronically signed by Ila Zimmerman MD on 8/9/2021 at 5:48 PM

## 2021-08-09 NOTE — ED PROVIDER NOTES
EKG:  Read by me in the absence of a cardiologist shows:  Sinus rhythm, 54 rate, normal conduction intervals, normal axis, no acute injury pattern, no major change from prior study      I was available for consultation. I did not perform face-to-face evaluation. Please see VladimirKEYSHAWN Lui note for further information on this visit.          Jostin Sanford MD  08/09/21 7902

## 2021-08-09 NOTE — ED NOTES
Report given to [facility] RN. Pt alert and oriented and shows no signs of distress at time of transfer to [facility]. Pt taken to room in wheelchair.        Lashell Cartwright RN  08/09/21 2926

## 2021-08-09 NOTE — PROGRESS NOTES
Phase 1 recovery completed, BP improved, resting comfortably. Abd soft, lap sites intact. Will transfer back to T.

## 2021-08-09 NOTE — ACP (ADVANCE CARE PLANNING)
ADVANCED CARE PLANNING    Name:Helena John       :  1940              MRN:  [id number]      Purpose of Encounter: Advanced care planning. Parties in attendance: :Agapito Martinez, Rhett Yu MD.  Decisional Capacity:Yes    Diagnosis: Principal Problem:    Intestinal obstruction [facility])  Active Problems:    CKD (chronic kidney disease) stage 3, GFR 30-59 ml/min ([facility])    Type 2 diabetes mellitus with kidney complication, with long-term current use of insulin ([facility] [address]    Coronary artery disease without angina pectoris    Essential hypertension    Incarcerated ventral hernia  Resolved Problems:    * No resolved hospital problems. *    Patients Medical Story: Agapito Martinez is a 80 y.o. female with h/o CKD 3, DM and HTN, bowel obstruction managed conservatively, multiple prior abdominal surgeries and a ventral hernia admitted with incarcerated ventral hernia with SBO. Goals of Care Determinations: Patient wishes to focus on life sustaining treatment. Plan: Will notify Ileana Funes MD of change in care plan. Will look at further interventions as needed. Code Status: At this time patient wishes to be Prior  Time Spent with Patient: 30 minutes      Electronically signed by Rhett Yu MD on 2021 at 3:13 PM  Thank you Ileana Funes MD for the opportunity to be involved in this patient's care.

## 2021-08-09 NOTE — PLAN OF CARE
[place] [id number] and Laparoscopic Surgery        Assessment & Plan of Care    History of Present Illness: Ms. Jennifer Madden is a 80 y.o. female who presents with a 3-day history of severe (10 out of 10), sharp, constant epigastric abdominal pain that wraps around into her back. The pain has been progressively worse since onset; no alleviating or aggravating factors noted. She reports some degree of bloating chronically but has increased significantly over the last few days. She was recently (7/29/2021) seen by GI as an outpatient for her chronic bloating, at which time she was started on Benefiber. Other associated symptoms include nausea, decreased appetite, and constipation. She denies vomiting, fevers, chills, diarrhea, hematochezia, melena, urinary symptoms, chest pain, or SOB. Last \"decent\" BM was 5 days ago; she reports passing just a scant amount of stool 3 days ago, only scant flatus since then along with burping. History of prior bowel obstruction in both 2015 and 6/2019, both of which resolved with medical management. Multiple prior abdominal surgeries including colon resection for perforated diverticulitis in 1996, she reports hiatal hernia repair x2, right inguinal hernia repair, hysterectomy, and open cholecystectomy. Reports that current ventral hernia has been present for at least the last few years. Last colonoscopy was in 2019. Medical history includes hypertension, diabetes, chronic kidney disease, and fibromyalgia. No blood thinners. Nonsmoker.     Physical Exam:  CONSTITUTIONAL:  alert, no apparent distress and mildly obese  NEUROLOGIC:  Mental Status Exam:  Level of Alertness:   awake  Orientation:   person, place, time  EYES:  sclera clear  ENT:  normocepalic, without obvious abnormality  NECK:  supple, symmetrical, trachea midline  LUNGS:  clear to auscultation  CARDIOVASCULAR:  regular rate and rhythm and no murmur noted  ABDOMEN: soft, distended, tenderness noted diffuse but most focal to epigastric region/ventral hernia site, voluntary guarding absent, no masses palpated, normal bowel sounds,  scars noted right upper quadrant and large midline scar, and hernia present--supraumbilical ventral hernia  Extremities: no edema  SKIN:  no bruising or bleeding and normal skin color, texture, turgor    Assessment:  Incarcerated ventral hernia causing small bowel obstruction  Hypertension  Diabetes  Fibromyalgia    Plan:  1. Exploratory laparotomy, lysis of adhesions, ventral hernia repair, possible bowel resection with Dr. Tripp Root, timing to be determined, tentatively this afternoon/evening  2. NPO  3. IV hydration; monitor and correct electrolytes  4. Perioperative antibiotics  5. Activity as tolerated  6. Pulmonary toilet, incentive spirometry  7. PRN analgesics and antiemetics--minimizing narcotics as tolerated  8. Management of medical comorbid etiologies per primary team and consulting services    EDUCATION:  Educated patient on plan of care and disease process--all questions answered. Plans discussed with patient and nursing. Reviewed and discussed with Dr. Melinda Robertson consult to follow.       Signed:  YAZMIN Maier - CNP  8/9/2021 10:23 AM

## 2021-08-10 LAB
A/G RATIO: 1.4 (ref 1.1–2.2)
ALBUMIN SERPL-MCNC: 3.7 G/DL (ref 3.4–5)
ALP BLD-CCNC: 85 U/L (ref 40–129)
ALT SERPL-CCNC: 24 U/L (ref 10–40)
ANION GAP SERPL CALCULATED.3IONS-SCNC: 14 MMOL/L (ref 3–16)
AST SERPL-CCNC: 31 U/L (ref 15–37)
BILIRUB SERPL-MCNC: 0.3 MG/DL (ref 0–1)
BUN BLDV-MCNC: 14 MG/DL (ref 7–20)
CALCIUM SERPL-MCNC: 8.8 MG/DL (ref 8.3–10.6)
CHLORIDE BLD-SCNC: 103 MMOL/L (ref 99–110)
CO2: 21 MMOL/L (ref 21–32)
CREAT SERPL-MCNC: 1 MG/DL (ref 0.6–1.2)
ESTIMATED AVERAGE GLUCOSE: 145.6 MG/DL
FOLATE: >20 NG/ML (ref 4.78–24.2)
GFR AFRICAN AMERICAN: >60
GFR NON-AFRICAN AMERICAN: 53
GLOBULIN: 2.7 G/DL
GLUCOSE BLD-MCNC: 135 MG/DL (ref 70–99)
GLUCOSE BLD-MCNC: 60 MG/DL (ref 70–99)
GLUCOSE BLD-MCNC: 79 MG/DL (ref 70–99)
GLUCOSE BLD-MCNC: 86 MG/DL (ref 70–99)
GLUCOSE BLD-MCNC: 94 MG/DL (ref 70–99)
GLUCOSE BLD-MCNC: 95 MG/DL (ref 70–99)
GLUCOSE BLD-MCNC: 96 MG/DL (ref 70–99)
HBA1C MFR BLD: 6.7 %
HCT VFR BLD CALC: 30.8 % (ref 36–48)
HEMOGLOBIN: 10.4 G/DL (ref 12–16)
MAGNESIUM: 1.4 MG/DL (ref 1.8–2.4)
MCH RBC QN AUTO: 33.6 PG (ref 26–34)
MCHC RBC AUTO-ENTMCNC: 33.9 G/DL (ref 31–36)
MCV RBC AUTO: 99 FL (ref 80–100)
PDW BLD-RTO: 14.2 % (ref 12.4–15.4)
PERFORMED ON: ABNORMAL
PERFORMED ON: ABNORMAL
PERFORMED ON: NORMAL
PHOSPHORUS: 3.2 MG/DL (ref 2.5–4.9)
PLATELET # BLD: 160 K/UL (ref 135–450)
PMV BLD AUTO: 8.1 FL (ref 5–10.5)
POTASSIUM SERPL-SCNC: 4.2 MMOL/L (ref 3.5–5.1)
RBC # BLD: 3.11 M/UL (ref 4–5.2)
REASON FOR REJECTION: NORMAL
REJECTED TEST: NORMAL
SODIUM BLD-SCNC: 138 MMOL/L (ref 136–145)
TOTAL PROTEIN: 6.4 G/DL (ref 6.4–8.2)
VITAMIN B-12: 1830 PG/ML (ref 211–911)
WBC # BLD: 5.5 K/UL (ref 4–11)

## 2021-08-10 PROCEDURE — 85027 COMPLETE CBC AUTOMATED: CPT

## 2021-08-10 PROCEDURE — APPSS15 APP SPLIT SHARED TIME 0-15 MINUTES: Performed by: NURSE PRACTITIONER

## 2021-08-10 PROCEDURE — 82746 ASSAY OF FOLIC ACID SERUM: CPT

## 2021-08-10 PROCEDURE — 82607 VITAMIN B-12: CPT

## 2021-08-10 PROCEDURE — 6360000002 HC RX W HCPCS: Performed by: NURSE PRACTITIONER

## 2021-08-10 PROCEDURE — 6360000002 HC RX W HCPCS: Performed by: INTERNAL MEDICINE

## 2021-08-10 PROCEDURE — 6370000000 HC RX 637 (ALT 250 FOR IP): Performed by: INTERNAL MEDICINE

## 2021-08-10 PROCEDURE — 6360000002 HC RX W HCPCS: Performed by: SURGERY

## 2021-08-10 PROCEDURE — 83036 HEMOGLOBIN GLYCOSYLATED A1C: CPT

## 2021-08-10 PROCEDURE — 80053 COMPREHEN METABOLIC PANEL: CPT

## 2021-08-10 PROCEDURE — C9113 INJ PANTOPRAZOLE SODIUM, VIA: HCPCS | Performed by: INTERNAL MEDICINE

## 2021-08-10 PROCEDURE — APPNB30 APP NON BILLABLE TIME 0-30 MINS: Performed by: NURSE PRACTITIONER

## 2021-08-10 PROCEDURE — 36415 COLL VENOUS BLD VENIPUNCTURE: CPT

## 2021-08-10 PROCEDURE — 1200000000 HC SEMI PRIVATE

## 2021-08-10 PROCEDURE — 83735 ASSAY OF MAGNESIUM: CPT

## 2021-08-10 PROCEDURE — 84100 ASSAY OF PHOSPHORUS: CPT

## 2021-08-10 RX ORDER — HYDROMORPHONE HYDROCHLORIDE 1 MG/ML
1 INJECTION, SOLUTION INTRAMUSCULAR; INTRAVENOUS; SUBCUTANEOUS
Status: DISCONTINUED | OUTPATIENT
Start: 2021-08-10 | End: 2021-08-14

## 2021-08-10 RX ORDER — HYDROMORPHONE HYDROCHLORIDE 1 MG/ML
0.5 INJECTION, SOLUTION INTRAMUSCULAR; INTRAVENOUS; SUBCUTANEOUS
Status: DISCONTINUED | OUTPATIENT
Start: 2021-08-10 | End: 2021-08-15 | Stop reason: HOSPADM

## 2021-08-10 RX ADMIN — DORZOLAMIDE HYDROCHLORIDE 1 DROP: 20 SOLUTION/ DROPS OPHTHALMIC at 08:59

## 2021-08-10 RX ADMIN — HYDROMORPHONE HYDROCHLORIDE 1 MG: 1 INJECTION, SOLUTION INTRAMUSCULAR; INTRAVENOUS; SUBCUTANEOUS at 01:48

## 2021-08-10 RX ADMIN — HYDROMORPHONE HYDROCHLORIDE 1 MG: 1 INJECTION, SOLUTION INTRAMUSCULAR; INTRAVENOUS; SUBCUTANEOUS at 12:00

## 2021-08-10 RX ADMIN — TIMOLOL MALEATE 1 DROP: 5 SOLUTION OPHTHALMIC at 08:59

## 2021-08-10 RX ADMIN — HYDROMORPHONE HYDROCHLORIDE 0.5 MG: 1 INJECTION, SOLUTION INTRAMUSCULAR; INTRAVENOUS; SUBCUTANEOUS at 16:32

## 2021-08-10 RX ADMIN — TIMOLOL MALEATE 1 DROP: 5 SOLUTION OPHTHALMIC at 20:13

## 2021-08-10 RX ADMIN — HYDROMORPHONE HYDROCHLORIDE 1 MG: 1 INJECTION, SOLUTION INTRAMUSCULAR; INTRAVENOUS; SUBCUTANEOUS at 05:17

## 2021-08-10 RX ADMIN — ACETAMINOPHEN 650 MG: 325 TABLET ORAL at 09:08

## 2021-08-10 RX ADMIN — HYDROMORPHONE HYDROCHLORIDE 1 MG: 1 INJECTION, SOLUTION INTRAMUSCULAR; INTRAVENOUS; SUBCUTANEOUS at 08:55

## 2021-08-10 RX ADMIN — HYDROMORPHONE HYDROCHLORIDE 1 MG: 1 INJECTION, SOLUTION INTRAMUSCULAR; INTRAVENOUS; SUBCUTANEOUS at 20:09

## 2021-08-10 RX ADMIN — PANTOPRAZOLE SODIUM 40 MG: 40 INJECTION, POWDER, FOR SOLUTION INTRAVENOUS at 08:59

## 2021-08-10 RX ADMIN — DORZOLAMIDE HYDROCHLORIDE 1 DROP: 20 SOLUTION/ DROPS OPHTHALMIC at 20:13

## 2021-08-10 RX ADMIN — LATANOPROST 1 DROP: 50 SOLUTION OPHTHALMIC at 20:09

## 2021-08-10 RX ADMIN — HYDROMORPHONE HYDROCHLORIDE 1 MG: 1 INJECTION, SOLUTION INTRAMUSCULAR; INTRAVENOUS; SUBCUTANEOUS at 23:37

## 2021-08-10 RX ADMIN — ENOXAPARIN SODIUM 40 MG: 40 INJECTION SUBCUTANEOUS at 08:59

## 2021-08-10 RX ADMIN — HYDRALAZINE HYDROCHLORIDE 5 MG: 20 INJECTION INTRAMUSCULAR; INTRAVENOUS at 16:31

## 2021-08-10 ASSESSMENT — PAIN DESCRIPTION - LOCATION
LOCATION: ABDOMEN

## 2021-08-10 ASSESSMENT — PAIN SCALES - GENERAL
PAINLEVEL_OUTOF10: 7
PAINLEVEL_OUTOF10: 5
PAINLEVEL_OUTOF10: 9
PAINLEVEL_OUTOF10: 10
PAINLEVEL_OUTOF10: 8
PAINLEVEL_OUTOF10: 4
PAINLEVEL_OUTOF10: 8
PAINLEVEL_OUTOF10: 8
PAINLEVEL_OUTOF10: 7
PAINLEVEL_OUTOF10: 6
PAINLEVEL_OUTOF10: 7
PAINLEVEL_OUTOF10: 4

## 2021-08-10 ASSESSMENT — PAIN - FUNCTIONAL ASSESSMENT
PAIN_FUNCTIONAL_ASSESSMENT: PREVENTS OR INTERFERES SOME ACTIVE ACTIVITIES AND ADLS
PAIN_FUNCTIONAL_ASSESSMENT: PREVENTS OR INTERFERES SOME ACTIVE ACTIVITIES AND ADLS

## 2021-08-10 ASSESSMENT — PAIN DESCRIPTION - PROGRESSION: CLINICAL_PROGRESSION: GRADUALLY WORSENING

## 2021-08-10 ASSESSMENT — PAIN DESCRIPTION - FREQUENCY
FREQUENCY: CONTINUOUS

## 2021-08-10 ASSESSMENT — PAIN DESCRIPTION - ORIENTATION
ORIENTATION: LEFT;LOWER
ORIENTATION: LEFT;LOWER

## 2021-08-10 ASSESSMENT — PAIN DESCRIPTION - DESCRIPTORS
DESCRIPTORS: STABBING
DESCRIPTORS: SORE
DESCRIPTORS: SORE

## 2021-08-10 ASSESSMENT — PAIN DESCRIPTION - PAIN TYPE
TYPE: SURGICAL PAIN

## 2021-08-10 ASSESSMENT — PAIN DESCRIPTION - ONSET
ONSET: ON-GOING

## 2021-08-10 NOTE — PROGRESS NOTES
Upon sitting up to edge of bed, pt reports dizziness and nausea. PRN zofran administered. Ambulated to bathroom with assistance and returned to bed. Medicated for pain with 1mg Dilaudid. Normoglycemic. BP elevated. Hydralazine given. Patient denies having any further needs at this time. Call light within reach.

## 2021-08-10 NOTE — PROGRESS NOTES
Shift assessment complete. Vital signs stable. Afebrile. Surgical incisions to abdomen all remain clean, dry, and intact with surgical glue. Abdomen soft, non-distended, bowel sounds present. Abdominal binder and ice pack in place. Reports pain 5/10, declines pain medication at this time. Encouraged use of incentive spirometer and deep breathing exercises. Pt verbalizes understanding of teaching. Remains NPO. IVF infusing. The care plan and education has been reviewed and mutually agreed upon with the patient. Fall precautions in place and call light within reach.

## 2021-08-10 NOTE — CARE COORDINATION
Discharge Planning Assessment    RN/SW discharge planner met with patient/ (and family member) to discuss reason for admission, current living situation, and potential needs at the time of discharge    Demographics/Insurance verified Yes    Current type of dwelling:   House    Patient from ECF/SW confirmed with:   N/A    Living arrangements:  She is caregiver for jonn Lim)  w/talisha amputation    Level of function/Support:  independent    PCP:   Dr. Kaylan Lewis    Last Visit to PCP:  6/8/21    DME:  Lift chair , bed changes positions, walker, grab bars    Active with any community resources/agencies/skilled home care:NO  Has used [facility] in past and would like to use again:    Name: [facility] [address]  Phone: [phone]  Fax: [phone]    Medication compliance issues:   NO - Uses [facility], [facility] for [facility] gtts,  [facility] in [employer] issues that could impact healthcare:NO    Tentative discharge plan:  Home w/HHC    Discussed with patient and/or family that on the day of discharge home tentative time of discharge will be between 10 AM and noon. Transportation at the time of discharge:   Jonn Mena will . Referral sent to [facility] for hhc services.     Pearl Nichols RN BSN  Case Management  [phone]

## 2021-08-10 NOTE — PROGRESS NOTES
[facility]        Progress Note    Patient Name: Rama Patterson  MRN: [id number]  [place]: 1940  Date of Evaluation: 8/10/2021    Subjective:  No acute events overnight  Pain controlled  Mild intermittent nausea without vomiting, wanting something to drink  No flatus or BM  Resting in bed at this time      Post-Operative Day #1    Vital Signs:  Patient Vitals for the past 24 hrs:   BP Temp Temp src Pulse Resp SpO2 Height   08/10/21 1056 (!) 160/70 98.3 °F (36.8 °C) Oral 64 16 97 % --   08/10/21 0807 (!) 147/67 98.5 °F (36.9 °C) Oral 81 18 -- --   08/10/21 0500 (!) 144/69 99.3 °F (37.4 °C) Oral 83 18 94 % --   08/10/21 0130 (!) 161/72 97.4 °F (36.3 °C) Oral 70 18 96 % --   21 2300 (!) 167/64 97.4 °F (36.3 °C) Oral 81 18 95 % --   21 1951 (!) 155/61 97.4 °F (36.3 °C) Oral 66 16 96 % 5' 3\" (1.6 m)   21 1833 (!) 155/70 97 °F (36.1 °C) Temporal 73 18 90 % --   21 1815 (!) 151/54 -- -- 65 12 93 % --   21 1810 (!) 152/45 97.4 °F (36.3 °C) Temporal 72 12 95 % --   21 1805 (!) 175/58 -- -- 70 14 100 % --   21 1800 (!) 196/73 -- -- 73 16 100 % --   21 1755 (!) 195/61 97.6 °F (36.4 °C) Temporal 71 12 100 % --   21 1445 (!) 184/70 98.7 °F (37.1 °C) Temporal 72 16 97 % --   21 1357 (!) 183/66 98.6 °F (37 °C) Oral 69 16 96 % --   21 1323 (!) 183/64 -- -- -- -- -- --   21 1300 (!) 194/81 -- -- -- -- 99 % --   21 1245 (!) 182/89 -- -- -- -- 96 % --      TEMPERATURE HISTORY 24H: Temp (24hrs), Av.5 °F (36.4 °C), Min:96.8 °F (36 °C), Max:99.3 °F (37.4 °C)    BLOOD PRESSURE HISTORY: Systolic (92DNH), GNJ:020 , Min:86 , QFF:831    Diastolic (80ICL), BPZ:94, Min:25, Max:89      Intake/Output:  I/O last 3 completed shifts: In: 2165 [I.V.:2165]  Out: 550 [Urine:550]  No intake/output data recorded.   Drain/tube Output:       Physical Exam:  General: awake, alert, oriented to  person, place, time  Lungs: unlabored respirations  Abdomen: soft, mildly distended, incisional tenderness only, hypaoctive bowel sounds  Skin/Wound: healing well, no drainage, no erythema, well approximated, abdominal binder in place    Labs:  CBC:    Recent Labs     08/09/21  0746 08/10/21  0658   WBC 4.9 5.5   HGB 11.1* 10.4*   HCT 33.0* 30.8*    160     BMP:    Recent Labs     08/09/21  0746 08/10/21  0658    138   K 4.2 4.2    103   CO2 23 21   BUN 16 14   CREATININE 1.0 1.0   GLUCOSE 156* 94     Hepatic:    Recent Labs     08/09/21  0746 08/10/21  0658   AST 33 31   ALT 30 24   BILITOT 0.3 0.3   ALKPHOS 97 85     Amylase:  No results found for: AMYLASE  Lipase:    Lab Results   Component Value Date    LIPASE 19.0 08/09/2021      Mag:    Lab Results   Component Value Date    MG 1.40 08/10/2021    MG 1.70 08/09/2021     Phos:     Lab Results   Component Value Date    PHOS 3.2 08/10/2021    PHOS 3.1 08/09/2021      Coags:   Lab Results   Component Value Date    PROTIME 11.8 06/25/2019    INR 1.04 06/25/2019       Cultures:  Anaerobic culture  No results found for: LABANAE  Fungus stain  No results found for requested labs within last 30 days. Gram stain  No results found for requested labs within last 30 days. Organism  Lab Results   Component Value Date/Time    ORG C. difficile toxin B gene detected (A) 12/11/2019 01:44 PM     Surgical culture  No results found for: CXSURG  Blood culture 1  No results found for requested labs within last 30 days. Blood culture 2  No results found for requested labs within last 30 days. Fecal occult  No results found for requested labs within last 30 days. GI bacterial pathogens by PCR  No results found for requested labs within last 30 days. C. difficile  No results found for requested labs within last 30 days.      Urine culture  No results found for: LABURIN    Pathology:  No relevant pathology     Imaging:  I have personally reviewed the following films:    CT ABDOMEN PELVIS WO CONTRAST Additional Contrast? None    Result Date: 8/9/2021  EXAMINATION: CT OF THE ABDOMEN AND PELVIS WITHOUT CONTRAST 8/9/2021 9:14 am TECHNIQUE: CT of the abdomen and pelvis was performed without the administration of intravenous contrast. Multiplanar reformatted images are provided for review. Dose modulation, iterative reconstruction, and/or weight based adjustment of the mA/kV was utilized to reduce the radiation dose to as low as reasonably achievable. COMPARISON: 06/24/2019 HISTORY: ORDERING SYSTEM PROVIDED HISTORY: obstruction TECHNOLOGIST PROVIDED HISTORY: Reason for exam:->obstruction Additional Contrast?->None Decision Support Exception - unselect if not a suspected or confirmed emergency medical condition->Emergency Medical Condition (MA) Reason for Exam: obstruction Acuity: Unknown Type of Exam: Unknown FINDINGS: Lower Chest: Inferior lung bases are clear. Heart size is normal. Organs: Liver is normal in appearance without worrisome focal lesion on these noncontrast images. Gallbladder surgically absent. Other abdominal organs appear normal. GI/Bowel: Moderate amount of air in the transverse colon. Appendix and terminal ileum are normal.  Line of sutures in the upper rectum. No evident diverticular disease. Moderate to marked dilatation of a single mid abdominal small bowel loops. The distal end of the loop lies within a small chronic supraumbilical ventral hernia. The point of obstruction is within the hernia. The exiting loop is not obstructed. No other small bowel loops are dilated. Previously multiple small bowel loops were dilated. Proximal adhesion may be present. Pelvis: Uterus is surgically absent. Urinary bladder appears normal.  No mass, adenopathy, or free fluid. Peritoneum/Retroperitoneum: Mass, adenopathy, or ascites. No free air. Normal size of the aorta. Bones/Soft Tissues: 2 small fatty ventral hernias in the upper midline not significantly changed.   No inflammation. No acute bony abnormality. Slight induration of the anterior abdominal wall presumably from subcutaneous injections. Acute moderate closed loop obstruction of the mid small bowel related to small supraumbilical ventral hernia. Adhesions suspected proximal to the dilated loop. No other acute abnormality identified. RECOMMENDATIONS: Urgent surgical consultation. XR CHEST PORTABLE    Result Date: 8/9/2021  EXAMINATION: ONE XRAY VIEW OF THE CHEST 8/9/2021 12:08 pm COMPARISON: None. HISTORY: ORDERING SYSTEM PROVIDED HISTORY: Preop TECHNOLOGIST PROVIDED HISTORY: Reason for exam:->Preop Reason for Exam: pre op for abd surgery today Acuity: Unknown Type of Exam: Unknown FINDINGS: Lordotic positioning. Heart size normal when accounting for positioning. Pulmonary vasculature within normal limits. Lungs clear. Costophrenic angles sharp     No active cardiopulmonary disease       Scheduled Meds:   sodium chloride flush  5-40 mL Intravenous 2 times per day    pantoprazole  40 mg Intravenous Daily    insulin lispro  0-3 Units Subcutaneous Nightly    latanoprost  1 drop Both Eyes Nightly    insulin lispro  0-6 Units Subcutaneous Q6H    enoxaparin  40 mg Subcutaneous Daily    dorzolamide  1 drop Both Eyes BID    And    timolol  1 drop Both Eyes BID     Continuous Infusions:   sodium chloride      lactated ringers 100 mL/hr at 08/09/21 1959    dextrose       PRN Meds:. HYDROmorphone **OR** HYDROmorphone, iopamidol, sodium chloride flush, sodium chloride, ondansetron **OR** ondansetron, acetaminophen **OR** acetaminophen, glucose, dextrose, glucagon (rDNA), dextrose, hydrALAZINE      Assessment:  80 y.o. female admitted with   1.  Intestinal obstruction, unspecified cause, unspecified whether partial or complete [facility])        Status-post laparoscopic lysis of adhesions (1 hour), repair, ventral hernia repair with mesh on 8/9/2021 for incarcerated ventral hernia  Hypertension, CAD  Chronic kidney disease  Diabetes  Fibromyalgia      Plan:  1. NPO with ice/popsicles/medications; monitor bowel function  2. IV hydration; monitor and correct electrolytes  3. Activity as tolerated, ambulate TID, up to chair for all meals--PT/OT evaluation and treatment  4. Pulmonary toilet, incentive spirometry  5. PRN analgesics and antiemetics--minimizing narcotics as tolerated  6. DVT prophylaxis with  Lovenox and SCD's  7. Management of medical comorbid etiologies per primary team and consulting services    EDUCATION:  Educated patient on plan of care and disease process--all questions answered. Plans discussed with patient and nursing. Reviewed and discussed with Dr. Evita Linares.       Signed:  YAZMIN Tavares - CNP  8/10/2021 11:35 AM    Doing well  Sips of liquids  Up to chair

## 2021-08-10 NOTE — OP NOTE
5-mm Optiview trocar was placed at this site. A 5-mm  trocar was placed in the left mid abdomen followed by a 5-mm trocar in  the left lower quadrant. There were dense adhesions to the abdominal wall from the lower abdomen  up to the falciform ligament. A good portion of the adhesions contained  mainly omentum. These were taken down using the LigaSure. We continued  to dissect bowel and omentum off of the abdominal wall. We eventually  approached the area of the known hernia which was located above the  umbilicus. The bowel was more densely adherent in this area. We made a decision to make a small open incision above the umbilicus. We made a 3-cm vertical incision. Dissection was carried down through  the subcutaneous tissue. The sac was encountered. It was opened. The  small bowel was adherent to the sac as well as adherent to the  peritoneal surface surrounding the hernia defect. This was tediously  dissected off of the sac and the abdominal wall. We closed the skin with a running 3-0 Vicryl suture in order to hold the  pneumoperitoneum. The abdomen was reinsufflated. We took down the  remaining adhesions to the peritoneum surrounding the hernia. There  were also some adhesions in the lower abdomen which were taken down with  the LigaSure. At this point in time, we evaluated the hernia defects. The lowest  hernia defect was at the level of the umbilicus. She had small hernias  which extended up to the base of the falciform ligament. We measured  the distance from the uppermost hernia to the lowermost hernia. It  measured 10 cm. We selected a 20 x 15 cm Bard Echo mesh. The sutures on the skin were removed and then the Bard Echo mesh was  placed in the peritoneal cavity. The hernia defect which measured about  3 cm in vertical height was closed in a transverse orientation with  interrupted 0-Prolene sutures. The abdomen was then reinsufflated.   We positioned the Bard Echo system  so we had 5-cm overlap circumferentially around all defects. The  balloon was brought up using a PMI needle and then the balloon was  inflated. The mesh was tacked circumferentially with SecureStrap tacks  placed about every 15 mm. We placed two 5-mm trocars in the right side  to facilitate packing on the left side of the mesh. Once we had placed  tacks circumferentially, the balloon on the Bard Echo mesh was removed. We then placed an inner crown row of tacks. At this point in time, we  had primary fascial closure of the largest hernia defect and mesh  coverage of all other areas. We had excellent hemostasis. I should say that prior to placing the mesh in the abdomen, we did  eviscerate the small bowel where we had undertaken dense adhesiolysis. There was no evidence of injury to the bowel. Before removing the trocars, we checked all the sites for bleeding. We  also had excellent hemostasis where the bowel was dissected. The  trocars were then removed and the abdomen was de-insufflated. The subcutaneous tissue of the supraumbilical incision was closed with  interrupted 3-0 Vicryl sutures. The skin of all the incisions was  closed with running 4-0 subcuticular sutures. Dermabond was then  applied. The patient tolerated the procedure without difficulty and was  transferred to recovery room in stable condition. Pretty Herbert.  Ayden Rodriguez MD    D: 08/09/2021 18:07:38       T: 08/09/2021 18:11:38     [email or web address]  Job#: [id number]     Doc#: [id number]    CC:  MD Hussain Castellanos PA-C

## 2021-08-11 LAB
ANION GAP SERPL CALCULATED.3IONS-SCNC: 10 MMOL/L (ref 3–16)
BUN BLDV-MCNC: 14 MG/DL (ref 7–20)
CALCIUM SERPL-MCNC: 9.1 MG/DL (ref 8.3–10.6)
CHLORIDE BLD-SCNC: 104 MMOL/L (ref 99–110)
CO2: 24 MMOL/L (ref 21–32)
CREAT SERPL-MCNC: 1 MG/DL (ref 0.6–1.2)
GFR AFRICAN AMERICAN: >60
GFR NON-AFRICAN AMERICAN: 53
GLUCOSE BLD-MCNC: 121 MG/DL (ref 70–99)
GLUCOSE BLD-MCNC: 138 MG/DL (ref 70–99)
GLUCOSE BLD-MCNC: 57 MG/DL (ref 70–99)
GLUCOSE BLD-MCNC: 62 MG/DL (ref 70–99)
GLUCOSE BLD-MCNC: 72 MG/DL (ref 70–99)
GLUCOSE BLD-MCNC: 77 MG/DL (ref 70–99)
GLUCOSE BLD-MCNC: 81 MG/DL (ref 70–99)
GLUCOSE BLD-MCNC: 92 MG/DL (ref 70–99)
HCT VFR BLD CALC: 26.9 % (ref 36–48)
HEMOGLOBIN: 8.9 G/DL (ref 12–16)
MAGNESIUM: 2 MG/DL (ref 1.8–2.4)
MCH RBC QN AUTO: 33.3 PG (ref 26–34)
MCHC RBC AUTO-ENTMCNC: 33.2 G/DL (ref 31–36)
MCV RBC AUTO: 100.1 FL (ref 80–100)
PDW BLD-RTO: 14.2 % (ref 12.4–15.4)
PERFORMED ON: ABNORMAL
PERFORMED ON: NORMAL
PLATELET # BLD: 155 K/UL (ref 135–450)
PMV BLD AUTO: 7.5 FL (ref 5–10.5)
POTASSIUM SERPL-SCNC: 3.8 MMOL/L (ref 3.5–5.1)
RBC # BLD: 2.68 M/UL (ref 4–5.2)
SODIUM BLD-SCNC: 138 MMOL/L (ref 136–145)
WBC # BLD: 5.8 K/UL (ref 4–11)

## 2021-08-11 PROCEDURE — 6360000002 HC RX W HCPCS: Performed by: NURSE PRACTITIONER

## 2021-08-11 PROCEDURE — 1200000000 HC SEMI PRIVATE

## 2021-08-11 PROCEDURE — 2580000003 HC RX 258: Performed by: NURSE PRACTITIONER

## 2021-08-11 PROCEDURE — 97530 THERAPEUTIC ACTIVITIES: CPT

## 2021-08-11 PROCEDURE — 36415 COLL VENOUS BLD VENIPUNCTURE: CPT

## 2021-08-11 PROCEDURE — 97161 PT EVAL LOW COMPLEX 20 MIN: CPT

## 2021-08-11 PROCEDURE — 6360000002 HC RX W HCPCS: Performed by: PHYSICIAN ASSISTANT

## 2021-08-11 PROCEDURE — 2500000003 HC RX 250 WO HCPCS: Performed by: SURGERY

## 2021-08-11 PROCEDURE — 97165 OT EVAL LOW COMPLEX 30 MIN: CPT

## 2021-08-11 PROCEDURE — 6360000002 HC RX W HCPCS: Performed by: INTERNAL MEDICINE

## 2021-08-11 PROCEDURE — 80048 BASIC METABOLIC PNL TOTAL CA: CPT

## 2021-08-11 PROCEDURE — APPNB30 APP NON BILLABLE TIME 0-30 MINS: Performed by: NURSE PRACTITIONER

## 2021-08-11 PROCEDURE — 2580000003 HC RX 258: Performed by: INTERNAL MEDICINE

## 2021-08-11 PROCEDURE — C9113 INJ PANTOPRAZOLE SODIUM, VIA: HCPCS | Performed by: INTERNAL MEDICINE

## 2021-08-11 PROCEDURE — 97535 SELF CARE MNGMENT TRAINING: CPT

## 2021-08-11 PROCEDURE — 85027 COMPLETE CBC AUTOMATED: CPT

## 2021-08-11 PROCEDURE — 97116 GAIT TRAINING THERAPY: CPT

## 2021-08-11 PROCEDURE — APPSS15 APP SPLIT SHARED TIME 0-15 MINUTES: Performed by: NURSE PRACTITIONER

## 2021-08-11 PROCEDURE — 6370000000 HC RX 637 (ALT 250 FOR IP): Performed by: PHYSICIAN ASSISTANT

## 2021-08-11 PROCEDURE — 6360000002 HC RX W HCPCS: Performed by: SURGERY

## 2021-08-11 PROCEDURE — 83735 ASSAY OF MAGNESIUM: CPT

## 2021-08-11 PROCEDURE — 99024 POSTOP FOLLOW-UP VISIT: CPT | Performed by: SURGERY

## 2021-08-11 RX ORDER — HYDRALAZINE HYDROCHLORIDE 25 MG/1
25 TABLET, FILM COATED ORAL EVERY 12 HOURS SCHEDULED
Status: DISCONTINUED | OUTPATIENT
Start: 2021-08-11 | End: 2021-08-15 | Stop reason: HOSPADM

## 2021-08-11 RX ORDER — MAGNESIUM SULFATE IN WATER 40 MG/ML
2000 INJECTION, SOLUTION INTRAVENOUS PRN
Status: DISCONTINUED | OUTPATIENT
Start: 2021-08-11 | End: 2021-08-15 | Stop reason: HOSPADM

## 2021-08-11 RX ORDER — HYDROCODONE BITARTRATE AND ACETAMINOPHEN 5; 325 MG/1; MG/1
1 TABLET ORAL EVERY 6 HOURS PRN
Qty: 15 TABLET | Refills: 0 | Status: SHIPPED | OUTPATIENT
Start: 2021-08-11 | End: 2021-08-15 | Stop reason: SDUPTHER

## 2021-08-11 RX ADMIN — HYDROMORPHONE HYDROCHLORIDE 1 MG: 1 INJECTION, SOLUTION INTRAMUSCULAR; INTRAVENOUS; SUBCUTANEOUS at 08:35

## 2021-08-11 RX ADMIN — HYDRALAZINE HYDROCHLORIDE 25 MG: 25 TABLET, FILM COATED ORAL at 20:54

## 2021-08-11 RX ADMIN — HYDROMORPHONE HYDROCHLORIDE 1 MG: 1 INJECTION, SOLUTION INTRAMUSCULAR; INTRAVENOUS; SUBCUTANEOUS at 02:37

## 2021-08-11 RX ADMIN — HYDROMORPHONE HYDROCHLORIDE 1 MG: 1 INJECTION, SOLUTION INTRAMUSCULAR; INTRAVENOUS; SUBCUTANEOUS at 20:54

## 2021-08-11 RX ADMIN — DORZOLAMIDE HYDROCHLORIDE 1 DROP: 20 SOLUTION/ DROPS OPHTHALMIC at 20:56

## 2021-08-11 RX ADMIN — TIMOLOL MALEATE 1 DROP: 5 SOLUTION OPHTHALMIC at 20:56

## 2021-08-11 RX ADMIN — LATANOPROST 1 DROP: 50 SOLUTION OPHTHALMIC at 20:56

## 2021-08-11 RX ADMIN — MAGNESIUM SULFATE HEPTAHYDRATE 2000 MG: 40 INJECTION, SOLUTION INTRAVENOUS at 03:26

## 2021-08-11 RX ADMIN — Medication 10 ML: at 20:54

## 2021-08-11 RX ADMIN — HYDROMORPHONE HYDROCHLORIDE 1 MG: 1 INJECTION, SOLUTION INTRAMUSCULAR; INTRAVENOUS; SUBCUTANEOUS at 11:54

## 2021-08-11 RX ADMIN — PANTOPRAZOLE SODIUM 40 MG: 40 INJECTION, POWDER, FOR SOLUTION INTRAVENOUS at 09:40

## 2021-08-11 RX ADMIN — SODIUM CHLORIDE, POTASSIUM CHLORIDE, SODIUM LACTATE AND CALCIUM CHLORIDE: 600; 310; 30; 20 INJECTION, SOLUTION INTRAVENOUS at 14:37

## 2021-08-11 RX ADMIN — DEXTROSE MONOHYDRATE 12.5 G: 25 INJECTION, SOLUTION INTRAVENOUS at 06:56

## 2021-08-11 RX ADMIN — METOPROLOL TARTRATE 25 MG: 25 TABLET, FILM COATED ORAL at 20:54

## 2021-08-11 RX ADMIN — ENOXAPARIN SODIUM 40 MG: 40 INJECTION SUBCUTANEOUS at 09:40

## 2021-08-11 RX ADMIN — DORZOLAMIDE HYDROCHLORIDE 1 DROP: 20 SOLUTION/ DROPS OPHTHALMIC at 09:48

## 2021-08-11 RX ADMIN — HYDROMORPHONE HYDROCHLORIDE 1 MG: 1 INJECTION, SOLUTION INTRAMUSCULAR; INTRAVENOUS; SUBCUTANEOUS at 15:42

## 2021-08-11 ASSESSMENT — PAIN SCALES - GENERAL
PAINLEVEL_OUTOF10: 6
PAINLEVEL_OUTOF10: 7
PAINLEVEL_OUTOF10: 8
PAINLEVEL_OUTOF10: 7
PAINLEVEL_OUTOF10: 7
PAINLEVEL_OUTOF10: 4
PAINLEVEL_OUTOF10: 5
PAINLEVEL_OUTOF10: 7
PAINLEVEL_OUTOF10: 0
PAINLEVEL_OUTOF10: 7
PAINLEVEL_OUTOF10: 7
PAINLEVEL_OUTOF10: 5
PAINLEVEL_OUTOF10: 5

## 2021-08-11 ASSESSMENT — PAIN DESCRIPTION - FREQUENCY: FREQUENCY: CONTINUOUS

## 2021-08-11 ASSESSMENT — PAIN DESCRIPTION - ONSET: ONSET: ON-GOING

## 2021-08-11 ASSESSMENT — PAIN DESCRIPTION - LOCATION
LOCATION: ABDOMEN
LOCATION: ABDOMEN;FLANK

## 2021-08-11 ASSESSMENT — PAIN DESCRIPTION - PROGRESSION
CLINICAL_PROGRESSION: GRADUALLY WORSENING
CLINICAL_PROGRESSION: GRADUALLY IMPROVING

## 2021-08-11 ASSESSMENT — PAIN DESCRIPTION - PAIN TYPE
TYPE: SURGICAL PAIN
TYPE: SURGICAL PAIN

## 2021-08-11 ASSESSMENT — PAIN DESCRIPTION - DESCRIPTORS: DESCRIPTORS: CRAMPING;STABBING

## 2021-08-11 ASSESSMENT — PAIN DESCRIPTION - ORIENTATION: ORIENTATION: LEFT;RIGHT

## 2021-08-11 NOTE — PROGRESS NOTES
[facility]oscopic [facility]        Progress Note    Patient Name: Holly Mcintosh  MRN: [id number]  [place]: 1940  Date of Evaluation: 2021    Subjective:  No acute events overnight; did have low-grade fever up to 100.1  Pain controlled  Denies further nausea, no vomiting, no appetite yet but would like to try Jello  Passing small amount of flatus, no BM, bloating gradually improving  Up to chair at this time    Post-Operative Day #2      Vital Signs:  Patient Vitals for the past 24 hrs:   BP Temp Temp src Pulse Resp SpO2   21 0930 (!) 154/87 98.8 °F (37.1 °C) Axillary 87 18 94 %   21 0904 (!) 173/77   90     21 0215 (!) 146/68 99.8 °F (37.7 °C) Oral 87 16 93 %   08/10/21 2319 (!) 164/76 100.1 °F (37.8 °C) Oral 89 18 92 %   08/10/21 2015 (!) 158/74   91     08/10/21 1657 (!) 143/58 97.6 °F (36.4 °C) Oral 83 16 98 %   08/10/21 1631 (!) 166/75           TEMPERATURE HISTORY 24H: Temp (24hrs), Av.1 °F (37.3 °C), Min:97.6 °F (36.4 °C), Max:100.1 °F (37.8 °C)    BLOOD PRESSURE HISTORY: Systolic (12RCP), ZPY:857 , Min:143 , YPT:946    Diastolic (20QYP), QUO:72, Min:58, Max:87      Intake/Output:  I/O last 3 completed shifts:  In: -   Out: 300 [Urine:300]  No intake/output data recorded.   Drain/tube Output:       Physical Exam:  General: awake, alert, oriented to  person, place, time  Lungs: unlabored respirations  Abdomen: soft, mildly distended, incisional tenderness only, active bowel sounds  Skin/Wound: healing well, no drainage, no erythema, well approximated, abdominal binder in place    Labs:  CBC:    Recent Labs     21  0746 08/10/21  0658 21  0644   WBC 4.9 5.5 5.8   HGB 11.1* 10.4* 8.9*   HCT 33.0* 30.8* 26.9*    160 155     BMP:    Recent Labs     21  0746 08/10/21  0658 21  0644    138 138   K 4.2 4.2 3.8    103 104   CO2 23 21 24   BUN 16 14 14   CREATININE 1.0 1.0 1.0   GLUCOSE 156* 94 57* Hepatic:    Recent Labs     08/09/21  0746 08/10/21  0658   AST 33 31   ALT 30 24   BILITOT 0.3 0.3   ALKPHOS 97 85     Amylase:  No results found for: AMYLASE  Lipase:    Lab Results   Component Value Date    LIPASE 19.0 08/09/2021      Mag:    Lab Results   Component Value Date    MG 2.00 08/11/2021    MG 1.40 08/10/2021     Phos:     Lab Results   Component Value Date    PHOS 3.2 08/10/2021    PHOS 3.1 08/09/2021      Coags:   Lab Results   Component Value Date    PROTIME 11.8 06/25/2019    INR 1.04 06/25/2019       Cultures:  Anaerobic culture  No results found for: LABANAE  Fungus stain  No results found for requested labs within last 30 days. Gram stain  No results found for requested labs within last 30 days. Organism  Lab Results   Component Value Date/Time    ORG C. difficile toxin B gene detected (A) 12/11/2019 01:44 PM     Surgical culture  No results found for: CXSURG  Blood culture 1  No results found for requested labs within last 30 days. Blood culture 2  No results found for requested labs within last 30 days. Fecal occult  No results found for requested labs within last 30 days. GI bacterial pathogens by PCR  No results found for requested labs within last 30 days. C. difficile  No results found for requested labs within last 30 days. Urine culture  No results found for: LABURIN    Pathology:  No relevant pathology     Imaging:  I have personally reviewed the following films:    XR CHEST PORTABLE    Result Date: 8/9/2021  EXAMINATION: ONE XRAY VIEW OF THE CHEST 8/9/2021 12:08 pm COMPARISON: None. HISTORY: ORDERING SYSTEM PROVIDED HISTORY: Preop TECHNOLOGIST PROVIDED HISTORY: Reason for exam:->Preop Reason for Exam: pre op for abd surgery today Acuity: Unknown Type of Exam: Unknown FINDINGS: Lordotic positioning. Heart size normal when accounting for positioning. Pulmonary vasculature within normal limits. Lungs clear.   Costophrenic angles sharp     No active cardiopulmonary disease     Scheduled Meds:   sodium chloride flush  5-40 mL Intravenous 2 times per day    pantoprazole  40 mg Intravenous Daily    insulin lispro  0-3 Units Subcutaneous Nightly    latanoprost  1 drop Both Eyes Nightly    insulin lispro  0-6 Units Subcutaneous Q6H    enoxaparin  40 mg Subcutaneous Daily    dorzolamide  1 drop Both Eyes BID    And    timolol  1 drop Both Eyes BID     Continuous Infusions:   sodium chloride      lactated ringers 75 mL/hr at 08/10/21 1158    dextrose       PRN Meds:.magnesium sulfate, HYDROmorphone **OR** HYDROmorphone, iopamidol, sodium chloride flush, sodium chloride, ondansetron **OR** ondansetron, acetaminophen **OR** acetaminophen, glucose, dextrose, glucagon (rDNA), dextrose, hydrALAZINE      Assessment:  80 y.o. female admitted with   1. Intestinal obstruction, unspecified cause, unspecified whether partial or complete [facility])        Status-post laparoscopic lysis of adhesions (1 hour), repair, ventral hernia repair with mesh on 8/9/2021 for incarcerated ventral hernia  Hypertension, CAD  Chronic kidney disease  Diabetes  Fibromyalgia      Plan:  1. Clear liquid diet as tolerated; monitor bowel function--passing some flatus, no stool  2. IV hydration; monitor and correct electrolytes  3. Activity as tolerated, ambulate TID, up to chair for all meals--PT/OT following  4. Low-grade fevers, incision healing well, monitor and continue pulmonary toilet, incentive spirometry  5. PRN analgesics and antiemetics--minimizing narcotics as tolerated  6. DVT prophylaxis with  Lovenox and SCD's  7. Management of medical comorbid etiologies per primary team and consulting services    EDUCATION:  Educated patient on plan of care and disease process--all questions answered. Plans discussed with patient and nursing. Reviewed and discussed with Dr. Elena Mukherjee.       Signed:  YAZMIN Wilkinson - CNP  8/11/2021 11:23 AM     31-year-old female who is postop day #2 status post laparoscopic repair of an incarcerated ventral hernia with mesh. Doing well. Passing some flatus. Will advance to a clear liquid diet. Increase ambulation.

## 2021-08-11 NOTE — PROGRESS NOTES
Physical Therapy    Facility/Department: [address] ORTHO/NEURO NURSING  Initial Assessment    NAME: Yash Chilel  : [birth year]  MRN: [id number]    Date of Service: 2021    Discharge Recommendations:Helena Marmolejo scored a 17/24 on the AM-PAC short mobility form. Current research shows that an AM-PAC score of 18 or greater is typically associated with a discharge to the patient's home setting. Based on the patient's AM-PAC score and their current functional mobility deficits, it is recommended that the patient have 2-3 sessions per week of Physical Therapy at d/c to increase the patient's independence. At this time, this patient demonstrates the endurance and safety to discharge home with (home services) and a follow up treatment frequency of 2-3x/wk. Please see assessment section for further patient specific details. If patient discharges prior to next session this note will serve as a discharge summary. Please see below for the latest assessment towards goals. HOME HEALTH CARE: LEVEL 3 SAFETY     - Initial home health evaluation to occur within 24-48 hours, in patient home   - Therapy evaluations in home within 24-48 hours of discharge; including DME and home safety   - Frontload therapy 5 days, then 3x a week   - Therapy to evaluate if patient has [address] needs for personal care   -  evaluation within 24-48 hours, includes evaluation of resources and insurance to determine [place], [place], LTC, and Medicaid options         PT Equipment Recommendations  Equipment Needed: Yes  Mobility Devices: Ada Ofe: Rolling    Assessment   Body structures, Functions, Activity limitations: Decreased functional mobility ; Decreased ADL status; Decreased ROM; Decreased endurance;Decreased balance; Increased pain  Assessment: Pt presents as below her baseline function, s/p abdominal surgery for ventral hernia. Pt would benefit from skilled PT services to promote safe retrun to PLOF.   Prognosis: Good  Decision Making: Low Complexity  PT Education: Goals;PT Role;Plan of Care  Patient Education: D/C recommendations--pt verbalized understanding  REQUIRES PT FOLLOW UP: Yes  Activity Tolerance  Activity Tolerance: Patient Tolerated treatment well;Patient limited by pain       Patient Diagnosis(es): The encounter diagnosis was Intestinal obstruction, unspecified cause, unspecified whether partial or complete ([address] has a past medical history of Angina, Bilateral carpal tunnel syndrome, Chronic kidney disease, Fibromyalgia, Hyperlipidemia, Hypertension, MVP (mitral valve prolapse), s, Small bowel obstruction ([address] and Type II or unspecified type diabetes mellitus without mention of complication, not stated as uncontrolled. has a past surgical history that includes Coronary angioplasty with stent; Hysterectomy; Cholecystectomy; lumbar discectomy (10/03/12); Small intestine surgery; Colonoscopy (N/A, 8/13/2019); hernia repair; and ventral hernia repair (N/A, 8/9/2021). Restrictions  Restrictions/Precautions  Restrictions/Precautions: Fall Risk (High)  Required Braces or Orthoses?: No  Position Activity Restriction  Other position/activity restrictions: Hung Tavares is a 80 y.o. female with past medical show chronic kidney disease, hyperlipidemia, hypertension, previous small bowel obstruction and type 2 diabetes who presents to the ED with complaint of constipation associate abdominal pain. Patient is for the past week she has had nothing but liquid stool. Patient states she feels constipated and has had abdominal bloating with associated upper abdominal pain that she describes as sharp rated 9/10. Patient states she had decreased oral intake. Denies any nausea or vomiting. Denies fever chills. Denies any blood, pus or mucus in her stool. Denies any urinary symptoms. Denies vaginal bleed or discharge. Denies chest pain or shortness of breath. Patient ages lungs and history of bowel problems. Patient states she has had history of cholecystectomy, hysterectomy and bowel resection for diverticulitis/perforation/colovaginal fistula in the past.  States she follows up with GI. Patient states she is been placed on fiber supplementation but states it has not improved her symptoms over the past week.   Vision/Hearing  Vision: Impaired  Vision Exceptions: Wears glasses at all times  Hearing: Within functional limits     Subjective  General  Chart Reviewed: Yes  Response To Previous Treatment: Not applicable  Family / Caregiver Present: No  Diagnosis: Intestinal obstruction; s/p lysis of adhesions, repair of ventral hernia  Follows Commands: Within Functional Limits  General Comment  Comments: Pt supine in bed upon arrival.  Subjective  Subjective: Pt agreeable to PT/OT assisting to restroom  Pain Screening  Patient Currently in Pain: Yes  Pain Assessment  Pain Assessment: 0-10  Pain Level: 7 (RN present near beginning of session and providing pain medication)  Vital Signs  Pulse: 90  BP: (!) 173/77  BP Location: Right upper arm  MAP (mmHg): 109  Patient Currently in Pain: Yes       Orientation  Orientation  Overall Orientation Status: Within Normal Limits  Social/Functional History  Social/Functional History  Lives With: Son (son is B amputee, pt reports son is functionally independent with w/c)  Type of Home: House  Home Layout: One level, Laundry in basement, Able to Live on Main level with bedroom/bathroom, Performs ADL's on one level (stair lift)  Home Access: Ramped entrance, Stairs to enter without rails  Entrance Stairs - Number of Steps: 2 DERICK  Bathroom Shower/Tub: Tub/Shower unit, Shower chair without back (whirlpool tub)  Bathroom Equipment: Grab bars in shower, Shower chair  Home Equipment: Rolling walker, Cane, Crutches, Wheelchair-manual (adjustable bed)  ADL Assistance: Independent  Homemaking Assistance: Independent  Homemaking Responsibilities: Yes  Ambulation Assistance: Independent  Transfer Assistance: Independent  Active : No  Patient's  Info: Friend drives pt's son to PT  Leisure & Hobbies: Sing with [employer]  Additional Comments: Pt denies recent falls  Cognition        Objective     Observation/Palpation  Posture: Good    AROM RLE (degrees)  RLE AROM: WFL  AROM LLE (degrees)  LLE AROM : WFL  Strength RLE  Strength RLE: WFL  Strength LLE  Strength LLE: WFL  Tone RLE  RLE Tone: Normotonic  Tone LLE  LLE Tone: Normotonic  Motor Control  Gross Motor?: WFL  Sensation  Overall Sensation Status: WFL  Bed mobility  Supine to Sit: Moderate assistance  Scooting: Contact guard assistance  Transfers  Sit to Stand: Contact guard assistance (completed from EOB and low toilet x2)  Stand to sit: Contact guard assistance  Ambulation  Ambulation?: Yes  Ambulation 1  Surface: level tile  Device: Rolling Walker  Assistance: Contact guard assistance  Quality of Gait: Pt ambulates with decreased step length, slow jacky, increased UE weightbearing, no LOB. Distance: ~10 ft x2  Stairs/Curb  Stairs?: No     Balance  Posture: Fair  Sitting - Static: Good  Sitting - Dynamic: Good  Standing - Static: Fair;+  Standing - Dynamic: Fair;+        Plan   Plan  Times per week: 3-5x  Times per day: Daily  Current Treatment Recommendations: Strengthening, ROM, Balance Training, Functional Mobility Training, Transfer Training, Endurance Training, Gait Training, Stair training, Home Exercise Program, Safety Education & Training, Patient/Caregiver Education & Training  Safety Devices  Type of devices:  All fall risk precautions in place, Call light within reach, Chair alarm in place, Gait belt, Patient at risk for falls, Left in chair, Nurse notified  Restraints  Initially in place: No    G-Code       OutComes Score                                                  AM-PAC Score  AM-PAC Inpatient Mobility Raw Score : 17 (08/11/21 1047)  AM-PAC Inpatient T-Scale Score : 42.13 (08/11/21 1047)  Mobility Inpatient CMS 0-100% Score: 50.57 (08/11/21 1047)  Mobility Inpatient CMS G-Code Modifier : CK (08/11/21 1047)          Goals  Short term goals  Time Frame for Short term goals:  To be met prior to discharge  Short term goal 1: Pt will complete bed mobility with mod I  Short term goal 2: Pt will complete sit to/from stand with mod I  Short term goal 3: Pt will ambulate 100 ft with LRAD and mod I       Therapy Time   Individual Concurrent Group Co-treatment   Time In 0829         Time Out 0924         Minutes 55         Timed Code Treatment Minutes: [address], PT   Chuy Ortez, DPT, [id number]

## 2021-08-11 NOTE — PROGRESS NOTES
Occupational Therapy   Occupational Therapy Initial Assessment  Date: 2021   Patient Name: Sana Gardner  MRN: [id number]     : 1940    Date of Service: 2021    Discharge Recommendations: Sana Gardner scored a 18/24 on the AM-PAC ADL Inpatient form. Current research shows that an AM-PAC score of 18 or greater is typically associated with a discharge to the patient's home setting. Based on the patient's AM-PAC score, and their current ADL deficits, it is recommended that the patient have 2-3 sessions per week of Occupational Therapy at d/c to increase the patient's independence. At this time, this patient demonstrates the endurance and safety to discharge home with home services (home vs OP services) and a follow up treatment frequency of 2-3x/wk. Please see assessment section for further patient specific details. If patient discharges prior to next session this note will serve as a discharge summary. Please see below for the latest assessment towards goals. HOME HEALTH CARE: LEVEL 3 SAFETY     - Initial home health evaluation to occur within 24-48 hours, in patient home   - Therapy evaluations in home within 24-48 hours of discharge; including DME and home safety   - Frontload therapy 5 days, then 3x a week   - Therapy to evaluate if patient has [address] needs for personal care   -  evaluation within 24-48 hours, includes evaluation of resources and insurance to determine AL, [place], LTC, and Medicaid options        OT Equipment Recommendations  Equipment Needed: No  Other: possible hip kit to decrease pain with LB dressing -- discussed with pt    Assessment   Performance deficits / Impairments: Decreased functional mobility ; Decreased endurance;Decreased ADL status; Decreased balance;Decreased strength;Decreased high-level IADLs  Assessment: Patient presents below baseline d/t above deficits; OT indicated to maximize safety/independence with ADL and IADL  Treatment Diagnosis: Above deficits associated with SBO  Prognosis: Good  Decision Making: Low Complexity  Exam: as above  OT Education: OT Role;Plan of Care  Patient Education: eval,discharge--pt v/u  REQUIRES OT FOLLOW UP: Yes  Activity Tolerance  Activity Tolerance: Patient Tolerated treatment well;Patient limited by pain  Safety Devices  Safety Devices in place: Yes  Type of devices: All fall risk precautions in place; Left in chair;Call light within reach;Nurse notified; Chair alarm in place;Gait belt           Patient Diagnosis(es): The encounter diagnosis was Intestinal obstruction, unspecified cause, unspecified whether partial or complete ([address] has a past medical history of Angina, Bilateral carpal tunnel syndrome, Chronic kidney disease, Fibromyalgia, Hyperlipidemia, Hypertension, MVP (mitral valve prolapse), s, Small bowel obstruction ([address] and Type II or unspecified type diabetes mellitus without mention of complication, not stated as uncontrolled. has a past surgical history that includes Coronary angioplasty with stent; Hysterectomy; Cholecystectomy; lumbar discectomy (10/03/12); Small intestine surgery; Colonoscopy (N/A, 8/13/2019); hernia repair; and ventral hernia repair (N/A, 8/9/2021). Treatment Diagnosis: Above deficits associated with SBO      Restrictions  Restrictions/Precautions  Restrictions/Precautions: Fall Risk (High)  Required Braces or Orthoses?: No  Position Activity Restriction  Other position/activity restrictions: Lupe Loaiza is a 80 y.o. female with past medical show chronic kidney disease, hyperlipidemia, hypertension, previous small bowel obstruction and type 2 diabetes who presents to the ED with complaint of constipation associate abdominal pain. Patient is for the past week she has had nothing but liquid stool. Patient states she feels constipated and has had abdominal bloating with associated upper abdominal pain that she describes as sharp rated 9/10.   Patient states she had decreased oral intake. Denies any nausea or vomiting. Denies fever chills. Denies any blood, pus or mucus in her stool. Denies any urinary symptoms. Denies vaginal bleed or discharge. Denies chest pain or shortness of breath. Patient ages lungs and history of bowel problems. Patient states she has had history of cholecystectomy, hysterectomy and bowel resection for diverticulitis/perforation/colovaginal fistula in the past.  States she follows up with GI. Patient states she is been placed on fiber supplementation but states it has not improved her symptoms over the past week. Subjective   General  Chart Reviewed: Yes  Diagnosis: SBO  Subjective  Subjective: Patient supine in bed upon arrival, agreeable to evaluation.  Reports 5/10 pain at rest, RN present to provide pain medication  Patient Currently in Pain: Yes  Pain Assessment  Pain Assessment: 0-10  Pain Level: 5  Response to Pain Intervention: Asleep with RR greater than 10  Pre Treatment Pain Screening  Intervention List: Patient able to continue with treatment;Nurse/Physician notified  Vital Signs  Temp: 98.8 °F (37.1 °C)  Temp Source: Axillary  Pulse: 87  Heart Rate Source: Monitor  Resp: 18  BP: (!) 154/87  BP Location: Right upper arm  MAP (mmHg): 109  Patient Position: Sitting  Level of Consciousness: Alert (0)  MEWS Score: 1  Patient Currently in Pain: Yes  Oxygen Therapy  SpO2: 94 %  O2 Device: None (Room air)     Social/Functional History  Social/Functional History  Lives With: Son (son is B amputee, pt reports son is functionally independent with w/c)  Type of Home: House  Home Layout: One level, Laundry in basement, Able to Live on Main level with bedroom/bathroom, Performs ADL's on one level (stair lift)  Home Access: Ramped entrance, Stairs to enter without rails  Entrance Stairs - Number of Steps: 2 DERICK  Bathroom Shower/Tub: Tub/Shower unit, Shower chair without back (whirlpool tub)  Bathroom Equipment: Grab bars in shower, [employer] chair  Home Equipment: Rolling walker, Cane, Crutches, Wheelchair-manual (adjustable bed)  ADL Assistance: [address]: Independent  Homemaking Responsibilities: Yes  Ambulation Assistance: Independent  Transfer Assistance: Independent  Active : No  Patient's  Info: Friend drives pt's son to PT  Leisure & Hobbies: Sing with [employer]  Additional Comments: Pt denies recent falls       Objective   Vision: Impaired  Vision Exceptions: Wears glasses at all times  Hearing: Within functional limits    Orientation  Overall Orientation Status: Within Functional Limits  Observation/Palpation  Posture: Good  Balance  Sitting Balance: Stand by assistance  Standing Balance: Contact guard assistance  Standing Balance  Time: ~3-4 min total  Activity: ambulation, transfers, stance at sink and stance for ADL  Functional Mobility  Functional - Mobility Device: Rolling Walker  Activity: Other; To/from bathroom  Assist Level: Contact guard assistance  Toilet Transfers  Toilet - Technique: Ambulating  Equipment Used: Standard toilet  Toilet Transfer: Contact guard assistance  ADL  Grooming: Stand by assistance (oral/hand hygiene -- initially in stance at sink but requests to sit d/t onset of mild dizziness, which resolves with rest)  UE Bathing: Setup;Stand by assistance; Increased time to complete  LE Bathing: Moderate assistance; Increased time to complete  UE Dressing:  (gown change)  LE Dressing:  Moderate assistance (changing depends, assist to thread BLE)  Toileting: Contact guard assistance  Tone RUE  RUE Tone: Normotonic  Tone LUE  LUE Tone: Normotonic  Coordination  Movements Are Fluid And Coordinated: Yes     Bed mobility  Supine to Sit: Moderate assistance  Scooting: Contact guard assistance  Transfers  Sit to stand: Contact guard assistance  Stand to sit: Contact guard assistance     Cognition  Overall Cognitive Status: WFL  Perception  Overall Perceptual Status: [facility] Sensation  Overall Sensation Status: WFL        LUE AROM (degrees)  LUE AROM : WFL  LUE General AROM: guarded movement d/t pain, WFL grossly  RUE AROM (degrees)  RUE AROM : WFL  RUE General AROM: guarded movement d/t pain, WFL grossly                      Plan   Plan  Times per week: 3-5  Times per day: Daily  Current Treatment Recommendations: Strengthening, Equipment Evaluation, Education, & procurement, Patient/Caregiver Education & Training, Balance Training, Functional Mobility Training, Endurance Training, Safety Education & Training, Self-Care / ADL    G-Code     OutComes Score                                                  AM-PAC Score        AM-[facility] Inpatient Daily Activity Raw Score: 18 (08/11/21 1045)  AM-PAC Inpatient ADL T-Scale Score : 38.66 (08/11/21 1045)  ADL Inpatient CMS 0-100% Score: 46.65 (08/11/21 1045)  ADL Inpatient CMS G-Code Modifier : CK (08/11/21 1045)    Goals  Short term goals  Time Frame for Short term goals: discharge  Short term goal 1: UB ADL mod I  Short term goal 2: LB ADL mod I  Short term goal 3: Fxl transfers mod I  Short term goal 4: Fxl mob mod I  Short term goal 5:  Toileting mod I  Long term goals  Time Frame for Long term goals : LTG=STG       Therapy Time   Individual Concurrent Group Co-treatment   Time In 0829         Time Out 0924         Minutes 55            Timed Code Treatment Minutes:  40 Minutes    Total Treatment Minutes:  55 minutes    Dheeraj Garcia OTR/L [id number]    Yasmin Balbuena OT

## 2021-08-11 NOTE — PLAN OF CARE
Problem: Falls - Risk of:  Goal: Will remain free from falls  Description: Will remain free from falls  Outcome: Ongoing  Note:     Up to bathroom with walker and standby assist, gait weak but steady. No c/o dizziness or nausea. High fall risk, precautions in place, call light in reach,frequent monitoring.gian     Problem: Skin Integrity:  Goal: Will show no infection signs and symptoms  Description: Will show no infection signs and symptoms  Outcome: Ongoing     Problem: Pain:  Description: Pain management should include both nonpharmacologic and pharmacologic interventions.   Goal: Pain level will decrease  Description: Pain level will decrease  Outcome: Ongoing  Note:   C/O level 7/10 abdominal pain, Pain med per prn order with good result.gian

## 2021-08-11 NOTE — PROGRESS NOTES
Awake, moaning, c/o level 7/10 abdominal pain, denies nausea, VSS. Gave Dilaudid per prn order, ice pk applied. gian

## 2021-08-11 NOTE — PROGRESS NOTES
[facility]    8/11/2021 2:48 PM        Name: Guera Wood . Admitted: 8/9/2021  Primary Care Provider: Alexander Cortez MD (Tel: [phone])      Subjective:  . Feeling ok, pain controlled. No cp or sob. Passing some gas. Her son is getting a kidney transplant today.      Reviewed interval ancillary notes    Current Medications  magnesium sulfate 2000 mg in 50 mL IVPB premix, PRN  metoprolol tartrate (LOPRESSOR) tablet 25 mg, BID  hydrALAZINE (APRESOLINE) tablet 25 mg, 2 times per day  HYDROmorphone HCl PF (DILAUDID) injection 0.5 mg, Q3H PRN   Or  HYDROmorphone HCl PF (DILAUDID) injection 1 mg, Q3H PRN  iopamidol (ISOVUE-370) 76 % injection 75 mL, ONCE PRN  sodium chloride flush 0.9 % injection 5-40 mL, 2 times per day  sodium chloride flush 0.9 % injection 5-40 mL, PRN  0.9 % sodium chloride infusion, PRN  ondansetron (ZOFRAN-ODT) disintegrating tablet 4 mg, Q8H PRN   Or  ondansetron (ZOFRAN) injection 4 mg, Q6H PRN  acetaminophen (TYLENOL) tablet 650 mg, Q6H PRN   Or  acetaminophen (TYLENOL) suppository 650 mg, Q6H PRN  pantoprazole (PROTONIX) injection 40 mg, Daily  lactated ringers infusion, Continuous  glucose (GLUTOSE) 40 % oral gel 15 g, PRN  dextrose 50 % IV solution, PRN  glucagon (rDNA) injection 1 mg, PRN  dextrose 5 % solution, PRN  insulin lispro (1 Unit Dial) 0-3 Units, Nightly  hydrALAZINE (APRESOLINE) injection 5 mg, Q6H PRN  latanoprost (XALATAN) 0.005 % ophthalmic solution 1 drop, Nightly  insulin lispro (1 Unit Dial) 0-6 Units, Q6H  enoxaparin (LOVENOX) injection 40 mg, Daily  dorzolamide (TRUSOPT) 2 % ophthalmic solution 1 drop, BID   And  timolol (TIMOPTIC) 0.5 % ophthalmic solution 1 drop, BID        Objective:  BP (!) 149/80   Pulse 74   Temp 98.9 °F (37.2 °C) (Temporal)   Resp 18   Ht 5' 3\" (1.6 m)   Wt 164 lb (74.4 kg)   SpO2 96%   BMI 29.05 kg/m²     Intake/Output Summary (Last 24 hours) at 8/11/2021 1448  Last data filed at 8/11/2021 0255  Gross per 24 hour   Intake    Output 300 ml   Net -300 ml      Wt Readings from Last 3 Encounters:   08/09/21 164 lb (74.4 kg)   06/08/21 172 lb (78 kg)   01/14/21 171 lb (77.6 kg)       General appearance:  Appears comfortable  Eyes: Sclera clear. Pupils equal.  ENT: Moist oral mucosa. Trachea midline, no adenopathy. Cardiovascular: Regular rhythm, normal S1, S2. No murmur. No edema in lower extremities  Respiratory: Not using accessory muscles. Good inspiratory effort. Clear to auscultation bilaterally, no wheeze or crackles. GI: Abdomen soft, no tenderness, not distended, normal bowel sounds  Musculoskeletal: No cyanosis in digits, neck supple  Neurology: CN 2-12 grossly intact. No speech or motor deficits  Psych: Normal affect. Alert and oriented in time, place and person  Skin: Warm, dry, normal turgor    Labs and Tests:  CBC:   Recent Labs     08/09/21  0746 08/10/21  0658 08/11/21  0644   WBC 4.9 5.5 5.8   HGB 11.1* 10.4* 8.9*    160 155     BMP:    Recent Labs     08/09/21  0746 08/10/21  0658 08/11/21  0644    138 138   K 4.2 4.2 3.8    103 104   CO2 23 21 24   BUN 16 14 14   CREATININE 1.0 1.0 1.0   GLUCOSE 156* 94 57*     Hepatic:   Recent Labs     08/09/21  0746 08/10/21  0658   AST 33 31   ALT 30 24   BILITOT 0.3 0.3   ALKPHOS 97 85       Problem List  Principal Problem:    Intestinal obstruction (HCC)  Active Problems:    CKD (chronic kidney disease) stage 3, GFR 30-59 ml/min (HCC)    Type 2 diabetes mellitus with kidney complication, with long-term current use of insulin (HCC)    Coronary artery disease without angina pectoris    Essential hypertension    Incarcerated ventral hernia  Resolved Problems:    * No resolved hospital problems. *       Assessment & Plan:   1. Hypertension-resume metoprolol and hydralazine  2.  Bowel obstruction-s/p exploratory lap with MARY JO and ventral hernia repair with mesh 8/9/21.   3. DM 2-has been hypoglycemic-not on lantus. Should improve with starting clears. Change to dextrose fluids if no improvement. Diet: ADULT DIET;  Clear Liquid  Code:Full Code  DVT PPX:tim Pierson PA-C   8/11/2021 2:48 PM

## 2021-08-11 NOTE — PROGRESS NOTES
Awake, resting in bed, c/o level 7/10 surgical pain to abdomen, denies nausea,  abdominal binder in place, BS hypoactive. Gave Dilaudid per prn order. /76, temp 100.1 (O). Assessment completed, see flow charts.   Will continue to monitor.gian

## 2021-08-11 NOTE — PROGRESS NOTES
Initial shift assessment completed, see complex assessment in doc flowsheet. Patient alert siting on the chair with son at bedside. PRN pain med given for pain. Monitoring VS, fall precautions in place, Call light within reach, encouraged to call for nurse as needed throughout the shift.  Will continue to monitor

## 2021-08-12 LAB
ANION GAP SERPL CALCULATED.3IONS-SCNC: 12 MMOL/L (ref 3–16)
BASOPHILS ABSOLUTE: 0 K/UL (ref 0–0.2)
BASOPHILS RELATIVE PERCENT: 0.3 %
BUN BLDV-MCNC: 21 MG/DL (ref 7–20)
CALCIUM SERPL-MCNC: 8.7 MG/DL (ref 8.3–10.6)
CHLORIDE BLD-SCNC: 100 MMOL/L (ref 99–110)
CO2: 20 MMOL/L (ref 21–32)
CREAT SERPL-MCNC: 1.1 MG/DL (ref 0.6–1.2)
EOSINOPHILS ABSOLUTE: 0 K/UL (ref 0–0.6)
EOSINOPHILS RELATIVE PERCENT: 0.6 %
GFR AFRICAN AMERICAN: 58
GFR NON-AFRICAN AMERICAN: 48
GLUCOSE BLD-MCNC: 116 MG/DL (ref 70–99)
GLUCOSE BLD-MCNC: 125 MG/DL (ref 70–99)
GLUCOSE BLD-MCNC: 132 MG/DL (ref 70–99)
GLUCOSE BLD-MCNC: 143 MG/DL (ref 70–99)
GLUCOSE BLD-MCNC: 145 MG/DL (ref 70–99)
GLUCOSE BLD-MCNC: 151 MG/DL (ref 70–99)
HCT VFR BLD CALC: 24 % (ref 36–48)
HEMOGLOBIN: 7.8 G/DL (ref 12–16)
LYMPHOCYTES ABSOLUTE: 1.4 K/UL (ref 1–5.1)
LYMPHOCYTES RELATIVE PERCENT: 19.6 %
MCH RBC QN AUTO: 33.5 PG (ref 26–34)
MCHC RBC AUTO-ENTMCNC: 32.5 G/DL (ref 31–36)
MCV RBC AUTO: 103.2 FL (ref 80–100)
MONOCYTES ABSOLUTE: 0.7 K/UL (ref 0–1.3)
MONOCYTES RELATIVE PERCENT: 9.3 %
NEUTROPHILS ABSOLUTE: 5.2 K/UL (ref 1.7–7.7)
NEUTROPHILS RELATIVE PERCENT: 70.2 %
PDW BLD-RTO: 14 % (ref 12.4–15.4)
PERFORMED ON: ABNORMAL
PLATELET # BLD: 165 K/UL (ref 135–450)
PMV BLD AUTO: 7.7 FL (ref 5–10.5)
POTASSIUM REFLEX MAGNESIUM: 4.5 MMOL/L (ref 3.5–5.1)
RBC # BLD: 2.33 M/UL (ref 4–5.2)
SODIUM BLD-SCNC: 132 MMOL/L (ref 136–145)
WBC # BLD: 7.4 K/UL (ref 4–11)

## 2021-08-12 PROCEDURE — 6370000000 HC RX 637 (ALT 250 FOR IP): Performed by: PHYSICIAN ASSISTANT

## 2021-08-12 PROCEDURE — 6370000000 HC RX 637 (ALT 250 FOR IP): Performed by: SURGERY

## 2021-08-12 PROCEDURE — 6370000000 HC RX 637 (ALT 250 FOR IP): Performed by: INTERNAL MEDICINE

## 2021-08-12 PROCEDURE — 85025 COMPLETE CBC W/AUTO DIFF WBC: CPT

## 2021-08-12 PROCEDURE — 99024 POSTOP FOLLOW-UP VISIT: CPT | Performed by: SURGERY

## 2021-08-12 PROCEDURE — 6360000002 HC RX W HCPCS: Performed by: SURGERY

## 2021-08-12 PROCEDURE — 2580000003 HC RX 258: Performed by: INTERNAL MEDICINE

## 2021-08-12 PROCEDURE — 6360000002 HC RX W HCPCS: Performed by: INTERNAL MEDICINE

## 2021-08-12 PROCEDURE — 6360000002 HC RX W HCPCS: Performed by: NURSE PRACTITIONER

## 2021-08-12 PROCEDURE — 1200000000 HC SEMI PRIVATE

## 2021-08-12 PROCEDURE — 36415 COLL VENOUS BLD VENIPUNCTURE: CPT

## 2021-08-12 PROCEDURE — 80048 BASIC METABOLIC PNL TOTAL CA: CPT

## 2021-08-12 PROCEDURE — C9113 INJ PANTOPRAZOLE SODIUM, VIA: HCPCS | Performed by: INTERNAL MEDICINE

## 2021-08-12 RX ADMIN — HYDRALAZINE HYDROCHLORIDE 25 MG: 25 TABLET, FILM COATED ORAL at 08:21

## 2021-08-12 RX ADMIN — DORZOLAMIDE HYDROCHLORIDE 1 DROP: 20 SOLUTION/ DROPS OPHTHALMIC at 09:49

## 2021-08-12 RX ADMIN — DORZOLAMIDE HYDROCHLORIDE 1 DROP: 20 SOLUTION/ DROPS OPHTHALMIC at 21:16

## 2021-08-12 RX ADMIN — HYDROMORPHONE HYDROCHLORIDE 1 MG: 1 INJECTION, SOLUTION INTRAMUSCULAR; INTRAVENOUS; SUBCUTANEOUS at 21:33

## 2021-08-12 RX ADMIN — HYDROMORPHONE HYDROCHLORIDE 1 MG: 1 INJECTION, SOLUTION INTRAMUSCULAR; INTRAVENOUS; SUBCUTANEOUS at 11:41

## 2021-08-12 RX ADMIN — ENOXAPARIN SODIUM 40 MG: 40 INJECTION SUBCUTANEOUS at 08:21

## 2021-08-12 RX ADMIN — ONDANSETRON 4 MG: 2 INJECTION INTRAMUSCULAR; INTRAVENOUS at 16:20

## 2021-08-12 RX ADMIN — TIMOLOL MALEATE 1 DROP: 5 SOLUTION OPHTHALMIC at 09:49

## 2021-08-12 RX ADMIN — Medication 10 ML: at 08:21

## 2021-08-12 RX ADMIN — Medication 10 ML: at 21:26

## 2021-08-12 RX ADMIN — HYDRALAZINE HYDROCHLORIDE 25 MG: 25 TABLET, FILM COATED ORAL at 21:16

## 2021-08-12 RX ADMIN — METOPROLOL TARTRATE 25 MG: 25 TABLET, FILM COATED ORAL at 08:21

## 2021-08-12 RX ADMIN — TIMOLOL MALEATE 1 DROP: 5 SOLUTION OPHTHALMIC at 21:16

## 2021-08-12 RX ADMIN — HYDROMORPHONE HYDROCHLORIDE 1 MG: 1 INJECTION, SOLUTION INTRAMUSCULAR; INTRAVENOUS; SUBCUTANEOUS at 18:10

## 2021-08-12 RX ADMIN — LATANOPROST 1 DROP: 50 SOLUTION OPHTHALMIC at 21:22

## 2021-08-12 RX ADMIN — PANTOPRAZOLE SODIUM 40 MG: 40 INJECTION, POWDER, FOR SOLUTION INTRAVENOUS at 08:21

## 2021-08-12 RX ADMIN — METOPROLOL TARTRATE 25 MG: 25 TABLET, FILM COATED ORAL at 21:16

## 2021-08-12 RX ADMIN — INSULIN LISPRO 1 UNITS: 100 INJECTION, SOLUTION INTRAVENOUS; SUBCUTANEOUS at 21:23

## 2021-08-12 RX ADMIN — HYDROMORPHONE HYDROCHLORIDE 1 MG: 1 INJECTION, SOLUTION INTRAMUSCULAR; INTRAVENOUS; SUBCUTANEOUS at 05:23

## 2021-08-12 RX ADMIN — INSULIN LISPRO 1 UNITS: 100 INJECTION, SOLUTION INTRAVENOUS; SUBCUTANEOUS at 12:03

## 2021-08-12 RX ADMIN — ONDANSETRON 4 MG: 4 TABLET, ORALLY DISINTEGRATING ORAL at 21:32

## 2021-08-12 ASSESSMENT — PAIN SCALES - GENERAL
PAINLEVEL_OUTOF10: 8
PAINLEVEL_OUTOF10: 8
PAINLEVEL_OUTOF10: 7
PAINLEVEL_OUTOF10: 8
PAINLEVEL_OUTOF10: 6
PAINLEVEL_OUTOF10: 8
PAINLEVEL_OUTOF10: 8

## 2021-08-12 ASSESSMENT — PAIN DESCRIPTION - ORIENTATION
ORIENTATION: LEFT;RIGHT
ORIENTATION: RIGHT;LEFT
ORIENTATION: RIGHT;LEFT
ORIENTATION: LEFT;RIGHT

## 2021-08-12 ASSESSMENT — PAIN DESCRIPTION - LOCATION
LOCATION: ABDOMEN

## 2021-08-12 ASSESSMENT — PAIN DESCRIPTION - ONSET
ONSET: ON-GOING
ONSET: ON-GOING

## 2021-08-12 ASSESSMENT — PAIN DESCRIPTION - DESCRIPTORS
DESCRIPTORS: SORE
DESCRIPTORS: SORE

## 2021-08-12 ASSESSMENT — PAIN DESCRIPTION - PAIN TYPE
TYPE: SURGICAL PAIN

## 2021-08-12 ASSESSMENT — PAIN DESCRIPTION - FREQUENCY
FREQUENCY: CONTINUOUS
FREQUENCY: CONTINUOUS

## 2021-08-12 ASSESSMENT — PAIN DESCRIPTION - PROGRESSION
CLINICAL_PROGRESSION: NOT CHANGED
CLINICAL_PROGRESSION: GRADUALLY IMPROVING

## 2021-08-12 ASSESSMENT — PAIN - FUNCTIONAL ASSESSMENT: PAIN_FUNCTIONAL_ASSESSMENT: PREVENTS OR INTERFERES SOME ACTIVE ACTIVITIES AND ADLS

## 2021-08-12 NOTE — PLAN OF CARE
Problem: Falls - Risk of:  Goal: Will remain free from falls  Description: Will remain free from falls  Outcome: Ongoing  Goal: Absence of physical injury  Description: Absence of physical injury  Outcome: Ongoing     Problem: Skin Integrity:  Goal: Will show no infection signs and symptoms  Description: Will show no infection signs and symptoms  Outcome: Ongoing  Goal: Absence of new skin breakdown  Description: Absence of new skin breakdown  Outcome: Ongoing     Problem: Pain:  Goal: Pain level will decrease  Description: Pain level will decrease  Outcome: Ongoing  Goal: Control of acute pain  Description: Control of acute pain  Outcome: Ongoing  Goal: Control of chronic pain  Description: Control of chronic pain  Outcome: Ongoing     Problem: HEMODYNAMIC STATUS  Goal: Patient has stable vital signs and fluid balance  Outcome: Ongoing     Problem: OXYGENATION/RESPIRATORY FUNCTION  Goal: Patient will achieve/maintain normal respiratory rate/effort  Outcome: Ongoing     Problem: MOBILITY  Goal: Early mobilization is achieved  Outcome: Ongoing     Problem: ELIMINATION  Goal: Elimination patterns are normal or improving  Description: Elimination patterns return to pre-surgery normal patterns  Outcome: Ongoing     Problem: SKIN INTEGRITY  Goal: Skin integrity is maintained or improved  Outcome: Ongoing

## 2021-08-12 NOTE — PROGRESS NOTES
[facility] PROGRESS NOTE    8/12/2021 11:17 AM        Name: Shaniqua Stewart . Admitted: 8/9/2021  Primary Care Provider: Andry Powell MD (Tel: [phone])      Subjective:  . Feeling ok, pain controlled. No cp or sob. Passing gas, no BM. Her son got a kidney transplant yesterday.      Reviewed interval ancillary notes    Current Medications  magnesium sulfate 2000 mg in 50 mL IVPB premix, PRN  metoprolol tartrate (LOPRESSOR) tablet 25 mg, BID  hydrALAZINE (APRESOLINE) tablet 25 mg, 2 times per day  HYDROmorphone HCl PF (DILAUDID) injection 0.5 mg, Q3H PRN   Or  HYDROmorphone HCl PF (DILAUDID) injection 1 mg, Q3H PRN  iopamidol (ISOVUE-370) 76 % injection 75 mL, ONCE PRN  sodium chloride flush 0.9 % injection 5-40 mL, 2 times per day  sodium chloride flush 0.9 % injection 5-40 mL, PRN  0.9 % sodium chloride infusion, PRN  ondansetron (ZOFRAN-ODT) disintegrating tablet 4 mg, Q8H PRN   Or  ondansetron (ZOFRAN) injection 4 mg, Q6H PRN  acetaminophen (TYLENOL) tablet 650 mg, Q6H PRN   Or  acetaminophen (TYLENOL) suppository 650 mg, Q6H PRN  pantoprazole (PROTONIX) injection 40 mg, Daily  lactated ringers infusion, Continuous  glucose (GLUTOSE) 40 % oral gel 15 g, PRN  dextrose 50 % IV solution, PRN  glucagon (rDNA) injection 1 mg, PRN  dextrose 5 % solution, PRN  insulin lispro (1 Unit Dial) 0-3 Units, Nightly  hydrALAZINE (APRESOLINE) injection 5 mg, Q6H PRN  latanoprost (XALATAN) 0.005 % ophthalmic solution 1 drop, Nightly  insulin lispro (1 Unit Dial) 0-6 Units, Q6H  enoxaparin (LOVENOX) injection 40 mg, Daily  dorzolamide (TRUSOPT) 2 % ophthalmic solution 1 drop, BID   And  timolol (TIMOPTIC) 0.5 % ophthalmic solution 1 drop, BID        Objective:  BP (!) 168/68   Pulse 105   Temp 97.8 °F (36.6 °C) (Oral)   Resp 16   Ht 5' 3\" (1.6 m)   Wt 164 lb (74.4 kg)   SpO2 100%   BMI 29.05 kg/m²     Intake/Output Summary (Last 24 hours) at 8/12/2021 1117  Last data filed at 8/12/2021 0814  Gross per 24 hour   Intake 2458.2 ml   Output 950 ml   Net 1508.2 ml      Wt Readings from Last 3 Encounters:   08/09/21 164 lb (74.4 kg)   06/08/21 172 lb (78 kg)   01/14/21 171 lb (77.6 kg)       General appearance:  Appears comfortable  Eyes: Sclera clear. Pupils equal.  ENT: Moist oral mucosa. Trachea midline, no adenopathy. Cardiovascular: Regular rhythm, normal S1, S2. No murmur. No edema in lower extremities  Respiratory: Not using accessory muscles. Good inspiratory effort. Clear to auscultation bilaterally, no wheeze or crackles. GI: Abdomen soft, no tenderness, not distended, normal bowel sounds  Musculoskeletal: No cyanosis in digits, neck supple  Neurology: CN 2-12 grossly intact. No speech or motor deficits  Psych: Normal affect. Alert and oriented in time, place and person  Skin: Warm, dry, normal turgor    Labs and Tests:  CBC:   Recent Labs     08/10/21  0658 08/11/21  0644   WBC 5.5 5.8   HGB 10.4* 8.9*    155     BMP:    Recent Labs     08/10/21  0658 08/11/21  0644    138   K 4.2 3.8    104   CO2 21 24   BUN 14 14   CREATININE 1.0 1.0   GLUCOSE 94 57*     Hepatic:   Recent Labs     08/10/21  0658   AST 31   ALT 24   BILITOT 0.3   ALKPHOS 85       Problem List  Principal Problem:    Intestinal obstruction (HCC)  Active Problems:    CKD (chronic kidney disease) stage 3, GFR 30-59 ml/min (HCC)    Type 2 diabetes mellitus with kidney complication, with long-term current use of insulin (HCC)    Coronary artery disease without angina pectoris    Essential hypertension    Incarcerated ventral hernia  Resolved Problems:    * No resolved hospital problems. *       Assessment & Plan:   1. Hypertension-continue metoprolol and hydralazine  2. Bowel obstruction-s/p exploratory lap with MARY JO and ventral hernia repair with mesh 8/9/21. Doing well. Tolerating clears. Diet per surgery.   3. DM 2-no longer hypoglycemic. Continue SSI. Diet: ADULT DIET;  Clear Liquid  Code:Full Code  DVT PPX:lovenox    Janet Alonzo PA-C   8/12/2021 11:17 AM

## 2021-08-12 NOTE — PROGRESS NOTES
3238: Shift assessment complete. BP elevated, scheduled meds given. Alert and oriented x4. See flowsheets. Total linen change completed. Pt assisted to BR SBA with walker to void, returned to chair. Morning medications given per MAR. The care plan and education has been reviewed and mutually agreed upon with the patient. Pt needs expressed, call light within reach.

## 2021-08-12 NOTE — PROGRESS NOTES
Mendel Macleod is a 80 y.o. female patient.     Current Facility-Administered Medications   Medication Dose Route Frequency Provider Last Rate Last Admin    magnesium sulfate 2000 mg in 50 mL IVPB premix  2,000 mg Intravenous PRN Courtney Manzanares PA-C   Stopped at 08/11/21 0537    metoprolol tartrate (LOPRESSOR) tablet 25 mg  25 mg Oral BID EdenLINDSAY RamseyC   25 mg at 08/12/21 0024    hydrALAZINE (APRESOLINE) tablet 25 mg  25 mg Oral 2 times per day EdenLINDSAY RamseyC   25 mg at 08/12/21 2629    HYDROmorphone HCl PF (DILAUDID) injection 0.5 mg  0.5 mg Intravenous Q3H PRN Mika Delgado APRRAJWINDER - CNP   0.5 mg at 08/10/21 1632    Or    HYDROmorphone HCl PF (DILAUDID) injection 1 mg  1 mg Intravenous Q3H PRN YAZMIN Mccracken - CNP   1 mg at 08/12/21 1141    iopamidol (ISOVUE-370) 76 % injection 75 mL  75 mL Intravenous ONCE PRN Delmy Emerson MD        sodium chloride flush 0.9 % injection 5-40 mL  5-40 mL Intravenous 2 times per day Dmitri Ko MD   10 mL at 08/12/21 0821    sodium chloride flush 0.9 % injection 5-40 mL  5-40 mL Intravenous PRN Dmitri Ko MD        0.9 % sodium chloride infusion  25 mL Intravenous PRN Dmitri Ko MD        ondansetron (ZOFRAN-ODT) disintegrating tablet 4 mg  4 mg Oral Q8H PRN Dmitri Ko MD        Or    ondansetron [facility]) injection 4 mg  4 mg Intravenous Q6H PRN Dmitri Ko MD   4 mg at 08/09/21 2301    acetaminophen (TYLENOL) tablet 650 mg  650 mg Oral Q6H PRN Dmitri Ko MD   650 mg at 08/10/21 0908    Or    acetaminophen (TYLENOL) suppository 650 mg  650 mg Rectal Q6H PRN Dmitri Ko MD        pantoprazole (PROTONIX) injection 40 mg  40 mg Intravenous Daily Dmitri Ko MD   40 mg at 08/12/21 0821    glucose (GLUTOSE) 40 % oral gel 15 g  15 g Oral PRN Delmy Emerson MD        dextrose 50 % IV solution  12.5 g Intravenous PRN Delmy Emerson MD   12.5 g at 08/11/21 0656    glucagon (rDNA) injection 1 mg  1 mg Intramuscular PRN Janey Rivas MD        dextrose 5 % solution  100 mL/hr Intravenous PRN Janey Rivas MD        insulin lispro (1 Unit Dial) 0-3 Units  0-3 Units Subcutaneous Nightly Janey Rivas MD        hydrALAZINE (APRESOLINE) injection 5 mg  5 mg Intravenous Q6H PRN Janey Rivas MD   5 mg at 08/10/21 1631    latanoprost (XALATAN) 0.005 % ophthalmic solution 1 drop  1 drop Both Eyes Nightly Rebeca Solo MD   1 drop at 08/11/21 2056    insulin lispro (1 Unit Dial) 0-6 Units  0-6 Units Subcutaneous Q6H Janey Rivas MD   1 Units at 08/12/21 1203    enoxaparin (LOVENOX) injection 40 mg  40 mg Subcutaneous Daily Janey Rivas MD   40 mg at 08/12/21 0821    dorzolamide (TRUSOPT) 2 % ophthalmic solution 1 drop  1 drop Both Eyes BID Rebeca Solo MD   1 drop at 08/12/21 0949    And    timolol (TIMOPTIC) 0.5 % ophthalmic solution 1 drop  1 drop Both Eyes BID Rebeca Solo MD   1 drop at 08/12/21 0949     Allergies   Allergen Reactions    Flagyl [Metronidazole]      ataxia    Sulfa Antibiotics     Lisinopril-Hydrochlorothiazide Swelling     Lip And facial swelling and coughing    Ace Inhibitors Other (See Comments)     Edema lip swelling and coughing.  Actos [Pioglitazone Hydrochloride]     Celebrex [Celecoxib]     Cipro Xr     Ciprofloxacin     Demerol     Doxycycline     Elavil [Amitriptyline Hcl]     Estradiol      Estrogen patch, rapid weight gain, arm pain , dizziness, mentally not clear. Patient took estrace 1 mg daily orally for years and insurance no longer covered so switched to patch and felt so bad , she discontinued.     Levofloxacin     Metformin     Metoclopramide     Metolazone Other (See Comments)    Neurontin [Gabapentin]     Niaspan [Niacin Er]     Nifedipine Itching    Nsaids     Oxycodone     Phenytoin Sodium Extended     Pioglitazone     Prednisone     Pregabalin     Simvastatin     Iodine Rash     Rash all over. Pt does not want contrast anymore. Principal Problem:    Intestinal obstruction (HCC)  Active Problems:    CKD (chronic kidney disease) stage 3, GFR 30-59 ml/min (HCC)    Type 2 diabetes mellitus with kidney complication, with long-term current use of insulin (HCC)    Coronary artery disease without angina pectoris    Essential hypertension    Incarcerated ventral hernia  Resolved Problems:    * No resolved hospital problems. *    Blood pressure (!) 156/76, pulse 94, temperature 98 °F (36.7 °C), temperature source Oral, resp. rate 16, height 5' 3\" (1.6 m), weight 164 lb (74.4 kg), SpO2 99 %, not currently breastfeeding. Subjective:  Symptoms:  Improved. Diet:  Adequate intake. Activity level: Returning to normal.    Pain:  She complains of pain that is mild. Objective:  General Appearance:  Comfortable. Vital signs: (most recent): Blood pressure (!) 156/76, pulse 94, temperature 98 °F (36.7 °C), temperature source Oral, resp. rate 16, height 5' 3\" (1.6 m), weight 164 lb (74.4 kg), SpO2 99 %, not currently breastfeeding. Output: Producing urine and no stool output. Lungs:  Normal effort and normal respiratory rate. Abdomen: Abdomen is soft and distended. (Incisions approximated without evidence of infection. Her abdomen is appropriately tender). Neurological: Patient is alert and oriented to person, place and time. Skin:  Warm and dry. Assessment & Plan 25-year-old female who is postop day #3 status post laparoscopic repair of an incarcerated ventral hernia which was causing a small bowel obstruction. Doing well. Passing flatus but no bowel movement. Tolerating full liquids. Will advance to a general diet and Hep-Lock IV fluids. Increase ambulation. Aiming for discharge home tomorrow.     Raphael Quijano MD  8/12/2021

## 2021-08-13 ENCOUNTER — APPOINTMENT (OUTPATIENT)
Dept: GENERAL RADIOLOGY | Age: 81
DRG: 336 | End: 2021-08-13
Payer: MEDICARE

## 2021-08-13 LAB
ANION GAP SERPL CALCULATED.3IONS-SCNC: 14 MMOL/L (ref 3–16)
BASOPHILS ABSOLUTE: 0 K/UL (ref 0–0.2)
BASOPHILS RELATIVE PERCENT: 0.4 %
BUN BLDV-MCNC: 31 MG/DL (ref 7–20)
CALCIUM SERPL-MCNC: 9 MG/DL (ref 8.3–10.6)
CHLORIDE BLD-SCNC: 103 MMOL/L (ref 99–110)
CO2: 20 MMOL/L (ref 21–32)
CREAT SERPL-MCNC: 1.3 MG/DL (ref 0.6–1.2)
EOSINOPHILS ABSOLUTE: 0.2 K/UL (ref 0–0.6)
EOSINOPHILS RELATIVE PERCENT: 2.4 %
GFR AFRICAN AMERICAN: 48
GFR NON-AFRICAN AMERICAN: 39
GLUCOSE BLD-MCNC: 105 MG/DL (ref 70–99)
GLUCOSE BLD-MCNC: 133 MG/DL (ref 70–99)
GLUCOSE BLD-MCNC: 135 MG/DL (ref 70–99)
GLUCOSE BLD-MCNC: 136 MG/DL (ref 70–99)
GLUCOSE BLD-MCNC: 141 MG/DL (ref 70–99)
GLUCOSE BLD-MCNC: 142 MG/DL (ref 70–99)
GLUCOSE BLD-MCNC: 158 MG/DL (ref 70–99)
HCT VFR BLD CALC: 23.9 % (ref 36–48)
HEMOGLOBIN: 8.1 G/DL (ref 12–16)
LYMPHOCYTES ABSOLUTE: 1 K/UL (ref 1–5.1)
LYMPHOCYTES RELATIVE PERCENT: 14.5 %
MACROCYTES: ABNORMAL
MCH RBC QN AUTO: 34.1 PG (ref 26–34)
MCHC RBC AUTO-ENTMCNC: 34 G/DL (ref 31–36)
MCV RBC AUTO: 100.3 FL (ref 80–100)
MONOCYTES ABSOLUTE: 0.5 K/UL (ref 0–1.3)
MONOCYTES RELATIVE PERCENT: 7.6 %
NEUTROPHILS ABSOLUTE: 5.4 K/UL (ref 1.7–7.7)
NEUTROPHILS RELATIVE PERCENT: 75.1 %
PDW BLD-RTO: 14 % (ref 12.4–15.4)
PERFORMED ON: ABNORMAL
PLATELET # BLD: 208 K/UL (ref 135–450)
PLATELET SLIDE REVIEW: ADEQUATE
PMV BLD AUTO: 8.4 FL (ref 5–10.5)
POTASSIUM REFLEX MAGNESIUM: 5 MMOL/L (ref 3.5–5.1)
RBC # BLD: 2.38 M/UL (ref 4–5.2)
SLIDE REVIEW: ABNORMAL
SODIUM BLD-SCNC: 137 MMOL/L (ref 136–145)
WBC # BLD: 7.2 K/UL (ref 4–11)

## 2021-08-13 PROCEDURE — 36415 COLL VENOUS BLD VENIPUNCTURE: CPT

## 2021-08-13 PROCEDURE — C9113 INJ PANTOPRAZOLE SODIUM, VIA: HCPCS | Performed by: INTERNAL MEDICINE

## 2021-08-13 PROCEDURE — 6370000000 HC RX 637 (ALT 250 FOR IP): Performed by: SURGERY

## 2021-08-13 PROCEDURE — 6360000002 HC RX W HCPCS: Performed by: SURGERY

## 2021-08-13 PROCEDURE — 1200000000 HC SEMI PRIVATE

## 2021-08-13 PROCEDURE — 6370000000 HC RX 637 (ALT 250 FOR IP): Performed by: INTERNAL MEDICINE

## 2021-08-13 PROCEDURE — 80048 BASIC METABOLIC PNL TOTAL CA: CPT

## 2021-08-13 PROCEDURE — 6360000002 HC RX W HCPCS: Performed by: INTERNAL MEDICINE

## 2021-08-13 PROCEDURE — 6360000002 HC RX W HCPCS: Performed by: NURSE PRACTITIONER

## 2021-08-13 PROCEDURE — 85025 COMPLETE CBC W/AUTO DIFF WBC: CPT

## 2021-08-13 PROCEDURE — 97530 THERAPEUTIC ACTIVITIES: CPT

## 2021-08-13 PROCEDURE — 97110 THERAPEUTIC EXERCISES: CPT

## 2021-08-13 PROCEDURE — 99024 POSTOP FOLLOW-UP VISIT: CPT | Performed by: SURGERY

## 2021-08-13 PROCEDURE — 6370000000 HC RX 637 (ALT 250 FOR IP): Performed by: PHYSICIAN ASSISTANT

## 2021-08-13 PROCEDURE — 74019 RADEX ABDOMEN 2 VIEWS: CPT

## 2021-08-13 PROCEDURE — 97535 SELF CARE MNGMENT TRAINING: CPT

## 2021-08-13 PROCEDURE — 97116 GAIT TRAINING THERAPY: CPT

## 2021-08-13 PROCEDURE — 2580000003 HC RX 258: Performed by: INTERNAL MEDICINE

## 2021-08-13 PROCEDURE — 2580000003 HC RX 258: Performed by: SURGERY

## 2021-08-13 RX ORDER — SODIUM CHLORIDE 9 MG/ML
INJECTION, SOLUTION INTRAVENOUS CONTINUOUS
Status: DISCONTINUED | OUTPATIENT
Start: 2021-08-13 | End: 2021-08-15 | Stop reason: HOSPADM

## 2021-08-13 RX ORDER — SODIUM CHLORIDE, SODIUM LACTATE, POTASSIUM CHLORIDE, CALCIUM CHLORIDE 600; 310; 30; 20 MG/100ML; MG/100ML; MG/100ML; MG/100ML
INJECTION, SOLUTION INTRAVENOUS CONTINUOUS
Status: DISCONTINUED | OUTPATIENT
Start: 2021-08-13 | End: 2021-08-13

## 2021-08-13 RX ORDER — BISACODYL 10 MG
10 SUPPOSITORY, RECTAL RECTAL DAILY PRN
Status: DISCONTINUED | OUTPATIENT
Start: 2021-08-13 | End: 2021-08-15 | Stop reason: HOSPADM

## 2021-08-13 RX ADMIN — HYDROMORPHONE HYDROCHLORIDE 1 MG: 1 INJECTION, SOLUTION INTRAMUSCULAR; INTRAVENOUS; SUBCUTANEOUS at 01:08

## 2021-08-13 RX ADMIN — METOPROLOL TARTRATE 25 MG: 25 TABLET, FILM COATED ORAL at 20:17

## 2021-08-13 RX ADMIN — HYDRALAZINE HYDROCHLORIDE 25 MG: 25 TABLET, FILM COATED ORAL at 20:17

## 2021-08-13 RX ADMIN — DORZOLAMIDE HYDROCHLORIDE 1 DROP: 20 SOLUTION/ DROPS OPHTHALMIC at 20:20

## 2021-08-13 RX ADMIN — METOPROLOL TARTRATE 25 MG: 25 TABLET, FILM COATED ORAL at 08:25

## 2021-08-13 RX ADMIN — INSULIN LISPRO 1 UNITS: 100 INJECTION, SOLUTION INTRAVENOUS; SUBCUTANEOUS at 02:08

## 2021-08-13 RX ADMIN — Medication 10 ML: at 08:25

## 2021-08-13 RX ADMIN — HYDROMORPHONE HYDROCHLORIDE 0.5 MG: 1 INJECTION, SOLUTION INTRAMUSCULAR; INTRAVENOUS; SUBCUTANEOUS at 09:27

## 2021-08-13 RX ADMIN — HYDROMORPHONE HYDROCHLORIDE 0.5 MG: 1 INJECTION, SOLUTION INTRAMUSCULAR; INTRAVENOUS; SUBCUTANEOUS at 20:20

## 2021-08-13 RX ADMIN — INSULIN LISPRO 1 UNITS: 100 INJECTION, SOLUTION INTRAVENOUS; SUBCUTANEOUS at 12:27

## 2021-08-13 RX ADMIN — ACETAMINOPHEN 650 MG: 325 TABLET ORAL at 08:24

## 2021-08-13 RX ADMIN — HYDROMORPHONE HYDROCHLORIDE 1 MG: 1 INJECTION, SOLUTION INTRAMUSCULAR; INTRAVENOUS; SUBCUTANEOUS at 05:22

## 2021-08-13 RX ADMIN — SODIUM CHLORIDE: 9 INJECTION, SOLUTION INTRAVENOUS at 12:31

## 2021-08-13 RX ADMIN — DORZOLAMIDE HYDROCHLORIDE 1 DROP: 20 SOLUTION/ DROPS OPHTHALMIC at 08:22

## 2021-08-13 RX ADMIN — Medication 10 ML: at 20:21

## 2021-08-13 RX ADMIN — HYDROMORPHONE HYDROCHLORIDE 1 MG: 1 INJECTION, SOLUTION INTRAMUSCULAR; INTRAVENOUS; SUBCUTANEOUS at 17:01

## 2021-08-13 RX ADMIN — HYDROMORPHONE HYDROCHLORIDE 1 MG: 1 INJECTION, SOLUTION INTRAMUSCULAR; INTRAVENOUS; SUBCUTANEOUS at 22:55

## 2021-08-13 RX ADMIN — LATANOPROST 1 DROP: 50 SOLUTION OPHTHALMIC at 20:19

## 2021-08-13 RX ADMIN — HYDRALAZINE HYDROCHLORIDE 25 MG: 25 TABLET, FILM COATED ORAL at 08:25

## 2021-08-13 RX ADMIN — TIMOLOL MALEATE 1 DROP: 5 SOLUTION OPHTHALMIC at 08:22

## 2021-08-13 RX ADMIN — INSULIN LISPRO 1 UNITS: 100 INJECTION, SOLUTION INTRAVENOUS; SUBCUTANEOUS at 17:02

## 2021-08-13 RX ADMIN — BISACODYL 10 MG: 10 SUPPOSITORY RECTAL at 14:18

## 2021-08-13 RX ADMIN — PANTOPRAZOLE SODIUM 40 MG: 40 INJECTION, POWDER, FOR SOLUTION INTRAVENOUS at 08:22

## 2021-08-13 RX ADMIN — ENOXAPARIN SODIUM 40 MG: 40 INJECTION SUBCUTANEOUS at 08:24

## 2021-08-13 ASSESSMENT — PAIN DESCRIPTION - PAIN TYPE
TYPE: SURGICAL PAIN

## 2021-08-13 ASSESSMENT — PAIN SCALES - GENERAL
PAINLEVEL_OUTOF10: 0
PAINLEVEL_OUTOF10: 6
PAINLEVEL_OUTOF10: 8
PAINLEVEL_OUTOF10: 6
PAINLEVEL_OUTOF10: 10
PAINLEVEL_OUTOF10: 3
PAINLEVEL_OUTOF10: 8
PAINLEVEL_OUTOF10: 3
PAINLEVEL_OUTOF10: 7
PAINLEVEL_OUTOF10: 6
PAINLEVEL_OUTOF10: 5
PAINLEVEL_OUTOF10: 5

## 2021-08-13 ASSESSMENT — PAIN DESCRIPTION - ONSET: ONSET: ON-GOING

## 2021-08-13 ASSESSMENT — PAIN DESCRIPTION - ORIENTATION
ORIENTATION: LEFT
ORIENTATION: RIGHT;LEFT

## 2021-08-13 ASSESSMENT — PAIN DESCRIPTION - PROGRESSION: CLINICAL_PROGRESSION: GRADUALLY IMPROVING

## 2021-08-13 ASSESSMENT — PAIN DESCRIPTION - LOCATION
LOCATION: ABDOMEN

## 2021-08-13 ASSESSMENT — PAIN DESCRIPTION - FREQUENCY: FREQUENCY: CONTINUOUS

## 2021-08-13 ASSESSMENT — PAIN DESCRIPTION - DESCRIPTORS: DESCRIPTORS: SORE

## 2021-08-13 NOTE — PROGRESS NOTES
5621: Shift assessment complete. VSS. Alert and oriented x4. See flowsheets. Morning medications given per MAR, prn tylenol given. Pt states she wants to go home today, she feels that her pain and nausea are better controlled today. The care plan and education has been reviewed and mutually agreed upon with the patient. Pt needs expressed, call light within reach. 1610: Message left with general surgery regarding patient's XR results.

## 2021-08-13 NOTE — PLAN OF CARE
Problem: Falls - Risk of:  Goal: Will remain free from falls  Description: Will remain free from falls  Outcome: Ongoing  Goal: Absence of physical injury  Description: Absence of physical injury  Outcome: Ongoing     Problem: Skin Integrity:  Goal: Will show no infection signs and symptoms  Description: Will show no infection signs and symptoms  Outcome: Ongoing  Goal: Absence of new skin breakdown  Description: Absence of new skin breakdown  Outcome: Ongoing     Problem: Pain:  Goal: Pain level will decrease  Description: Pain level will decrease  Outcome: Ongoing  Goal: Control of acute pain  Description: Control of acute pain  Outcome: Ongoing  Goal: Control of chronic pain  Description: Control of chronic pain  Outcome: Ongoing     Problem: SKIN INTEGRITY  Goal: Skin integrity is maintained or improved  Outcome: Ongoing

## 2021-08-13 NOTE — PLAN OF CARE
Problem: Falls - Risk of:  Goal: Will remain free from falls  Description: Will remain free from falls  8/13/2021 1137 by Nixon Mendoza RN  Outcome: Ongoing  8/13/2021 0522 by Mayra Tovar  Outcome: Ongoing  Goal: Absence of physical injury  Description: Absence of physical injury  8/13/2021 1137 by Nixon Mendoza RN  Outcome: Ongoing  8/13/2021 0522 by Mayra Tovar  Outcome: Ongoing     Problem: Skin Integrity:  Goal: Will show no infection signs and symptoms  Description: Will show no infection signs and symptoms  8/13/2021 1137 by Nixon Mendoza RN  Outcome: Ongoing  8/13/2021 0522 by Mayra Tovar  Outcome: Ongoing  Goal: Absence of new skin breakdown  Description: Absence of new skin breakdown  8/13/2021 1137 by Nixon Mendoza RN  Outcome: Ongoing  8/13/2021 0522 by aMyra Tovar  Outcome: Ongoing     Problem: Pain:  Goal: Pain level will decrease  Description: Pain level will decrease  8/13/2021 1137 by Nixon Mendoza RN  Outcome: Ongoing  8/13/2021 0522 by Mayra Tovar  Outcome: Ongoing  Goal: Control of acute pain  Description: Control of acute pain  8/13/2021 1137 by Nixon Mendoza RN  Outcome: Ongoing  8/13/2021 0522 by Mayra Tovar  Outcome: Ongoing  Goal: Control of chronic pain  Description: Control of chronic pain  8/13/2021 1137 by Nixon Mendoza RN  Outcome: Ongoing  8/13/2021 0522 by Mayra Tovar  Outcome: Ongoing     Problem: HEMODYNAMIC STATUS  Goal: Patient has stable vital signs and fluid balance  Outcome: Ongoing     Problem: OXYGENATION/RESPIRATORY FUNCTION  Goal: Patient will achieve/maintain normal respiratory rate/effort  Outcome: Ongoing     Problem: MOBILITY  Goal: Early mobilization is achieved  Outcome: Ongoing     Problem: SKIN INTEGRITY  Goal: Skin integrity is maintained or improved  8/13/2021 1137 by Nixon Mendoza RN  Outcome: Ongoing  8/13/2021 0522 by Mayra Tovar  Outcome: Ongoing

## 2021-08-13 NOTE — PROGRESS NOTES
Assessment completed. Patient alert and oriented and able to make all her needs known. Ambulated with walker and SBA to bathroom and back to bed. C/o pain to abd. Medications administered as ordered. Assisted pt back into bed. Sx incisions to abd CDI, no drainage noted. Binder on. POC and education reviewed and mutually agreed upon. All needs met at this time. Call light in reach. Will continue to monitor.

## 2021-08-13 NOTE — DISCHARGE INSTR - COC
Continuity of Care Form    Patient Name: Mendel Macleod   :    MRN:  [id number]    Admit date:  2021  Discharge date:  ***    Code Status Order: Full Code   Advance Directives:   Advance Care Flowsheet Documentation       Date/Time Healthcare Directive Type of Healthcare Directive Copy in [address] Agent's Name Healthcare Agent's Phone Number    21 1504  No, patient does not have an advance directive for healthcare treatment  Sylvie Garcia 157            Admitting Physician:  Dmitri Ko MD  PCP: Michelle Degroot MD    Discharging Nurse: [facility] Unit/Room#: 4LQ-5395/0693-95  Discharging Unit Phone Number: ***    Emergency Contact:   Extended Emergency Contact Information  Primary Emergency Contact: Ryley Epps  [place] Phone: [phone]  Relation: Child  Secondary Emergency Contact: Tarik Epps  Address: [address] Phone: [phone]  Relation: Child    Past Surgical History:  Past Surgical History:   Procedure Laterality Date    CHOLECYSTECTOMY      COLONOSCOPY N/A 2019    COLONOSCOPY DIAGNOSTIC [facility] Smith[facility] Archibald [facility] [address] [facility] DISCECTOMY  10/03/12    lumbar discectomy, 3-4 left    SMALL INTESTINE SURGERY      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 performed by Delmy Emerson MD at [address] [place] [address] History:   Immunization History   Administered Date(s) Administered    COVID-19, Moderna, PF, 100mcg/0.5mL 2021, 2021    Influenza Vaccine, unspecified formulation 2007    Influenza Virus Vaccine 10/04/2012    Influenza, Quadv, adjuvanted, 65 yrs +, IM, PF (Fluad) 10/15/2020    Pneumococcal Conjugate 13-valent (Pprrfuq20) 04/19/2019    Pneumococcal Polysaccharide (Szfgsuogt27) 01/13/2007    Zoster Recombinant (Shingrix) 11/14/2020       Active Problems:  Patient Active Problem List   Diagnosis Code    Herniated lumbar disc without myelopathy M51.26    CKD (chronic kidney disease) stage 3, GFR 30-59 ml/min ([facility]) N18.30    Vitamin D deficiency E55.9    Fibromyalgia M79.7    Type 2 diabetes mellitus with kidney complication, with long-term current use of insulin ([facility]) E11.29, Z79.4    Bilateral carpal tunnel syndrome G56.03    Intestinal obstruction ([facility]) K56.609    Chronic abdominal pain R10.9, G89.29    Trigger middle finger of right hand M65.331    Carpal tunnel syndrome, left G56.02    Bacterial overgrowth syndrome K63.89    Plantar fasciitis M72.2    Primary open angle glaucoma H40.1190    Renal insufficiency syndrome N28.9    Retrocalcaneal bursitis M77.50    S/P angioplasty with stent Z95.820    Ulcerative colitis ([facility] K51.90    Myalgia M79.10    Other neutropenia ([facility]) D70.8    Mixed hyperlipidemia E78.2    Coronary artery disease without angina pectoris I25.10    Essential hypertension I10    Incarcerated ventral hernia K43.6       Isolation/Infection:   Isolation            No Isolation          Patient Infection Status       Infection Onset Added Last Indicated Last Indicated By Review Planned Expiration Resolved Resolved By    None active    Resolved    C-diff Rule Out  12/11/19 12/11/19 C DIFF TOXIN/ANTIGEN (Ordered)   12/12/19 Rule-Out Test Resulted            Nurse Assessment:  Last Vital Signs: BP (!) 136/47   Pulse 68   Temp 98.3 °F (36.8 °C) (Oral)   Resp 16   Ht 5' 3\" (1.6 m)   Wt 162 lb 4 oz (73.6 kg)   SpO2 97%   BMI 28.74 kg/m²     Last documented pain score (0-10 scale): Pain Level: 6  Last Weight:   Wt Readings from Last 1 Encounters:   08/13/21 162 lb 4 oz (73.6 kg)     Mental Status:  oriented and alert    IV Access:  - None    Nursing Mobility/ADLs:  Walking   Independent  Transfer  Independent  Bathing  Assisted  Dressing  Assisted  [address]  Assisted  Med Delivery   whole    Wound Care Documentation and Therapy:        Elimination:  Continence:   · Bowel: Yes  · Bladder: Yes  Urinary Catheter: None   Colostomy/Ileostomy/Ileal Conduit: No       Date of Last BM: 8/15/21    Intake/Output Summary (Last 24 hours) at 8/13/2021 1513  Last data filed at 8/13/2021 0448  Gross per 24 hour   Intake    Output 400 ml   Net -400 ml     I/O last 3 completed shifts:  In: -   Out: 400 [Urine:400]    Safety Concerns: At Risk for Falls    Impairments/Disabilities:      None    Nutrition Therapy:  Current Nutrition Therapy:   - Oral Diet:  General    Routes of Feeding: Oral  Liquids: Thin Liquids  Daily Fluid Restriction: no  Last Modified Barium Swallow with Video (Video Swallowing Test): not done    Treatments at the Time of Hospital Discharge:   Respiratory Treatments: ***  Oxygen Therapy:  is not on home oxygen therapy.   Ventilator:    - No ventilator support    Rehab Therapies: Physical Therapy and Occupational Therapy  Weight Bearing Status/Restrictions: No weight bearing restirctions  Other Medical Equipment (for information only, NOT a DME order):  walker  Other Treatments: ***    Patient's personal belongings (please select all that are sent with patient):  None    RN SIGNATURE:  Electronically signed by Briana Caruso RN on 8/15/21 at 1:39 PM EDT    CASE MANAGEMENT/SOCIAL WORK SECTION    Inpatient Status Date: 08/09/21    Readmission Risk Assessment Score:  Readmission Risk              Risk of Unplanned Readmission:  17           Discharging to Facility/ Agency   · Name: [facility]  · Address:  · Phone: [phone]  · Fax: [phone]    / signature: Electronically signed by KENN Ye on 8/13/2021 at 3:13 PM      PHYSICIAN SECTION    Prognosis: Good    Condition at Discharge: Stable    Rehab Potential (if transferring to Rehab): Good    Recommended Labs or Other Treatments After Discharge: Follow up with surgeon in 10 days    Physician Certification: I certify the above information and transfer of [facility]  is necessary for the continuing treatment of the diagnosis listed and that she requires [address] for less 30 days.      Update Admission H&P: No change in H&P    PHYSICIAN SIGNATURE:  Electronically signed by Naseem Hoover PA-C on 8/15/21 at 1:00 PM EDT

## 2021-08-13 NOTE — PROGRESS NOTES
Guera Wood is a 80 y.o. female patient.     Current Facility-Administered Medications   Medication Dose Route Frequency Provider Last Rate Last Admin    bisacodyl (DULCOLAX) suppository 10 mg  10 mg Rectal Daily PRN Can Valdez MD   10 mg at 08/13/21 1418    0.9 % sodium chloride infusion   Intravenous Continuous Can Valdez MD 75 mL/hr at 08/13/21 1231 New Bag at 08/13/21 1231    magnesium sulfate 2000 mg in 50 mL IVPB premix  2,000 mg Intravenous PRN Cecilia Moon PA-C   Stopped at 08/11/21 0537    metoprolol tartrate (LOPRESSOR) tablet 25 mg  25 mg Oral BID Jorge Kerns PA-C   25 mg at 08/13/21 0825    hydrALAZINE (APRESOLINE) tablet 25 mg  25 mg Oral 2 times per day Jorge Kerns PA-C   25 mg at 08/13/21 0825    HYDROmorphone HCl PF (DILAUDID) injection 0.5 mg  0.5 mg Intravenous Q3H PRN YAZMIN Vann - CNP   0.5 mg at 08/13/21 9571    Or    HYDROmorphone HCl PF (DILAUDID) injection 1 mg  1 mg Intravenous Q3H PRN YAZMIN Vann - CNP   1 mg at 08/13/21 0522    iopamidol (ISOVUE-370) 76 % injection 75 mL  75 mL Intravenous ONCE PRN Can Valdez MD        sodium chloride flush 0.9 % injection 5-40 mL  5-40 mL Intravenous 2 times per day Fabien Birmingham MD   10 mL at 08/13/21 0825    sodium chloride flush 0.9 % injection 5-40 mL  5-40 mL Intravenous PRN Fabien Birmingham MD        0.9 % sodium chloride infusion  25 mL Intravenous PRN Fabien Birmingham MD        ondansetron (ZOFRAN-ODT) disintegrating tablet 4 mg  4 mg Oral Q8H PRN Fabien Birmingham MD   4 mg at 08/12/21 2132    Or    ondansetron (ZOFRAN) injection 4 mg  4 mg Intravenous Q6H PRN Fabien Birmingham MD   4 mg at 08/12/21 1620    acetaminophen (TYLENOL) tablet 650 mg  650 mg Oral Q6H PRN Fabien Birmingham MD   650 mg at 08/13/21 7714    Or    acetaminophen (TYLENOL) suppository 650 mg  650 mg Rectal Q6H PRN Fabien Birmingham MD        pantoprazole (PROTONIX) injection 40 mg  40 mg Intravenous Daily Metformin     Metoclopramide     Metolazone Other (See Comments)    Neurontin [Gabapentin]     Niaspan [Niacin Er]     Nifedipine Itching    Nsaids     Oxycodone     Phenytoin Sodium Extended     Pioglitazone     Prednisone     Pregabalin     Simvastatin     Iodine Rash     Rash all over. Pt does not want contrast anymore. Principal Problem:    Intestinal obstruction (HCC)  Active Problems:    CKD (chronic kidney disease) stage 3, GFR 30-59 ml/min (HCC)    Type 2 diabetes mellitus with kidney complication, with long-term current use of insulin (HCC)    Coronary artery disease without angina pectoris    Essential hypertension    Incarcerated ventral hernia  Resolved Problems:    * No resolved hospital problems. *    Blood pressure (!) 136/47, pulse 68, temperature 98.3 °F (36.8 °C), temperature source Oral, resp. rate 16, height 5' 3\" (1.6 m), weight 162 lb 4 oz (73.6 kg), SpO2 97 %, not currently breastfeeding. Subjective:  Symptoms:  Stable. Diet:  Poor intake. Activity level: Returning to normal.    Pain:  She complains of pain that is moderate. Objective:  General Appearance:  Comfortable. Vital signs: (most recent): Blood pressure (!) 136/47, pulse 68, temperature 98.3 °F (36.8 °C), temperature source Oral, resp. rate 16, height 5' 3\" (1.6 m), weight 162 lb 4 oz (73.6 kg), SpO2 97 %, not currently breastfeeding. Output: Producing urine and no stool output. Lungs:  Normal effort and normal respiratory rate. Abdomen: Abdomen is soft. (Mild abdominal distention. Incisions approximated without evidence of infection. Tender left side of the abdomen). Neurological: Patient is alert and oriented to person, place and time. Skin:  Warm. Assessment & Plan very pleasant 15-year-old female who is postop day #4 status post laparoscopic repair of an incarcerated ventral hernia which was causing a small bowel obstruction. She is doing fairly well. P.o.  intake has been

## 2021-08-13 NOTE — PROGRESS NOTES
[facility]    8/13/2021 12:39 PM        Name: Emily Gilford . Admitted: 8/9/2021  Primary Care Provider: Charles Coyne MD (Tel: [phone])      Subjective:  . Feeling ok, pain controlled. No cp or sob. Passing gas, no BM.      Reviewed interval ancillary notes    Current Medications  bisacodyl (DULCOLAX) suppository 10 mg, Daily PRN  0.9 % sodium chloride infusion, Continuous  magnesium sulfate 2000 mg in 50 mL IVPB premix, PRN  metoprolol tartrate (LOPRESSOR) tablet 25 mg, BID  hydrALAZINE (APRESOLINE) tablet 25 mg, 2 times per day  HYDROmorphone HCl PF (DILAUDID) injection 0.5 mg, Q3H PRN   Or  HYDROmorphone HCl PF (DILAUDID) injection 1 mg, Q3H PRN  iopamidol (ISOVUE-370) 76 % injection 75 mL, ONCE PRN  sodium chloride flush 0.9 % injection 5-40 mL, 2 times per day  sodium chloride flush 0.9 % injection 5-40 mL, PRN  0.9 % sodium chloride infusion, PRN  ondansetron (ZOFRAN-ODT) disintegrating tablet 4 mg, Q8H PRN   Or  ondansetron (ZOFRAN) injection 4 mg, Q6H PRN  acetaminophen (TYLENOL) tablet 650 mg, Q6H PRN   Or  acetaminophen (TYLENOL) suppository 650 mg, Q6H PRN  pantoprazole (PROTONIX) injection 40 mg, Daily  glucose (GLUTOSE) 40 % oral gel 15 g, PRN  dextrose 50 % IV solution, PRN  glucagon (rDNA) injection 1 mg, PRN  dextrose 5 % solution, PRN  insulin lispro (1 Unit Dial) 0-3 Units, Nightly  hydrALAZINE (APRESOLINE) injection 5 mg, Q6H PRN  latanoprost (XALATAN) 0.005 % ophthalmic solution 1 drop, Nightly  insulin lispro (1 Unit Dial) 0-6 Units, Q6H  enoxaparin (LOVENOX) injection 40 mg, Daily  dorzolamide (TRUSOPT) 2 % ophthalmic solution 1 drop, BID   And  timolol (TIMOPTIC) 0.5 % ophthalmic solution 1 drop, BID        Objective:  BP (!) 136/47   Pulse 68   Temp 98.3 °F (36.8 °C) (Oral)   Resp 16   Ht 5' 3\" (1.6 m)   Wt 162 lb 4 oz (73.6 kg)   SpO2 97%   BMI 28.74 kg/m² Intake/Output Summary (Last 24 hours) at 8/13/2021 1239  Last data filed at 8/13/2021 0448  Gross per 24 hour   Intake    Output 400 ml   Net -400 ml      Wt Readings from Last 3 Encounters:   08/13/21 162 lb 4 oz (73.6 kg)   06/08/21 172 lb (78 kg)   01/14/21 171 lb (77.6 kg)       General appearance:  Appears comfortable  Eyes: Sclera clear. Pupils equal.  ENT: Moist oral mucosa. Trachea midline, no adenopathy. Cardiovascular: Regular rhythm, normal S1, S2. No murmur. No edema in lower extremities  Respiratory: Not using accessory muscles. Good inspiratory effort. Clear to auscultation bilaterally, no wheeze or crackles. GI: Abdomen soft, no tenderness, not distended, normal bowel sounds  Musculoskeletal: No cyanosis in digits, neck supple  Neurology: CN 2-12 grossly intact. No speech or motor deficits  Psych: Normal affect. Alert and oriented in time, place and person  Skin: Warm, dry, normal turgor    Labs and Tests:  CBC:   Recent Labs     08/11/21  0644 08/12/21  1211 08/13/21  1011   WBC 5.8 7.4 7.2   HGB 8.9* 7.8* 8.1*    165 208     BMP:    Recent Labs     08/11/21  0644 08/12/21  1211 08/13/21  1011    132* 137   K 3.8 4.5 5.0    100 103   CO2 24 20* 20*   BUN 14 21* 31*   CREATININE 1.0 1.1 1.3*   GLUCOSE 57* 143* 136*     Hepatic:   No results for input(s): AST, ALT, ALB, BILITOT, ALKPHOS in the last 72 hours. Problem List  Principal Problem:    Intestinal obstruction (HCC)  Active Problems:    CKD (chronic kidney disease) stage 3, GFR 30-59 ml/min (HCC)    Type 2 diabetes mellitus with kidney complication, with long-term current use of insulin (HCC)    Coronary artery disease without angina pectoris    Essential hypertension    Incarcerated ventral hernia  Resolved Problems:    * No resolved hospital problems. *       Assessment & Plan:   1. Hypertension-continue metoprolol and hydralazine  2.  Bowel obstruction-s/p exploratory lap with MARY JO and ventral hernia repair with mesh 8/9/21. Doing well. Diet advanced. 3. Constipation-xray and suppository ordered per surgery  4. DM 2-no longer hypoglycemic. Continue SSI. Diet: ADULT DIET;  Regular  Code:Full Code  DVT PPX:tim Urias PA-C   8/13/2021 12:39 PM

## 2021-08-13 NOTE — PROGRESS NOTES
Physical Therapy  Facility/Department: [address] ORTHO/NEURO NURSING  Daily Treatment Note  NAME: Idris Gruber  : 1940  MRN: [id number]    Date of Service: 2021    Discharge Recommendations:  2-3 sessions per week, S Level 3   PT Equipment Recommendations  Equipment Needed: Yes  Mobility Devices: Laurance Otoole: Rolling    Assessment   Body structures, Functions, Activity limitations: Decreased functional mobility ; Decreased ADL status; Decreased ROM; Decreased endurance;Decreased balance; Increased pain  Assessment: Pt presents with improved functional mobility this date, able to progress ambulation distance and completes therapeutic exercise with extended rest breaks. Pt will continue to benefit from skilled PT services to promote return to PLOF. Prognosis: Good  PT Education: Goals;PT Role;Plan of Care;Gait Training;Transfer Training;Functional Mobility Training  Patient Education: D/C recommendations--pt verbalized understanding  REQUIRES PT FOLLOW UP: Yes  Activity Tolerance  Activity Tolerance: Patient Tolerated treatment well     Patient Diagnosis(es): The primary encounter diagnosis was Intestinal obstruction, unspecified cause, unspecified whether partial or complete ([address] A diagnosis of Incarcerated ventral hernia was also pertinent to this visit. has a past medical history of Angina, Bilateral carpal tunnel syndrome, Chronic kidney disease, Fibromyalgia, Hyperlipidemia, Hypertension, MVP (mitral valve prolapse), s, Small bowel obstruction ([address] and Type II or unspecified type diabetes mellitus without mention of complication, not stated as uncontrolled. has a past surgical history that includes Coronary angioplasty with stent; Hysterectomy; Cholecystectomy; lumbar discectomy (10/03/12); Small intestine surgery; Colonoscopy (N/A, 2019); hernia repair; and ventral hernia repair (N/A, 2021).     Restrictions  Restrictions/Precautions  Restrictions/Precautions: Fall Risk  Required Braces or Orthoses?: No  Position Activity Restriction  Other position/activity restrictions: Deonte Farrell is a 80 y.o. female with past medical show chronic kidney disease, hyperlipidemia, hypertension, previous small bowel obstruction and type 2 diabetes who presents to the ED with complaint of constipation associate abdominal pain. Patient is for the past week she has had nothing but liquid stool. Patient states she feels constipated and has had abdominal bloating with associated upper abdominal pain that she describes as sharp rated 9/10. Patient states she had decreased oral intake. Denies any nausea or vomiting. Denies fever chills. Denies any blood, pus or mucus in her stool. Denies any urinary symptoms. Denies vaginal bleed or discharge. Denies chest pain or shortness of breath. Patient ages lungs and history of bowel problems. Patient states she has had history of cholecystectomy, hysterectomy and bowel resection for diverticulitis/perforation/colovaginal fistula in the past.  States she follows up with GI. Patient states she is been placed on fiber supplementation but states it has not improved her symptoms over the past week. Subjective   General  Chart Reviewed: Yes  Response To Previous Treatment: Not applicable  Family / Caregiver Present: No  Subjective  Subjective: Pt seated in chair upon PT arrival, presents lethargic but is agreeable to PT tx session. Pt denies pain but notes abdominal discomfort.   Pain Screening  Patient Currently in Pain: Denies  Vital Signs  Patient Currently in Pain: Denies       Objective      Transfers  Sit to Stand: Contact guard assistance (up to RW)  Stand to sit: Contact guard assistance  Ambulation  Ambulation?: Yes  Ambulation 1  Surface: level tile  Device: Rolling Walker  Assistance: Contact guard assistance;Stand by assistance (CGA progressing to SBA)  Quality of Gait: Pt ambulates with decreased step length, slow jacky, increased UE weightbearing, no LOB.  Gait Deviations: Decreased step length;Decreased step height  Distance: 280'  Stairs/Curb  Stairs?: No     Balance  Posture: Fair  Sitting - Static: Good (independent)  Sitting - Dynamic: Good (independent)  Standing - Static: Fair (SBA with RW)  Standing - Dynamic: Fair (CGA-SBA with RW)            Comment: Heel raises x 10 reps, minisquats x 10 reps           AM-PAC Score  AM-PAC Inpatient Mobility Raw Score : 18 (08/13/21 1232)  AM-PAC Inpatient T-Scale Score : 43.63 (08/13/21 1232)  Mobility Inpatient CMS 0-100% Score: 46.58 (08/13/21 1232)  Mobility Inpatient CMS G-Code Modifier : CK (08/13/21 1232)          Goals  Short term goals  Time Frame for Short term goals: To be met prior to discharge (8/13- all goals progressing)  Short term goal 1: Pt will complete bed mobility with mod I  Short term goal 2: Pt will complete sit to/from stand with mod I  Short term goal 3: Pt will ambulate 100 ft with LRAD and mod I    Plan    Plan  Times per week: 3-5x  Times per day: Daily  Current Treatment Recommendations: Strengthening, ROM, Balance Training, Functional Mobility Training, Transfer Training, Endurance Training, Gait Training, Stair training, Home Exercise Program, Safety Education & Training, Patient/Caregiver Education & Training  Safety Devices  Type of devices:  All fall risk precautions in place, Call light within reach, Chair alarm in place, Gait belt, Patient at risk for falls, Left in chair, Nurse notified  Restraints  Initially in place: No     Therapy Time   Individual Concurrent Group Co-treatment   Time In 1036         Time Out 1114         Minutes 38               Timed Code Treatment Minutes:  38 minutes    Total Treatment Minutes:  38 minutes    [facility], [address] , [place] [employer] [id number]

## 2021-08-13 NOTE — PROGRESS NOTES
Occupational Therapy  Facility/Department: [address] ORTHO/NEURO NURSING  Daily Treatment Note  NAME: Marge Cho  : 1940  MRN: [id number]    Date of Service: 2021    Discharge Recommendations: Marge Cho scored a 18/24 on the AM-PAC ADL Inpatient form. Current research shows that an AM-PAC score of 18 or greater is typically associated with a discharge to the patient's home setting. Based on the patient's AM-PAC score, and their current ADL deficits, it is recommended that the patient have 2-3 sessions per week of Occupational Therapy at d/c to increase the patient's independence. At this time, this patient demonstrates the endurance and safety to discharge home with home OT and a follow up treatment frequency of 2-3x/wk. Please see assessment section for further patient specific details. If patient discharges prior to next session this note will serve as a discharge summary. Please see below for the latest assessment towards goals. HOME HEALTH CARE: LEVEL 3 SAFETY     - Initial home health evaluation to occur within 24-48 hours, in patient home   - Therapy evaluations in home within 24-48 hours of discharge; including DME and home safety   - Frontload therapy 5 days, then 3x a week   - Therapy to evaluate if patient has [address] needs for personal care   -  evaluation within 24-48 hours, includes evaluation of resources and insurance to determine AL, [place], LTC, and Medicaid options        OT Equipment Recommendations  Equipment Needed: Yes  Mobility Devices: ADL Assistive Devices  Other: possible hip kit to decrease pain with LB dressing -- discussed with pt    Assessment   Performance deficits / Impairments: Decreased functional mobility ; Decreased endurance;Decreased ADL status; Decreased balance;Decreased strength;Decreased high-level IADLs  Assessment: Patient presents below baseline d/t above deficits; OT indicated to maximize safety/independence with ADL and IADL  Treatment Diagnosis: Above deficits associated with SBO  OT Education: OT Role;Plan of Care;Transfer Training  Patient Education: pt v/u  REQUIRES OT FOLLOW UP: Yes  Activity Tolerance  Activity Tolerance: Patient limited by pain  Safety Devices  Safety Devices in place: Yes  Type of devices: All fall risk precautions in place; Left in chair;Call light within reach; Chair alarm in place;Gait belt         Patient Diagnosis(es): The primary encounter diagnosis was Intestinal obstruction, unspecified cause, unspecified whether partial or complete ([facility] [address] A diagnosis of Incarcerated ventral hernia was also pertinent to this visit. has a past medical history of Angina, Bilateral carpal tunnel syndrome, Chronic kidney disease, Fibromyalgia, Hyperlipidemia, Hypertension, MVP (mitral valve prolapse), s, Small bowel obstruction ([address] and Type II or unspecified type diabetes mellitus without mention of complication, not stated as uncontrolled. has a past surgical history that includes Coronary angioplasty with stent; Hysterectomy; Cholecystectomy; lumbar discectomy (10/03/12); Small intestine surgery; Colonoscopy (N/A, 8/13/2019); hernia repair; and ventral hernia repair (N/A, 8/9/2021). Restrictions  Restrictions/Precautions  Restrictions/Precautions: Fall Risk  Required Braces or Orthoses?: No  Position Activity Restriction  Other position/activity restrictions: William Keller is a 80 y.o. female with past medical show chronic kidney disease, hyperlipidemia, hypertension, previous small bowel obstruction and type 2 diabetes who presents to the ED with complaint of constipation associate abdominal pain. Patient is for the past week she has had nothing but liquid stool. Patient states she feels constipated and has had abdominal bloating with associated upper abdominal pain that she describes as sharp rated 9/10. Patient states she had decreased oral intake. Denies any nausea or vomiting. Denies fever chills. Denies any blood, pus or mucus in her stool. Denies any urinary symptoms. Denies vaginal bleed or discharge. Denies chest pain or shortness of breath. Patient ages lungs and history of bowel problems. Patient states she has had history of cholecystectomy, hysterectomy and bowel resection for diverticulitis/perforation/colovaginal fistula in the past.  States she follows up with GI. Patient states she is been placed on fiber supplementation but states it has not improved her symptoms over the past week. Subjective   General  Chart Reviewed: Yes  Patient assessed for rehabilitation services?: Yes  Response to previous treatment: Patient with no complaints from previous session  Family / Caregiver Present: No  Diagnosis: SBO  Subjective  Subjective: Pt met bedside. Pleseant and agreeable to OT  treat. Pt reporting pain throughout. Pain Assessment  Pain Assessment: 0-10  Pain Level: 6  Pain Type: Surgical pain  Pain Location: Abdomen  Pre Treatment Pain Screening  Intervention List: Nurse called to administer meds  Vital Signs  Patient Currently in Pain: Yes   Orientation  Orientation  Overall Orientation Status: Within Functional Limits  Objective    ADL  Grooming: Stand by assistance (in stance at sink)  UE Bathing: Stand by assistance;Setup; Increased time to complete  UE Dressing:  (unable to complete, pt reporting pain when attempting to remove abominal binder for dressing.)  LE Dressing: Moderate assistance (assist to thread depends 2/2 pain level.)  Toileting: Contact guard assistance  Additional Comments: pt limited by pain and feeling naseous during ADLs.         Toilet Transfers  Toilet - Technique: Ambulating  Equipment Used: Standard toilet  Toilet Transfer: Contact guard assistance  Bed mobility  Supine to Sit: Contact guard assistance  Scooting: Contact guard assistance  Transfers  Sit to stand: Contact guard assistance  Stand to sit: Contact guard assistance                       Cognition  Overall Cognitive Status: [facility]                                         Plan   Plan  Times per week: 3-5  Times per day: Daily  Current Treatment Recommendations: Strengthening, Equipment Evaluation, Education, & procurement, Patient/Caregiver Education & Training, Balance Training, Functional Mobility Training, Endurance Training, Safety Education & Training, Self-Care / ADL     AM-PAC Inpatient Daily Activity Raw Score: 18 (08/13/21 1039)  AM-PAC Inpatient ADL T-Scale Score : 38.66 (08/13/21 1039)  ADL Inpatient CMS 0-100% Score: 46.65 (08/13/21 1039)  ADL Inpatient CMS G-Code Modifier : CK (08/13/21 1039)    Goals  Short term goals  Time Frame for Short term goals: discharge  Short term goal 1: UB ADL mod I  Short term goal 2: LB ADL mod I  Short term goal 3: Fxl transfers mod I  Short term goal 4: Fxl mob mod I  Short term goal 5:  Toileting mod I  Long term goals  Time Frame for Long term goals : LTG=STG       Therapy Time   Individual Concurrent Group Co-treatment   Time In 0915         Time Out 0954         Minutes [address] [place], [place]

## 2021-08-14 LAB
ANION GAP SERPL CALCULATED.3IONS-SCNC: 14 MMOL/L (ref 3–16)
BUN BLDV-MCNC: 28 MG/DL (ref 7–20)
CALCIUM SERPL-MCNC: 9 MG/DL (ref 8.3–10.6)
CHLORIDE BLD-SCNC: 103 MMOL/L (ref 99–110)
CO2: 19 MMOL/L (ref 21–32)
CREAT SERPL-MCNC: 1.1 MG/DL (ref 0.6–1.2)
GFR AFRICAN AMERICAN: 58
GFR NON-AFRICAN AMERICAN: 48
GLUCOSE BLD-MCNC: 106 MG/DL (ref 70–99)
GLUCOSE BLD-MCNC: 109 MG/DL (ref 70–99)
GLUCOSE BLD-MCNC: 109 MG/DL (ref 70–99)
GLUCOSE BLD-MCNC: 111 MG/DL (ref 70–99)
GLUCOSE BLD-MCNC: 117 MG/DL (ref 70–99)
GLUCOSE BLD-MCNC: 117 MG/DL (ref 70–99)
GLUCOSE BLD-MCNC: 120 MG/DL (ref 70–99)
HCT VFR BLD CALC: 22.1 % (ref 36–48)
HEMOGLOBIN: 7.2 G/DL (ref 12–16)
MCH RBC QN AUTO: 33.6 PG (ref 26–34)
MCHC RBC AUTO-ENTMCNC: 32.4 G/DL (ref 31–36)
MCV RBC AUTO: 103.7 FL (ref 80–100)
PDW BLD-RTO: 14.1 % (ref 12.4–15.4)
PERFORMED ON: ABNORMAL
PLATELET # BLD: 242 K/UL (ref 135–450)
PMV BLD AUTO: 7.5 FL (ref 5–10.5)
POTASSIUM SERPL-SCNC: 4.4 MMOL/L (ref 3.5–5.1)
RBC # BLD: 2.14 M/UL (ref 4–5.2)
SODIUM BLD-SCNC: 136 MMOL/L (ref 136–145)
WBC # BLD: 6.6 K/UL (ref 4–11)

## 2021-08-14 PROCEDURE — 2580000003 HC RX 258: Performed by: INTERNAL MEDICINE

## 2021-08-14 PROCEDURE — 6360000002 HC RX W HCPCS: Performed by: SURGERY

## 2021-08-14 PROCEDURE — 6360000002 HC RX W HCPCS: Performed by: NURSE PRACTITIONER

## 2021-08-14 PROCEDURE — 2580000003 HC RX 258: Performed by: SURGERY

## 2021-08-14 PROCEDURE — 1200000000 HC SEMI PRIVATE

## 2021-08-14 PROCEDURE — 94760 N-INVAS EAR/PLS OXIMETRY 1: CPT

## 2021-08-14 PROCEDURE — 6370000000 HC RX 637 (ALT 250 FOR IP): Performed by: SURGERY

## 2021-08-14 PROCEDURE — 99024 POSTOP FOLLOW-UP VISIT: CPT | Performed by: SURGERY

## 2021-08-14 PROCEDURE — 85027 COMPLETE CBC AUTOMATED: CPT

## 2021-08-14 PROCEDURE — C9113 INJ PANTOPRAZOLE SODIUM, VIA: HCPCS | Performed by: INTERNAL MEDICINE

## 2021-08-14 PROCEDURE — 6370000000 HC RX 637 (ALT 250 FOR IP): Performed by: PHYSICIAN ASSISTANT

## 2021-08-14 PROCEDURE — 6360000002 HC RX W HCPCS: Performed by: INTERNAL MEDICINE

## 2021-08-14 PROCEDURE — 80048 BASIC METABOLIC PNL TOTAL CA: CPT

## 2021-08-14 RX ORDER — BISACODYL 10 MG
10 SUPPOSITORY, RECTAL RECTAL ONCE
Status: DISCONTINUED | OUTPATIENT
Start: 2021-08-14 | End: 2021-08-14

## 2021-08-14 RX ADMIN — Medication 10 ML: at 19:39

## 2021-08-14 RX ADMIN — SODIUM CHLORIDE: 9 INJECTION, SOLUTION INTRAVENOUS at 15:48

## 2021-08-14 RX ADMIN — METOPROLOL TARTRATE 25 MG: 25 TABLET, FILM COATED ORAL at 08:44

## 2021-08-14 RX ADMIN — Medication 240 ML: at 17:53

## 2021-08-14 RX ADMIN — HYDROMORPHONE HYDROCHLORIDE 0.5 MG: 1 INJECTION, SOLUTION INTRAMUSCULAR; INTRAVENOUS; SUBCUTANEOUS at 19:39

## 2021-08-14 RX ADMIN — HYDROMORPHONE HYDROCHLORIDE 0.5 MG: 1 INJECTION, SOLUTION INTRAMUSCULAR; INTRAVENOUS; SUBCUTANEOUS at 15:45

## 2021-08-14 RX ADMIN — LATANOPROST 1 DROP: 50 SOLUTION OPHTHALMIC at 22:05

## 2021-08-14 RX ADMIN — HYDRALAZINE HYDROCHLORIDE 25 MG: 25 TABLET, FILM COATED ORAL at 22:00

## 2021-08-14 RX ADMIN — TIMOLOL MALEATE 1 DROP: 5 SOLUTION OPHTHALMIC at 08:45

## 2021-08-14 RX ADMIN — METOPROLOL TARTRATE 25 MG: 25 TABLET, FILM COATED ORAL at 22:00

## 2021-08-14 RX ADMIN — ONDANSETRON 4 MG: 2 INJECTION INTRAMUSCULAR; INTRAVENOUS at 10:31

## 2021-08-14 RX ADMIN — DORZOLAMIDE HYDROCHLORIDE 1 DROP: 20 SOLUTION/ DROPS OPHTHALMIC at 22:05

## 2021-08-14 RX ADMIN — HYDROMORPHONE HYDROCHLORIDE 1 MG: 1 INJECTION, SOLUTION INTRAMUSCULAR; INTRAVENOUS; SUBCUTANEOUS at 02:12

## 2021-08-14 RX ADMIN — PANTOPRAZOLE SODIUM 40 MG: 40 INJECTION, POWDER, FOR SOLUTION INTRAVENOUS at 08:45

## 2021-08-14 RX ADMIN — ONDANSETRON 4 MG: 2 INJECTION INTRAMUSCULAR; INTRAVENOUS at 22:00

## 2021-08-14 RX ADMIN — BISACODYL 10 MG: 10 SUPPOSITORY RECTAL at 08:53

## 2021-08-14 RX ADMIN — ONDANSETRON 4 MG: 2 INJECTION INTRAMUSCULAR; INTRAVENOUS at 02:12

## 2021-08-14 RX ADMIN — ENOXAPARIN SODIUM 40 MG: 40 INJECTION SUBCUTANEOUS at 08:45

## 2021-08-14 RX ADMIN — HYDROMORPHONE HYDROCHLORIDE 1 MG: 1 INJECTION, SOLUTION INTRAMUSCULAR; INTRAVENOUS; SUBCUTANEOUS at 10:31

## 2021-08-14 RX ADMIN — HYDRALAZINE HYDROCHLORIDE 25 MG: 25 TABLET, FILM COATED ORAL at 08:44

## 2021-08-14 RX ADMIN — Medication 10 ML: at 08:45

## 2021-08-14 RX ADMIN — SODIUM CHLORIDE: 9 INJECTION, SOLUTION INTRAVENOUS at 00:33

## 2021-08-14 RX ADMIN — DORZOLAMIDE HYDROCHLORIDE 1 DROP: 20 SOLUTION/ DROPS OPHTHALMIC at 08:45

## 2021-08-14 ASSESSMENT — PAIN SCALES - GENERAL
PAINLEVEL_OUTOF10: 7
PAINLEVEL_OUTOF10: 5
PAINLEVEL_OUTOF10: 6
PAINLEVEL_OUTOF10: 7
PAINLEVEL_OUTOF10: 6
PAINLEVEL_OUTOF10: 6
PAINLEVEL_OUTOF10: 9
PAINLEVEL_OUTOF10: 8
PAINLEVEL_OUTOF10: 5

## 2021-08-14 ASSESSMENT — PAIN DESCRIPTION - LOCATION
LOCATION: ABDOMEN

## 2021-08-14 ASSESSMENT — PAIN DESCRIPTION - PAIN TYPE
TYPE: SURGICAL PAIN

## 2021-08-14 NOTE — PLAN OF CARE
Problem: Falls - Risk of:  Goal: Will remain free from falls  Description: Will remain free from falls  8/13/2021 2157 by Mariela Mcclendon RN  Outcome: Ongoing     Problem: Falls - Risk of:  Goal: Absence of physical injury  Description: Absence of physical injury  8/13/2021 2157 by Mariela Mcclendon RN  Outcome: Ongoing     Problem: Skin Integrity:  Goal: Will show no infection signs and symptoms  Description: Will show no infection signs and symptoms  8/13/2021 2157 by Mariela Mcclendon RN  Outcome: Ongoing     Problem: Skin Integrity:  Goal: Absence of new skin breakdown  Description: Absence of new skin breakdown  8/13/2021 2157 by Mariela Mcclendon RN  Outcome: Ongoing     Problem: Pain:  Goal: Pain level will decrease  Description: Pain level will decrease  8/13/2021 2157 by Mariela Mcclendon RN  Outcome: Ongoing     Problem: Pain:  Goal: Control of acute pain  Description: Control of acute pain  8/13/2021 2157 by Mariela Mcclendon RN  Outcome: Ongoing     Problem: HEMODYNAMIC STATUS  Goal: Patient has stable vital signs and fluid balance  8/13/2021 2157 by Mariela Mcclendon RN  Outcome: Ongoing     Problem: OXYGENATION/RESPIRATORY FUNCTION  Goal: Patient will achieve/maintain normal respiratory rate/effort  8/13/2021 2157 by Mariela Mcclendon RN  Outcome: Ongoing     Problem: MOBILITY  Goal: Early mobilization is achieved  8/13/2021 2157 by Mariela Mcclendon RN  Outcome: Ongoing     Problem: ELIMINATION  Goal: Elimination patterns are normal or improving  Description: Elimination patterns return to pre-surgery normal patterns  8/13/2021 2157 by Mariela Mcclendon RN  Outcome: Ongoing     Problem: SKIN INTEGRITY  Goal: Skin integrity is maintained or improved  8/13/2021 2157 by Mariela Mcclendon RN  Outcome: Ongoing

## 2021-08-14 NOTE — PROGRESS NOTES
Call placed to Dr. Jeffery Bernal, clarification needed on suppository one time dose. PRN dose given this AM, no return call noted at this time.  Marthenia Denver, RN

## 2021-08-14 NOTE — PROGRESS NOTES
AM assessment complete. Pt c/o constipation, prn suppository per orders, please see MAR, will monitor. Pt states she is passing flatus at this time, will monitor. Midline surgical incision remains approximated with surgical glue and s/s free of infection, will monitor. The care plan and education has been reviewed and mutually agreed upon with the patient. Pt denies any further questions or concerns at this time, call light w/in reach.  Zayda Chambers RN

## 2021-08-14 NOTE — PROGRESS NOTES
Shift assessment completed, pt is alert and oriented, VSS, fall precautions in place, call light within reach, Dilaudid for pain, denies any other needs at this time, see flowsheets and MAR, will monitor pt. The care plan and education has been reviewed and mutually agreed upon with the patient.

## 2021-08-14 NOTE — PROGRESS NOTES
Spoke with PA, spoke with Dr. Alpa Reis regarding clarification on suppository. Verbal orders obtained from ac MAHAJAN to DC suppository and order milk of molasses enema.  Catarino Lee RN

## 2021-08-14 NOTE — PROGRESS NOTES
Offered to ambulate pt several times, pt currently visiting with family. Pt encouraged after visit to ambulate in halls with assistance, will attempt again.  Cara Garcia RN

## 2021-08-14 NOTE — PLAN OF CARE
Problem: Falls - Risk of:  Goal: Will remain free from falls  Description: Will remain free from falls  8/14/2021 1137 by Scar Joel RN  Outcome: Ongoing  8/13/2021 2157 by Gabriel Langston RN  Outcome: Ongoing  Goal: Absence of physical injury  Description: Absence of physical injury  8/14/2021 1137 by Scar Joel RN  Outcome: Ongoing  8/13/2021 2157 by Gabriel Langston RN  Outcome: Ongoing     Problem: Skin Integrity:  Goal: Will show no infection signs and symptoms  Description: Will show no infection signs and symptoms  8/14/2021 1137 by Scar Joel RN  Outcome: Ongoing  8/13/2021 2157 by Gabriel Langston RN  Outcome: Ongoing  Goal: Absence of new skin breakdown  Description: Absence of new skin breakdown  8/14/2021 1137 by Scar Joel RN  Outcome: Ongoing  8/13/2021 2157 by Gabriel Langston RN  Outcome: Ongoing     Problem: Pain:  Goal: Pain level will decrease  Description: Pain level will decrease  8/14/2021 1137 by Scar Joel RN  Outcome: Ongoing  8/13/2021 2157 by Gabriel Langston RN  Outcome: Ongoing  Goal: Control of acute pain  Description: Control of acute pain  8/14/2021 1137 by Scar Joel RN  Outcome: Ongoing  8/13/2021 2157 by Gabriel Langston RN  Outcome: Ongoing  Goal: Control of chronic pain  Description: Control of chronic pain  8/14/2021 1137 by Scar Joel RN  Outcome: Ongoing  8/13/2021 2157 by Gabriel Langston RN  Outcome: Ongoing     Problem: HEMODYNAMIC STATUS  Goal: Patient has stable vital signs and fluid balance  8/14/2021 1137 by Scar Joel RN  Outcome: Ongoing  8/13/2021 2157 by Gabriel Langston RN  Outcome: Ongoing     Problem: OXYGENATION/RESPIRATORY FUNCTION  Goal: Patient will achieve/maintain normal respiratory rate/effort  8/14/2021 1137 by Scar Joel RN  Outcome: Ongoing  8/13/2021 2157 by Gabriel Langston RN  Outcome: Ongoing     Problem: MOBILITY  Goal: Early mobilization is achieved  8/14/2021 1137 by Scar Joel, RN  Outcome: Ongoing  8/13/2021 2157 by Mariela Mcclendon RN  Outcome: Ongoing     Problem: ELIMINATION  Goal: Elimination patterns are normal or improving  Description: Elimination patterns return to pre-surgery normal patterns  8/14/2021 1137 by Nicole Cornelius RN  Outcome: Ongoing  8/13/2021 2157 by Mariela Mcclendon RN  Outcome: Ongoing     Problem: SKIN INTEGRITY  Goal: Skin integrity is maintained or improved  8/14/2021 1137 by Nicole Cornelius RN  Outcome: Ongoing  8/13/2021 2157 by Mariela Mcclendon RN  Outcome: Ongoing

## 2021-08-14 NOTE — PROGRESS NOTES
Aniyah Jaimes is a 80 y.o. female patient.     Current Facility-Administered Medications   Medication Dose Route Frequency Provider Last Rate Last Admin    bisacodyl (DULCOLAX) suppository 10 mg  10 mg Rectal Daily PRN Elena Alexander MD   10 mg at 08/14/21 0853    0.9 % sodium chloride infusion   Intravenous Continuous Elena Alexander MD 75 mL/hr at 08/14/21 0033 New Bag at 08/14/21 0033    magnesium sulfate 2000 mg in 50 mL IVPB premix  2,000 mg Intravenous PRN Sami Palmer PA-C   Stopped at 08/11/21 0537    metoprolol tartrate (LOPRESSOR) tablet 25 mg  25 mg Oral BID Felipe Sanchez PA-C   25 mg at 08/14/21 0844    hydrALAZINE (APRESOLINE) tablet 25 mg  25 mg Oral 2 times per day Felipe Sanchez PA-C   25 mg at 08/14/21 0844    HYDROmorphone HCl PF (DILAUDID) injection 0.5 mg  0.5 mg Intravenous Q3H PRN Cem Stake, APRN - CNP   0.5 mg at 08/13/21 2020    Or    HYDROmorphone HCl PF (DILAUDID) injection 1 mg  1 mg Intravenous Q3H PRN Cem Stake, APRN - CNP   1 mg at 08/14/21 1031    iopamidol (ISOVUE-370) 76 % injection 75 mL  75 mL Intravenous ONCE PRN Elena Alexander MD        sodium chloride flush 0.9 % injection 5-40 mL  5-40 mL Intravenous 2 times per day Omid De La Rosa MD   10 mL at 08/14/21 0845    sodium chloride flush 0.9 % injection 5-40 mL  5-40 mL Intravenous PRN Omid De La Rosa MD        0.9 % sodium chloride infusion  25 mL Intravenous PRN Omid De La Rosa MD        ondansetron (ZOFRAN-ODT) disintegrating tablet 4 mg  4 mg Oral Q8H PRN Omid De La Rosa MD   4 mg at 08/12/21 2132    Or    ondansetron (ZOFRAN) injection 4 mg  4 mg Intravenous Q6H PRN Omid De La Rosa MD   4 mg at 08/14/21 1031    acetaminophen (TYLENOL) tablet 650 mg  650 mg Oral Q6H PRN Omid De La Rosa MD   650 mg at 08/13/21 5700    Or    acetaminophen (TYLENOL) suppository 650 mg  650 mg Rectal Q6H PRN Jerrol MD Estrella        pantoprazole (PROTONIX) injection 40 mg  40 mg Intravenous Daily Abigail Carnes MD   40 mg at 08/14/21 0845    glucose (GLUTOSE) 40 % oral gel 15 g  15 g Oral PRN Eddi Grissom MD        dextrose 50 % IV solution  12.5 g Intravenous PRN Eddi Grissom MD   12.5 g at 08/11/21 0656    glucagon (rDNA) injection 1 mg  1 mg Intramuscular PRN Eddi Grissom MD        dextrose 5 % solution  100 mL/hr Intravenous PRN Eddi Grissmo MD        insulin lispro (1 Unit Dial) 0-3 Units  0-3 Units Subcutaneous Nightly Eddi Grissom MD   1 Units at 08/12/21 2123    hydrALAZINE (APRESOLINE) injection 5 mg  5 mg Intravenous Q6H PRN Eddi Grissom MD   5 mg at 08/10/21 1631    latanoprost (XALATAN) 0.005 % ophthalmic solution 1 drop  1 drop Both Eyes Nightly Abigail Carnes MD   1 drop at 08/13/21 2019    insulin lispro (1 Unit Dial) 0-6 Units  0-6 Units Subcutaneous Q6H Eddi Grissom MD   1 Units at 08/13/21 1702    enoxaparin (LOVENOX) injection 40 mg  40 mg Subcutaneous Daily Eddi Grissom MD   40 mg at 08/14/21 0845    dorzolamide (TRUSOPT) 2 % ophthalmic solution 1 drop  1 drop Both Eyes BID Abigail Carnes MD   1 drop at 08/14/21 0845    And    timolol (TIMOPTIC) 0.5 % ophthalmic solution 1 drop  1 drop Both Eyes BID Abigail Carnes MD   1 drop at 08/14/21 0845     Allergies   Allergen Reactions    Flagyl [Metronidazole]      ataxia    Sulfa Antibiotics     Lisinopril-Hydrochlorothiazide Swelling     Lip And facial swelling and coughing    Ace Inhibitors Other (See Comments)     Edema lip swelling and coughing.  Actos [Pioglitazone Hydrochloride]     Celebrex [Celecoxib]     Cipro Xr     Ciprofloxacin     Demerol     Doxycycline     Elavil [Amitriptyline Hcl]     Estradiol      Estrogen patch, rapid weight gain, arm pain , dizziness, mentally not clear. Patient took estrace 1 mg daily orally for years and insurance no longer covered so switched to patch and felt so bad , she discontinued.     Levofloxacin     Metformin     Metoclopramide     Metolazone Other (See Comments)    Neurontin [Gabapentin]     Niaspan [Niacin Er]     Nifedipine Itching    Nsaids     Oxycodone     Phenytoin Sodium Extended     Pioglitazone     Prednisone     Pregabalin     Simvastatin     Iodine Rash     Rash all over. Pt does not want contrast anymore. Principal Problem:    Intestinal obstruction (HCC)  Active Problems:    CKD (chronic kidney disease) stage 3, GFR 30-59 ml/min (HCC)    Type 2 diabetes mellitus with kidney complication, with long-term current use of insulin (HCC)    Coronary artery disease without angina pectoris    Essential hypertension    Incarcerated ventral hernia  Resolved Problems:    * No resolved hospital problems. *    Blood pressure (!) 155/69, pulse 84, temperature 98 °F (36.7 °C), temperature source Oral, resp. rate 18, height 5' 3\" (1.6 m), weight 162 lb 4 oz (73.6 kg), SpO2 95 %, not currently breastfeeding. Subjective:  Symptoms:  Stable. Diet:  Poor intake. Activity level: Returning to normal.    Pain:  She complains of pain that is moderate. Objective:  General Appearance:  Comfortable. Vital signs: (most recent): Blood pressure (!) 155/69, pulse 84, temperature 98 °F (36.7 °C), temperature source Oral, resp. rate 18, height 5' 3\" (1.6 m), weight 162 lb 4 oz (73.6 kg), SpO2 95 %, not currently breastfeeding. Output: Producing urine and no stool output. Lungs:  Normal effort and normal respiratory rate. Abdomen: Abdomen is soft. (Mild abdominal distention. Incisions approximated without evidence of infection. Tender left side of the abdomen). Neurological: Patient is alert and oriented to person, place and time. Skin:  Warm. Assessment & Plan very pleasant 80-year-old female who is postop day # 5 status post laparoscopic repair of an incarcerated ventral hernia which was causing a small bowel obstruction. She is doing fairly well. P.o.  intake has been fair.  Passing flatus but remains withno bowel movement yet. Abdomen is distended. BUN and creatinine are up, and sig anemia with Hb 7.2  Not ready for discharge yet. Continue IV fluids at 75 mL/h. Diet as tolerated - not taking much. Dulcolax suppository again today. Continue ambulation in hallway.  FU labs in am.    Jericho Vergara MD  8/14/2021

## 2021-08-14 NOTE — PROGRESS NOTES
Physician Progress Note      Kodi Oleary  [facility] #:                  [id number]  :                       1940  ADMIT DATE:       2021 6:28 AM  DISCH DATE:  RESPONDING  PROVIDER #:        Soila Concepcion PA-C          QUERY TEXT:    Pt admitted with SBO. Pt noted to have HGB on admission was 11.1, had lysis of   adhesions the same day. HGB after 10.4-->8.9-->7.8. If possible, please   document in the progress notes and discharge summary if you are evaluating   and/or treating any of the following: The medical record reflects the following:  Risk Factors: Laparoscopic lysis of adhesions (1 hour), repair, ventral hernia   repair with mesh. Clinical Indicators: Pt admitted with SBO. Pt noted to have HGB on admission   was 11.1, had lysis of adhesions the same day. HGB after 10.4-->8.9-->7.8. Treatment: Serial labs, monitoring  Options provided:  -- Acute blood loss anemia  -- Postoperative acute blood loss anemia  -- Dilutional anemia  -- Other - I will add my own diagnosis  -- Disagree - Not applicable / Not valid  -- Disagree - Clinically unable to determine / Unknown  -- Refer to Clinical Documentation Reviewer    PROVIDER RESPONSE TEXT:    This patient has acute blood loss anemia.     Query created by: Ricard Phoenix on 2021 9:44 AM      Electronically signed by:  Soila Concepcion PA-C 2021 7:07 PM

## 2021-08-14 NOTE — PROGRESS NOTES
[facility]    8/14/2021 2:09 PM        Name: Hung Tavares . Admitted: 8/9/2021  Primary Care Provider: Milagro Pradhan MD (Tel: [phone])      Subjective:  . Feeling ok, pain controlled. No cp or sob. Passing gas, no BM.      Reviewed interval ancillary notes    Current Medications  bisacodyl (DULCOLAX) suppository 10 mg, Once  bisacodyl (DULCOLAX) suppository 10 mg, Daily PRN  0.9 % sodium chloride infusion, Continuous  magnesium sulfate 2000 mg in 50 mL IVPB premix, PRN  metoprolol tartrate (LOPRESSOR) tablet 25 mg, BID  hydrALAZINE (APRESOLINE) tablet 25 mg, 2 times per day  HYDROmorphone HCl PF (DILAUDID) injection 0.5 mg, Q3H PRN  iopamidol (ISOVUE-370) 76 % injection 75 mL, ONCE PRN  sodium chloride flush 0.9 % injection 5-40 mL, 2 times per day  sodium chloride flush 0.9 % injection 5-40 mL, PRN  0.9 % sodium chloride infusion, PRN  ondansetron (ZOFRAN-ODT) disintegrating tablet 4 mg, Q8H PRN   Or  ondansetron (ZOFRAN) injection 4 mg, Q6H PRN  acetaminophen (TYLENOL) tablet 650 mg, Q6H PRN   Or  acetaminophen (TYLENOL) suppository 650 mg, Q6H PRN  pantoprazole (PROTONIX) injection 40 mg, Daily  glucose (GLUTOSE) 40 % oral gel 15 g, PRN  dextrose 50 % IV solution, PRN  glucagon (rDNA) injection 1 mg, PRN  dextrose 5 % solution, PRN  insulin lispro (1 Unit Dial) 0-3 Units, Nightly  hydrALAZINE (APRESOLINE) injection 5 mg, Q6H PRN  latanoprost (XALATAN) 0.005 % ophthalmic solution 1 drop, Nightly  insulin lispro (1 Unit Dial) 0-6 Units, Q6H  enoxaparin (LOVENOX) injection 40 mg, Daily  dorzolamide (TRUSOPT) 2 % ophthalmic solution 1 drop, BID   And  timolol (TIMOPTIC) 0.5 % ophthalmic solution 1 drop, BID        Objective:  BP (!) 155/69   Pulse 84   Temp 98 °F (36.7 °C) (Oral)   Resp 18   Ht 5' 3\" (1.6 m)   Wt 162 lb 4 oz (73.6 kg)   SpO2 95%   BMI 28.74 kg/m²     Intake/Output Summary (Last 24 hours) at 8/14/2021 1409  Last data filed at 8/14/2021 0830  Gross per 24 hour   Intake 120 ml   Output    Net 120 ml      Wt Readings from Last 3 Encounters:   08/13/21 162 lb 4 oz (73.6 kg)   06/08/21 172 lb (78 kg)   01/14/21 171 lb (77.6 kg)       General appearance:  Appears comfortable  Eyes: Sclera clear. Pupils equal.  ENT: Moist oral mucosa. Trachea midline, no adenopathy. Cardiovascular: Regular rhythm, normal S1, S2. No murmur. No edema in lower extremities  Respiratory: Not using accessory muscles. Good inspiratory effort. Clear to auscultation bilaterally, no wheeze or crackles. GI: Abdomen soft, no tenderness, not distended, normal bowel sounds  Musculoskeletal: No cyanosis in digits, neck supple  Neurology: CN 2-12 grossly intact. No speech or motor deficits  Psych: Normal affect. Alert and oriented in time, place and person  Skin: Warm, dry, normal turgor    Labs and Tests:  CBC:   Recent Labs     08/12/21  1211 08/13/21  1011 08/14/21  0600   WBC 7.4 7.2 6.6   HGB 7.8* 8.1* 7.2*    208 242     BMP:    Recent Labs     08/12/21  1211 08/13/21  1011 08/14/21  0600   * 137 136   K 4.5 5.0 4.4    103 103   CO2 20* 20* 19*   BUN 21* 31* 28*   CREATININE 1.1 1.3* 1.1   GLUCOSE 143* 136* 109*     Hepatic:   No results for input(s): AST, ALT, ALB, BILITOT, ALKPHOS in the last 72 hours. Problem List  Principal Problem:    Intestinal obstruction (HCC)  Active Problems:    CKD (chronic kidney disease) stage 3, GFR 30-59 ml/min (HCC)    Type 2 diabetes mellitus with kidney complication, with long-term current use of insulin (HCC)    Coronary artery disease without angina pectoris    Essential hypertension    Incarcerated ventral hernia  Resolved Problems:    * No resolved hospital problems. *       Assessment & Plan:   1. Hypertension-continue metoprolol and hydralazine. Bp acceptable.   2. Bowel obstruction-s/p exploratory lap with MARY JO and ventral hernia repair with mesh 8/9/21. Doing well. Diet advanced. 3. Constipation-continue suppository per surgery  4. DM 2-no longer hypoglycemic. Continue SSI. Diet: ADULT DIET;  Regular  Code:Full Code  DVT PPX:loveadelax    Janet Alonzo PA-C   8/14/2021 2:09 PM

## 2021-08-15 VITALS
DIASTOLIC BLOOD PRESSURE: 71 MMHG | WEIGHT: 162.25 LBS | RESPIRATION RATE: 18 BRPM | SYSTOLIC BLOOD PRESSURE: 150 MMHG | BODY MASS INDEX: 28.75 KG/M2 | TEMPERATURE: 97.9 F | HEIGHT: 63 IN | HEART RATE: 86 BPM | OXYGEN SATURATION: 95 %

## 2021-08-15 LAB
ANION GAP SERPL CALCULATED.3IONS-SCNC: 15 MMOL/L (ref 3–16)
BASOPHILS ABSOLUTE: 0 K/UL (ref 0–0.2)
BASOPHILS RELATIVE PERCENT: 0.2 %
BUN BLDV-MCNC: 22 MG/DL (ref 7–20)
CALCIUM SERPL-MCNC: 9.1 MG/DL (ref 8.3–10.6)
CHLORIDE BLD-SCNC: 105 MMOL/L (ref 99–110)
CO2: 18 MMOL/L (ref 21–32)
CREAT SERPL-MCNC: 0.9 MG/DL (ref 0.6–1.2)
EOSINOPHILS ABSOLUTE: 0.2 K/UL (ref 0–0.6)
EOSINOPHILS RELATIVE PERCENT: 2.5 %
GFR AFRICAN AMERICAN: >60
GFR NON-AFRICAN AMERICAN: >60
GLUCOSE BLD-MCNC: 110 MG/DL (ref 70–99)
GLUCOSE BLD-MCNC: 114 MG/DL (ref 70–99)
GLUCOSE BLD-MCNC: 116 MG/DL (ref 70–99)
GLUCOSE BLD-MCNC: 118 MG/DL (ref 70–99)
GLUCOSE BLD-MCNC: 125 MG/DL (ref 70–99)
HCT VFR BLD CALC: 21.7 % (ref 36–48)
HEMOGLOBIN: 7.2 G/DL (ref 12–16)
LYMPHOCYTES ABSOLUTE: 0.9 K/UL (ref 1–5.1)
LYMPHOCYTES RELATIVE PERCENT: 14.6 %
MCH RBC QN AUTO: 33.9 PG (ref 26–34)
MCHC RBC AUTO-ENTMCNC: 33.2 G/DL (ref 31–36)
MCV RBC AUTO: 102.1 FL (ref 80–100)
MONOCYTES ABSOLUTE: 0.5 K/UL (ref 0–1.3)
MONOCYTES RELATIVE PERCENT: 8.2 %
NEUTROPHILS ABSOLUTE: 4.5 K/UL (ref 1.7–7.7)
NEUTROPHILS RELATIVE PERCENT: 74.5 %
PDW BLD-RTO: 14 % (ref 12.4–15.4)
PERFORMED ON: ABNORMAL
PLATELET # BLD: 299 K/UL (ref 135–450)
PMV BLD AUTO: 7.3 FL (ref 5–10.5)
POTASSIUM SERPL-SCNC: 4.4 MMOL/L (ref 3.5–5.1)
RBC # BLD: 2.12 M/UL (ref 4–5.2)
SODIUM BLD-SCNC: 138 MMOL/L (ref 136–145)
WBC # BLD: 6.1 K/UL (ref 4–11)

## 2021-08-15 PROCEDURE — 94760 N-INVAS EAR/PLS OXIMETRY 1: CPT

## 2021-08-15 PROCEDURE — 1800000000 HC LEAVE OF ABSENCE

## 2021-08-15 PROCEDURE — 6370000000 HC RX 637 (ALT 250 FOR IP): Performed by: PHYSICIAN ASSISTANT

## 2021-08-15 PROCEDURE — 6360000002 HC RX W HCPCS: Performed by: SURGERY

## 2021-08-15 PROCEDURE — 85025 COMPLETE CBC W/AUTO DIFF WBC: CPT

## 2021-08-15 PROCEDURE — 6360000002 HC RX W HCPCS: Performed by: INTERNAL MEDICINE

## 2021-08-15 PROCEDURE — C9113 INJ PANTOPRAZOLE SODIUM, VIA: HCPCS | Performed by: INTERNAL MEDICINE

## 2021-08-15 PROCEDURE — 99024 POSTOP FOLLOW-UP VISIT: CPT | Performed by: SURGERY

## 2021-08-15 PROCEDURE — 6360000002 HC RX W HCPCS: Performed by: NURSE PRACTITIONER

## 2021-08-15 PROCEDURE — 80048 BASIC METABOLIC PNL TOTAL CA: CPT

## 2021-08-15 RX ORDER — HYDROCODONE BITARTRATE AND ACETAMINOPHEN 5; 325 MG/1; MG/1
1 TABLET ORAL EVERY 6 HOURS PRN
Status: DISCONTINUED | OUTPATIENT
Start: 2021-08-15 | End: 2021-08-15 | Stop reason: HOSPADM

## 2021-08-15 RX ORDER — HYDROCODONE BITARTRATE AND ACETAMINOPHEN 5; 325 MG/1; MG/1
1 TABLET ORAL EVERY 6 HOURS PRN
Qty: 15 TABLET | Refills: 0 | Status: SHIPPED | OUTPATIENT
Start: 2021-08-15 | End: 2021-08-20

## 2021-08-15 RX ORDER — ONDANSETRON 4 MG/1
4 TABLET, ORALLY DISINTEGRATING ORAL EVERY 8 HOURS PRN
Qty: 15 TABLET | Refills: 0 | Status: SHIPPED | OUTPATIENT
Start: 2021-08-15 | End: 2021-08-20

## 2021-08-15 RX ORDER — SENNA AND DOCUSATE SODIUM 50; 8.6 MG/1; MG/1
1 TABLET, FILM COATED ORAL DAILY
COMMUNITY
Start: 2021-08-15

## 2021-08-15 RX ADMIN — ONDANSETRON 4 MG: 2 INJECTION INTRAMUSCULAR; INTRAVENOUS at 11:25

## 2021-08-15 RX ADMIN — HYDROCODONE BITARTRATE AND ACETAMINOPHEN 1 TABLET: 5; 325 TABLET ORAL at 11:25

## 2021-08-15 RX ADMIN — DORZOLAMIDE HYDROCHLORIDE 1 DROP: 20 SOLUTION/ DROPS OPHTHALMIC at 10:19

## 2021-08-15 RX ADMIN — PANTOPRAZOLE SODIUM 40 MG: 40 INJECTION, POWDER, FOR SOLUTION INTRAVENOUS at 10:25

## 2021-08-15 RX ADMIN — METOPROLOL TARTRATE 25 MG: 25 TABLET, FILM COATED ORAL at 10:20

## 2021-08-15 RX ADMIN — HYDRALAZINE HYDROCHLORIDE 25 MG: 25 TABLET, FILM COATED ORAL at 10:20

## 2021-08-15 RX ADMIN — HYDROMORPHONE HYDROCHLORIDE 0.5 MG: 1 INJECTION, SOLUTION INTRAMUSCULAR; INTRAVENOUS; SUBCUTANEOUS at 00:24

## 2021-08-15 RX ADMIN — HYDRALAZINE HYDROCHLORIDE 5 MG: 20 INJECTION INTRAMUSCULAR; INTRAVENOUS at 00:35

## 2021-08-15 RX ADMIN — HYDROMORPHONE HYDROCHLORIDE 0.5 MG: 1 INJECTION, SOLUTION INTRAMUSCULAR; INTRAVENOUS; SUBCUTANEOUS at 05:49

## 2021-08-15 ASSESSMENT — PAIN SCALES - GENERAL
PAINLEVEL_OUTOF10: 0
PAINLEVEL_OUTOF10: 5
PAINLEVEL_OUTOF10: 10
PAINLEVEL_OUTOF10: 0
PAINLEVEL_OUTOF10: 6

## 2021-08-15 NOTE — PLAN OF CARE
Problem: Falls - Risk of:  Goal: Will remain free from falls  Description: Will remain free from falls  8/14/2021 2320 by Brent Gold RN  Outcome: Ongoing     Problem: Falls - Risk of:  Goal: Absence of physical injury  Description: Absence of physical injury  8/14/2021 2320 by Brent Gold RN  Outcome: Ongoing     Problem: Skin Integrity:  Goal: Will show no infection signs and symptoms  Description: Will show no infection signs and symptoms  8/14/2021 2320 by Brent Gold RN  Outcome: Ongoing     Problem: Skin Integrity:  Goal: Absence of new skin breakdown  Description: Absence of new skin breakdown  8/14/2021 2320 by Brent Gold RN  Outcome: Ongoing     Problem: Pain:  Goal: Pain level will decrease  Description: Pain level will decrease  8/14/2021 2320 by Brent Gold RN  Outcome: Ongoing     Problem: Pain:  Goal: Control of acute pain  Description: Control of acute pain  8/14/2021 2320 by Brent Gold RN  Outcome: Ongoing     Problem: Pain:  Goal: Control of chronic pain  Description: Control of chronic pain  8/14/2021 2320 by Brent Gold RN  Outcome: Ongoing     Problem: HEMODYNAMIC STATUS  Goal: Patient has stable vital signs and fluid balance  8/14/2021 2320 by Brent Gold RN  Outcome: Ongoing     Problem: OXYGENATION/RESPIRATORY FUNCTION  Goal: Patient will achieve/maintain normal respiratory rate/effort  8/14/2021 2320 by Brent Gold RN  Outcome: Ongoing     Problem: MOBILITY  Goal: Early mobilization is achieved  8/14/2021 2320 by Brent Gold RN  Outcome: Ongoing     Problem: SKIN INTEGRITY  Goal: Skin integrity is maintained or improved  8/14/2021 2320 by Brent Gold RN  Outcome: Ongoing

## 2021-08-15 NOTE — PROGRESS NOTES
Shift assessment completed, pt is alert and oriented, VSS, fall precautions in place, call light within reach, Dilaudid for pain, see flowsheets and LDA, will monitor pt. The care plan and education has been reviewed and mutually agreed upon with the patient.

## 2021-08-15 NOTE — DISCHARGE SUMMARY
[facility]    Patient Demographics    Patient. Guera Labor  Date of Birth. 1940  MRN. [id number]     Primary care provider. Alexander Cortez MD  (Tel: [phone])    Admit date: 8/9/2021    Discharge date (blank if same as Note Date): Note Date: 8/15/2021     Reason for Hospitalization. Chief Complaint   Patient presents with    Constipation     Pt in with liquid stool for one week. last normal BM was last week. used a fleet enema two days ago with no results. hx of bowel issues, pt of dr Yolette Branch Findings. Principal Problem:    Intestinal obstruction (HCC)  Active Problems:    CKD (chronic kidney disease) stage 3, GFR 30-59 ml/min (HCC)    Type 2 diabetes mellitus with kidney complication, with long-term current use of insulin (HCC)    Coronary artery disease without angina pectoris    Essential hypertension    Incarcerated ventral hernia  Resolved Problems:    * No resolved hospital problems. *       Problems and results from this hospitalization that need follow up. 1. Post op follow up    Significant test results and incidental findings. 1. CT abdomen pelvis  CT ABDOMEN PELVIS WO CONTRAST Additional Contrast? None   Final Result   Acute moderate closed loop obstruction of the mid small bowel related to   small supraumbilical ventral hernia. Adhesions suspected proximal to the   dilated loop. No other acute abnormality identified.           Invasive procedures and treatments. 1. Laparoscopic lysis of adhesions with mesh repair of ventral hernia by Dr. Maritza Gil on August 9    [facility] Course. Patient is 49-year-old female with a history of multiple abdominal surgeries and ventral hernia who presented with severe abdominal pain. It was associated with nausea constipation and abdominal distention.   CT scan of the abdomen pelvis revealed an acute moderate closed-loop obstruction in the mid small bowel secondary to a small supraumbilical ventral hernia. Patient was admitted with an incarcerated ventral hernia and small bowel obstruction. She was seen by general surgery and taken to the operating room by Dr. Yun De La O on August 9 for laparoscopic lysis of adhesions with mesh repair of ventral hernia. She tolerated procedure well. She was started on a clear liquid diet. She had follow-up x-ray which revealed ileus versus obstruction however she was passing gas. She was given cathartics as well as an enema and she had a bowel movement. Diet was advanced and she was discharged home in stable condition. She did have some acute anemia likely secondary to blood loss however she did not require transfusion and her hemoglobin is stable prior to discharge. Consults. IP CONSULT TO GENERAL SURGERY  IP CONSULT TO SOCIAL WORK    Physical examination on discharge day. BP (!) 149/74   Pulse 97   Temp 98.7 °F (37.1 °C) (Oral)   Resp 16   Ht 5' 3\" (1.6 m)   Wt 162 lb 4 oz (73.6 kg)   SpO2 94%   BMI 28.74 kg/m²   General appearance. Alert. Looks comfortable. HEENT. Sclera clear. Moist mucus membranes. Cardiovascular. Regular rate and rhythm, normal S1, S2. No murmur. Respiratory. Not using accessory muscles. Clear to auscultation bilaterally, no wheeze. Gastrointestinal. Abdomen soft, non-tender, not distended, normal bowel sounds  Neurology. Facial symmetry. No speech deficits. Moving all extremities equally. Extremities. No edema in lower extremities. Skin. Warm, dry, normal turgor    Condition at time of discharge stable    Medication instructions provided to patient at discharge. Medication List      START taking these medications    HYDROcodone-acetaminophen 5-325 MG per tablet  Commonly known as: Norco  Take 1 tablet by mouth every 6 hours as needed for Pain for up to 5 days.  Take lowest dose possible to manage pain; may stop taking sooner than 7 days if pain is able to be controlled with non-narcotic analgesics and therapies.         CONTINUE taking these medications    acetaminophen 325 MG tablet  Commonly known as: TYLENOL     aspirin 81 MG EC tablet     B complex-vitamin C-folic acid 1 MG tablet  Take 1 tablet by mouth daily (with breakfast)     B-12 500 MCG Tabs     Betimol 0.5 % ophthalmic solution  Generic drug: timolol     CoQ10 100 MG Caps     dorzolamide-timolol 22.3-6.8 MG/ML ophthalmic solution  Commonly known as: COSOPT     ezetimibe 10 MG tablet  Commonly known as: ZETIA  TAKE ONE TABLET BY MOUTH DAILY     fenofibrate 48 MG tablet  Commonly known as: Tricor  Take 1 tablet by mouth daily     fluticasone-umeclidin-vilant 100-62.5-25 MCG/INH Aepb  Commonly known as: Trelegy Ellipta  Inhale 1 puff into the lungs daily Expiration date 7/ 2021  Lot (10 [id number]     folic acid 1 MG tablet  Commonly known as: FOLVITE  Take 1 tablet by mouth daily     hydrALAZINE 25 MG tablet  Commonly known as: APRESOLINE  Take 1 tablet by mouth 2 times daily     [employer] Pen [place] 31G 31G X 8 MM Misc  Generic drug: Insulin Pen Needle  1 each by Does not apply route daily     Lantus SoloStar 100 UNIT/ML injection pen  Generic drug: insulin glargine  Inject 22 Units into the skin nightly Indications: sliding scale up to 22 units Lease refill 8/23/18 per patient requests     latanoprost 0.005 % ophthalmic solution  Commonly known as: XALATAN     metoprolol tartrate 25 MG tablet  Commonly known as: LOPRESSOR     Netarsudil-Latanoprost 0.02-0.005 % Soln     Ocuvite Eye + Multi Tabs     Prevacid 15 MG delayed release capsule  Generic drug: lansoprazole     spironolactone 25 MG tablet  Commonly known as: ALDACTONE  Take 1 tablet by mouth daily     vitamin D 1.25 MG (21933 UT) Caps capsule  Commonly known as: ERGOCALCIFEROL           Where to Get Your Medications      You can get these medications from any pharmacy    Bring a paper prescription for each of these medications  · HYDROcodone-acetaminophen 5-325 MG per tablet         Discharge recommendations given to patient. Follow Up. Dr. Maddy Gaspar and PCP in 1 week   Disposition. home  Activity. activity as tolerated  Diet: ADULT DIET; Regular      Spent 36 minutes in discharge process. Signed:   Edmund Morris PA-C     8/15/2021 8:58 AM

## 2021-08-15 NOTE — PROGRESS NOTES
Oxycodone     Phenytoin Sodium Extended     Pioglitazone     Prednisone     Pregabalin     Simvastatin     Iodine Rash     Rash all over. Pt does not want contrast anymore. Principal Problem:    Intestinal obstruction (HCC)  Active Problems:    CKD (chronic kidney disease) stage 3, GFR 30-59 ml/min (HCC)    Type 2 diabetes mellitus with kidney complication, with long-term current use of insulin (HCC)    Coronary artery disease without angina pectoris    Essential hypertension    Incarcerated ventral hernia  Resolved Problems:    * No resolved hospital problems. *    Blood pressure (!) 149/74, pulse 97, temperature 98.7 °F (37.1 °C), temperature source Oral, resp. rate 16, height 5' 3\" (1.6 m), weight 162 lb 4 oz (73.6 kg), SpO2 94 %, not currently breastfeeding. Subjective:  Symptoms:  Stable. Diet:  Poor intake. Activity level: Returning to normal.    Pain:  She complains of pain that is moderate. Objective:  General Appearance:  Comfortable. Vital signs: (most recent): Blood pressure (!) 149/74, pulse 97, temperature 98.7 °F (37.1 °C), temperature source Oral, resp. rate 16, height 5' 3\" (1.6 m), weight 162 lb 4 oz (73.6 kg), SpO2 94 %, not currently breastfeeding. Output: Producing urine and no stool output. Lungs:  Normal effort and normal respiratory rate. Abdomen: Abdomen is soft. (Mild abdominal distention. Incisions approximated without evidence of infection. Tender left side of the abdomen). Neurological: Patient is alert and oriented to person, place and time. Skin:  Warm. Assessment & Plan very pleasant 80-year-old female who is postop day # 6 status post laparoscopic repair of an incarcerated ventral hernia which was causing a small bowel obstruction. She is doing fairly well. P.o. intake has been improved. Passing flatus and smears / liquidish BM now.  Pt wants to go home, OK to do so with follow up this week in office, sal labs and bowel function.     Jericho Vergara MD  8/15/2021

## 2021-08-15 NOTE — CARE COORDINATION
CM called [facility] and spoke to intake and verified fax number. CM notified RN that home care paperwork faxed. RN states pt had walker on d/c that was in room. [facility] faxed all paperwork and home care orders to [facility] at [phone].     Patient discharged 8/15/2021 to home with [facility]   All discharge needs met per case management    Nany Patel RN, BSN  [phone]

## 2021-08-15 NOTE — CARE COORDINATION
CM faxed DME order and demographics to [facility] [phone], waiting on call. CM called [facility] [phone] and spoke to Jay Rai and they have accepted pt. CM sent ps message to [facility] [place] to complete neymar for home care.      Uzma Payan RN, BSN  [phone]

## 2021-08-16 ENCOUNTER — CARE COORDINATION (OUTPATIENT)
Dept: CASE MANAGEMENT | Age: 81
End: 2021-08-16

## 2021-08-16 DIAGNOSIS — K43.6 INCARCERATED VENTRAL HERNIA: Primary | ICD-10-CM

## 2021-08-16 PROCEDURE — 1800000000 HC LEAVE OF ABSENCE

## 2021-08-16 PROCEDURE — 1111F DSCHRG MED/CURRENT MED MERGE: CPT | Performed by: INTERNAL MEDICINE

## 2021-08-16 NOTE — CARE COORDINATION
[facility] 45 Transitions Initial Follow Up Call: Son Marisa Perdomo" reports that patient is doing well, denies any questions or concerns at this time. Patient is resting comfortably, does have post-op ain and is taking pain medication with relief. She is not having any nausea, vomiting, or fever. Discussed discharge instructions and reviewed medications, 1111F completed. [facility] nurse was there today. CTN will continue with outreach follow up calls. Call within 2 business days of discharge: Yes    Patient: Connie Urena Patient : 1940   MRN: [id number]  Reason for Admission: intestinal obstruction, CKD, DM  Discharge Date: 8/15/21 RARS: Readmission Risk Score: 17      Last Discharge [facility]       Complaint Diagnosis Description Type Department Provider    21 Constipation Intestinal obstruction, unspecified cause, unspecified whether partial or complete ([facility] [address] . .. ED to Hosp-Admission (Discharged) (ADMITTED) Janine Johnson MD; Kay Kinsey. .. Spoke with: son \"Ryley\"    Transitions of Care Initial Call     Was this an external facility discharge? No Discharge Facility:     Challenges to be reviewed by the provider   Additional needs identified to be addressed with provider: No  none             Method of communication with provider : none      Advance Care Planning:   Does patient have an Advance Directive: reviewed and current, reviewed and needs to be updated, not on file; education provided, not on file, patient declined education, decision maker updated and referral to internal ACP facilitator. Was this a readmission? No  Patient stated reason for admission: intestinal obstruction, CKD, DM  Patients top risk factors for readmission: medical condition-.    Care Transition Nurse (CTN) contacted the family by telephone to perform post hospital discharge assessment. Verified name and  with family as identifiers.  Provided introduction to self, and explanation of the CTN role.     CTN reviewed discharge instructions, medical action plan and red flags with family who verbalized understanding. Family given an opportunity to ask questions and does not have any further questions or concerns at this time. Were discharge instructions available to patient? Yes. Reviewed appropriate site of care based on symptoms and resources available to patient including: PCP, Urgent care clinics, Home health and When to call 911. The family agrees to contact the PCP office for questions related to their healthcare. Medication reconciliation was performed with family, who verbalizes understanding of administration of home medications. Advised obtaining a 90-day supply of all daily and as-needed medications. Reviewed and educated family on any new and changed medications related to discharge diagnosis. Was patient discharged with a pulse oximeter? No   CTN provided contact information. Plan for follow-up call in 5-7 days based on severity of symptoms and risk factors. Plan for next call: symptom management-., self management-. and follow up appointment-.           Non-face-to-face services provided:  Obtained and reviewed discharge summary and/or continuity of care documents    Care Transitions 24 Hour Call    Do you have any ongoing symptoms?: No  Do you have a copy of your discharge instructions?: Yes  Do you have all of your prescriptions and are they filled?: Yes  Have you been contacted by a [address]?: No  Have you scheduled your follow up appointment?: No  Were you discharged with any Home Care or Post Acute Services: Yes  Post Acute Services: [address] [place] [address] you feel like you have everything you need to keep you well at home?: Yes  Care Transitions Interventions         Follow Up  Future Appointments   Date Time Provider Usama Vargas   11/8/2021  1:00 PM Rosie Rebollar, RN

## 2021-08-17 ENCOUNTER — TELEPHONE (OUTPATIENT)
Dept: INTERNAL MEDICINE CLINIC | Age: 81
End: 2021-08-17

## 2021-08-17 PROCEDURE — 1800000000 HC LEAVE OF ABSENCE

## 2021-08-17 NOTE — TELEPHONE ENCOUNTER
----- Message from [facility] sent at 8/17/2021  1:07 PM EDT -----  Subject: Message to Provider    QUESTIONS  Information for Provider? PT was prescribed pain and nausea medicine when   discharged. Would like to know if PT should take previous medicine with   new medicine   ---------------------------------------------------------------------------  --------------  CALL BACK INFO  What is the best way for the office to contact you? OK to leave message on   voicemail  Preferred Call Back Phone Number? [phone]  ---------------------------------------------------------------------------  --------------  SCRIPT ANSWERS  Relationship to Patient? Self  (Patient requests to see provider urgently. )? No  Do you have any questions for your primary care provider that need to be   answered prior to your appointment?  Yes

## 2021-08-18 PROCEDURE — 1800000000 HC LEAVE OF ABSENCE

## 2021-08-19 ENCOUNTER — TELEPHONE (OUTPATIENT)
Dept: INTERNAL MEDICINE CLINIC | Age: 81
End: 2021-08-19

## 2021-08-19 PROCEDURE — 1800000000 HC LEAVE OF ABSENCE

## 2021-08-19 NOTE — TELEPHONE ENCOUNTER
----- Message from Brent Perez sent at 8/19/2021 12:54 PM EDT -----  Regarding: Rx Request  Contact: April April with [facility] called in regarding patient. She stated patient had 4 hernia repairs and is complaining of right side pain 10/10. She was prescribed Vicodin 500mg, and she was wanting to know if we would call her in more because the patient is out.  If you have any questions April can be reached at [phone]

## 2021-08-20 PROCEDURE — 1800000000 HC LEAVE OF ABSENCE

## 2021-08-20 NOTE — TELEPHONE ENCOUNTER
----- Message from Ratna Ferrera sent at 8/20/2021  3:02 PM EDT -----  Regarding: Rx Request Update  April with [facility] called for an update in regarding patient. She stated patient had 4 hernia repairs and is complaining of right side pain 10/10. She was prescribed Vicodin 500mg, and she was wanting to know if we would call her in more because the patient is out.  If you have any questions April can be reached at [phone]

## 2021-08-21 ENCOUNTER — APPOINTMENT (OUTPATIENT)
Dept: CT IMAGING | Age: 81
DRG: 336 | End: 2021-08-21
Payer: MEDICARE

## 2021-08-21 ENCOUNTER — HOSPITAL ENCOUNTER (INPATIENT)
Age: 81
LOS: 5 days | Discharge: HOME HEALTH | DRG: 336 | End: 2021-08-26
Attending: EMERGENCY MEDICINE | Admitting: INTERNAL MEDICINE
Payer: MEDICARE

## 2021-08-21 DIAGNOSIS — K56.7 ILEUS (HCC): Primary | ICD-10-CM

## 2021-08-21 LAB
A/G RATIO: 1.1 (ref 1.1–2.2)
ALBUMIN SERPL-MCNC: 4 G/DL (ref 3.4–5)
ALP BLD-CCNC: 76 U/L (ref 40–129)
ALT SERPL-CCNC: 42 U/L (ref 10–40)
ANION GAP SERPL CALCULATED.3IONS-SCNC: 17 MMOL/L (ref 3–16)
AST SERPL-CCNC: 45 U/L (ref 15–37)
BASOPHILS ABSOLUTE: 0 K/UL (ref 0–0.2)
BASOPHILS RELATIVE PERCENT: 0.1 %
BILIRUB SERPL-MCNC: 0.7 MG/DL (ref 0–1)
BUN BLDV-MCNC: 18 MG/DL (ref 7–20)
CALCIUM SERPL-MCNC: 9.4 MG/DL (ref 8.3–10.6)
CHLORIDE BLD-SCNC: 103 MMOL/L (ref 99–110)
CO2: 16 MMOL/L (ref 21–32)
CREAT SERPL-MCNC: 0.9 MG/DL (ref 0.6–1.2)
EOSINOPHILS ABSOLUTE: 0.1 K/UL (ref 0–0.6)
EOSINOPHILS RELATIVE PERCENT: 0.9 %
GFR AFRICAN AMERICAN: >60
GFR NON-AFRICAN AMERICAN: >60
GLOBULIN: 3.8 G/DL
GLUCOSE BLD-MCNC: 126 MG/DL (ref 70–99)
GLUCOSE BLD-MCNC: 41 MG/DL (ref 70–99)
GLUCOSE BLD-MCNC: 94 MG/DL (ref 70–99)
HCT VFR BLD CALC: 26 % (ref 36–48)
HEMOGLOBIN: 8.6 G/DL (ref 12–16)
LACTIC ACID: 0.7 MMOL/L (ref 0.4–2)
LIPASE: 17 U/L (ref 13–60)
LYMPHOCYTES ABSOLUTE: 0.7 K/UL (ref 1–5.1)
LYMPHOCYTES RELATIVE PERCENT: 8.5 %
MCH RBC QN AUTO: 33.6 PG (ref 26–34)
MCHC RBC AUTO-ENTMCNC: 33.2 G/DL (ref 31–36)
MCV RBC AUTO: 101.1 FL (ref 80–100)
MONOCYTES ABSOLUTE: 0.6 K/UL (ref 0–1.3)
MONOCYTES RELATIVE PERCENT: 7.2 %
NEUTROPHILS ABSOLUTE: 7 K/UL (ref 1.7–7.7)
NEUTROPHILS RELATIVE PERCENT: 83.3 %
PDW BLD-RTO: 14.6 % (ref 12.4–15.4)
PERFORMED ON: ABNORMAL
PERFORMED ON: NORMAL
PLATELET # BLD: 503 K/UL (ref 135–450)
PMV BLD AUTO: 6.9 FL (ref 5–10.5)
POTASSIUM REFLEX MAGNESIUM: 3.7 MMOL/L (ref 3.5–5.1)
RBC # BLD: 2.57 M/UL (ref 4–5.2)
SODIUM BLD-SCNC: 136 MMOL/L (ref 136–145)
TOTAL PROTEIN: 7.8 G/DL (ref 6.4–8.2)
WBC # BLD: 8.4 K/UL (ref 4–11)

## 2021-08-21 PROCEDURE — 74176 CT ABD & PELVIS W/O CONTRAST: CPT

## 2021-08-21 PROCEDURE — 96375 TX/PRO/DX INJ NEW DRUG ADDON: CPT

## 2021-08-21 PROCEDURE — 36415 COLL VENOUS BLD VENIPUNCTURE: CPT

## 2021-08-21 PROCEDURE — 2500000003 HC RX 250 WO HCPCS: Performed by: INTERNAL MEDICINE

## 2021-08-21 PROCEDURE — 2580000003 HC RX 258: Performed by: INTERNAL MEDICINE

## 2021-08-21 PROCEDURE — 80053 COMPREHEN METABOLIC PANEL: CPT

## 2021-08-21 PROCEDURE — 6360000002 HC RX W HCPCS: Performed by: PHYSICIAN ASSISTANT

## 2021-08-21 PROCEDURE — C9113 INJ PANTOPRAZOLE SODIUM, VIA: HCPCS | Performed by: NURSE PRACTITIONER

## 2021-08-21 PROCEDURE — 83605 ASSAY OF LACTIC ACID: CPT

## 2021-08-21 PROCEDURE — 2500000003 HC RX 250 WO HCPCS: Performed by: NURSE PRACTITIONER

## 2021-08-21 PROCEDURE — 6370000000 HC RX 637 (ALT 250 FOR IP): Performed by: INTERNAL MEDICINE

## 2021-08-21 PROCEDURE — 2580000003 HC RX 258: Performed by: PHYSICIAN ASSISTANT

## 2021-08-21 PROCEDURE — 6370000000 HC RX 637 (ALT 250 FOR IP): Performed by: NURSE PRACTITIONER

## 2021-08-21 PROCEDURE — 6360000002 HC RX W HCPCS: Performed by: NURSE PRACTITIONER

## 2021-08-21 PROCEDURE — 99024 POSTOP FOLLOW-UP VISIT: CPT | Performed by: SURGERY

## 2021-08-21 PROCEDURE — 96374 THER/PROPH/DIAG INJ IV PUSH: CPT

## 2021-08-21 PROCEDURE — 83690 ASSAY OF LIPASE: CPT

## 2021-08-21 PROCEDURE — 2060000000 HC ICU INTERMEDIATE R&B

## 2021-08-21 PROCEDURE — 99284 EMERGENCY DEPT VISIT MOD MDM: CPT

## 2021-08-21 PROCEDURE — 85025 COMPLETE CBC W/AUTO DIFF WBC: CPT

## 2021-08-21 PROCEDURE — 6360000002 HC RX W HCPCS: Performed by: INTERNAL MEDICINE

## 2021-08-21 PROCEDURE — 2580000003 HC RX 258: Performed by: NURSE PRACTITIONER

## 2021-08-21 PROCEDURE — 94760 N-INVAS EAR/PLS OXIMETRY 1: CPT

## 2021-08-21 RX ORDER — ONDANSETRON 2 MG/ML
4 INJECTION INTRAMUSCULAR; INTRAVENOUS EVERY 6 HOURS PRN
Status: DISCONTINUED | OUTPATIENT
Start: 2021-08-21 | End: 2021-08-26 | Stop reason: HOSPADM

## 2021-08-21 RX ORDER — KETOROLAC TROMETHAMINE 30 MG/ML
15 INJECTION, SOLUTION INTRAMUSCULAR; INTRAVENOUS EVERY 6 HOURS PRN
Status: DISPENSED | OUTPATIENT
Start: 2021-08-21 | End: 2021-08-26

## 2021-08-21 RX ORDER — SODIUM CHLORIDE 0.9 % (FLUSH) 0.9 %
10 SYRINGE (ML) INJECTION PRN
Status: DISCONTINUED | OUTPATIENT
Start: 2021-08-21 | End: 2021-08-26 | Stop reason: HOSPADM

## 2021-08-21 RX ORDER — SENNA AND DOCUSATE SODIUM 50; 8.6 MG/1; MG/1
1 TABLET, FILM COATED ORAL DAILY
Status: DISCONTINUED | OUTPATIENT
Start: 2021-08-21 | End: 2021-08-21

## 2021-08-21 RX ORDER — NICOTINE POLACRILEX 4 MG
15 LOZENGE BUCCAL PRN
Status: DISCONTINUED | OUTPATIENT
Start: 2021-08-21 | End: 2021-08-26 | Stop reason: HOSPADM

## 2021-08-21 RX ORDER — FOLIC ACID 1 MG/1
1 TABLET ORAL DAILY
Status: DISCONTINUED | OUTPATIENT
Start: 2021-08-21 | End: 2021-08-26 | Stop reason: HOSPADM

## 2021-08-21 RX ORDER — POLYETHYLENE GLYCOL 3350 17 G/17G
17 POWDER, FOR SOLUTION ORAL DAILY PRN
Status: DISCONTINUED | OUTPATIENT
Start: 2021-08-21 | End: 2021-08-21

## 2021-08-21 RX ORDER — FENOFIBRATE 54 MG/1
54 TABLET ORAL DAILY
Status: DISCONTINUED | OUTPATIENT
Start: 2021-08-21 | End: 2021-08-26 | Stop reason: HOSPADM

## 2021-08-21 RX ORDER — INSULIN LISPRO 100 [IU]/ML
0-3 INJECTION, SOLUTION INTRAVENOUS; SUBCUTANEOUS NIGHTLY
Status: DISCONTINUED | OUTPATIENT
Start: 2021-08-21 | End: 2021-08-26 | Stop reason: HOSPADM

## 2021-08-21 RX ORDER — ONDANSETRON 2 MG/ML
4 INJECTION INTRAMUSCULAR; INTRAVENOUS ONCE
Status: COMPLETED | OUTPATIENT
Start: 2021-08-21 | End: 2021-08-21

## 2021-08-21 RX ORDER — ASPIRIN 300 MG/1
300 SUPPOSITORY RECTAL DAILY
Status: DISCONTINUED | OUTPATIENT
Start: 2021-08-21 | End: 2021-08-25

## 2021-08-21 RX ORDER — DORZOLAMIDE HYDROCHLORIDE AND TIMOLOL MALEATE 20; 5 MG/ML; MG/ML
1 SOLUTION/ DROPS OPHTHALMIC 2 TIMES DAILY
Status: DISCONTINUED | OUTPATIENT
Start: 2021-08-21 | End: 2021-08-21 | Stop reason: CLARIF

## 2021-08-21 RX ORDER — DEXTROSE MONOHYDRATE 50 MG/ML
100 INJECTION, SOLUTION INTRAVENOUS PRN
Status: DISCONTINUED | OUTPATIENT
Start: 2021-08-21 | End: 2021-08-26 | Stop reason: HOSPADM

## 2021-08-21 RX ORDER — PANTOPRAZOLE SODIUM 40 MG/10ML
40 INJECTION, POWDER, LYOPHILIZED, FOR SOLUTION INTRAVENOUS DAILY
Status: DISCONTINUED | OUTPATIENT
Start: 2021-08-21 | End: 2021-08-26 | Stop reason: HOSPADM

## 2021-08-21 RX ORDER — ASPIRIN 81 MG/1
81 TABLET ORAL DAILY
Status: DISCONTINUED | OUTPATIENT
Start: 2021-08-21 | End: 2021-08-21

## 2021-08-21 RX ORDER — HYDRALAZINE HYDROCHLORIDE 20 MG/ML
10 INJECTION INTRAMUSCULAR; INTRAVENOUS 2 TIMES DAILY
Status: DISCONTINUED | OUTPATIENT
Start: 2021-08-21 | End: 2021-08-25

## 2021-08-21 RX ORDER — SODIUM CHLORIDE 9 MG/ML
25 INJECTION, SOLUTION INTRAVENOUS PRN
Status: DISCONTINUED | OUTPATIENT
Start: 2021-08-21 | End: 2021-08-26 | Stop reason: HOSPADM

## 2021-08-21 RX ORDER — CHOLECALCIFEROL (VITAMIN D3) 10 MCG
1 TABLET ORAL
Status: DISCONTINUED | OUTPATIENT
Start: 2021-08-22 | End: 2021-08-26 | Stop reason: HOSPADM

## 2021-08-21 RX ORDER — SODIUM CHLORIDE 0.9 % (FLUSH) 0.9 %
5-40 SYRINGE (ML) INJECTION EVERY 12 HOURS SCHEDULED
Status: DISCONTINUED | OUTPATIENT
Start: 2021-08-21 | End: 2021-08-26 | Stop reason: HOSPADM

## 2021-08-21 RX ORDER — 0.9 % SODIUM CHLORIDE 0.9 %
1000 INTRAVENOUS SOLUTION INTRAVENOUS ONCE
Status: COMPLETED | OUTPATIENT
Start: 2021-08-21 | End: 2021-08-21

## 2021-08-21 RX ORDER — ACETAMINOPHEN 325 MG/1
650 TABLET ORAL EVERY 6 HOURS PRN
Status: DISCONTINUED | OUTPATIENT
Start: 2021-08-21 | End: 2021-08-26 | Stop reason: HOSPADM

## 2021-08-21 RX ORDER — PANTOPRAZOLE SODIUM 40 MG/1
40 TABLET, DELAYED RELEASE ORAL
Status: DISCONTINUED | OUTPATIENT
Start: 2021-08-22 | End: 2021-08-21

## 2021-08-21 RX ORDER — EZETIMIBE 10 MG/1
1 TABLET ORAL DAILY
Status: DISCONTINUED | OUTPATIENT
Start: 2021-08-21 | End: 2021-08-21 | Stop reason: RX

## 2021-08-21 RX ORDER — MORPHINE SULFATE 4 MG/ML
4 INJECTION, SOLUTION INTRAMUSCULAR; INTRAVENOUS ONCE
Status: COMPLETED | OUTPATIENT
Start: 2021-08-21 | End: 2021-08-21

## 2021-08-21 RX ORDER — ACETAMINOPHEN 650 MG/1
650 SUPPOSITORY RECTAL EVERY 6 HOURS PRN
Status: DISCONTINUED | OUTPATIENT
Start: 2021-08-21 | End: 2021-08-26 | Stop reason: HOSPADM

## 2021-08-21 RX ORDER — DORZOLAMIDE HCL 20 MG/ML
1 SOLUTION/ DROPS OPHTHALMIC 2 TIMES DAILY
Status: DISCONTINUED | OUTPATIENT
Start: 2021-08-21 | End: 2021-08-22

## 2021-08-21 RX ORDER — VITAMIN B COMPLEX
1 TABLET ORAL DAILY
Status: DISCONTINUED | OUTPATIENT
Start: 2021-08-21 | End: 2021-08-21 | Stop reason: RX

## 2021-08-21 RX ORDER — TIMOLOL MALEATE 5 MG/ML
1 SOLUTION/ DROPS OPHTHALMIC 2 TIMES DAILY
Status: DISCONTINUED | OUTPATIENT
Start: 2021-08-21 | End: 2021-08-22

## 2021-08-21 RX ORDER — DEXTROSE MONOHYDRATE 25 G/50ML
12.5 INJECTION, SOLUTION INTRAVENOUS PRN
Status: DISCONTINUED | OUTPATIENT
Start: 2021-08-21 | End: 2021-08-26 | Stop reason: HOSPADM

## 2021-08-21 RX ORDER — SODIUM CHLORIDE, SODIUM LACTATE, POTASSIUM CHLORIDE, CALCIUM CHLORIDE 600; 310; 30; 20 MG/100ML; MG/100ML; MG/100ML; MG/100ML
INJECTION, SOLUTION INTRAVENOUS CONTINUOUS
Status: DISCONTINUED | OUTPATIENT
Start: 2021-08-21 | End: 2021-08-21

## 2021-08-21 RX ORDER — DEXTROSE AND SODIUM CHLORIDE 5; .45 G/100ML; G/100ML
INJECTION, SOLUTION INTRAVENOUS CONTINUOUS
Status: DISCONTINUED | OUTPATIENT
Start: 2021-08-21 | End: 2021-08-25

## 2021-08-21 RX ORDER — INSULIN LISPRO 100 [IU]/ML
0-6 INJECTION, SOLUTION INTRAVENOUS; SUBCUTANEOUS
Status: DISCONTINUED | OUTPATIENT
Start: 2021-08-21 | End: 2021-08-26 | Stop reason: HOSPADM

## 2021-08-21 RX ORDER — HYDRALAZINE HYDROCHLORIDE 25 MG/1
25 TABLET, FILM COATED ORAL 2 TIMES DAILY
Status: DISCONTINUED | OUTPATIENT
Start: 2021-08-21 | End: 2021-08-21

## 2021-08-21 RX ORDER — ONDANSETRON 4 MG/1
4 TABLET, ORALLY DISINTEGRATING ORAL EVERY 8 HOURS PRN
Status: DISCONTINUED | OUTPATIENT
Start: 2021-08-21 | End: 2021-08-26 | Stop reason: HOSPADM

## 2021-08-21 RX ORDER — M-VIT,TX,IRON,MINS/CALC/FOLIC 27MG-0.4MG
1 TABLET ORAL DAILY
Status: DISCONTINUED | OUTPATIENT
Start: 2021-08-21 | End: 2021-08-26 | Stop reason: HOSPADM

## 2021-08-21 RX ORDER — METOPROLOL TARTRATE 5 MG/5ML
5 INJECTION INTRAVENOUS EVERY 12 HOURS
Status: DISCONTINUED | OUTPATIENT
Start: 2021-08-21 | End: 2021-08-23

## 2021-08-21 RX ORDER — LATANOPROST 50 UG/ML
1 SOLUTION/ DROPS OPHTHALMIC NIGHTLY
Status: DISCONTINUED | OUTPATIENT
Start: 2021-08-21 | End: 2021-08-26 | Stop reason: HOSPADM

## 2021-08-21 RX ADMIN — ONDANSETRON 4 MG: 2 INJECTION INTRAMUSCULAR; INTRAVENOUS at 03:24

## 2021-08-21 RX ADMIN — TIMOLOL MALEATE 1 DROP: 5 SOLUTION OPHTHALMIC at 18:06

## 2021-08-21 RX ADMIN — MORPHINE SULFATE 4 MG: 4 INJECTION, SOLUTION INTRAMUSCULAR; INTRAVENOUS at 03:23

## 2021-08-21 RX ADMIN — Medication 10 ML: at 21:54

## 2021-08-21 RX ADMIN — HYDRALAZINE HYDROCHLORIDE 10 MG: 20 INJECTION INTRAMUSCULAR; INTRAVENOUS at 21:52

## 2021-08-21 RX ADMIN — HYDRALAZINE HYDROCHLORIDE 10 MG: 20 INJECTION INTRAMUSCULAR; INTRAVENOUS at 16:09

## 2021-08-21 RX ADMIN — DORZOLAMIDE HYDROCHLORIDE 1 DROP: 20 SOLUTION/ DROPS OPHTHALMIC at 18:06

## 2021-08-21 RX ADMIN — KETOROLAC TROMETHAMINE 15 MG: 30 INJECTION, SOLUTION INTRAMUSCULAR; INTRAVENOUS at 16:08

## 2021-08-21 RX ADMIN — DEXTROSE MONOHYDRATE 100 ML/HR: 50 INJECTION, SOLUTION INTRAVENOUS at 22:04

## 2021-08-21 RX ADMIN — ASPIRIN 300 MG: 300 SUPPOSITORY RECTAL at 16:09

## 2021-08-21 RX ADMIN — ONDANSETRON 4 MG: 2 INJECTION INTRAMUSCULAR; INTRAVENOUS at 23:35

## 2021-08-21 RX ADMIN — LATANOPROST 1 DROP: 50 SOLUTION OPHTHALMIC at 21:53

## 2021-08-21 RX ADMIN — METOPROLOL TARTRATE 5 MG: 5 INJECTION INTRAVENOUS at 16:08

## 2021-08-21 RX ADMIN — PANTOPRAZOLE SODIUM 40 MG: 40 INJECTION, POWDER, FOR SOLUTION INTRAVENOUS at 16:09

## 2021-08-21 RX ADMIN — SODIUM CHLORIDE, POTASSIUM CHLORIDE, SODIUM LACTATE AND CALCIUM CHLORIDE: 600; 310; 30; 20 INJECTION, SOLUTION INTRAVENOUS at 08:21

## 2021-08-21 RX ADMIN — DEXTROSE AND SODIUM CHLORIDE: 5; 450 INJECTION, SOLUTION INTRAVENOUS at 22:59

## 2021-08-21 RX ADMIN — Medication 10 ML: at 16:07

## 2021-08-21 RX ADMIN — DEXTROSE MONOHYDRATE 12.5 G: 25 INJECTION, SOLUTION INTRAVENOUS at 21:45

## 2021-08-21 RX ADMIN — SODIUM CHLORIDE 1000 ML: 9 INJECTION, SOLUTION INTRAVENOUS at 03:23

## 2021-08-21 RX ADMIN — ENOXAPARIN SODIUM 40 MG: 40 INJECTION SUBCUTANEOUS at 16:57

## 2021-08-21 ASSESSMENT — PAIN DESCRIPTION - LOCATION: LOCATION: ABDOMEN

## 2021-08-21 ASSESSMENT — PAIN DESCRIPTION - ONSET: ONSET: ON-GOING

## 2021-08-21 ASSESSMENT — ENCOUNTER SYMPTOMS
CHEST TIGHTNESS: 0
VOMITING: 1
RESPIRATORY NEGATIVE: 1
BACK PAIN: 0
ABDOMINAL PAIN: 1
DIARRHEA: 1
CONSTIPATION: 0
ABDOMINAL DISTENTION: 1
SHORTNESS OF BREATH: 0
COLOR CHANGE: 0
COUGH: 0
NAUSEA: 1

## 2021-08-21 ASSESSMENT — PAIN SCALES - GENERAL
PAINLEVEL_OUTOF10: 5
PAINLEVEL_OUTOF10: 0
PAINLEVEL_OUTOF10: 10
PAINLEVEL_OUTOF10: 10

## 2021-08-21 ASSESSMENT — PAIN DESCRIPTION - PAIN TYPE: TYPE: ACUTE PAIN

## 2021-08-21 ASSESSMENT — PAIN DESCRIPTION - ORIENTATION: ORIENTATION: LEFT

## 2021-08-21 ASSESSMENT — PAIN DESCRIPTION - DESCRIPTORS: DESCRIPTORS: SORE

## 2021-08-21 ASSESSMENT — PAIN - FUNCTIONAL ASSESSMENT: PAIN_FUNCTIONAL_ASSESSMENT: PREVENTS OR INTERFERES SOME ACTIVE ACTIVITIES AND ADLS

## 2021-08-21 ASSESSMENT — PAIN DESCRIPTION - FREQUENCY: FREQUENCY: CONTINUOUS

## 2021-08-21 NOTE — PROGRESS NOTES
Hospitalist Progress Note      PCP: Shanell Starr MD    Date of Admission: 8/21/2021    Chief Complaint: Abdominal pain    Hospital Course: This is a pleasant 80 y.o. female with history of fibromyalgia, hyperlipidemia, essential hypertension, macrocytic anemia, well-controlled diabetes type 2, history of bowel obstruction, history of multiple abdominal surgeries, who presents to the emergency room with complaints of increased abdominal pain, nausea, decreased p.o. intake, loose stools, ongoing for the last few days. She has had decreased p.o. intake. CT-abdomen/pelvis obtained in the emergency room reveals persistent scattered small bowel dilatation of fluid-filled bowel loops proximal to the surgical site consistent with ongoing postoperative ileus versus persistent small bowel obstruction. Subjective: Patient laying in bed, reports abdominal discomfort and pressure. Reports nausea. Denies chest pain shortness of breath or palpitations. Reviewed plan of care, denied further questions or needs.     Assessment/Plan:    Ileus versus small bowel obstruction  -Surgery has been consulted  -N.p.o.  -NG to low intermittent wall suction  -Encourage ambulation and minimize narcotics    Anion gap metabolic acidosis  -Likely secondary to volume depletion  -Continue IV fluids  -Monitor electrolytes closely  -CBC BMP in the morning    History of fibromyalgia hyperlipidemia, hypertension, microcytic anemia, NIDDM small bowel obstruction, multiple abdominal surgeries  -Continue medications as ordered  -Holding p.o. meds, converted p.o. home meds to IV form    Active Hospital Problems    Diagnosis     Ileus ([facility] [address] [K56.7]        Medications:  Reviewed    Infusion Medications    lactated ringers 75 mL/hr at 08/21/21 0533    dextrose      sodium chloride       Scheduled Medications    [Held by provider] B complex-vitamin C-folic acid  1 tablet Oral Daily with breakfast    dorzolamide-timolol  1 drop Both Eyes BID    [Held by provider] fenofibrate  54 mg Oral Daily    fluticasone-umeclidin-vilant  1 puff Inhalation Daily    [Held by provider] folic acid  1 mg Oral Daily    latanoprost  1 drop Both Eyes Nightly    [Held by provider] Ocuvite Eye + Multi  1 tablet Oral Daily    Netarsudil-Latanoprost  1 drop Ophthalmic Nightly    timolol  1 drop Both Eyes BID    insulin lispro  0-6 Units Subcutaneous TID WC    insulin lispro  0-3 Units Subcutaneous Nightly    sodium chloride flush  5-40 mL Intravenous 2 times per day    enoxaparin  40 mg Subcutaneous Daily    insulin glargine  10 Units Subcutaneous Nightly    pantoprazole  40 mg Intravenous Daily    aspirin  300 mg Rectal Daily    hydrALAZINE  10 mg Intravenous BID    metoprolol  5 mg Intravenous Q12H     PRN Meds: glucose, dextrose, glucagon (rDNA), dextrose, sodium chloride flush, sodium chloride, ondansetron **OR** ondansetron, acetaminophen **OR** acetaminophen, ketorolac    No intake or output data in the 24 hours ending 08/21/21 1406    Physical Exam Performed:    BP (!) 169/71   Pulse 99   Temp 98.1 °F (36.7 °C) (Oral)   Resp 16   Wt 162 lb (73.5 kg)   SpO2 96%   BMI 28.70 kg/m²     General appearance: No apparent distress, appears stated age and cooperative. HEENT: Pupils equal, round, and reactive to light. Conjunctivae/corneas clear. Neck: Supple, with full range of motion. No jugular venous distention. Trachea midline. Respiratory:  Normal respiratory effort. Clear to auscultation, bilaterally without Rales/Wheezes/Rhonchi. Cardiovascular: Regular rate and rhythm with normal S1/S2 without murmurs, rubs or gallops. Abdomen: Distended, tender to palpation, NG tube in place with green output   musculoskeletal: No clubbing, cyanosis or edema bilaterally. Full range of motion without deformity. Skin: Skin color, texture, turgor normal.  No rashes or lesions. Neurologic:  Neurovascularly intact without any focal sensory/motor deficits. Cranial nerves: II-XII intact, grossly non-focal.  Psychiatric: Alert and oriented, thought content appropriate, normal insight  Capillary Refill: Brisk,< 3 seconds   Peripheral Pulses: +2 palpable, equal bilaterally       Labs:   Recent Labs     08/21/21 0328   WBC 8.4   HGB 8.6*   HCT 26.0*   *     Recent Labs     08/21/21 0328      K 3.7      CO2 16*   BUN 18   CREATININE 0.9   CALCIUM 9.4     Recent Labs     08/21/21 0328   AST 45*   ALT 42*   BILITOT 0.7   ALKPHOS 76     No results for input(s): INR in the last 72 hours. No results for input(s): Geovanny Frank in the last 72 hours. Urinalysis:      Lab Results   Component Value Date    NITRU Negative 08/09/2021    WBCUA 9 08/09/2021    BACTERIA 1+ 06/24/2019    RBCUA 2 08/09/2021    BLOODU Negative 08/09/2021    SPECGRAV 1.020 08/09/2021    GLUCOSEU Negative 08/09/2021       Radiology:  CT ABDOMEN PELVIS WO CONTRAST Additional Contrast? None   Final Result   Interval surgical reduction of right supraumbilical herniation previously   containing small bowel loops. Persisting scattered small bowel dilatation with fluid-filled bowel loops   proximal to the surgical site consistent with ongoing postoperative ileus   versus persisting small bowel obstruction. Differential diagnostic   considerations include possible presence of persisting scarring/adhesions in   region of the ventral abdominal wall surgical site. Small bowel dilatation   has worsened in comparison with prior preoperative study. Recommend surgical   consultation. Findings suboptimally evaluated in absence of intravenous   iodinated contrast administration. DVT Prophylaxis: Lovenox  Diet: Diet NPO  Code Status: Full Code    PT/OT Eval Status: Eval ordered    Dispo -home once medically stable    YAZMIN Mitchell - CNP      NOTE:  This report was transcribed using voice recognition software.   Every effort was made to ensure accuracy; however, inadvertent computerized transcription errors may be present.

## 2021-08-21 NOTE — CONSULTS
[address] [facility]     Consult                                                     Patient Name: Sadia Larios  MRN: [id number]  [place]: 1940  Admission date: 8/21/2021  2:36 AM   Date of evaluation: 8/21/2021  Primary Care Physician: Tammi Nelson MD  Requesting physician: Jaja Andrew MD  Reason for consult: Abdominal pain  History of Present Illness:    Ms. Shima Yarbrough is a 80 y.o. female who presents with several day history of distension, pain, nausea, anorexia and constipation. Recently underwent laparoscopic lysis of adhesions, repair of ventral hernia with mesh with Dr. Ru Sewell 8/9/21, procedure uneventful. Passed small amount of dark stool recently but per patient not had normal formed stool in weeks. Abdominal pain was crampy, diffuse, nothing made better or worse and did not radiate. Never happened before. Better with NG placement. Denies fevers, chills, chest pain, dyspnea, dysuria or other complaints    Past Medical History:        Diagnosis Date    Angina     Bilateral carpal tunnel syndrome 10/9/2018    Chronic kidney disease     Fibromyalgia     Hyperlipidemia     Hypertension     MVP (mitral valve prolapse)     s 5/18/2017    Small bowel obstruction ([address] 6/24/2019    Type II or unspecified type diabetes mellitus without mention of complication, not stated as uncontrolled        Past Surgical History:        Procedure Laterality Date    CHOLECYSTECTOMY      COLONOSCOPY N/A 8/13/2019    COLONOSCOPY DIAGNOSTIC performed by Beth Mcclellan MD at [address] DISCECTOMY  10/03/12    lumbar discectomy, 3-4 left    SMALL INTESTINE SURGERY      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 performed by Corrine David MD at [address] [place] [address]       Scheduled Meds:  Continuous Infusions:   lactated ringers       PRN Meds:. Prior to Admission medications    Medication Sig Start Date End Date Taking?  Authorizing Provider   sennosides-docusate sodium (SENOKOT-S) 8.6-50 MG tablet Take 1 tablet by mouth daily 8/15/21  Yes Jensen Orlando PA-C   ezetimibe (ZETIA) 10 MG tablet TAKE ONE TABLET BY MOUTH DAILY 6/26/21  Yes Snow Duncan MD   spironolactone (ALDACTONE) 25 MG tablet Take 1 tablet by mouth daily 6/26/21  Yes Snow Duncan MD   folic acid (FOLVITE) 1 MG tablet Take 1 tablet by mouth daily 6/26/21  Yes Snow Duncan MD   B complex-vitamin C-folic acid (NEPHRO-BARNEY) 1 MG tablet Take 1 tablet by mouth daily (with breakfast) 6/26/21  Yes Snow Duncan MD   Insulin Pen Needle ([employer] PEN NEEDLES 31G) 31G X 8 MM MISC 1 each by Does not apply route daily 1/22/21  Yes Barby Aguilar MD   insulin glargine (LANTUS SOLOSTAR) 100 UNIT/ML injection pen Inject 22 Units into the skin nightly Indications: sliding scale up to 22 units Lease refill 8/23/18 per patient requests 1/14/21  Yes Barby Aguilar MD   fenofibrate (TRICOR) 48 MG tablet Take 1 tablet by mouth daily 1/10/21  Yes Barby Aguilar MD   Netarsudil-Latanoprost 0.02-0.005 % SOLN Apply 1 drop to eye nightly   Yes Historical Provider, MD   timolol (BETIMOL) 0.5 % ophthalmic solution 1 drop 2 times daily   Yes Historical Provider, MD   dorzolamide-timolol (COSOPT) 22.3-6.8 MG/ML ophthalmic solution INSTILL 1 DROP INTO BOTH EYES EVERY 12 HOURS 9/3/20  Yes Historical Provider, MD   metoprolol tartrate (LOPRESSOR) 25 MG tablet Take 25 mg by mouth 2 times daily 10/1/20  Yes Historical Provider, MD   hydrALAZINE (APRESOLINE) 25 MG tablet Take 1 tablet by mouth 2 times daily 11/24/20  Yes Barby Aguilar MD   fluticasone-umeclidin-vilant (TRELEGY ELLIPTA) 100-62.5-25 MCG/INH AEPB Inhale 1 puff into the lungs daily Expiration date 7/ 2021  Lot 10 KD5P 3/6/20  Yes Myles Pedersen MD   aspirin 81 MG EC tablet Take 81 mg by mouth daily   Yes Historical Provider, MD   vitamin D (ERGOCALCIFEROL) 36579 units CAPS capsule Take 50,000 Units by mouth every 14 days   Yes Historical Provider, MD   acetaminophen (TYLENOL) 325 MG tablet Take 650 mg by mouth every 6 hours as needed for Pain   Yes Historical Provider, MD   Multiple Vitamins-Minerals (OCUVITE EYE + MULTI) TABS Take 1 tablet by mouth daily   Yes Historical Provider, MD   latanoprost (XALATAN) 0.005 % ophthalmic solution Place 1 drop into both eyes nightly    Yes Historical Provider, MD   Coenzyme Q10 (COQ10) 100 MG CAPS Take 1 capsule by mouth daily    Yes Historical Provider, MD   Cyanocobalamin (B-12) 500 MCG TABS Take 1 tablet by mouth once a week    Yes Historical Provider, MD   lansoprazole (PREVACID) 15 MG delayed release capsule Take 15 mg by mouth daily    Yes Historical Provider, MD        Allergies:  Flagyl [metronidazole], Sulfa antibiotics, Lisinopril-hydrochlorothiazide, Ace inhibitors, Actos [pioglitazone hydrochloride], Celebrex [celecoxib], Cipro xr, Ciprofloxacin, Demerol, Doxycycline, Elavil [amitriptyline hcl], Estradiol, Levofloxacin, Metformin, Metoclopramide, Metolazone, Neurontin [gabapentin], Niaspan [niacin er], Nifedipine, Nsaids, Oxycodone, Phenytoin sodium extended, Pioglitazone, Prednisone, Pregabalin, Simvastatin, and Iodine    Social History:   Social History     Socioeconomic History    Marital status:       Spouse name: None    Number of children: None    Years of education: None    Highest education level: 12th grade   Occupational History    None   Tobacco Use    Smoking status: Never Smoker    Smokeless tobacco: Never Used   Vaping Use    Vaping Use: Never assessed   Substance and Sexual Activity    Alcohol use: No    Drug use: No    Sexual activity: Not Currently     Partners: Male   Other Topics Concern    None   Social History Narrative    Lives home and cares for her 53 yo paraplegic son (on disability). Social Determinants of Health     Financial Resource Strain: Low Risk     Difficulty of Paying Living Expenses: Not hard at all   Food Insecurity: No Food Insecurity    Worried About Running Out of Food in the Last Year: Never true    Fabienne of Food in the Last Year: Never true   Transportation Needs: No Transportation Needs    Lack of Transportation (Medical): No    Lack of Transportation (Non-Medical): No   Physical Activity: Insufficiently Active    Days of Exercise per Week: 2 days    Minutes of Exercise per Session: 20 min   Stress: Stress Concern Present    Feeling of Stress : To some extent   Social Connections: Moderately Integrated    Frequency of Communication with Friends and Family: More than three times a week    Frequency of Social Gatherings with Friends and Family: More than three times a week    Attends Yazidism Services: More than 4 times per year    Active Member of [address] [place] [address] [employer] or Organizations: No    Attends Club or Organization Meetings: More than 4 times per year    Marital Status:     Intimate Partner Violence: Not At Risk    Fear of Current or Ex-Partner: No    Emotionally Abused: No    Physically Abused: No    Sexually Abused: No       Family History:    Family History   Problem Relation Age of Onset    Vision Loss Sister     Heart Attack Mother     Colon Cancer Father     Ovarian Cancer Daughter        Review of Systems:  CONSTITUTIONAL:  Negative except as above  HEENT:  negative  RESPIRATORY:  negative  CARDIOVASCULAR:  negative  GASTROINTESTINAL:  negative except as above   GENITOURINARY:  negative  HEMATOLOGIC/LYMPHATIC:  negative  NEUROLOGICAL:  Negative      Vital Signs:  Patient Vitals for the past 24 hrs:   BP Temp Temp src Pulse Resp SpO2 Weight   08/21/21 0800 (!) 167/65 -- -- -- -- 97 % --   08/21/21 0730 (!) 169/61 -- -- -- -- 96 % --   21 0415 (!) 164/56 -- -- -- -- 97 % --   21 0400 (!) 159/57 -- -- -- -- 96 % --   21 0345 (!) 150/57 -- -- -- -- 96 % --   21 0315 (!) 168/58 -- -- -- -- 95 % --   21 0300 (!) 151/62 -- -- -- -- 96 % --   21 0246 (!) 153/64 98.1 °F (36.7 °C) Oral 99 16 97 % 162 lb (73.5 kg)      TEMPERATURE HISTORY 24H: Temp (24hrs), Av.1 °F (36.7 °C), Min:98.1 °F (36.7 °C), Max:98.1 °F (36.7 °C)    BLOOD PRESSURE HISTORY: Systolic (57UXJ), FQZ:457 , Min:150 , XAH:584    Diastolic (30IET), IKM:10, Min:56, Max:65    Admission Weight: 162 lb (73.5 kg)     No intake or output data in the 24 hours ending 21 0818  Drain/tube Output:         Physical Exam:  Constitutional:  well-developed, well-nourished,   Neurologic: awake, Orientation:  Oriented to person, place, time, follows commands, clear speech, cranial nerves grossly intact  Psychiatric: normal affect, no hallucinations  Eyes:  sclera clear, no visible discharge  Head, ears, nose, mouth, throat: normocepalic, without obvious abnormality, supple, symmetrical, trachea midline, no JVD, mucous membranes moist  Cardiovascular: regular rate and rhythm   Respiratory: clear to auscultation  GI: soft, distended, mild mid abdominal tenderness, appropriate incisional tenderness, incision clean dry and intact  Lymph: no palpable lymphadenopathy  Skin: no bruising or bleeding, normal skin color, texture, turgor and no redness, warmth, or swelling    Labs:    CBC:    Recent Labs     21  0328   WBC 8.4   HGB 8.6*   HCT 26.0*   *     BMP:    Recent Labs     21  0328      K 3.7      CO2 16*   BUN 18   CREATININE 0.9   GLUCOSE 126*     Hepatic:   Recent Labs     21   AST 45*   ALT 42*   BILITOT 0.7   ALKPHOS 76     Amylase: No results for input(s): AMYLASE in the last 72 hours. Lipase:   Recent Labs     21   LIPASE 17.0     Mag:    No results for input(s): MG in the last 72 hours.    Phos: No results for input(s): PHOS in the last 72 hours. Coags: No results for input(s): INR, APTT in the last 72 hours. Imaging:  I have personally reviewed the following films:  CT ABDOMEN PELVIS WO CONTRAST Additional Contrast? None    Result Date: 8/21/2021  EXAMINATION: CT OF THE ABDOMEN AND PELVIS WITHOUT CONTRAST 8/21/2021 3:10 am TECHNIQUE: CT of the abdomen and pelvis was performed without the administration of intravenous contrast. Multiplanar reformatted images are provided for review. Dose modulation, iterative reconstruction, and/or weight based adjustment of the mA/kV was utilized to reduce the radiation dose to as low as reasonably achievable. COMPARISON: CT abdomen and pelvis without contrast August 9, 2021. HISTORY: ORDERING SYSTEM PROVIDED HISTORY: abd pain - ro obstruction TECHNOLOGIST PROVIDED HISTORY: Reason for exam:->abd pain - ro obstruction Additional Contrast?->None Decision Support Exception - unselect if not a suspected or confirmed emergency medical condition->Emergency Medical Condition (MA) Reason for Exam: Abdominal Pain (pt states hernia surgery on the 9th. now with increased pain and nausea. states also having loose, uncontrollable stools. ) Acuity: Acute Type of Exam: Initial FINDINGS: Abdomen/Pelvis: Lower chest: The lung bases are well aerated. Pleural surfaces are unremarkable and no evidence of pleural effusion is identified. Organs: Gallbladder is surgically absent. The liver, spleen, pancreas, adrenal glands, kidneys, are unremarkable in appearance. GI/Bowel: Interval ventral abdominal wall surgical reduction of previously identified supraumbilical hernia containing small bowel loop is noted with residual fluid present.   Multifocal supraumbilical omental herniations persist.  Question of hyperdense ventral abdominal wall scarring/adhesions are seen in region of the herniation with persisting multifocal dilatation of small bowel distal to the surgical site with loops measuring up to 4.4 cm in transluminal diameter with dependent luminal fluid seen. Moderate gastric distention is present. The appendix is normal.  Distal sigmoid colon surgical anastomosis site is noted without evidence of complication identified. Pelvis: The urinary bladder is well distended and unremarkable in appearance. No evidence of pelvic free fluid is seen. Peritoneum/Retroperitoneum: No evidence of retroperitoneal or intraperitoneal lymphadenopathy is identified. No evidence of intraperitoneal free fluid is seen. Bones/Soft Tissues: The bones, skeletal muscle bundles, fascial planes and subcutaneous soft tissues are unremarkable in appearance. Interval surgical reduction of right supraumbilical herniation previously containing small bowel loops. Persisting scattered small bowel dilatation with fluid-filled bowel loops proximal to the surgical site consistent with ongoing postoperative ileus versus persisting small bowel obstruction. Differential diagnostic considerations include possible presence of persisting scarring/adhesions in region of the ventral abdominal wall surgical site. Small bowel dilatation has worsened in comparison with prior preoperative study. Recommend surgical consultation. Findings suboptimally evaluated in absence of intravenous iodinated contrast administration. CT ABDOMEN PELVIS WO CONTRAST Additional Contrast? None    Result Date: 8/9/2021  EXAMINATION: CT OF THE ABDOMEN AND PELVIS WITHOUT CONTRAST 8/9/2021 9:14 am TECHNIQUE: CT of the abdomen and pelvis was performed without the administration of intravenous contrast. Multiplanar reformatted images are provided for review. Dose modulation, iterative reconstruction, and/or weight based adjustment of the mA/kV was utilized to reduce the radiation dose to as low as reasonably achievable.  COMPARISON: 06/24/2019 HISTORY: ORDERING SYSTEM PROVIDED HISTORY: obstruction TECHNOLOGIST PROVIDED HISTORY: Reason for system. COMPARISON: None. HISTORY: ORDERING SYSTEM PROVIDED HISTORY: Post-menopausal TECHNOLOGIST PROVIDED HISTORY: Reason for exam:->screen osteoporosis FINDINGS: LUMBAR SPINE: The bone mineral density in the lumbar spine including the L1 through L4 levels is measured at 1.277 g/cm2, which corresponds to a T-score of 2.1 and a Z-score of 4.1. This is within the normal range by WHO criteria. LEFT HIP: The bone mineral density in the left total hip is measured at 1.095 g/cm2 corresponding to a T-score of 0.3 and a Z-score of 2.0. This is within the normal range by WHO criteria. The bone mineral density of the left femoral neck is measured at 0.985 g/cm2 corresponding to a T-score of 1.2 and a Z-score of 2.1. This is within the normal range by WHO criteria. RIGHT HIP: The bone mineral density in the right total hip is measured at 1.063 g/cm2 corresponding to a T-score of 1.0 and a Z-score of 1.8. This is within the normal range by WHO criteria. The bone mineral density of the right femoral neck is measured at 1.040 g/cm2 corresponding to a T-score of 1.7 and a Z-score of 2.4. This is within the normal range by WHO criteria. Based upon the lowest T-score above, this represents normal bone mineral density by WHO criteria. XR CHEST PORTABLE    Result Date: 8/9/2021  EXAMINATION: ONE XRAY VIEW OF THE CHEST 8/9/2021 12:08 pm COMPARISON: None. HISTORY: ORDERING SYSTEM PROVIDED HISTORY: Preop TECHNOLOGIST PROVIDED HISTORY: Reason for exam:->Preop Reason for Exam: pre op for abd surgery today Acuity: Unknown Type of Exam: Unknown FINDINGS: Lordotic positioning. Heart size normal when accounting for positioning. Pulmonary vasculature within normal limits. Lungs clear.   Costophrenic angles sharp     No active cardiopulmonary disease     XR ABDOMEN (2 VIEWS)    Result Date: 8/13/2021  EXAMINATION: TWO XRAY VIEWS OF THE ABDOMEN 8/13/2021 3:48 pm COMPARISON: CT abdomen pelvis 08/20/2021 HISTORY: ORDERING SYSTEM PROVIDED HISTORY: ileus TECHNOLOGIST PROVIDED HISTORY: Reason for exam:->ileus Reason for Exam: eval for ileus; had recent hernia surgery this week, wearing a brace Acuity: Unknown Type of Exam: Unknown FINDINGS: Persistent dilated loops of small bowel noted. Air can be seen in colon. No evidence of free air. Lung volumes are low with pulmonary vascular crowding. Surgical clips seen in the right upper quadrant. Degenerative changes seen lower lumbar spine and SI joints. Degenerative changes are seen in the hips bilaterally. Dilated loops of small bowel consistent with ongoing ileus or partial obstruction. Cultures:  Relevant cultures documented under results section     Assessment:  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    CKD (chronic kidney disease) stage 3, GFR 30-59 ml/min ([facility])    Vitamin D deficiency    Fibromyalgia    Type 2 diabetes mellitus with kidney complication, with long-term current use of insulin ([facility])    Bilateral carpal tunnel syndrome    Intestinal obstruction ([facility])    Chronic abdominal pain    Trigger middle finger of right hand    Carpal tunnel syndrome, left    Bacterial overgrowth syndrome    Plantar fasciitis    Primary open angle glaucoma    Renal insufficiency syndrome    Retrocalcaneal bursitis    S/P angioplasty with stent    Ulcerative colitis ([address]    Myalgia    Other neutropenia ([address]    Mixed hyperlipidemia    Coronary artery disease without angina pectoris    Essential hypertension    Incarcerated ventral hernia    Ileus ([address]     Small bowel obstruction/ileus  laparoscopic lysis of adhesions, repair of ventral hernia with mesh with Dr. Amber Miller 8/9/21  Diabetes    Plan:  1. The patient has a small bowel obstruction vs ileus that is likely to respond to maximal conservative measures. If she does not respond to conservative management or clinically deteriorates, may need surgical exploration  2. Pending clinical progress, may repeat CT with PO contrast or order small bowel follow through which may be both diagnostic and therapeutic in several days  3. NG decompression, NPO  4. IVF resuscitation  5. Monitor leukocytosis, does not need antibiotics from surgical standpoint  6. Pain medication and antiemetics as needed with caution as they may mask a worsening exam  7. Close monitoring of electrolytes, electrolyte derangement can alter bowel function  8. Monitor bowel function, no recent flatus or stool    This plan was discussed at length with the patient. She was understanding and in agreement with the plan  Thank you for the consult and allowing me to participate in the care of this patient. I look forward to following her this admission    Tarik Quiñonez MD, FACS  8/21/2021  8:18 AM

## 2021-08-21 NOTE — ED PROVIDER NOTES
radiation. Nursing Notes were all reviewed and agreed with or any disagreements were addressed in the HPI. REVIEW OF SYSTEMS    (2-9 systems for level 4, 10 or more for level 5)     Review of Systems   Constitutional: Negative for activity change, appetite change, chills, diaphoresis, fatigue and fever. Respiratory: Negative. Negative for cough, chest tightness and shortness of breath. Cardiovascular: Negative. Negative for chest pain, palpitations and leg swelling. Gastrointestinal: Positive for abdominal distention, abdominal pain, diarrhea, nausea and vomiting. Negative for constipation. Genitourinary: Negative for decreased urine volume, difficulty urinating, dysuria, flank pain, frequency, hematuria and urgency. Musculoskeletal: Negative for arthralgias, back pain, myalgias, neck pain and neck stiffness. Skin: Negative for color change, pallor, rash and wound. Neurological: Negative for dizziness, light-headedness and headaches. Positives and Pertinent negatives as per HPI. Except as noted above in the ROS, all other systems were reviewed and negative.        PAST MEDICAL HISTORY     Past Medical History:   Diagnosis Date    Angina     Bilateral carpal tunnel syndrome 10/9/2018    Chronic kidney disease     Fibromyalgia     Hyperlipidemia     Hypertension     MVP (mitral valve prolapse)     s 5/18/2017    Small bowel obstruction ([address] 6/24/2019    Type II or unspecified type diabetes mellitus without mention of complication, not stated as uncontrolled          SURGICAL HISTORY     Past Surgical History:   Procedure Laterality Date    CHOLECYSTECTOMY      COLONOSCOPY N/A 8/13/2019    COLONOSCOPY DIAGNOSTIC performed by Parviz Gillespie MD at [place] [address] stents    HERNIA REPAIR      HYSTERECTOMY      LUMBAR DISCECTOMY  10/03/12    lumbar discectomy, 3-4 left    SMALL INTESTINE SURGERY      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 performed by Laura Small MD at [address] [place] [address]       Current Discharge Medication List      CONTINUE these medications which have NOT CHANGED    Details   sennosides-docusate sodium (SENOKOT-S) 8.6-50 MG tablet Take 1 tablet by mouth daily      ezetimibe (ZETIA) 10 MG tablet TAKE ONE TABLET BY MOUTH DAILY  Qty: 90 tablet, Refills: 2    Associated Diagnoses: Pure hypercholesterolemia      spironolactone (ALDACTONE) 25 MG tablet Take 1 tablet by mouth daily  Qty: 90 tablet, Refills: 1    Associated Diagnoses: Essential hypertension      folic acid (FOLVITE) 1 MG tablet Take 1 tablet by mouth daily  Qty: 90 tablet, Refills: 3    Associated Diagnoses: Pure hypercholesterolemia      B complex-vitamin C-folic acid (NEPHRO-BARNEY) 1 MG tablet Take 1 tablet by mouth daily (with breakfast)  Qty: 90 tablet, Refills: 1    Associated Diagnoses: Stage 3b chronic kidney disease (HCC)      Insulin Pen Needle ([employer] PEN NEEDLES 31G) 31G X 8 MM MISC 1 each by Does not apply route daily  Qty: 100 each, Refills: 3      insulin glargine (LANTUS SOLOSTAR) 100 UNIT/ML injection pen Inject 22 Units into the skin nightly Indications: sliding scale up to 22 units Lease refill 8/23/18 per patient requests  Qty: 20 mL, Refills: 3    Associated Diagnoses: Type 2 diabetes mellitus with complication, with long-term current use of insulin ([facility])      fenofibrate (TRICOR) 48 MG tablet Take 1 tablet by mouth daily  Qty: 30 tablet, Refills: 3    Associated Diagnoses: Mixed hyperlipidemia      Netarsudil-Latanoprost 0.02-0.005 % SOLN Apply 1 drop to eye nightly      timolol (BETIMOL) 0.5 % ophthalmic solution 1 drop 2 times daily      dorzolamide-timolol (COSOPT) 22.3-6.8 MG/ML ophthalmic solution INSTILL 1 DROP INTO BOTH EYES EVERY 12 HOURS metoprolol tartrate (LOPRESSOR) 25 MG tablet Take 25 mg by mouth 2 times daily      hydrALAZINE (APRESOLINE) 25 MG tablet Take 1 tablet by mouth 2 times daily  Qty: 180 tablet, Refills: 3    Associated Diagnoses: Essential hypertension      fluticasone-umeclidin-vilant (TRELEGY ELLIPTA) 100-62.5-25 MCG/INH AEPB Inhale 1 puff into the lungs daily Expiration date 7/ 2021  Lot [id number]  Qty: 1 each, Refills: 0    Associated Diagnoses: Asthmatic bronchitis with acute exacerbation, unspecified asthma severity, unspecified whether persistent      aspirin 81 MG EC tablet Take 81 mg by mouth daily      vitamin D (ERGOCALCIFEROL) 63156 units CAPS capsule Take 50,000 Units by mouth every 14 days      acetaminophen (TYLENOL) 325 MG tablet Take 650 mg by mouth every 6 hours as needed for Pain      Multiple Vitamins-Minerals (OCUVITE EYE + MULTI) TABS Take 1 tablet by mouth daily      latanoprost (XALATAN) 0.005 % ophthalmic solution Place 1 drop into both eyes nightly       Coenzyme Q10 (COQ10) 100 MG CAPS Take 1 capsule by mouth daily       Cyanocobalamin (B-12) 500 MCG TABS Take 1 tablet by mouth once a week       lansoprazole (PREVACID) 15 MG delayed release capsule Take 15 mg by mouth daily                ALLERGIES     Flagyl [metronidazole], Sulfa antibiotics, Lisinopril-hydrochlorothiazide, Ace inhibitors, Actos [pioglitazone hydrochloride], Celebrex [celecoxib], Cipro xr, Ciprofloxacin, Demerol, Doxycycline, Elavil [amitriptyline hcl], Estradiol, Levofloxacin, Metformin, Metoclopramide, Metolazone, Neurontin [gabapentin], Niaspan [niacin er], Nifedipine, Nsaids, Oxycodone, Phenytoin sodium extended, Pioglitazone, Prednisone, Pregabalin, Simvastatin, and Iodine    FAMILYHISTORY       Family History   Problem Relation Age of Onset    Vision Loss Sister     Heart Attack Mother     Colon Cancer Father     Ovarian Cancer Daughter           SOCIAL HISTORY       Social History     Tobacco Use    Smoking status: Never Smoker    Smokeless tobacco: Never Used   Vaping Use    Vaping Use: Never assessed   Substance Use Topics    Alcohol use: No    Drug use: No       SCREENINGS             PHYSICAL EXAM    (up to 7 for level 4, 8 or more for level 5)     ED Triage Vitals [08/21/21 0246]   BP Temp Temp Source Pulse Resp SpO2 Height Weight   (!) 153/64 98.1 °F (36.7 °C) Oral 99 16 97 % -- 162 lb (73.5 kg)       Physical Exam  Constitutional:       General: She is not in acute distress. Appearance: She is well-developed. She is not ill-appearing, toxic-appearing or diaphoretic. HENT:      Head: Normocephalic and atraumatic. Right Ear: External ear normal.      Left Ear: External ear normal.   Eyes:      General:         Right eye: No discharge. Left eye: No discharge. Cardiovascular:      Rate and Rhythm: Normal rate and regular rhythm. Pulses: Normal pulses. Heart sounds: Normal heart sounds. No murmur heard. No friction rub. No gallop. Pulmonary:      Effort: Pulmonary effort is normal. No respiratory distress. Breath sounds: Normal breath sounds. No stridor. No wheezing, rhonchi or rales. Chest:      Chest wall: No tenderness. Abdominal:      General: Abdomen is flat. Bowel sounds are normal. There is distension. Palpations: Abdomen is soft. There is no mass. Tenderness: There is abdominal tenderness in the epigastric area and periumbilical area. There is guarding (voluntary). There is no right CVA tenderness, left CVA tenderness or rebound. Negative signs include Ventura's sign, Rovsing's sign, McBurney's sign, psoas sign and obturator sign. Hernia: No hernia is present. Comments: Surgical incision noted to the midline abdomen. Incision appears to be well-healing at this time. There is no wound dehiscence. Does have tender outpatient over the epigastric and periumbilical abdomen over the incision and to the upper incision. There is no induration or fluctuance. There is no crepitus. No bleeding or drainage. Does have voluntary guarding. Appears to have some distention. Musculoskeletal:         General: Normal range of motion. Cervical back: Normal range of motion and neck supple. Skin:     General: Skin is warm and dry. Coloration: Skin is not pale. Findings: No erythema. Neurological:      Mental Status: She is alert and oriented to person, place, and time. Psychiatric:         Behavior: Behavior normal.         DIAGNOSTIC RESULTS   LABS:    Labs Reviewed   CBC WITH AUTO DIFFERENTIAL - Abnormal; Notable for the following components:       Result Value    RBC 2.57 (*)     Hemoglobin 8.6 (*)     Hematocrit 26.0 (*)     .1 (*)     Platelets 821 (*)     Lymphocytes Absolute 0.7 (*)     All other components within normal limits    Narrative:     Performed at:  [facility] [address], [place], [address]   Phone [phone]   COMPREHENSIVE METABOLIC PANEL W/ REFLEX TO MG FOR LOW K - Abnormal; Notable for the following components:    CO2 16 (*)     Anion Gap 17 (*)     Glucose 126 (*)     ALT 42 (*)     AST 45 (*)     All other components within normal limits    Narrative:     Performed at:  [facility] [address], [place], [address]   Phone [phone]   GASTROINTESTINAL PANEL, MOLECULAR   C DIFF TOXIN/ANTIGEN   LIPASE    Narrative:     Performed at:  [facility] [address], [place], [address]   Phone [phone]   LACTIC ACID, PLASMA    Narrative:     Performed at:  [facility] [address], [place], [address]   Phone [phone]   POCT GLUCOSE   POCT GLUCOSE   POCT GLUCOSE   POCT GLUCOSE   POCT GLUCOSE   POCT GLUCOSE       When ordered only abnormal lab results are displayed. All other labs were within normal range or not returned as of this dictation. EKG:  When ordered, EKG's are interpreted by the Emergency Department Physician in the absence of a cardiologist.  Please see their note for interpretation of EKG. RADIOLOGY:   Non-plain film images such as CT, Ultrasound and MRI are read by the radiologist. Plain radiographic images are visualized and preliminarily interpreted by the ED Provider with the below findings:        Interpretation per the Radiologist below, if available at the time of this note:    CT ABDOMEN PELVIS WO CONTRAST Additional Contrast? None   Final Result   Interval surgical reduction of right supraumbilical herniation previously   containing small bowel loops. Persisting scattered small bowel dilatation with fluid-filled bowel loops   proximal to the surgical site consistent with ongoing postoperative ileus   versus persisting small bowel obstruction. Differential diagnostic   considerations include possible presence of persisting scarring/adhesions in   region of the ventral abdominal wall surgical site. Small bowel dilatation   has worsened in comparison with prior preoperative study. Recommend surgical   consultation. Findings suboptimally evaluated in absence of intravenous   iodinated contrast administration. No results found.         PROCEDURES   Unless otherwise noted below, none     Procedures    CRITICAL CARE TIME   N/A    CONSULTS:  IP CONSULT TO GENERAL SURGERY  IP CONSULT TO HOSPITALIST      EMERGENCY DEPARTMENT COURSE and DIFFERENTIAL DIAGNOSIS/MDM:   Vitals:    Vitals:    08/21/21 0815 08/21/21 0830 08/21/21 0845 08/21/21 0900   BP: (!) 171/64 (!) 171/64 (!) 171/64 (!) 169/71   Pulse:       Resp:       Temp:       TempSrc:       SpO2: 98% 98% 97% 96%   Weight:           Patient was given the following medications:  Medications   lactated ringers infusion ( Intravenous New Bag 8/21/21 0821)   B complex-vitamin C-folic acid (NEPHRO-BARNEY) tablet 1 tablet ( Oral Automatically Held 8/25/21 0800)   dorzolamide-timolol (COSOPT) 22.3-6.8 MG/ML ophthalmic solution 1 drop (has no administration in time range)   fenofibrate (TRICOR) tablet 54 mg ( Oral Automatically Held 8/24/21 0900)   fluticasone-umeclidin-vilant (TRELEGY ELLIPTA) 100-62.5-25 MCG/INH inhaler 1 puff (1 puff Inhalation Not Given 5/13/45 0823)   folic acid (FOLVITE) tablet 1 mg ( Oral Automatically Held 8/24/21 0900)   latanoprost (XALATAN) 0.005 % ophthalmic solution 1 drop (has no administration in time range)   Ocuvite Eye + Multi TABS 1 tablet ( Oral Automatically Held 8/24/21 0900)   Netarsudil-Latanoprost 0.02-0.005 % SOLN 1 drop (has no administration in time range)   timolol (BETIMOL) 0.5 % ophthalmic solution 1 drop (has no administration in time range)   insulin lispro (1 Unit Dial) 0-6 Units (has no administration in time range)   insulin lispro (1 Unit Dial) 0-3 Units (has no administration in time range)   glucose (GLUTOSE) 40 % oral gel 15 g (has no administration in time range)   dextrose 50 % IV solution (has no administration in time range)   glucagon (rDNA) injection 1 mg (has no administration in time range)   dextrose 5 % solution (has no administration in time range)   sodium chloride flush 0.9 % injection 5-40 mL (has no administration in time range)   sodium chloride flush 0.9 % injection 10 mL (has no administration in time range)   0.9 % sodium chloride infusion (has no administration in time range)   enoxaparin (LOVENOX) injection 40 mg (has no administration in time range)   ondansetron (ZOFRAN-ODT) disintegrating tablet 4 mg (has no administration in time range)     Or   ondansetron (ZOFRAN) injection 4 mg (has no administration in time range)   acetaminophen (TYLENOL) tablet 650 mg (has no administration in time range)     Or   acetaminophen (TYLENOL) suppository 650 mg (has no administration in time range)   ketorolac (TORADOL) injection 15 mg (has no administration in time range)   insulin glargine (LANTUS;BASAGLAR) injection pen 10 Units (has no administration in time range)   pantoprazole (PROTONIX) injection 40 mg (has no administration in time range)   aspirin suppository 300 mg (has no administration in time range)   hydrALAZINE (APRESOLINE) injection 10 mg (has no administration in time range)   metoprolol (LOPRESSOR) injection 5 mg (has no administration in time range)   morphine injection 4 mg (4 mg Intravenous Given 8/21/21 0323)   ondansetron (ZOFRAN) injection 4 mg (4 mg Intravenous Given 8/21/21 6594)   0.9 % sodium chloride bolus (0 mLs Intravenous Stopped 8/21/21 0534)           Patient is an 19-year-old female who presents to the ED with complaint of abdominal pain. Patient complaint of abdominal pain with associated distention, nausea/vomiting and liquid stool. Patient is status post obstruction secondary to incarcerated ventral hernia status post repair earlier this month. Patient had increasing pain. Came to the ED for further evaluation treatment. CBC showed normal white count with hemoglobin of 8.6 and platelets of 854. CMP relatively unremarkable. Lipase normal.  Lactic acid normal.  CT of the abdomen pelvis obtained. CT currently pending at end of shift. Will sign out to attending provider. Please see attending provider note for further disposition and treatment of patient. FINAL IMPRESSION      1. Ileus [facility])          DISPOSITION/PLAN   DISPOSITION Admitted 08/21/2021 05:55:40 AM      PATIENT REFERRED TO:  No follow-up provider specified.     DISCHARGE MEDICATIONS:  Current Discharge Medication List          DISCONTINUED MEDICATIONS:  Current Discharge Medication List                 (Please note that portions of this note were completed with a voice recognition program.  Efforts were made to edit the dictations but occasionally words are mis-transcribed.)    KEYSHAWN Black (electronically signed)          Mavis Scheuermann, PA  08/21/21 1400

## 2021-08-21 NOTE — ED NOTES
Report given to floor RN. Pt transported to floor with all personal belongings by ED tech.       Abram Gottlieb RN  08/21/21 6654

## 2021-08-21 NOTE — H&P
[facility] Medicine History and Physical    8/21/2021    Date of Admission: 8/21/2021    Date of Service: Pt seen/examined on 8/21/2021 and admitted to observation. Assessment/plan:  1. Ileus. Likely prolonged postoperative ileus. Make n.p.o. for now. Start gentle intravenous fluid. Increase ambulation. Minimize narcotics. Check stool studies. Await surgery evaluation later today. 2. Anion gap metabolic acidosis. Likely secondary to volume depletion. Continue intravenous fluid and monitor electrolytes closely. 3. Other comorbidities:  history of fibromyalgia, hyperlipidemia, essential hypertension, macrocytic anemia, well-controlled diabetes type 2, history of bowel obstruction, history of multiple abdominal surgeries    Activities: Up with assist  Prophylaxis: Subcutaneous Lovenox  Code status: Full code    ==========================================================  Chief complaint:  Chief Complaint   Patient presents with    Abdominal Pain     pt states hernia surgery on the 9th. now with increased pain and nausea. states also having loose, uncontrollable stools. History of Presenting Illness: This is a pleasant 80 y.o. female with history of fibromyalgia, hyperlipidemia, essential hypertension, macrocytic anemia, well-controlled diabetes type 2, history of bowel obstruction, history of multiple abdominal surgeries, who presents to the emergency room with complaints of increased abdominal pain, nausea, decreased p.o. intake, loose stools, ongoing for the last few days. She has had decreased p.o. intake. CT-abdomen/pelvis obtained in the emergency room reveals persistent scattered small bowel dilatation of fluid-filled bowel loops proximal to the surgical site consistent with ongoing postoperative ileus versus persistent small bowel obstruction.     Past Medical History:      Diagnosis Date    Angina     Bilateral carpal tunnel syndrome 10/9/2018    Chronic kidney disease     Fibromyalgia     Hyperlipidemia     Hypertension     MVP (mitral valve prolapse)     s 5/18/2017    Small bowel obstruction ([address] 6/24/2019    Type II or unspecified type diabetes mellitus without mention of complication, not stated as uncontrolled        Past Surgical History:      Procedure Laterality Date    CHOLECYSTECTOMY      COLONOSCOPY N/A 8/13/2019    COLONOSCOPY DIAGNOSTIC performed by Marycruz Faustin MD at [place]      2 stents   [address]      HYSTERECTOMY      LUMBAR DISCECTOMY  10/03/12    lumbar discectomy, 3-4 left    SMALL INTESTINE SURGERY      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 performed by Migel Tabares MD at [address] [place] [address]       Medications (prior to admission):  Prior to Admission medications    Medication Sig Start Date End Date Taking?  Authorizing Provider   sennosides-docusate sodium (SENOKOT-S) 8.6-50 MG tablet Take 1 tablet by mouth daily 8/15/21  Yes Juany Chu PA-C   ezetimibe (ZETIA) 10 MG tablet TAKE ONE TABLET BY MOUTH DAILY 6/26/21  Yes Naila Waterman MD   spironolactone (ALDACTONE) 25 MG tablet Take 1 tablet by mouth daily 6/26/21  Yes Naila Waterman MD   folic acid (FOLVITE) 1 MG tablet Take 1 tablet by mouth daily 6/26/21  Yes Naila Waterman MD   B complex-vitamin C-folic acid (NEPHRO-BARNEY) 1 MG tablet Take 1 tablet by mouth daily (with breakfast) 6/26/21  Yes Naila Waterman MD   Insulin Pen Needle ([employer] PEN NEEDLES 31G) 31G X 8 MM MISC 1 each by Does not apply route daily 1/22/21  Yes Dmitriy Ware MD   insulin glargine (LANTUS SOLOSTAR) 100 UNIT/ML injection pen Inject 22 Units into the skin nightly Indications: sliding scale up to 22 units Lease refill 8/23/18 per patient requests 1/14/21  Yes Jevon Jara MD   fenofibrate (TRICOR) 48 MG tablet Take 1 tablet by mouth daily 1/10/21  Yes Jevon Jara MD   Netarsudil-Latanoprost 0.02-0.005 % SOLN Apply 1 drop to eye nightly   Yes Historical Provider, MD   timolol (BETIMOL) 0.5 % ophthalmic solution 1 drop 2 times daily   Yes Historical Provider, MD   dorzolamide-timolol (COSOPT) 22.3-6.8 MG/ML ophthalmic solution INSTILL 1 DROP INTO BOTH EYES EVERY 12 HOURS 9/3/20  Yes Historical Provider, MD   metoprolol tartrate (LOPRESSOR) 25 MG tablet Take 25 mg by mouth 2 times daily 10/1/20  Yes Historical Provider, MD   hydrALAZINE (APRESOLINE) 25 MG tablet Take 1 tablet by mouth 2 times daily 11/24/20  Yes Jevon Jara MD   fluticasone-umeclidin-vilant (TRELEGY ELLIPTA) 100-62.5-25 MCG/INH AEPB Inhale 1 puff into the lungs daily Expiration date 7/ 2021  Lot (10) [id number] 3/6/20  Yes Jevon Jara MD   aspirin 81 MG EC tablet Take 81 mg by mouth daily   Yes Historical Provider, MD   vitamin D (ERGOCALCIFEROL) 19491 units CAPS capsule Take 50,000 Units by mouth every 14 days   Yes Historical Provider, MD   acetaminophen (TYLENOL) 325 MG tablet Take 650 mg by mouth every 6 hours as needed for Pain   Yes Historical Provider, MD   Multiple Vitamins-Minerals (OCUVITE EYE + MULTI) TABS Take 1 tablet by mouth daily   Yes Historical Provider, MD   latanoprost (XALATAN) 0.005 % ophthalmic solution Place 1 drop into both eyes nightly    Yes Historical Provider, MD   Coenzyme Q10 (COQ10) 100 MG CAPS Take 1 capsule by mouth daily    Yes Historical Provider, MD   Cyanocobalamin (B-12) 500 MCG TABS Take 1 tablet by mouth once a week    Yes Historical Provider, MD   lansoprazole (PREVACID) 15 MG delayed release capsule Take 15 mg by mouth daily    Yes Historical Provider, MD       Allergy(ies):  Flagyl [metronidazole], Sulfa antibiotics, Lisinopril-hydrochlorothiazide, Ace inhibitors, Actos [pioglitazone hydrochloride], Celebrex [celecoxib], Cipro xr, Ciprofloxacin, Demerol, Doxycycline, Elavil [amitriptyline hcl], Estradiol, Levofloxacin, Metformin, Metoclopramide, Metolazone, Neurontin [gabapentin], Niaspan [niacin er], Nifedipine, Nsaids, Oxycodone, Phenytoin sodium extended, Pioglitazone, Prednisone, Pregabalin, Simvastatin, and Iodine    Social History:  TOBACCO:  reports that she has never smoked. She has never used smokeless tobacco.  ETOH:  reports no history of alcohol use. Family History:      Problem Relation Age of Onset   [facility] Vision Loss Sister     Heart Attack Mother     Colon Cancer Father     Ovarian Cancer Daughter        Review of Systems:  Pertinent positives are listed in HPI. At least 10-point ROS reviewed and were negative. Vitals and physical examination:  BP (!) 159/57   Pulse 99   Temp 98.1 °F (36.7 °C) (Oral)   Resp 16   Wt 162 lb (73.5 kg)   SpO2 96%   BMI 28.70 kg/m²   Gen/overall appearance: Not in acute distress. Alert. Oriented X3. Head: Normocephalic, atraumatic  Eyes: EOMI, good acuity  ENT: Oral mucosa moist  Neck: No JVD, thyromegaly  CVS: Nml S1S2, no MRG, RRR  Pulm: Clear bilaterally. No crackles/wheezes  Gastrointestinal: Abdomen is slightly distended. Soft, NT, +BS  Musculoskeletal: No edema. Warm  Neuro: No focal deficit. Moves extremity spontaneously. Psychiatry: Appropriate affect. Not agitated. Skin: Warm, dry with normal turgor.  No rash  Capillary refill: Brisk,< 3 seconds   Peripheral Pulses: +2 palpable, equal bilaterally       Labs/imaging/EKG:  CBC:   Recent Labs     08/21/21  0328   WBC 8.4   HGB 8.6*   *     BMP:    Recent Labs     08/21/21  0328      K 3.7      CO2 16*   BUN 18   CREATININE 0.9   GLUCOSE 126*     Hepatic:   Recent Labs     08/21/21  0328   AST 45*   ALT 42*   BILITOT 0.7   ALKPHOS 76       CT ABDOMEN PELVIS WO CONTRAST Additional Contrast? None    Result Date: 8/21/2021  EXAMINATION: CT OF THE ABDOMEN AND PELVIS WITHOUT CONTRAST 8/21/2021 3:10 am TECHNIQUE: CT of the abdomen and pelvis was performed without the administration of intravenous contrast. Multiplanar reformatted images are provided for review. Dose modulation, iterative reconstruction, and/or weight based adjustment of the mA/kV was utilized to reduce the radiation dose to as low as reasonably achievable. COMPARISON: CT abdomen and pelvis without contrast August 9, 2021. HISTORY: ORDERING SYSTEM PROVIDED HISTORY: abd pain - ro obstruction TECHNOLOGIST PROVIDED HISTORY: Reason for exam:->abd pain - ro obstruction Additional Contrast?->None Decision Support Exception - unselect if not a suspected or confirmed emergency medical condition->Emergency Medical Condition (MA) Reason for Exam: Abdominal Pain (pt states hernia surgery on the 9th. now with increased pain and nausea. states also having loose, uncontrollable stools. ) Acuity: Acute Type of Exam: Initial FINDINGS: Abdomen/Pelvis: Lower chest: The lung bases are well aerated. Pleural surfaces are unremarkable and no evidence of pleural effusion is identified. Organs: Gallbladder is surgically absent. The liver, spleen, pancreas, adrenal glands, kidneys, are unremarkable in appearance. GI/Bowel: Interval ventral abdominal wall surgical reduction of previously identified supraumbilical hernia containing small bowel loop is noted with residual fluid present. Multifocal supraumbilical omental herniations persist.  Question of hyperdense ventral abdominal wall scarring/adhesions are seen in region of the herniation with persisting multifocal dilatation of small bowel distal to the surgical site with loops measuring up to 4.4 cm in transluminal diameter with dependent luminal fluid seen. Moderate gastric distention is present. The appendix is normal.  Distal sigmoid colon surgical anastomosis site is noted without evidence of complication identified. Pelvis: The urinary bladder is well distended and unremarkable in appearance.  No evidence of pelvic free fluid is seen. Peritoneum/Retroperitoneum: No evidence of retroperitoneal or intraperitoneal lymphadenopathy is identified. No evidence of intraperitoneal free fluid is seen. Bones/Soft Tissues: The bones, skeletal muscle bundles, fascial planes and subcutaneous soft tissues are unremarkable in appearance. Interval surgical reduction of right supraumbilical herniation previously containing small bowel loops. Persisting scattered small bowel dilatation with fluid-filled bowel loops proximal to the surgical site consistent with ongoing postoperative ileus versus persisting small bowel obstruction. Differential diagnostic considerations include possible presence of persisting scarring/adhesions in region of the ventral abdominal wall surgical site. Small bowel dilatation has worsened in comparison with prior preoperative study. Recommend surgical consultation. Findings suboptimally evaluated in absence of intravenous iodinated contrast administration. Discussed with ER provider.       Thank you Zahra Mcallister MD for the opportunity to be involved in this patient's care.    -----------------------------  Jyotsna James MD  [facility]

## 2021-08-21 NOTE — ED PROVIDER NOTES
I independently performed a history and physical on [address] [place] [address]. All diagnostic, treatment, and disposition decisions were made by myself in conjunction with the advanced practice provider. Briefly, this is a 80 y.o. female here for abdominal pain. Worsening over the past week. Having liquid stools. Has nausea without vomiting. Is unable to tolerate oral intake. Pain is generalized but worse on left side. Recently had bowel obstruction surgery on the ninth. Discharged last Saturday. No fevers. No chest pain or shortness of breath. Symptoms moderate to severe in intensity. On exam,   General: Patient is in no acute distress  Skin: No cyanosis  HEENT: dry mucous membranes  Heart: Regular rate, regular rhythm  Lung: No respiratory distress  Abdomen: Soft, distended, normal bowel sounds, mild tenderness left side  Neuro: Moving all extremities, no facial droop, no slurred speech, answers questions appropriately      Screenings            MDM  Briefly, this is a 80 y.o. female here for abdominal pain. Recent surgery. CT abdomen pelvis obtained which showed evidence of obstruction versus ileus. General surgery consulted. NG tube placed and 300 cc of near feculent material was aspirated. Patient's pain being controlled with morphine. Hemodynamically stable. Admitted to hospitalist service given medical comorbidities and age. .    Patient Referrals:  No follow-up provider specified. Discharge Medications:  New Prescriptions    No medications on file       FINAL IMPRESSION  1. Ileus (HCC)        Blood pressure (!) 159/57, pulse 99, temperature 98.1 °F (36.7 °C), temperature source Oral, resp. rate 16, weight 162 lb (73.5 kg), SpO2 96 %, not currently breastfeeding. For further details of [facility] emergency department encounter, please see documentation by advanced practice providerObed.         Sukumar Garcia MD  08/21/21 [id number]

## 2021-08-22 ENCOUNTER — APPOINTMENT (OUTPATIENT)
Dept: GENERAL RADIOLOGY | Age: 81
DRG: 336 | End: 2021-08-22
Payer: MEDICARE

## 2021-08-22 LAB
A/G RATIO: 1.1 (ref 1.1–2.2)
ALBUMIN SERPL-MCNC: 3.4 G/DL (ref 3.4–5)
ALP BLD-CCNC: 67 U/L (ref 40–129)
ALT SERPL-CCNC: 35 U/L (ref 10–40)
ANION GAP SERPL CALCULATED.3IONS-SCNC: 13 MMOL/L (ref 3–16)
APTT: 30.2 SEC (ref 26.2–38.6)
AST SERPL-CCNC: 32 U/L (ref 15–37)
BASOPHILS ABSOLUTE: 0 K/UL (ref 0–0.2)
BASOPHILS RELATIVE PERCENT: 0.1 %
BILIRUB SERPL-MCNC: 0.5 MG/DL (ref 0–1)
BUN BLDV-MCNC: 14 MG/DL (ref 7–20)
CALCIUM SERPL-MCNC: 8.7 MG/DL (ref 8.3–10.6)
CHLORIDE BLD-SCNC: 105 MMOL/L (ref 99–110)
CO2: 22 MMOL/L (ref 21–32)
CREAT SERPL-MCNC: 0.9 MG/DL (ref 0.6–1.2)
EOSINOPHILS ABSOLUTE: 0.1 K/UL (ref 0–0.6)
EOSINOPHILS RELATIVE PERCENT: 2.6 %
FERRITIN: 306.7 NG/ML (ref 15–150)
GFR AFRICAN AMERICAN: >60
GFR NON-AFRICAN AMERICAN: >60
GLOBULIN: 3.1 G/DL
GLUCOSE BLD-MCNC: 112 MG/DL (ref 70–99)
GLUCOSE BLD-MCNC: 120 MG/DL (ref 70–99)
GLUCOSE BLD-MCNC: 137 MG/DL (ref 70–99)
GLUCOSE BLD-MCNC: 94 MG/DL (ref 70–99)
GLUCOSE BLD-MCNC: 98 MG/DL (ref 70–99)
HCT VFR BLD CALC: 22.9 % (ref 36–48)
HCT VFR BLD CALC: 24.3 % (ref 36–48)
HEMOGLOBIN: 7.5 G/DL (ref 12–16)
HEMOGLOBIN: 7.8 G/DL (ref 12–16)
INR BLD: 1.12 (ref 0.88–1.12)
IRON SATURATION: 21 % (ref 15–50)
IRON: 40 UG/DL (ref 37–145)
LACTIC ACID: 0.7 MMOL/L (ref 0.4–2)
LYMPHOCYTES ABSOLUTE: 0.6 K/UL (ref 1–5.1)
LYMPHOCYTES RELATIVE PERCENT: 11.1 %
MAGNESIUM: 1.4 MG/DL (ref 1.8–2.4)
MCH RBC QN AUTO: 33.2 PG (ref 26–34)
MCHC RBC AUTO-ENTMCNC: 32.7 G/DL (ref 31–36)
MCV RBC AUTO: 101.4 FL (ref 80–100)
MONOCYTES ABSOLUTE: 0.5 K/UL (ref 0–1.3)
MONOCYTES RELATIVE PERCENT: 8.2 %
NEUTROPHILS ABSOLUTE: 4.3 K/UL (ref 1.7–7.7)
NEUTROPHILS RELATIVE PERCENT: 78 %
PDW BLD-RTO: 14.8 % (ref 12.4–15.4)
PERFORMED ON: ABNORMAL
PERFORMED ON: NORMAL
PHOSPHORUS: 2.4 MG/DL (ref 2.5–4.9)
PLATELET # BLD: 428 K/UL (ref 135–450)
PMV BLD AUTO: 6.4 FL (ref 5–10.5)
POTASSIUM SERPL-SCNC: 3.1 MMOL/L (ref 3.5–5.1)
PREALBUMIN: 11.9 MG/DL (ref 20–40)
PROTHROMBIN TIME: 12.7 SEC (ref 9.9–12.7)
RBC # BLD: 2.26 M/UL (ref 4–5.2)
SODIUM BLD-SCNC: 140 MMOL/L (ref 136–145)
TOTAL IRON BINDING CAPACITY: 193 UG/DL (ref 260–445)
TOTAL PROTEIN: 6.5 G/DL (ref 6.4–8.2)
TRANSFERRIN: 146 MG/DL (ref 200–360)
WBC # BLD: 5.5 K/UL (ref 4–11)

## 2021-08-22 PROCEDURE — 84466 ASSAY OF TRANSFERRIN: CPT

## 2021-08-22 PROCEDURE — 6370000000 HC RX 637 (ALT 250 FOR IP): Performed by: NURSE PRACTITIONER

## 2021-08-22 PROCEDURE — 84134 ASSAY OF PREALBUMIN: CPT

## 2021-08-22 PROCEDURE — 80053 COMPREHEN METABOLIC PANEL: CPT

## 2021-08-22 PROCEDURE — 85018 HEMOGLOBIN: CPT

## 2021-08-22 PROCEDURE — 85014 HEMATOCRIT: CPT

## 2021-08-22 PROCEDURE — 6360000002 HC RX W HCPCS: Performed by: NURSE PRACTITIONER

## 2021-08-22 PROCEDURE — 85025 COMPLETE CBC W/AUTO DIFF WBC: CPT

## 2021-08-22 PROCEDURE — 99024 POSTOP FOLLOW-UP VISIT: CPT | Performed by: SURGERY

## 2021-08-22 PROCEDURE — 74018 RADEX ABDOMEN 1 VIEW: CPT

## 2021-08-22 PROCEDURE — 2580000003 HC RX 258: Performed by: NURSE PRACTITIONER

## 2021-08-22 PROCEDURE — C9113 INJ PANTOPRAZOLE SODIUM, VIA: HCPCS | Performed by: NURSE PRACTITIONER

## 2021-08-22 PROCEDURE — 36415 COLL VENOUS BLD VENIPUNCTURE: CPT

## 2021-08-22 PROCEDURE — 82728 ASSAY OF FERRITIN: CPT

## 2021-08-22 PROCEDURE — 2060000000 HC ICU INTERMEDIATE R&B

## 2021-08-22 PROCEDURE — 85610 PROTHROMBIN TIME: CPT

## 2021-08-22 PROCEDURE — 83735 ASSAY OF MAGNESIUM: CPT

## 2021-08-22 PROCEDURE — 83605 ASSAY OF LACTIC ACID: CPT

## 2021-08-22 PROCEDURE — 84100 ASSAY OF PHOSPHORUS: CPT

## 2021-08-22 PROCEDURE — 2500000003 HC RX 250 WO HCPCS: Performed by: NURSE PRACTITIONER

## 2021-08-22 PROCEDURE — 85730 THROMBOPLASTIN TIME PARTIAL: CPT

## 2021-08-22 PROCEDURE — 83540 ASSAY OF IRON: CPT

## 2021-08-22 PROCEDURE — 6360000002 HC RX W HCPCS: Performed by: INTERNAL MEDICINE

## 2021-08-22 PROCEDURE — 2580000003 HC RX 258: Performed by: INTERNAL MEDICINE

## 2021-08-22 RX ORDER — POTASSIUM CHLORIDE 7.45 MG/ML
10 INJECTION INTRAVENOUS
Status: DISPENSED | OUTPATIENT
Start: 2021-08-22 | End: 2021-08-22

## 2021-08-22 RX ORDER — POTASSIUM CHLORIDE 7.45 MG/ML
30 INJECTION INTRAVENOUS ONCE
Status: DISCONTINUED | OUTPATIENT
Start: 2021-08-22 | End: 2021-08-22 | Stop reason: SDUPTHER

## 2021-08-22 RX ORDER — POTASSIUM CHLORIDE 7.45 MG/ML
40 INJECTION INTRAVENOUS ONCE
Status: COMPLETED | OUTPATIENT
Start: 2021-08-22 | End: 2021-08-22

## 2021-08-22 RX ORDER — TIMOLOL MALEATE 5 MG/ML
1 SOLUTION/ DROPS OPHTHALMIC NIGHTLY
Status: DISCONTINUED | OUTPATIENT
Start: 2021-08-22 | End: 2021-08-26 | Stop reason: HOSPADM

## 2021-08-22 RX ORDER — DORZOLAMIDE HCL 20 MG/ML
1 SOLUTION/ DROPS OPHTHALMIC 2 TIMES DAILY
Status: DISCONTINUED | OUTPATIENT
Start: 2021-08-22 | End: 2021-08-26 | Stop reason: HOSPADM

## 2021-08-22 RX ORDER — TIMOLOL MALEATE 5 MG/ML
1 SOLUTION/ DROPS OPHTHALMIC NIGHTLY
Status: DISCONTINUED | OUTPATIENT
Start: 2021-08-22 | End: 2021-08-22

## 2021-08-22 RX ORDER — MORPHINE SULFATE 2 MG/ML
2 INJECTION, SOLUTION INTRAMUSCULAR; INTRAVENOUS ONCE
Status: COMPLETED | OUTPATIENT
Start: 2021-08-22 | End: 2021-08-22

## 2021-08-22 RX ORDER — MAGNESIUM SULFATE IN WATER 40 MG/ML
2000 INJECTION, SOLUTION INTRAVENOUS ONCE
Status: COMPLETED | OUTPATIENT
Start: 2021-08-22 | End: 2021-08-22

## 2021-08-22 RX ORDER — DORZOLAMIDE HCL 20 MG/ML
1 SOLUTION/ DROPS OPHTHALMIC NIGHTLY
Status: DISCONTINUED | OUTPATIENT
Start: 2021-08-22 | End: 2021-08-22

## 2021-08-22 RX ORDER — MINERAL OIL AND WHITE PETROLATUM 150; 830 MG/G; MG/G
OINTMENT OPHTHALMIC PRN
Status: DISCONTINUED | OUTPATIENT
Start: 2021-08-22 | End: 2021-08-26 | Stop reason: HOSPADM

## 2021-08-22 RX ADMIN — LATANOPROST 1 DROP: 50 SOLUTION OPHTHALMIC at 21:21

## 2021-08-22 RX ADMIN — POTASSIUM CHLORIDE 10 MEQ: 7.46 INJECTION, SOLUTION INTRAVENOUS at 13:16

## 2021-08-22 RX ADMIN — KETOROLAC TROMETHAMINE 15 MG: 30 INJECTION, SOLUTION INTRAMUSCULAR; INTRAVENOUS at 13:14

## 2021-08-22 RX ADMIN — ASPIRIN 300 MG: 300 SUPPOSITORY RECTAL at 10:05

## 2021-08-22 RX ADMIN — HYDRALAZINE HYDROCHLORIDE 10 MG: 20 INJECTION INTRAMUSCULAR; INTRAVENOUS at 10:05

## 2021-08-22 RX ADMIN — MORPHINE SULFATE 2 MG: 2 INJECTION, SOLUTION INTRAMUSCULAR; INTRAVENOUS at 18:59

## 2021-08-22 RX ADMIN — Medication 10 ML: at 21:21

## 2021-08-22 RX ADMIN — METOPROLOL TARTRATE 5 MG: 5 INJECTION INTRAVENOUS at 02:13

## 2021-08-22 RX ADMIN — DORZOLAMIDE HYDROCHLORIDE 1 DROP: 20 SOLUTION/ DROPS OPHTHALMIC at 10:06

## 2021-08-22 RX ADMIN — Medication 10 ML: at 10:05

## 2021-08-22 RX ADMIN — POTASSIUM CHLORIDE 10 MEQ: 7.46 INJECTION, SOLUTION INTRAVENOUS at 10:31

## 2021-08-22 RX ADMIN — KETOROLAC TROMETHAMINE 15 MG: 30 INJECTION, SOLUTION INTRAMUSCULAR; INTRAVENOUS at 06:51

## 2021-08-22 RX ADMIN — METOPROLOL TARTRATE 5 MG: 5 INJECTION INTRAVENOUS at 13:14

## 2021-08-22 RX ADMIN — ONDANSETRON 4 MG: 2 INJECTION INTRAMUSCULAR; INTRAVENOUS at 22:32

## 2021-08-22 RX ADMIN — TIMOLOL MALEATE 1 DROP: 5 SOLUTION OPHTHALMIC at 10:06

## 2021-08-22 RX ADMIN — POTASSIUM CHLORIDE 10 MEQ: 7.46 INJECTION, SOLUTION INTRAVENOUS at 13:00

## 2021-08-22 RX ADMIN — MAGNESIUM SULFATE HEPTAHYDRATE 2000 MG: 40 INJECTION, SOLUTION INTRAVENOUS at 10:42

## 2021-08-22 RX ADMIN — HYDRALAZINE HYDROCHLORIDE 10 MG: 20 INJECTION INTRAMUSCULAR; INTRAVENOUS at 21:21

## 2021-08-22 RX ADMIN — ENOXAPARIN SODIUM 40 MG: 40 INJECTION SUBCUTANEOUS at 10:05

## 2021-08-22 RX ADMIN — PANTOPRAZOLE SODIUM 40 MG: 40 INJECTION, POWDER, FOR SOLUTION INTRAVENOUS at 10:05

## 2021-08-22 RX ADMIN — KETOROLAC TROMETHAMINE 15 MG: 30 INJECTION, SOLUTION INTRAMUSCULAR; INTRAVENOUS at 00:52

## 2021-08-22 RX ADMIN — DEXTROSE AND SODIUM CHLORIDE: 5; 450 INJECTION, SOLUTION INTRAVENOUS at 23:28

## 2021-08-22 ASSESSMENT — PAIN DESCRIPTION - FREQUENCY
FREQUENCY: CONTINUOUS
FREQUENCY: CONTINUOUS

## 2021-08-22 ASSESSMENT — PAIN DESCRIPTION - LOCATION
LOCATION: ABDOMEN
LOCATION: ABDOMEN

## 2021-08-22 ASSESSMENT — PAIN DESCRIPTION - ONSET
ONSET: ON-GOING
ONSET: ON-GOING

## 2021-08-22 ASSESSMENT — PAIN SCALES - GENERAL
PAINLEVEL_OUTOF10: 5
PAINLEVEL_OUTOF10: 8
PAINLEVEL_OUTOF10: 7
PAINLEVEL_OUTOF10: 8
PAINLEVEL_OUTOF10: 0
PAINLEVEL_OUTOF10: 0
PAINLEVEL_OUTOF10: 7

## 2021-08-22 ASSESSMENT — PAIN DESCRIPTION - PROGRESSION
CLINICAL_PROGRESSION: NOT CHANGED

## 2021-08-22 ASSESSMENT — PAIN DESCRIPTION - ORIENTATION
ORIENTATION: LEFT
ORIENTATION: LEFT

## 2021-08-22 ASSESSMENT — PAIN DESCRIPTION - PAIN TYPE
TYPE: ACUTE PAIN
TYPE: ACUTE PAIN

## 2021-08-22 ASSESSMENT — PAIN DESCRIPTION - DESCRIPTORS
DESCRIPTORS: ACHING;SORE
DESCRIPTORS: ACHING;SORE

## 2021-08-22 NOTE — PROGRESS NOTES
NG tube fell out at 530pm after pt was up transferring to restroom. New NG placed by this nurse. Pt tolerated well. Xray ordered for placement verification.

## 2021-08-22 NOTE — PROGRESS NOTES
BS 41 this evening, D50 given x1, D5 started infusing. Perfectserve sent to hospitalist. New orders placed in [facility]. Repeat BS 94.  Pt A/Ox4, no other needs at this time

## 2021-08-22 NOTE — PROGRESS NOTES
[facility]        Post-op Note    Pt Name: Alice Donnelly  MRN: [id number]  [place]: 1940  Date of evaluation: 2021    Subjective:    No acute events overnight  Pain improving  Still distended, but passing flatus, no stool  No nausea with NG    Vital Signs:  Patient Vitals for the past 24 hrs:   BP Temp Temp src Pulse Resp SpO2 Height Weight   21 0519 (!) 155/77 98 °F (36.7 °C) Oral 85 16 98 % -- --   21 0200 (!) 159/81 -- -- 81 -- -- -- --   21 2337 127/74 98.6 °F (37 °C) Oral 89 18 99 % -- --   21 134/62 97.8 °F (36.6 °C) Oral 77 18 97 % -- --   21 1703 -- -- -- -- -- -- 5' 3\" (1.6 m) 162 lb (73.5 kg)   21 1634 -- -- -- -- -- 90 % -- --   21 1600 (!) 159/75 98 °F (36.7 °C) Oral 82 17 97 % -- --      TEMPERATURE HISTORY 24H: Temp (24hrs), Av.1 °F (36.7 °C), Min:97.8 °F (36.6 °C), Max:98.6 °F (37 °C)    BLOOD PRESSURE HISTORY: Systolic (66MSB), VYB:730 , Min:127 , HHY:309    Diastolic (08FNH), SFZ:42, Min:56, Max:81      Intake/Output:    I/O last 3 completed shifts:  In: -   Out: 200 [Emesis/NG output:200]  No intake/output data recorded. Drain/tube Output:           Physical Exam:  General: awake, alert, oriented to  person, place, time  Abdomen: soft, mild distension, appropriate incisional tenderness, incision clean dry and intact    Labs:  CBC:    Recent Labs     21  0328 21  0655   WBC 8.4 5.5   HGB 8.6* 7.5*   HCT 26.0* 22.9*   * 428     BMP:    Recent Labs     218 21  0655    140   K 3.7 3.1*    105   CO2 16* 22   BUN 18 14   CREATININE 0.9 0.9   GLUCOSE 126* 94     Hepatic:   Recent Labs     21  0655   AST 45* 32   ALT 42* 35   BILITOT 0.7 0.5   ALKPHOS 76 67     Amylase: No results for input(s): AMYLASE in the last 72 hours.   Lipase:   Recent Labs     21   LIPASE 17.0     Mag:      Recent Labs     21  0655   MG 1.40*      Phos:     Recent Labs     08/22/21  0655   PHOS 2.4*      Coags:   Recent Labs     08/22/21  0655   INR 1.12   APTT 30.2       Cultures:  Anaerobic culture  No results for input(s): LABANAE in the last 72 hours. Blood culture  No results for input(s): BC in the last 72 hours. Blood culture 2  No results for input(s): BLOODCULT2 in the last 72 hours. Body fluid culture  No results for input(s): BLOODCULT2 in the last 72 hours. Surgical culture  No results for input(s): CXSURG in the last 72 hours. Fecal occult  No results for input(s): OCCULTBLDFEC in the last 72 hours. Gram stain  No results for input(s): LABGRAM in the last 72 hours. Stool culture 1  No results for input(s): CXST in the last 72 hours. Stool culture 2  No results for input(s): STOOLCULT2 in the last 72 hours. Urine culture  No results for input(s): LABURIN in the last 72 hours. Wound abscess  No results for input(s): WNDABS in the last 72 hours. C Diff   No results for input(s): CDIFFTOXAB in the last 72 hours. Imaging:  I have personally reviewed the following films:    CT ABDOMEN PELVIS WO CONTRAST Additional Contrast? None    Result Date: 8/21/2021  EXAMINATION: CT OF THE ABDOMEN AND PELVIS WITHOUT CONTRAST 8/21/2021 3:10 am TECHNIQUE: CT of the abdomen and pelvis was performed without the administration of intravenous contrast. Multiplanar reformatted images are provided for review. Dose modulation, iterative reconstruction, and/or weight based adjustment of the mA/kV was utilized to reduce the radiation dose to as low as reasonably achievable. COMPARISON: CT abdomen and pelvis without contrast August 9, 2021.  HISTORY: ORDERING SYSTEM PROVIDED HISTORY: abd pain - ro obstruction TECHNOLOGIST PROVIDED HISTORY: Reason for exam:->abd pain - ro obstruction Additional Contrast?->None Decision Support Exception - unselect if not a suspected or confirmed emergency medical condition->Emergency Medical Condition (MA) Reason for Exam: Abdominal Pain (pt states hernia surgery on the 9th. now with increased pain and nausea. states also having loose, uncontrollable stools. ) Acuity: Acute Type of Exam: Initial FINDINGS: Abdomen/Pelvis: Lower chest: The lung bases are well aerated. Pleural surfaces are unremarkable and no evidence of pleural effusion is identified. Organs: Gallbladder is surgically absent. The liver, spleen, pancreas, adrenal glands, kidneys, are unremarkable in appearance. GI/Bowel: Interval ventral abdominal wall surgical reduction of previously identified supraumbilical hernia containing small bowel loop is noted with residual fluid present. Multifocal supraumbilical omental herniations persist.  Question of hyperdense ventral abdominal wall scarring/adhesions are seen in region of the herniation with persisting multifocal dilatation of small bowel distal to the surgical site with loops measuring up to 4.4 cm in transluminal diameter with dependent luminal fluid seen. Moderate gastric distention is present. The appendix is normal.  Distal sigmoid colon surgical anastomosis site is noted without evidence of complication identified. Pelvis: The urinary bladder is well distended and unremarkable in appearance. No evidence of pelvic free fluid is seen. Peritoneum/Retroperitoneum: No evidence of retroperitoneal or intraperitoneal lymphadenopathy is identified. No evidence of intraperitoneal free fluid is seen. Bones/Soft Tissues: The bones, skeletal muscle bundles, fascial planes and subcutaneous soft tissues are unremarkable in appearance. Interval surgical reduction of right supraumbilical herniation previously containing small bowel loops. Persisting scattered small bowel dilatation with fluid-filled bowel loops proximal to the surgical site consistent with ongoing postoperative ileus versus persisting small bowel obstruction.   Differential diagnostic considerations include possible presence of persisting scarring/adhesions in region of the ventral abdominal wall surgical site. Small bowel dilatation has worsened in comparison with prior preoperative study. Recommend surgical consultation. Findings suboptimally evaluated in absence of intravenous iodinated contrast administration. CT ABDOMEN PELVIS WO CONTRAST Additional Contrast? None    Result Date: 8/9/2021  EXAMINATION: CT OF THE ABDOMEN AND PELVIS WITHOUT CONTRAST 8/9/2021 9:14 am TECHNIQUE: CT of the abdomen and pelvis was performed without the administration of intravenous contrast. Multiplanar reformatted images are provided for review. Dose modulation, iterative reconstruction, and/or weight based adjustment of the mA/kV was utilized to reduce the radiation dose to as low as reasonably achievable. COMPARISON: 06/24/2019 HISTORY: ORDERING SYSTEM PROVIDED HISTORY: obstruction TECHNOLOGIST PROVIDED HISTORY: Reason for exam:->obstruction Additional Contrast?->None Decision Support Exception - unselect if not a suspected or confirmed emergency medical condition->Emergency Medical Condition (MA) Reason for Exam: obstruction Acuity: Unknown Type of Exam: Unknown FINDINGS: Lower Chest: Inferior lung bases are clear. Heart size is normal. Organs: Liver is normal in appearance without worrisome focal lesion on these noncontrast images. Gallbladder surgically absent. Other abdominal organs appear normal. GI/Bowel: Moderate amount of air in the transverse colon. Appendix and terminal ileum are normal.  Line of sutures in the upper rectum. No evident diverticular disease. Moderate to marked dilatation of a single mid abdominal small bowel loops. The distal end of the loop lies within a small chronic supraumbilical ventral hernia. The point of obstruction is within the hernia. The exiting loop is not obstructed. No other small bowel loops are dilated. Previously multiple small bowel loops were dilated. Proximal adhesion may be present. Pelvis: Uterus is surgically absent. Urinary bladder appears normal.  No mass, adenopathy, or free fluid. Peritoneum/Retroperitoneum: Mass, adenopathy, or ascites. No free air. Normal size of the aorta. Bones/Soft Tissues: 2 small fatty ventral hernias in the upper midline not significantly changed. No inflammation. No acute bony abnormality. Slight induration of the anterior abdominal wall presumably from subcutaneous injections. Acute moderate closed loop obstruction of the mid small bowel related to small supraumbilical ventral hernia. Adhesions suspected proximal to the dilated loop. No other acute abnormality identified. RECOMMENDATIONS: Urgent surgical consultation. DEXA BONE DENSITY AXIAL SKELETON    Result Date: 7/27/2021  EXAMINATION: BONE DENSITOMETRY 7/27/2021 8:21 am TECHNIQUE: A bone density DEXA scan was performed of the lumbar spine and bilateral hips on a [employer] system. COMPARISON: None. HISTORY: ORDERING SYSTEM PROVIDED HISTORY: Post-menopausal TECHNOLOGIST PROVIDED HISTORY: Reason for exam:->screen osteoporosis FINDINGS: LUMBAR SPINE: The bone mineral density in the lumbar spine including the L1 through L4 levels is measured at 1.277 g/cm2, which corresponds to a T-score of 2.1 and a Z-score of 4.1. This is within the normal range by WHO criteria. LEFT HIP: The bone mineral density in the left total hip is measured at 1.095 g/cm2 corresponding to a T-score of 0.3 and a Z-score of 2.0. This is within the normal range by WHO criteria. The bone mineral density of the left femoral neck is measured at 0.985 g/cm2 corresponding to a T-score of 1.2 and a Z-score of 2.1. This is within the normal range by WHO criteria. RIGHT HIP: The bone mineral density in the right total hip is measured at 1.063 g/cm2 corresponding to a T-score of 1.0 and a Z-score of 1.8. This is within the normal range by WHO criteria.  The bone mineral density of the right femoral neck is measured at 1.040 g/cm2 corresponding to a T-score of 1.7 and a Z-score of 2.4. This is within the normal range by WHO criteria. Based upon the lowest T-score above, this represents normal bone mineral density by WHO criteria. XR CHEST PORTABLE    Result Date: 8/9/2021  EXAMINATION: ONE XRAY VIEW OF THE CHEST 8/9/2021 12:08 pm COMPARISON: None. HISTORY: ORDERING SYSTEM PROVIDED HISTORY: Preop TECHNOLOGIST PROVIDED HISTORY: Reason for exam:->Preop Reason for Exam: pre op for abd surgery today Acuity: Unknown Type of Exam: Unknown FINDINGS: Lordotic positioning. Heart size normal when accounting for positioning. Pulmonary vasculature within normal limits. Lungs clear. Costophrenic angles sharp     No active cardiopulmonary disease     XR ABDOMEN (2 VIEWS)    Result Date: 8/13/2021  EXAMINATION: TWO XRAY VIEWS OF THE ABDOMEN 8/13/2021 3:48 pm COMPARISON: CT abdomen pelvis 08/20/2021 HISTORY: ORDERING SYSTEM PROVIDED HISTORY: ileus TECHNOLOGIST PROVIDED HISTORY: Reason for exam:->ileus Reason for Exam: eval for ileus; had recent hernia surgery this week, wearing a brace Acuity: Unknown Type of Exam: Unknown FINDINGS: Persistent dilated loops of small bowel noted. Air can be seen in colon. No evidence of free air. Lung volumes are low with pulmonary vascular crowding. Surgical clips seen in the right upper quadrant. Degenerative changes seen lower lumbar spine and SI joints. Degenerative changes are seen in the hips bilaterally. Dilated loops of small bowel consistent with ongoing ileus or partial obstruction.          Scheduled Meds:   magnesium sulfate  2,000 mg Intravenous Once    dorzolamide  1 drop Both Eyes BID    And    timolol  1 drop Both Eyes Nightly    [Held by provider] b complex-C-folic acid  1 mg Oral Daily with breakfast    [Held by provider] fenofibrate  54 mg Oral Daily    [Held by provider] folic acid  1 mg Oral Daily    latanoprost  1 drop Both Eyes Nightly    [Held by provider] therapeutic multivitamin-minerals  1 tablet Oral Daily    Netarsudil-Latanoprost  1 drop Ophthalmic Nightly    insulin lispro  0-6 Units Subcutaneous TID WC    insulin lispro  0-3 Units Subcutaneous Nightly    sodium chloride flush  5-40 mL Intravenous 2 times per day    enoxaparin  40 mg Subcutaneous Daily    insulin glargine  10 Units Subcutaneous Nightly    pantoprazole  40 mg Intravenous Daily    aspirin  300 mg Rectal Daily    hydrALAZINE  10 mg Intravenous BID    metoprolol  5 mg Intravenous Q12H     Continuous Infusions:   dextrose 100 mL/hr (08/21/21 2204)    sodium chloride      dextrose 5 % and 0.45 % NaCl 100 mL/hr at 08/21/21 2259     PRN Meds:.glucose, dextrose, glucagon (rDNA), dextrose, sodium chloride flush, sodium chloride, ondansetron **OR** ondansetron, acetaminophen **OR** acetaminophen, ketorolac      Assessment:  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    CKD (chronic kidney disease) stage 3, GFR 30-59 ml/min ([facility])    Vitamin D deficiency    Fibromyalgia    Type 2 diabetes mellitus with kidney complication, with long-term current use of insulin ([facility])    Bilateral carpal tunnel syndrome    Intestinal obstruction ([facility])    Chronic abdominal pain    Trigger middle finger of right hand    Carpal tunnel syndrome, left    Bacterial overgrowth syndrome    Plantar fasciitis    Primary open angle glaucoma    Renal insufficiency syndrome    Retrocalcaneal bursitis    S/P angioplasty with stent    Ulcerative colitis ([address]    Myalgia    Other neutropenia ([address]    Mixed hyperlipidemia    Coronary artery disease without angina pectoris    Essential hypertension    Incarcerated ventral hernia    Ileus ([address]     Small bowel obstruction/ileus  laparoscopic lysis of adhesions, repair of ventral hernia with mesh with Dr. Joanie Ochoa 8/9/21  Diabetes     Plan:  1.  The patient has a small bowel obstruction vs ileus that is likely to respond to maximal conservative measures. If she does not respond to conservative management or clinically deteriorates, may need surgical exploration  2. Clinically improving, if plateaus, may repeat CT with PO contrast or order small bowel follow through which may be both diagnostic and therapeutic in several days  3. NG decompression, NPO  4. IVF, monitor and replace electrolytes as needed  5. Monitor bowel function, passing flatus but no stool  6. Pain medication and antiemetics as needed with caution as they may mask a worsening exam  7. Defer management of remainder of medical comorbidities to primary and consulting teams    Tarik Barrow MD, FACS  8/22/2021  10:38 AM

## 2021-08-22 NOTE — PROGRESS NOTES
Hospitalist Progress Note      PCP: Corey Rai MD    Date of Admission: 8/21/2021    Chief Complaint: Abdominal pain    Hospital Course: This is a pleasant 80 y.o. female with history of fibromyalgia, hyperlipidemia, essential hypertension, macrocytic anemia, well-controlled diabetes type 2, history of bowel obstruction, history of multiple abdominal surgeries, who presents to the emergency room with complaints of increased abdominal pain, nausea, decreased p.o. intake, loose stools, ongoing for the last few days. She has had decreased p.o. intake. CT-abdomen/pelvis obtained in the emergency room reveals persistent scattered small bowel dilatation of fluid-filled bowel loops proximal to the surgical site consistent with ongoing postoperative ileus versus persistent small bowel obstruction. Subjective: Patient laying in bed, reports abdominal discomfort and pressure. Reports nausea. States she is passing gas, but no BM. Denies chest pain shortness of breath or palpitations. Reviewed plan of care, denied further questions or needs. Assessment/Plan:    Ileus versus small bowel obstruction  -Surgery has been consulted, continue with conservative management.    -N.p.o.  -NG to low intermittent wall suction  -Encourage ambulation and minimize narcotics    Anion gap metabolic acidosis  -Likely secondary to volume depletion  -Resolved with IV  -Continue IV fluids  -Monitor electrolytes closely  -CBC BMP in the morning    Hypokalemia  Hypomagnesia  -Replacement ordered  -Recheck labs in the morning    Hypoglycemia  -Glucose noted to be 41 last night.   IV fluids changed to D5 half-normal saline  -Discontinue Lantus, she had not received any yet  -Continue sliding scale as needed  -Fingerstick blood sugar every 6 hours    Anemia of chronic disease  -Hemoglobin 7.5, recheck this afternoon 7.8  -Check iron studies  -No signs of active bleeding  -Likely dilutional from IV fluids    History of fibromyalgia hyperlipidemia, hypertension, microcytic anemia, NIDDM small bowel obstruction, multiple abdominal surgeries  -Continue medications as ordered  -Holding p.o. meds, converted p.o. home meds to IV form    Active Hospital Problems    Diagnosis     [facility] ([facility] [K56.7]        Medications:  Reviewed    Infusion Medications    dextrose 100 mL/hr (08/21/21 2204)    sodium chloride      dextrose 5 % and 0.45 % NaCl 100 mL/hr at 08/21/21 9799     Scheduled Medications    dorzolamide  1 drop Both Eyes BID    And    timolol  1 drop Both Eyes Nightly    potassium chloride  10 mEq Intravenous Q1H    [Held by provider] b complex-C-folic acid  1 mg Oral Daily with breakfast    [Held by provider] fenofibrate  54 mg Oral Daily    [Held by provider] folic acid  1 mg Oral Daily    latanoprost  1 drop Both Eyes Nightly    [Held by provider] therapeutic multivitamin-minerals  1 tablet Oral Daily    Netarsudil-Latanoprost  1 drop Ophthalmic Nightly    insulin lispro  0-6 Units Subcutaneous TID WC    insulin lispro  0-3 Units Subcutaneous Nightly    sodium chloride flush  5-40 mL Intravenous 2 times per day    enoxaparin  40 mg Subcutaneous Daily    pantoprazole  40 mg Intravenous Daily    aspirin  300 mg Rectal Daily    hydrALAZINE  10 mg Intravenous BID    metoprolol  5 mg Intravenous Q12H     PRN Meds: glucose, dextrose, glucagon (rDNA), dextrose, sodium chloride flush, sodium chloride, ondansetron **OR** ondansetron, acetaminophen **OR** acetaminophen, ketorolac      Intake/Output Summary (Last 24 hours) at 8/22/2021 1452  Last data filed at 8/22/2021 0005  Gross per 24 hour   Intake --   Output 200 ml   Net -200 ml       Physical Exam Performed:    /80   Pulse 79   Temp 98 °F (36.7 °C) (Oral)   Resp 17   Ht 5' 3\" (1.6 m)   Wt 162 lb (73.5 kg)   SpO2 98%   BMI 28.70 kg/m²     General appearance: No apparent distress, appears stated age and cooperative.   HEENT: Pupils equal, round, and reactive to light. Conjunctivae/corneas clear. Neck: Supple, with full range of motion. No jugular venous distention. Trachea midline. Respiratory:  Normal respiratory effort. Clear to auscultation, bilaterally without Rales/Wheezes/Rhonchi. Cardiovascular: Regular rate and rhythm with normal S1/S2 without murmurs, rubs or gallops. Abdomen: Distended, tender to palpation, NG tube in place with green output   musculoskeletal: No clubbing, cyanosis or edema bilaterally. Full range of motion without deformity. Skin: Skin color, texture, turgor normal.  No rashes or lesions. Neurologic:  Neurovascularly intact without any focal sensory/motor deficits. Cranial nerves: II-XII intact, grossly non-focal.  Psychiatric: Alert and oriented, thought content appropriate, normal insight  Capillary Refill: Brisk,< 3 seconds   Peripheral Pulses: +2 palpable, equal bilaterally       Labs:   Recent Labs     08/21/21  0328 08/22/21  0655 08/22/21  1316   WBC 8.4 5.5  --    HGB 8.6* 7.5* 7.8*   HCT 26.0* 22.9* 24.3*   * 428  --      Recent Labs     08/21/21  0328 08/22/21  0655    140   K 3.7 3.1*    105   CO2 16* 22   BUN 18 14   CREATININE 0.9 0.9   CALCIUM 9.4 8.7   PHOS  --  2.4*     Recent Labs     08/21/21  0328 08/22/21  0655   AST 45* 32   ALT 42* 35   BILITOT 0.7 0.5   ALKPHOS 76 67     Recent Labs     08/22/21  0655   INR 1.12     No results for input(s): Lyla Hays in the last 72 hours. Urinalysis:      Lab Results   Component Value Date    NITRU Negative 08/09/2021    WBCUA 9 08/09/2021    BACTERIA 1+ 06/24/2019    RBCUA 2 08/09/2021    BLOODU Negative 08/09/2021    SPECGRAV 1.020 08/09/2021    GLUCOSEU Negative 08/09/2021       Radiology:  CT ABDOMEN PELVIS WO CONTRAST Additional Contrast? None   Final Result   Interval surgical reduction of right supraumbilical herniation previously   containing small bowel loops.       Persisting scattered small bowel dilatation with fluid-filled bowel loops   proximal to the surgical site consistent with ongoing postoperative ileus   versus persisting small bowel obstruction. Differential diagnostic   considerations include possible presence of persisting scarring/adhesions in   region of the ventral abdominal wall surgical site. Small bowel dilatation   has worsened in comparison with prior preoperative study. Recommend surgical   consultation. Findings suboptimally evaluated in absence of intravenous   iodinated contrast administration. DVT Prophylaxis: Lovenox  Diet: Diet NPO  Code Status: Full Code    PT/OT Eval Status: Eval ordered    Dispo -home once medically stable    Michael Armenta, APRN - CNP      NOTE:  This report was transcribed using voice recognition software. Every effort was made to ensure accuracy; however, inadvertent computerized transcription errors may be present.

## 2021-08-23 ENCOUNTER — APPOINTMENT (OUTPATIENT)
Dept: GENERAL RADIOLOGY | Age: 81
DRG: 336 | End: 2021-08-23
Payer: MEDICARE

## 2021-08-23 LAB
A/G RATIO: 1 (ref 1.1–2.2)
ALBUMIN SERPL-MCNC: 3.2 G/DL (ref 3.4–5)
ALP BLD-CCNC: 69 U/L (ref 40–129)
ALT SERPL-CCNC: 27 U/L (ref 10–40)
ANION GAP SERPL CALCULATED.3IONS-SCNC: 11 MMOL/L (ref 3–16)
AST SERPL-CCNC: 27 U/L (ref 15–37)
BASOPHILS ABSOLUTE: 0 K/UL (ref 0–0.2)
BASOPHILS RELATIVE PERCENT: 0.3 %
BILIRUB SERPL-MCNC: 0.5 MG/DL (ref 0–1)
BUN BLDV-MCNC: 8 MG/DL (ref 7–20)
CALCIUM SERPL-MCNC: 8.8 MG/DL (ref 8.3–10.6)
CHLORIDE BLD-SCNC: 106 MMOL/L (ref 99–110)
CO2: 21 MMOL/L (ref 21–32)
CREAT SERPL-MCNC: 0.8 MG/DL (ref 0.6–1.2)
EOSINOPHILS ABSOLUTE: 0.2 K/UL (ref 0–0.6)
EOSINOPHILS RELATIVE PERCENT: 3.8 %
GFR AFRICAN AMERICAN: >60
GFR NON-AFRICAN AMERICAN: >60
GLOBULIN: 3.3 G/DL
GLUCOSE BLD-MCNC: 148 MG/DL (ref 70–99)
GLUCOSE BLD-MCNC: 152 MG/DL (ref 70–99)
GLUCOSE BLD-MCNC: 154 MG/DL (ref 70–99)
GLUCOSE BLD-MCNC: 156 MG/DL (ref 70–99)
GLUCOSE BLD-MCNC: 168 MG/DL (ref 70–99)
GLUCOSE BLD-MCNC: 178 MG/DL (ref 70–99)
HCT VFR BLD CALC: 21.8 % (ref 36–48)
HEMOGLOBIN: 7.2 G/DL (ref 12–16)
LYMPHOCYTES ABSOLUTE: 0.7 K/UL (ref 1–5.1)
LYMPHOCYTES RELATIVE PERCENT: 12.6 %
MCH RBC QN AUTO: 33.4 PG (ref 26–34)
MCHC RBC AUTO-ENTMCNC: 33 G/DL (ref 31–36)
MCV RBC AUTO: 101.1 FL (ref 80–100)
MONOCYTES ABSOLUTE: 0.4 K/UL (ref 0–1.3)
MONOCYTES RELATIVE PERCENT: 7.8 %
NEUTROPHILS ABSOLUTE: 4 K/UL (ref 1.7–7.7)
NEUTROPHILS RELATIVE PERCENT: 75.5 %
PDW BLD-RTO: 14.9 % (ref 12.4–15.4)
PERFORMED ON: ABNORMAL
PLATELET # BLD: 430 K/UL (ref 135–450)
PMV BLD AUTO: 6.5 FL (ref 5–10.5)
POTASSIUM REFLEX MAGNESIUM: 3.6 MMOL/L (ref 3.5–5.1)
RBC # BLD: 2.16 M/UL (ref 4–5.2)
SODIUM BLD-SCNC: 138 MMOL/L (ref 136–145)
TOTAL PROTEIN: 6.5 G/DL (ref 6.4–8.2)
WBC # BLD: 5.3 K/UL (ref 4–11)

## 2021-08-23 PROCEDURE — 6370000000 HC RX 637 (ALT 250 FOR IP): Performed by: NURSE PRACTITIONER

## 2021-08-23 PROCEDURE — 6360000002 HC RX W HCPCS: Performed by: NURSE PRACTITIONER

## 2021-08-23 PROCEDURE — 99024 POSTOP FOLLOW-UP VISIT: CPT | Performed by: SURGERY

## 2021-08-23 PROCEDURE — APPSS15 APP SPLIT SHARED TIME 0-15 MINUTES: Performed by: NURSE PRACTITIONER

## 2021-08-23 PROCEDURE — 2060000000 HC ICU INTERMEDIATE R&B

## 2021-08-23 PROCEDURE — C9113 INJ PANTOPRAZOLE SODIUM, VIA: HCPCS | Performed by: NURSE PRACTITIONER

## 2021-08-23 PROCEDURE — APPNB30 APP NON BILLABLE TIME 0-30 MINS: Performed by: NURSE PRACTITIONER

## 2021-08-23 PROCEDURE — 2500000003 HC RX 250 WO HCPCS: Performed by: NURSE PRACTITIONER

## 2021-08-23 PROCEDURE — 36415 COLL VENOUS BLD VENIPUNCTURE: CPT

## 2021-08-23 PROCEDURE — 6360000002 HC RX W HCPCS: Performed by: INTERNAL MEDICINE

## 2021-08-23 PROCEDURE — 97535 SELF CARE MNGMENT TRAINING: CPT

## 2021-08-23 PROCEDURE — 97530 THERAPEUTIC ACTIVITIES: CPT

## 2021-08-23 PROCEDURE — 80053 COMPREHEN METABOLIC PANEL: CPT

## 2021-08-23 PROCEDURE — 97116 GAIT TRAINING THERAPY: CPT

## 2021-08-23 PROCEDURE — 6370000000 HC RX 637 (ALT 250 FOR IP): Performed by: INTERNAL MEDICINE

## 2021-08-23 PROCEDURE — 97165 OT EVAL LOW COMPLEX 30 MIN: CPT

## 2021-08-23 PROCEDURE — 2580000003 HC RX 258: Performed by: NURSE PRACTITIONER

## 2021-08-23 PROCEDURE — 2580000003 HC RX 258: Performed by: INTERNAL MEDICINE

## 2021-08-23 PROCEDURE — 85025 COMPLETE CBC W/AUTO DIFF WBC: CPT

## 2021-08-23 PROCEDURE — 74019 RADEX ABDOMEN 2 VIEWS: CPT

## 2021-08-23 PROCEDURE — 97162 PT EVAL MOD COMPLEX 30 MIN: CPT

## 2021-08-23 RX ORDER — METOPROLOL TARTRATE 5 MG/5ML
5 INJECTION INTRAVENOUS EVERY 8 HOURS
Status: DISCONTINUED | OUTPATIENT
Start: 2021-08-23 | End: 2021-08-25

## 2021-08-23 RX ADMIN — DEXTROSE AND SODIUM CHLORIDE: 5; 450 INJECTION, SOLUTION INTRAVENOUS at 09:30

## 2021-08-23 RX ADMIN — KETOROLAC TROMETHAMINE 15 MG: 30 INJECTION, SOLUTION INTRAMUSCULAR; INTRAVENOUS at 21:43

## 2021-08-23 RX ADMIN — Medication 20 ML: at 08:17

## 2021-08-23 RX ADMIN — LATANOPROST 1 DROP: 50 SOLUTION OPHTHALMIC at 20:33

## 2021-08-23 RX ADMIN — ENOXAPARIN SODIUM 40 MG: 40 INJECTION SUBCUTANEOUS at 12:50

## 2021-08-23 RX ADMIN — METOPROLOL TARTRATE 5 MG: 5 INJECTION INTRAVENOUS at 00:57

## 2021-08-23 RX ADMIN — KETOROLAC TROMETHAMINE 15 MG: 30 INJECTION, SOLUTION INTRAMUSCULAR; INTRAVENOUS at 15:22

## 2021-08-23 RX ADMIN — INSULIN LISPRO 1 UNITS: 100 INJECTION, SOLUTION INTRAVENOUS; SUBCUTANEOUS at 17:49

## 2021-08-23 RX ADMIN — INSULIN LISPRO 1 UNITS: 100 INJECTION, SOLUTION INTRAVENOUS; SUBCUTANEOUS at 12:50

## 2021-08-23 RX ADMIN — HYDRALAZINE HYDROCHLORIDE 10 MG: 20 INJECTION INTRAMUSCULAR; INTRAVENOUS at 20:32

## 2021-08-23 RX ADMIN — HYDRALAZINE HYDROCHLORIDE 10 MG: 20 INJECTION INTRAMUSCULAR; INTRAVENOUS at 08:17

## 2021-08-23 RX ADMIN — METOPROLOL TARTRATE 5 MG: 5 INJECTION INTRAVENOUS at 08:16

## 2021-08-23 RX ADMIN — PANTOPRAZOLE SODIUM 40 MG: 40 INJECTION, POWDER, FOR SOLUTION INTRAVENOUS at 08:17

## 2021-08-23 RX ADMIN — DORZOLAMIDE HCL 1 DROP: 20 SOLUTION/ DROPS OPHTHALMIC at 08:20

## 2021-08-23 RX ADMIN — KETOROLAC TROMETHAMINE 15 MG: 30 INJECTION, SOLUTION INTRAMUSCULAR; INTRAVENOUS at 08:17

## 2021-08-23 RX ADMIN — ASPIRIN 300 MG: 300 SUPPOSITORY RECTAL at 08:20

## 2021-08-23 RX ADMIN — METOPROLOL TARTRATE 5 MG: 5 INJECTION INTRAVENOUS at 17:49

## 2021-08-23 RX ADMIN — Medication 10 ML: at 20:33

## 2021-08-23 RX ADMIN — Medication 10 ML: at 17:49

## 2021-08-23 RX ADMIN — KETOROLAC TROMETHAMINE 15 MG: 30 INJECTION, SOLUTION INTRAMUSCULAR; INTRAVENOUS at 01:04

## 2021-08-23 ASSESSMENT — PAIN - FUNCTIONAL ASSESSMENT
PAIN_FUNCTIONAL_ASSESSMENT: PREVENTS OR INTERFERES WITH MANY ACTIVE NOT PASSIVE ACTIVITIES

## 2021-08-23 ASSESSMENT — PAIN DESCRIPTION - PROGRESSION
CLINICAL_PROGRESSION: NOT CHANGED

## 2021-08-23 ASSESSMENT — PAIN SCALES - GENERAL
PAINLEVEL_OUTOF10: 4
PAINLEVEL_OUTOF10: 7
PAINLEVEL_OUTOF10: 5
PAINLEVEL_OUTOF10: 7
PAINLEVEL_OUTOF10: 6
PAINLEVEL_OUTOF10: 6
PAINLEVEL_OUTOF10: 5
PAINLEVEL_OUTOF10: 7
PAINLEVEL_OUTOF10: 5
PAINLEVEL_OUTOF10: 6
PAINLEVEL_OUTOF10: 5
PAINLEVEL_OUTOF10: 4
PAINLEVEL_OUTOF10: 5

## 2021-08-23 ASSESSMENT — PAIN DESCRIPTION - LOCATION
LOCATION: RIB CAGE
LOCATION: ABDOMEN
LOCATION: RIB CAGE
LOCATION: ABDOMEN

## 2021-08-23 ASSESSMENT — PAIN DESCRIPTION - PAIN TYPE
TYPE: ACUTE PAIN

## 2021-08-23 ASSESSMENT — PAIN DESCRIPTION - DESCRIPTORS
DESCRIPTORS: STABBING

## 2021-08-23 ASSESSMENT — PAIN DESCRIPTION - FREQUENCY
FREQUENCY: CONTINUOUS

## 2021-08-23 ASSESSMENT — PAIN DESCRIPTION - ONSET
ONSET: ON-GOING

## 2021-08-23 ASSESSMENT — PAIN DESCRIPTION - ORIENTATION
ORIENTATION: LEFT
ORIENTATION: LEFT;LOWER
ORIENTATION: LEFT
ORIENTATION: LEFT

## 2021-08-23 NOTE — PROGRESS NOTES
[facility]        Post-op Note    Pt Name: Mendel Macleod  MRN: [id number]  [place]: 1940  Date of evaluation: 2021    Subjective:    No acute events overnight  Pain improving  Less distended, passing flatus, but no stool  Reports some nausea but no vomiting, NGT in place with low output  Up to chair at this time      Vital Signs:  Patient Vitals for the past 24 hrs:   BP Temp Temp src Pulse Resp SpO2 Weight   21 0759 (!) 177/80 98.9 °F (37.2 °C) Oral 86 18 96 % --   21 0531 (!) 190/65 98 °F (36.7 °C) Oral 93 18 98 % 159 lb 12.8 oz (72.5 kg)   21 0052 (!) 156/75 99.3 °F (37.4 °C) Oral 101 18 96 % --   21 2005 (!) 160/81 98.8 °F (37.1 °C) Oral 92 18 97 % --   21 1645 (!) 158/67 98.2 °F (36.8 °C) Oral 84 17 98 % --   21 1314 130/80 98 °F (36.7 °C) Oral 79 17 98 % --      TEMPERATURE HISTORY 24H: Temp (24hrs), Av.5 °F (36.9 °C), Min:98 °F (36.7 °C), Max:99.3 °F (37.4 °C)    BLOOD PRESSURE HISTORY: Systolic (89GQA), ZXA:359 , Min:130 , YZS:143    Diastolic (11FXT), GDO:37, Min:65, Max:81      Intake/Output:    I/O last 3 completed shifts:  In: -   Out: 1425 [Urine:1325; Emesis/NG output:100]  I/O this shift:  In: 10 [I.V.:10]  Out: -   Drain/tube Output:           Physical Exam:  General: awake, alert, oriented to person, place, time  Lungs: unlabored respirations  Abdomen: soft, mild distension, appropriate incisional tenderness, incision, active bowel sounds  Skin/Wound: healing well, no drainage, no erythema, well approximated    Labs:  CBC:    Recent Labs     21  0328 21  0328 21  0655 21  1316 21  0504   WBC 8.4  --  5.5  --  5.3   HGB 8.6*   < > 7.5* 7.8* 7.2*   HCT 26.0*   < > 22.9* 24.3* 21.8*   *  --  428  --  430    < > = values in this interval not displayed.      BMP:    Recent Labs     21  0328 21  0655 21  0504    140 138   K 3.7 3.1* 3.6    105 106   CO2 16* 22 21   BUN 18 14 8   CREATININE 0.9 0.9 0.8   GLUCOSE 126* 94 156*     Hepatic:   Recent Labs     08/21/21  0328 08/22/21  0655 08/23/21  0504   AST 45* 32 27   ALT 42* 35 27   BILITOT 0.7 0.5 0.5   ALKPHOS 76 67 69     Amylase: No results for input(s): AMYLASE in the last 72 hours. Lipase:   Recent Labs     08/21/21  0328   LIPASE 17.0     Mag:      Recent Labs     08/22/21  0655   MG 1.40*      Phos:     Recent Labs     08/22/21  0655   PHOS 2.4*      Coags:   Recent Labs     08/22/21  0655   INR 1.12   APTT 30.2       Cultures:  Anaerobic culture  No results for input(s): LABANAE in the last 72 hours. Blood culture  No results for input(s): BC in the last 72 hours. Blood culture 2  No results for input(s): BLOODCULT2 in the last 72 hours. Body fluid culture  No results for input(s): BLOODCULT2 in the last 72 hours. Surgical culture  No results for input(s): CXSURG in the last 72 hours. Fecal occult  No results for input(s): OCCULTBLDFEC in the last 72 hours. Gram stain  No results for input(s): LABGRAM in the last 72 hours. Stool culture 1  No results for input(s): CXST in the last 72 hours. Stool culture 2  No results for input(s): STOOLCULT2 in the last 72 hours. Urine culture  No results for input(s): LABURIN in the last 72 hours. Wound abscess  No results for input(s): WNDABS in the last 72 hours. C Diff   No results for input(s): CDIFFTOXAB in the last 72 hours. Imaging:  I have personally reviewed the following films:    XR ABDOMEN FOR NG/OG/NE TUBE PLACEMENT    Result Date: 8/22/2021  EXAMINATION: ONE SUPINE XRAY VIEW(S) OF THE ABDOMEN 8/22/2021 6:52 pm COMPARISON: None. HISTORY: ORDERING SYSTEM PROVIDED HISTORY: Confirmation of course of NG/OG/NE tube and location of tip of tube TECHNOLOGIST PROVIDED HISTORY: Reason for exam:->Confirmation of course of NG/OG/NE tube and location of tip of tube Portable? ->Yes Reason for Exam: NG placement Acuity: Acute Type of Exam: Initial FINDINGS: The feeding tube is inserted with its tip in the fundus of the stomach. The cardiomediastinal silhouette is stable. There is mild pulmonary vascular congestion. There is no pleural effusion. There is no pneumothorax. There are loops of mildly dilated bowel in the visualized upper abdomen, likely related to ileus. There is no acute osseous abnormality. Mild pulmonary vascular congestion. Loops of mildly dilated bowel in the visualized upper abdomen, likely related to ileus. Scheduled Meds:   metoprolol  5 mg Intravenous Q8H    dorzolamide  1 drop Both Eyes BID    And    timolol  1 drop Both Eyes Nightly    [Held by provider] b complex-C-folic acid  1 mg Oral Daily with breakfast    [Held by provider] fenofibrate  54 mg Oral Daily    [Held by provider] folic acid  1 mg Oral Daily    latanoprost  1 drop Both Eyes Nightly    [Held by provider] therapeutic multivitamin-minerals  1 tablet Oral Daily    Netarsudil-Latanoprost  1 drop Ophthalmic Nightly    insulin lispro  0-6 Units Subcutaneous TID WC    insulin lispro  0-3 Units Subcutaneous Nightly    sodium chloride flush  5-40 mL Intravenous 2 times per day    enoxaparin  40 mg Subcutaneous Daily    pantoprazole  40 mg Intravenous Daily    aspirin  300 mg Rectal Daily    hydrALAZINE  10 mg Intravenous BID     Continuous Infusions:   dextrose 100 mL/hr (08/21/21 2204)    sodium chloride      dextrose 5 % and 0.45 % NaCl 100 mL/hr at 08/23/21 0930     PRN Meds:.artificial tears, glucose, dextrose, glucagon (rDNA), dextrose, sodium chloride flush, sodium chloride, ondansetron **OR** ondansetron, acetaminophen **OR** acetaminophen, ketorolac      Assessment:  Small bowel obstruction verses ileus  Status-post laparoscopic lysis of adhesions, repair of ventral hernia with mesh with Dr. Jania Carbone on 8/9/2021  Diabetes      Plan:  1. Pain and bloating improving, passing flatus but no stool; check AXR today  2. NPO with NGT decompression; monitor bowel function  3. IV hydration; monitor and correct electrolytes  4. Activity as tolerated, ambulate TID  5. PRN analgesics and antiemetics--minimizing narcotics as tolerated  6. Management of medical comorbid etiologies per primary team and consulting services    EDUCATION:  Educated patient on plan of care and disease process--all questions answered. Plans discussed with patient and nursing. Reviewed and discussed with Dr. Ari Sánchez.       Signed:  YAZMIN Jo - CNP  8/23/2021 12:10 PM     Patient seen and examined  69-year-old female status post recent repair of incarcerated ventral hernia with mesh  Admitted now with abdominal distention, nausea and vomiting  Abdominal x-rays are indicative of an ileus versus a partial small bowel obstruction  Clinically improving with NG tube decompression  Repeat abdominal x-rays have been ordered for today

## 2021-08-23 NOTE — PROGRESS NOTES
[facility] PROGRESS NOTE    8/24/2021 1:22 PM        Name: Mohan Jauregui . Admitted: 8/21/2021  Primary Care Provider: Maryann Ann MD (Tel: [phone])      Chief Complaint   Patient presents with    Abdominal Pain     pt states hernia surgery on the 9th. now with increased pain and nausea. states also having loose, uncontrollable stools. Brief History: Patient is an 79 yo female with hx CAD/stents, CKD, HTN, HLD, DM2. She was hospitalized 8/9-8/15 with bowel obstruction and underwent laparoscopic lysis of adhesions and mesh repair ventral hernia 8/9. Patient returned to hospital with increased abdominal pain, nausea, decreased po intake and loose stools. CT abd/pelvis consistent with ongoing postoperative ileus vs persistent sbo. Subjective:  Presently resting in chair. Has been up to bathroom a couple times this afternoon, passing small amounts of stool. NG tube is clamped, states just mild nausea. Still with abdominal discomfort and distention. Denies shortness of breath or chest pain.      Reviewed interval ancillary notes    Current Medications  iohexol (OMNIPAQUE 350) 350 MG/ML solution,   metoprolol (LOPRESSOR) injection 5 mg, Q8H  dorzolamide (TRUSOPT) 2 % ophthalmic solution 1 drop, BID   And  timolol (TIMOPTIC) 0.5 % ophthalmic solution 1 drop, Nightly  lubrifresh P.M. (artificial tears) ophthalmic ointment, PRN  [Held by provider] b complex-C-folic acid (NEPHROCAPS) capsule 1 mg, Daily with breakfast  [Held by provider] fenofibrate (TRICOR) tablet 54 mg, Daily  [Held by provider] folic acid (FOLVITE) tablet 1 mg, Daily  latanoprost (XALATAN) 0.005 % ophthalmic solution 1 drop, Nightly  [Held by provider] therapeutic multivitamin-minerals 1 tablet, Daily  Netarsudil-Latanoprost 0.02-0.005 % SOLN 1 drop PATIENT SUPPLIED, Nightly  insulin lispro (1 Unit Dial) 0-6 Units, TID WC  insulin lispro (1 Unit Dial) 0-3 Units, Nightly  glucose (GLUTOSE) 40 % oral gel 15 g, PRN  dextrose 50 % IV solution, PRN  glucagon (rDNA) injection 1 mg, PRN  dextrose 5 % solution, PRN  sodium chloride flush 0.9 % injection 5-40 mL, 2 times per day  sodium chloride flush 0.9 % injection 10 mL, PRN  0.9 % sodium chloride infusion, PRN  enoxaparin (LOVENOX) injection 40 mg, Daily  ondansetron (ZOFRAN-ODT) disintegrating tablet 4 mg, Q8H PRN   Or  ondansetron (ZOFRAN) injection 4 mg, Q6H PRN  acetaminophen (TYLENOL) tablet 650 mg, Q6H PRN   Or  acetaminophen (TYLENOL) suppository 650 mg, Q6H PRN  ketorolac (TORADOL) injection 15 mg, Q6H PRN  pantoprazole (PROTONIX) injection 40 mg, Daily  aspirin suppository 300 mg, Daily  hydrALAZINE (APRESOLINE) injection 10 mg, BID  dextrose 5 % and 0.45 % sodium chloride infusion, Continuous        Objective:  BP (!) 176/61   Pulse 96   Temp 98.4 °F (36.9 °C) (Oral)   Resp 16   Ht 5' 3\" (1.6 m)   Wt 156 lb (70.8 kg)   SpO2 99%   BMI 27.63 kg/m²     Intake/Output Summary (Last 24 hours) at 8/24/2021 1322  Last data filed at 8/24/2021 0338  Gross per 24 hour   Intake 2046.73 ml   Output 480 ml   Net 1566.73 ml      Wt Readings from Last 3 Encounters:   08/24/21 156 lb (70.8 kg)   08/13/21 162 lb 4 oz (73.6 kg)   06/08/21 172 lb (78 kg)       General appearance:  Appears comfortable  Eyes: Sclera clear. Pupils equal.  ENT: Moist oral mucosa. Trachea midline, no adenopathy. Cardiovascular: Regular rhythm, normal S1, S2. No murmur. No edema in lower extremities  Respiratory: Not using accessory muscles. Good inspiratory effort. Clear to auscultation bilaterally, no wheeze or crackles. GI: Abdomen distended, diffuse tenderness, NG currently clamped, hypoactive bowel sounds  Musculoskeletal: No cyanosis in digits, neck supple  Neurology: CN 2-12 grossly intact. No speech or motor deficits  Psych: Normal affect.  Alert and oriented in time, place and person  Skin: Warm, dry, normal turgor    Labs and Tests:  CBC:   Recent Labs     08/22/21  0655 08/22/21  0655 08/22/21  1316 08/23/21  0504 08/24/21  0504   WBC 5.5  --   --  5.3 5.0   HGB 7.5*   < > 7.8* 7.2* 7.4*     --   --  430 435    < > = values in this interval not displayed. BMP:    Recent Labs     08/22/21  0655 08/23/21  0504 08/24/21  0504    138 140   K 3.1* 3.6 3.6    106 106   CO2 22 21 22   BUN 14 8 5*   CREATININE 0.9 0.8 0.9   GLUCOSE 94 156* 167*     Hepatic:   Recent Labs     08/22/21  0655 08/23/21  0504 08/24/21  0504   AST 32 27 16   ALT 35 27 22   BILITOT 0.5 0.5 0.5   ALKPHOS 67 69 65     Results for Solomon Stewart (MRN [id number]) as of 8/24/2021 14:19   Ref. Range 8/23/2021 17:20 8/23/2021 20:05 8/24/2021 02:11 8/24/2021 07:25 8/24/2021 13:09   POC Glucose Latest Ref Range: 70 - 99 mg/dl 152 (H) 148 (H) 159 (H) 177 (H) 130 (H)         CT Abd/Pelvis 8/21/2021: Interval surgical reduction of right supraumbilical herniation previously   containing small bowel loops.       Persisting scattered small bowel dilatation with fluid-filled bowel loops   proximal to the surgical site consistent with ongoing postoperative ileus   versus persisting small bowel obstruction.  Differential diagnostic   considerations include possible presence of persisting scarring/adhesions in   region of the ventral abdominal wall surgical site.  Small bowel dilatation   has worsened in comparison with prior preoperative study.  Recommend surgical   consultation. Gideon Mauriceloretta suboptimally evaluated in absence of intravenous   iodinated contrast administration. Xray Abdomen 8/22/2021:  Mild pulmonary vascular congestion.       Loops of mildly dilated bowel in the visualized upper abdomen, likely related   to ileus. Xray Abdomen 8/23/2021:  Nasogastric tube is in normal position.       Ileus pattern is again noted. Problem List  Active Problems:    Ileus ([address]  Resolved Problems:    * No resolved hospital problems. *       Assessment & Plan:   1. Ileus vs SBO. CT abdomen suggests ongoing postop ileus vs persisting small bowel obstruction. NG tube currently clamped. Had a couple small BMs today. Continue IV fluids. Surgery on board, recommend continued conservative management. Encourage ambulation and minimize narcotics. 2. Electrolyte derangement. Replaced potassium and magnesium, stable. Secondary to GI losses. Continue to monitor. 3. DM2. Takes Lantus nightly at home, currently NPO. Being covered with low dose correction. BG values controlled. 4. Macrocytic anemia. Appears to be chronic issue but Hgb much lower than baseline most likely secondary to recent abdominal surgery. Iron studies lower limits normal, no deficiency vitamin B12 and folate on recent labs (8/10). Continue to follow. 5. HTN. Currently receiving IV antihypertensive medications since NPO with NG tube. BP within acceptable limits.      Disposition: Anticipate home with [facility] when improved.      Diet: Diet NPO  Code:Full Code  DVT PPX: enoxaparin      YAZMIN Mcdaniel - CNP   8/24/2021 1:23 PM

## 2021-08-23 NOTE — PROGRESS NOTES
Physical Therapy    Facility/Department: [address]  Initial Assessment    NAME: Luís Berry  : 1940  MRN: [id number]    Date of Service: 2021    Discharge Recommendations:Helena Linder scored a 18/24 on the AM-PAC short mobility form. Current research shows that an AM-PAC score of 18 or greater is typically associated with a discharge to the patient's home setting. Based on the patient's AM-PAC score and their current functional mobility deficits, it is recommended that the patient have 2-3 sessions per week of Physical Therapy at d/c to increase the patient's independence. At this time, this patient demonstrates the endurance and safety to discharge home with home PT and a follow up treatment frequency of 2-3x/wk. Please see assessment section for further patient specific details. If patient discharges prior to next session this note will serve as a discharge summary. Please see below for the latest assessment towards goals. HOME HEALTH CARE: LEVEL 1 STANDARD    - Initial home health evaluation to occur within 24-48 hours, in patient home   - Therapy to evaluate with goal of regaining prior level of functioning   - Therapy to evaluate if patient has [address] needs for personal care      PT Equipment Recommendations  Equipment Needed: No (has RW)    Assessment   Body structures, Functions, Activity limitations: Decreased functional mobility ; Decreased strength;Decreased endurance;Decreased posture; Increased pain  Assessment: Pt with decreased mobility, strength, endurance and increased pain. Pt needing skilled PT services to address these issues. Treatment Diagnosis: weakness, difficulty with gait. Prognosis: Good  Decision Making: Medium Complexity  PT Education: PT Role;Plan of Care;Transfer Training;Functional Mobility Training  Patient Education: Pt verbalized good understanding of POC.   Barriers to Learning: none  REQUIRES PT FOLLOW UP: Yes  Activity Tolerance  Activity Tolerance: Patient limited by fatigue       Patient Diagnosis(es): The encounter diagnosis was Ileus ([facility] [address] has a past medical history of Angina, Bilateral carpal tunnel syndrome, Chronic kidney disease, Fibromyalgia, Hyperlipidemia, Hypertension, MVP (mitral valve prolapse), s, Small bowel obstruction ([address] and Type II or unspecified type diabetes mellitus without mention of complication, not stated as uncontrolled. has a past surgical history that includes Coronary angioplasty with stent; Hysterectomy; Cholecystectomy; lumbar discectomy (10/03/12); Small intestine surgery; Colonoscopy (N/A, 8/13/2019); hernia repair; and ventral hernia repair (N/A, 8/9/2021). Restrictions  Restrictions/Precautions  Restrictions/Precautions: Fall Risk, Contact Precautions (mod fall risk, cdiff)  Position Activity Restriction  Other position/activity restrictions: Delmer Lo is a 80 y.o. female with past medical history of chronic kidney disease, hyperlipidemia, hypertension, diabetes, fibromyalgia and previous small bowel obstruction who presents to the ED with complaint of abdominal pain. Patient states she was just recently seen here in the emergency department for abdominal stanchion, nausea, vomiting and pain and diagnosed with a small bowel obstruction secondary to a hernia. Patient dates he had surgery on 8/9/2021 by general surgeon, Dr. Adam Prince, for repair of hernia. Patient states she was discharged home last Sunday. Patient states ever since she has been home she has had gradually increasing pain to her upper abdomen with associated distention. Patient states she had decreased oral intake with associated nausea and vomiting. Patient states she has had liquid stools. Patient dates she has been passing gas but states she has not had a formed bowel movement in about 4 weeks.   Vision/Hearing  Vision: Impaired  Vision Exceptions: Wears glasses at all times  Hearing: Within functional limits Subjective  General  Chart Reviewed: Yes  Additional Pertinent Hx: hernia repair 8/9/21  Response To Previous Treatment: Patient with no complaints from previous session. Family / Caregiver Present: No  Diagnosis: ileus  Follows Commands: Within Functional Limits  General Comment  Comments: Pt supine in bed upon arrival.  Subjective  Subjective: Pt reporting 5/10 pain abdomen. Wanting to urinate. Pain Screening  Patient Currently in Pain: Yes  Pain Assessment  Pain Assessment: 0-10  Pain Level: 5  Pain Type: Acute pain  Pain Location: Abdomen  Vital Signs  Patient Currently in Pain: Yes       Orientation  Orientation  Overall Orientation Status: Within Functional Limits  Social/Functional History  Social/Functional History  Lives With: Son (caregiver for disabled son, other son has been staying with her since recent surgery.)  Type of Home: House  Home Layout: One level, Laundry in basement (stairlift to basement)  Home Access: Ramped entrance  Bathroom Shower/Tub: Tub/Shower unit, Shower chair without back  Bathroom Toilet: Handicap height  Bathroom Equipment: Grab bars in shower  Bathroom Accessibility: Not accessible  Home Equipment: Rolling walker, Cane, Electric scooter  Receives Help From: Family  ADL Assistance: Independent  Homemaking Assistance: Independent  Homemaking Responsibilities: Yes  Ambulation Assistance: Independent  Transfer Assistance: Independent  Active : No  Additional Comments: Pt denies falls in past 6 mo. Pt does not amb with AD but has 4ww. Son assists with grocery shopping and MD appts. 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   Cognition        Objective     Observation/Palpation  Posture: Fair (forward lean trunk)  Observation: NG tube    AROM RLE (degrees)  RLE AROM: WFL  AROM LLE (degrees)  LLE AROM : WFL  Strength RLE  Comment: grossly 4/5  Strength LLE  Comment: grossly 4/5  Tone RLE  RLE Tone: Normotonic  Tone LLE  LLE Tone: Normotonic  Motor Control  Gross Motor?: WFL  Sensation  Overall Sensation Status: WFL  Bed mobility  Supine to Sit: Stand by assistance  Scooting: Stand by assistance  Comment: Pt left seated in chair at end of treatment. Transfers  Sit to Stand: Stand by assistance (from EOB, chair, toilet)  Stand to sit: Stand by assistance  Ambulation  Ambulation?: Yes  Ambulation 1  Surface: level tile  Device: Rolling Walker  Other Apparatus:  (NG tube, IV.)  Assistance: Stand by assistance  Quality of Gait: good posture with bilat UE support of RW. Gait Deviations: Slow Jacky  Distance: Pt amb with RW and SBA for 10 ft x 2 and 175 ft. Comments: Pt amb to bathroom and urinated, performed her own hygiene and brief change, stood at sink with good balance for hand washing. Flexed trunk progressed standing. Pt sat to rest before amb in hallway with RW. Slow jacky and 2 standing rest breaks but tolerated well. Pt reported feeling \"weak\" due to inactivity the past several weeks. Stairs/Curb  Stairs?: No     Balance  Posture: Fair (forward lean of trunk)  Sitting - Static: Good  Sitting - Dynamic: Good  Standing - Static: Good (with and without RW)  Standing - Dynamic: Good (with RW)  Comments: postural fatigue with standing, needing UE support of RW with amb. Plan   Plan  Times per week: 3-5x/week  Times per day: Daily  Current Treatment Recommendations: Strengthening, Balance Training, Functional Mobility Training, Transfer Training, Endurance Training, Gait Training, Pain Management, Home Exercise Program, Safety Education & Training, Patient/Caregiver Education & Training, Equipment Evaluation, Education, & procurement  Safety Devices  Type of devices:  All fall risk precautions in place, Call light within reach, Gait belt, Left in chair, Nurse notified  Restraints  Initially in place: No    G-Code       OutComes Score                                                  AM-PAC Score  AM-PAC Inpatient Mobility Raw Score : 18 (08/23/21 1027)  AM-PAC Inpatient T-Scale Score : 43.63 (08/23/21 1027)  Mobility Inpatient CMS 0-100% Score: 46.58 (08/23/21 1027)  Mobility Inpatient CMS G-Code Modifier : CK (08/23/21 1027)          Goals  Short term goals  Time Frame for Short term goals: To be met by DC. Short term goal 1: Pt to perform bed mob mod I. Short term goal 2: Pt to perform transfers mod I. Short term goal 3: Pt to amb with AAD and mod I for 200 ft. Long term goals  Time Frame for Long term goals : LTGs=STGs  Patient Goals   Patient goals : To return home.        Therapy Time   Individual Concurrent Group Co-treatment   Time In 0932         Time Out 1010         Minutes 38         Timed Code Treatment Minutes: [facility], [id number]

## 2021-08-23 NOTE — PROGRESS NOTES
Hospitalist Progress Note      PCP: Francia Patten MD    Date of Admission: 8/21/2021    Chief Complaint: Abdominal pain    Hospital Course: This is a pleasant 80 y.o. female with history of fibromyalgia, hyperlipidemia, essential hypertension, macrocytic anemia, well-controlled diabetes type 2, history of bowel obstruction, history of multiple abdominal surgeries, who presents to the emergency room with complaints of increased abdominal pain, nausea, decreased p.o. intake, loose stools, ongoing for the last few days. She has had decreased p.o. intake. CT-abdomen/pelvis obtained in the emergency room reveals persistent scattered small bowel dilatation of fluid-filled bowel loops proximal to the surgical site consistent with ongoing postoperative ileus versus persistent small bowel obstruction. Subjective: Patient laying in bed, reports abdominal discomfort and pressure. Reports nausea. States she is passing gas, but no BM. No change from yesterday. Denies chest pain shortness of breath or palpitations. Reviewed plan of care, denied further questions or needs. Assessment/Plan:    Ileus versus small bowel obstruction  -Surgery has been consulted, continue with conservative management.    -N.p.o.  -NG to low intermittent wall suction  -Encourage ambulation and minimize narcotics    Anion gap metabolic acidosis  -Likely secondary to volume depletion  -Resolved with IV  -Continue IV fluids  -Monitor electrolytes closely  -CBC BMP in the morning    Hypokalemia  Hypomagnesia  -Resolved with replacement, repeat labs in the morning    Hypoglycemia  -Glucose noted to be 41 last night.   IV fluids changed to D5 half-normal saline  -Discontinue Lantus, she had not received any yet  -Continue sliding scale as needed  -Fingerstick blood sugar every 6 hours    Anemia of chronic disease  -Hemoglobin stable at 7.2  -Iron level 40  -CBC in the morning  -No signs of active bleeding  -Likely dilutional from IV fluids    Hypertension  -Patient takes Aldactone 25 mg daily, Lopressor 25 mg twice daily, hydralazine 25 mg twice daily at home  -Since patient is n.p.o., has been receiving IV blood pressure medications  -Continue hydralazine 10 mg twice daily, increase Lopressor to 5 mg every 8 hour  -Monitor    History of fibromyalgia hyperlipidemia, hypertension, microcytic anemia, NIDDM small bowel obstruction, multiple abdominal surgeries  -Continue medications as ordered  -Holding p.o. meds, converted p.o. home meds to IV form    Active Hospital Problems    Diagnosis     Ileus ([facility] [address] [K56.7]        Medications:  Reviewed    Infusion Medications    dextrose 100 mL/hr (08/21/21 2204)    sodium chloride      dextrose 5 % and 0.45 % NaCl 100 mL/hr at 08/23/21 0930     Scheduled Medications    metoprolol  5 mg Intravenous Q8H    dorzolamide  1 drop Both Eyes BID    And    timolol  1 drop Both Eyes Nightly    [Held by provider] b complex-C-folic acid  1 mg Oral Daily with breakfast    [Held by provider] fenofibrate  54 mg Oral Daily    [Held by provider] folic acid  1 mg Oral Daily    latanoprost  1 drop Both Eyes Nightly    [Held by provider] therapeutic multivitamin-minerals  1 tablet Oral Daily    Netarsudil-Latanoprost  1 drop Ophthalmic Nightly    insulin lispro  0-6 Units Subcutaneous TID WC    insulin lispro  0-3 Units Subcutaneous Nightly    sodium chloride flush  5-40 mL Intravenous 2 times per day    enoxaparin  40 mg Subcutaneous Daily    pantoprazole  40 mg Intravenous Daily    aspirin  300 mg Rectal Daily    hydrALAZINE  10 mg Intravenous BID     PRN Meds: artificial tears, glucose, dextrose, glucagon (rDNA), dextrose, sodium chloride flush, sodium chloride, ondansetron **OR** ondansetron, acetaminophen **OR** acetaminophen, ketorolac      Intake/Output Summary (Last 24 hours) at 8/23/2021 1229  Last data filed at 8/23/2021 0817  Gross per 24 hour   Intake 10 ml   Output 1425 ml   Net -1415 ml

## 2021-08-23 NOTE — PROGRESS NOTES
Occupational Therapy   Occupational Therapy Initial Assessment  Date: 2021   Patient Name: Angela Mesa  MRN: [id number]     : 1940    Date of Service: 2021    Discharge Recommendations:    Angela Mesa scored a 21/24 on the AM-PAC ADL Inpatient form. Current research shows that an AM-PAC score of 18 or greater is typically associated with a discharge to the patient's home setting. Based on the patient's AM-PAC score, and their current ADL deficits, it is recommended that the patient have 2-3 sessions per week of Occupational Therapy at d/c to increase the patient's independence. At this time, this patient demonstrates the endurance and safety to discharge home with home health OT services (home vs OP services) and a follow up treatment frequency of 2-3x/wk. Please see assessment section for further patient specific details. If patient discharges prior to next session this note will serve as a discharge summary. Please see below for the latest assessment towards goals. HOME HEALTH CARE: LEVEL 1 STANDARD    - Initial home health evaluation to occur within 24-48 hours, in patient home   - Therapy to evaluate with goal of regaining prior level of functioning   - Therapy to evaluate if patient has [address] needs for personal care    OT Equipment Recommendations  Equipment Needed: No  Other: continue to assess pending pt progress    Assessment   Performance deficits / Impairments: Decreased functional mobility ; Decreased ADL status; Decreased strength;Decreased endurance;Decreased high-level IADLs;Decreased balance  Assessment: Pt is currently functioning below occupational baseline and demo the deficits listed above, pt would benefit from continued skilled OT services to address these deficits and increase independence, safety, and ease with all occupational pursuits  Treatment Diagnosis: Decreased ADL/IADL status, functional mobility, and functional transfers d/t Ileus [facility])  Prognosis: Good  Decision Making: Low Complexity  OT Education: OT Role;Plan of Care;Energy Conservation  Patient Education: eval, d/c recommendations, use of RW- pt verbalized understanding  Barriers to Learning: none  REQUIRES OT FOLLOW UP: Yes  Activity Tolerance  Activity Tolerance: Patient Tolerated treatment well;Patient limited by fatigue  Safety Devices  Safety Devices in place: Yes  Type of devices: Left in chair;Call light within reach;Nurse notified           Patient Diagnosis(es): The encounter diagnosis was Ileus ([facility] [address] has a past medical history of Angina, Bilateral carpal tunnel syndrome, Chronic kidney disease, Fibromyalgia, Hyperlipidemia, Hypertension, MVP (mitral valve prolapse), s, Small bowel obstruction ([address] and Type II or unspecified type diabetes mellitus without mention of complication, not stated as uncontrolled. has a past surgical history that includes Coronary angioplasty with stent; Hysterectomy; Cholecystectomy; lumbar discectomy (10/03/12); Small intestine surgery; Colonoscopy (N/A, 8/13/2019); hernia repair; and ventral hernia repair (N/A, 8/9/2021). Treatment Diagnosis: Decreased ADL/IADL status, functional mobility, and functional transfers d/t Ileus [facility])      Restrictions  Restrictions/Precautions  Restrictions/Precautions: Fall Risk, Contact Precautions (mod fall risk, cdiff r/o)  Required Braces or Orthoses?: No  Position Activity Restriction  Other position/activity restrictions: Hipolito Dacosta is a 80 y.o. female with past medical history of chronic kidney disease, hyperlipidemia, hypertension, diabetes, fibromyalgia and previous small bowel obstruction who presents to the ED with complaint of abdominal pain. Patient states she was just recently seen here in the emergency department for abdominal stanchion, nausea, vomiting and pain and diagnosed with a small bowel obstruction secondary to a hernia.   Patient dates he had surgery on 8/9/2021 by general surgeon, Dr. Maritza Gil, for repair of hernia. Patient states she was discharged home last Sunday. Patient states ever since she has been home she has had gradually increasing pain to her upper abdomen with associated distention. Patient states she had decreased oral intake with associated nausea and vomiting. Patient states she has had liquid stools. Patient dates she has been passing gas but states she has not had a formed bowel movement in about 4 weeks. Subjective   General  Chart Reviewed: Yes  Patient assessed for rehabilitation services?: Yes  Additional Pertinent Hx: PMH: Angina, Bilateral carpal tunnel syndrome (10/9/2018), Chronic kidney disease, Fibromyalgia, Hyperlipidemia, Hypertension, MVP (mitral valve prolapse), s (5/18/2017), Small bowel obstruction ([facility] [address] (6/24/2019), and Type II or unspecified type diabetes mellitus without mention of complication, not stated as uncontrolled. Family / Caregiver Present: No  Referring Practitioner:  YAZMIN Chand CNP  Diagnosis: Ileus ([facility] [address]  Subjective  Subjective: Pt supine in bed upon arrival, pleasant and agreeable to OT evaluation and treat  Patient Currently in Pain: Yes  Pain Assessment  Pain Assessment: 0-10  Pain Level: 5  Pain Type: Acute pain  Pain Location: Abdomen  Pre Treatment Pain Screening  Intervention List: Patient able to continue with treatment  Comments / Details: Pain medication administered prior to the start of therapy  Vital Signs  Patient Currently in Pain: Yes    Social/Functional History  Social/Functional History  Lives With: Son (caregiver for disabled son, other son has been staying with her since recent surgery.)  Type of Home: House  Home Layout: One level, Laundry in basement (stairlift to basement)  Home Access: Ramped entrance  Bathroom Shower/Tub: Tub/Shower unit, Shower chair without back  Bathroom Toilet: Handicap height  Bathroom Equipment: Grab bars in shower  Bathroom Accessibility: Not accessible  Home [employer]  Coordination  Movements Are Fluid And Coordinated: Yes  Bed mobility  Supine to Sit: Stand by assistance  Scooting: Stand by assistance  Comment: Pt left seated in chair at EOS  Transfers  Sit to stand: Stand by assistance  Stand to sit: Stand by assistance  Cognition  Overall Cognitive Status: WFL  Perception  Overall Perceptual Status: WFL  Sensation  Overall Sensation Status: WFL  LUE AROM (degrees)  LUE AROM : WFL  Left Hand AROM (degrees)  Left Hand AROM: WFL  RUE AROM (degrees)  RUE AROM : WFL  Right Hand AROM (degrees)  Right Hand AROM: WFL  LUE Strength  Gross LUE Strength: WFL  RUE Strength  Gross RUE Strength: Exceptions to WFL (3/5)        Plan   Plan  Times per week: 3-5  Times per day: Daily  Current Treatment Recommendations: Strengthening, Balance Training, Functional Mobility Training, Endurance Training, Safety Education & Training, Patient/Caregiver Education & Training, Self-Care / ADL, Equipment Evaluation, Education, & procurement    G-Code     OutComes Score                                                  AM-PAC Score        AM-PAC Inpatient Daily Activity Raw Score: 21 (08/23/21 1047)  AM-PAC Inpatient ADL T-Scale Score : 44.27 (08/23/21 1047)  ADL Inpatient CMS 0-100% Score: 32.79 (08/23/21 1047)  ADL Inpatient CMS G-Code Modifier : Tootie Doalicia (08/23/21 1047)    Goals  Short term goals  Time Frame for Short term goals: d/c  Short term goal 1: Pt will complete toileting at MOD I level  Short term goal 2: Pt will complete UB ADLs at MOD I level  Short term goal 3: Pt will complete functional transfer to ADL surface at MOD I level  Short term goal 4: Pt will complete toileting at MOD I level  Short term goal 5: Pt will tolerate at least 10 minutes in stance while completing functional task  Long term goals  Time Frame for Long term goals : LTG=STG  Patient Goals   Patient goals : to return home       Therapy Time   Individual Concurrent Group Co-treatment   Time In 0932         Time Out [address] [facility]         Timed Code Treatment Minutes: 12 [facility], [address] [place], [place] [id number]

## 2021-08-24 ENCOUNTER — APPOINTMENT (OUTPATIENT)
Dept: GENERAL RADIOLOGY | Age: 81
DRG: 336 | End: 2021-08-24
Payer: MEDICARE

## 2021-08-24 ENCOUNTER — CARE COORDINATION (OUTPATIENT)
Dept: CASE MANAGEMENT | Age: 81
End: 2021-08-24

## 2021-08-24 LAB
A/G RATIO: 1.2 (ref 1.1–2.2)
ALBUMIN SERPL-MCNC: 3.4 G/DL (ref 3.4–5)
ALP BLD-CCNC: 65 U/L (ref 40–129)
ALT SERPL-CCNC: 22 U/L (ref 10–40)
ANION GAP SERPL CALCULATED.3IONS-SCNC: 12 MMOL/L (ref 3–16)
AST SERPL-CCNC: 16 U/L (ref 15–37)
BASOPHILS ABSOLUTE: 0 K/UL (ref 0–0.2)
BASOPHILS RELATIVE PERCENT: 0.3 %
BILIRUB SERPL-MCNC: 0.5 MG/DL (ref 0–1)
BUN BLDV-MCNC: 5 MG/DL (ref 7–20)
CALCIUM SERPL-MCNC: 8.8 MG/DL (ref 8.3–10.6)
CHLORIDE BLD-SCNC: 106 MMOL/L (ref 99–110)
CO2: 22 MMOL/L (ref 21–32)
CREAT SERPL-MCNC: 0.9 MG/DL (ref 0.6–1.2)
EOSINOPHILS ABSOLUTE: 0.2 K/UL (ref 0–0.6)
EOSINOPHILS RELATIVE PERCENT: 3.5 %
GFR AFRICAN AMERICAN: >60
GFR NON-AFRICAN AMERICAN: >60
GLOBULIN: 2.9 G/DL
GLUCOSE BLD-MCNC: 111 MG/DL (ref 70–99)
GLUCOSE BLD-MCNC: 130 MG/DL (ref 70–99)
GLUCOSE BLD-MCNC: 139 MG/DL (ref 70–99)
GLUCOSE BLD-MCNC: 159 MG/DL (ref 70–99)
GLUCOSE BLD-MCNC: 167 MG/DL (ref 70–99)
GLUCOSE BLD-MCNC: 177 MG/DL (ref 70–99)
HCT VFR BLD CALC: 22.4 % (ref 36–48)
HEMOGLOBIN: 7.4 G/DL (ref 12–16)
LYMPHOCYTES ABSOLUTE: 0.7 K/UL (ref 1–5.1)
LYMPHOCYTES RELATIVE PERCENT: 13.6 %
MCH RBC QN AUTO: 33.1 PG (ref 26–34)
MCHC RBC AUTO-ENTMCNC: 33 G/DL (ref 31–36)
MCV RBC AUTO: 100.1 FL (ref 80–100)
MONOCYTES ABSOLUTE: 0.4 K/UL (ref 0–1.3)
MONOCYTES RELATIVE PERCENT: 9 %
NEUTROPHILS ABSOLUTE: 3.7 K/UL (ref 1.7–7.7)
NEUTROPHILS RELATIVE PERCENT: 73.6 %
PDW BLD-RTO: 15 % (ref 12.4–15.4)
PERFORMED ON: ABNORMAL
PLATELET # BLD: 435 K/UL (ref 135–450)
PMV BLD AUTO: 6.2 FL (ref 5–10.5)
POTASSIUM REFLEX MAGNESIUM: 3.6 MMOL/L (ref 3.5–5.1)
RBC # BLD: 2.23 M/UL (ref 4–5.2)
SODIUM BLD-SCNC: 140 MMOL/L (ref 136–145)
TOTAL PROTEIN: 6.3 G/DL (ref 6.4–8.2)
WBC # BLD: 5 K/UL (ref 4–11)

## 2021-08-24 PROCEDURE — 36415 COLL VENOUS BLD VENIPUNCTURE: CPT

## 2021-08-24 PROCEDURE — 87505 NFCT AGENT DETECTION GI: CPT

## 2021-08-24 PROCEDURE — 2500000003 HC RX 250 WO HCPCS: Performed by: NURSE PRACTITIONER

## 2021-08-24 PROCEDURE — 74250 X-RAY XM SM INT 1CNTRST STD: CPT

## 2021-08-24 PROCEDURE — 80053 COMPREHEN METABOLIC PANEL: CPT

## 2021-08-24 PROCEDURE — 2060000000 HC ICU INTERMEDIATE R&B

## 2021-08-24 PROCEDURE — C9113 INJ PANTOPRAZOLE SODIUM, VIA: HCPCS | Performed by: NURSE PRACTITIONER

## 2021-08-24 PROCEDURE — 85025 COMPLETE CBC W/AUTO DIFF WBC: CPT

## 2021-08-24 PROCEDURE — 6370000000 HC RX 637 (ALT 250 FOR IP): Performed by: INTERNAL MEDICINE

## 2021-08-24 PROCEDURE — APPNB30 APP NON BILLABLE TIME 0-30 MINS: Performed by: NURSE PRACTITIONER

## 2021-08-24 PROCEDURE — 6360000002 HC RX W HCPCS: Performed by: NURSE PRACTITIONER

## 2021-08-24 PROCEDURE — 99024 POSTOP FOLLOW-UP VISIT: CPT | Performed by: SURGERY

## 2021-08-24 PROCEDURE — 2580000003 HC RX 258: Performed by: NURSE PRACTITIONER

## 2021-08-24 PROCEDURE — 6360000004 HC RX CONTRAST MEDICATION: Performed by: SURGERY

## 2021-08-24 PROCEDURE — APPSS15 APP SPLIT SHARED TIME 0-15 MINUTES: Performed by: NURSE PRACTITIONER

## 2021-08-24 PROCEDURE — 97530 THERAPEUTIC ACTIVITIES: CPT

## 2021-08-24 PROCEDURE — 2580000003 HC RX 258: Performed by: INTERNAL MEDICINE

## 2021-08-24 PROCEDURE — 97535 SELF CARE MNGMENT TRAINING: CPT

## 2021-08-24 PROCEDURE — 6370000000 HC RX 637 (ALT 250 FOR IP): Performed by: NURSE PRACTITIONER

## 2021-08-24 PROCEDURE — 6360000002 HC RX W HCPCS: Performed by: INTERNAL MEDICINE

## 2021-08-24 RX ADMIN — HYDRALAZINE HYDROCHLORIDE 10 MG: 20 INJECTION INTRAMUSCULAR; INTRAVENOUS at 09:28

## 2021-08-24 RX ADMIN — Medication 10 ML: at 09:34

## 2021-08-24 RX ADMIN — KETOROLAC TROMETHAMINE 15 MG: 30 INJECTION, SOLUTION INTRAMUSCULAR; INTRAVENOUS at 03:46

## 2021-08-24 RX ADMIN — KETOROLAC TROMETHAMINE 15 MG: 30 INJECTION, SOLUTION INTRAMUSCULAR; INTRAVENOUS at 17:16

## 2021-08-24 RX ADMIN — PANTOPRAZOLE SODIUM 40 MG: 40 INJECTION, POWDER, FOR SOLUTION INTRAVENOUS at 09:28

## 2021-08-24 RX ADMIN — ACETAMINOPHEN 650 MG: 325 TABLET ORAL at 20:29

## 2021-08-24 RX ADMIN — INSULIN LISPRO 1 UNITS: 100 INJECTION, SOLUTION INTRAVENOUS; SUBCUTANEOUS at 09:34

## 2021-08-24 RX ADMIN — ASPIRIN 300 MG: 300 SUPPOSITORY RECTAL at 09:33

## 2021-08-24 RX ADMIN — LATANOPROST 1 DROP: 50 SOLUTION OPHTHALMIC at 20:24

## 2021-08-24 RX ADMIN — DORZOLAMIDE HCL 1 DROP: 20 SOLUTION/ DROPS OPHTHALMIC at 09:29

## 2021-08-24 RX ADMIN — METOPROLOL TARTRATE 5 MG: 5 INJECTION INTRAVENOUS at 17:16

## 2021-08-24 RX ADMIN — METOPROLOL TARTRATE 5 MG: 5 INJECTION INTRAVENOUS at 00:37

## 2021-08-24 RX ADMIN — DORZOLAMIDE HCL 1 DROP: 20 SOLUTION/ DROPS OPHTHALMIC at 20:24

## 2021-08-24 RX ADMIN — DEXTROSE AND SODIUM CHLORIDE: 5; 450 INJECTION, SOLUTION INTRAVENOUS at 20:33

## 2021-08-24 RX ADMIN — IOHEXOL 200 ML: 350 INJECTION, SOLUTION INTRAVENOUS at 11:00

## 2021-08-24 RX ADMIN — HYDRALAZINE HYDROCHLORIDE 10 MG: 20 INJECTION INTRAMUSCULAR; INTRAVENOUS at 20:29

## 2021-08-24 RX ADMIN — Medication 10 ML: at 20:29

## 2021-08-24 RX ADMIN — KETOROLAC TROMETHAMINE 15 MG: 30 INJECTION, SOLUTION INTRAMUSCULAR; INTRAVENOUS at 10:24

## 2021-08-24 RX ADMIN — METOPROLOL TARTRATE 5 MG: 5 INJECTION INTRAVENOUS at 09:27

## 2021-08-24 RX ADMIN — ENOXAPARIN SODIUM 40 MG: 40 INJECTION SUBCUTANEOUS at 09:33

## 2021-08-24 ASSESSMENT — PAIN DESCRIPTION - PROGRESSION
CLINICAL_PROGRESSION: NOT CHANGED

## 2021-08-24 ASSESSMENT — PAIN DESCRIPTION - LOCATION
LOCATION: ABDOMEN
LOCATION: ABDOMEN

## 2021-08-24 ASSESSMENT — PAIN SCALES - GENERAL
PAINLEVEL_OUTOF10: 8
PAINLEVEL_OUTOF10: 8
PAINLEVEL_OUTOF10: 0
PAINLEVEL_OUTOF10: 0
PAINLEVEL_OUTOF10: 4
PAINLEVEL_OUTOF10: 8

## 2021-08-24 ASSESSMENT — PAIN DESCRIPTION - PAIN TYPE
TYPE: ACUTE PAIN
TYPE: ACUTE PAIN

## 2021-08-24 NOTE — FLOWSHEET NOTE
Pt c/o a little nauseous. No output from NG tube. Although the repeat x-ray indicates that it is in the correct place, it had much more output previously when it was at 58 cm. Inserted NG to 59cm. There is an order for abd x-rays already entered, will see if it picks up the NG or not. Output beginning to increase. Will continue to monitor.

## 2021-08-24 NOTE — PROGRESS NOTES
Physician Progress Note      Malik Persaud  CSN #:                  [id number]  :                       1940  ADMIT DATE:       2021 2:36 AM  DISCH DATE:  RESPONDING  PROVIDER #:        Anna Turcios CNP          QUERY TEXT:    Patient admitted with Ileus vs. SBO. Noted documentation of CKD 3 on active   problem list in General Surgery consult note 21. Please document in   progress notes the clinical indicators to support this diagnosis on current   admission or document if this is PMH only. If the diagnosis of CKD 3 is PMH only, please update the problem list   appropriately so it does not continue to appear as an active problem in daily   progress notes. The medical record reflects the following:  Risk Factors: Age, HTN, DM2, HLD  Clinical Indicators: Labs on admission 21 CR 0.9 BUN 18 GFR>60     21  CR 0.8 BUN 8  GFR>60. Treatment: General Surgery consult, iv NS Bolus, CT Abd/Pel, NGT placement,   monitor labs  Options provided:  -- CKD 3 currently being treated/evaluated  -- CKD 3 is PMH only  -- Other - I will add my own diagnosis  -- Disagree - Not applicable / Not valid  -- Disagree - Clinically unable to determine / Unknown  -- Refer to Clinical Documentation Reviewer    PROVIDER RESPONSE TEXT:    CKD 3 is PMH only.     Query created by: Lucilla Goltz on 2021 5:46 PM      Electronically signed by:  Ximena Turcios CNP 2021 5:19 PM

## 2021-08-24 NOTE — PROGRESS NOTES
[facility] and Laparoscopic [facility]        Post-op Note    Pt Name: Luís Berry  MRN: [id number]  [place]: 1940  Date of evaluation: 2021    Subjective:    No acute events overnight  Pain improving  Less distended, passing flatus and now stool  No further nausea or vomiting, NGT in place with low output  Up to chair at this time      Vital Signs:  Patient Vitals for the past 24 hrs:   BP Temp Temp src Pulse Resp SpO2 Weight   21 0923 (!) 176/61 98.4 °F (36.9 °C) Oral 96 16 99 % --   21 0723 -- -- -- -- -- -- 156 lb (70.8 kg)   21 0520 138/67 98 °F (36.7 °C) Temporal 85 16 97 % --   21 0037 (!) 144/64 98 °F (36.7 °C) Temporal 87 18 97 % --   21 2030 (!) 171/63 97.2 °F (36.2 °C) Temporal 81 18 97 % --   21 1730 (!) 166/64 98.2 °F (36.8 °C) Temporal 90 16 100 % --      TEMPERATURE HISTORY 24H: Temp (24hrs), Av °F (36.7 °C), Min:97.2 °F (36.2 °C), Max:98.4 °F (36.9 °C)    BLOOD PRESSURE HISTORY: Systolic (23UPW), PJK:173 , Min:138 , NGE:852    Diastolic (77NMP), JAW:06, Min:61, Max:80      Intake/Output:    I/O last 3 completed shifts: In: . [I.V.:.]  Out: 480 [Urine:350; Emesis/NG output:130]  No intake/output data recorded. Drain/tube Output:           Physical Exam:  General: awake, alert, oriented to person, place, time  Lungs: unlabored respirations  Abdomen: soft, mild distension, appropriate incisional tenderness, incision, hypoactive bowel sounds  Skin/Wound: healing well, no drainage, no erythema, well approximated    Labs:  CBC:    Recent Labs     21  0655 21  0655 21  1316 21  0504 21  0504   WBC 5.5  --   --  5.3 5.0   HGB 7.5*   < > 7.8* 7.2* 7.4*   HCT 22.9*   < > 24.3* 21.8* 22.4*     --   --  430 435    < > = values in this interval not displayed.      BMP:    Recent Labs     21  0655 21  0504 21  0504    138 140   K 3.1* 3.6 3.6    106 106   CO2  BUN 14 8 5*   CREATININE 0.9 0.8 0.9   GLUCOSE 94 156* 167*     Hepatic:   Recent Labs     08/22/21  0655 08/23/21  0504 08/24/21  0504   AST 32 27 16   ALT 35 27 22   BILITOT 0.5 0.5 0.5   ALKPHOS 67 69 65     Amylase: No results for input(s): AMYLASE in the last 72 hours. Lipase:   No results for input(s): LIPASE in the last 72 hours. Mag:      Recent Labs     08/22/21  0655   MG 1.40*      Phos:     Recent Labs     08/22/21  0655   PHOS 2.4*      Coags:   Recent Labs     08/22/21  0655   INR 1.12   APTT 30.2       Cultures:  Anaerobic culture  No results for input(s): LABANAE in the last 72 hours. Blood culture  No results for input(s): BC in the last 72 hours. Blood culture 2  No results for input(s): BLOODCULT2 in the last 72 hours. Body fluid culture  No results for input(s): BLOODCULT2 in the last 72 hours. Surgical culture  No results for input(s): CXSURG in the last 72 hours. Fecal occult  No results for input(s): OCCULTBLDFEC in the last 72 hours. Gram stain  No results for input(s): LABGRAM in the last 72 hours. Stool culture 1  No results for input(s): CXST in the last 72 hours. Stool culture 2  No results for input(s): STOOLCULT2 in the last 72 hours. Urine culture  No results for input(s): LABURIN in the last 72 hours. Wound abscess  No results for input(s): WNDABS in the last 72 hours. C Diff   No results for input(s): CDIFFTOXAB in the last 72 hours. Imaging:  I have personally reviewed the following films:    XR ABDOMEN (2 VIEWS)    Result Date: 8/23/2021  EXAMINATION: TWO XRAY VIEWS OF THE ABDOMEN 8/23/2021 4:10 pm COMPARISON: None. HISTORY: ORDERING SYSTEM PROVIDED HISTORY: F/u SBO/ileus TECHNOLOGIST PROVIDED HISTORY: Reason for exam:->F/u SBO/ileus Reason for Exam: F/u SBO/ileus Acuity: Unknown Type of Exam: Unknown FINDINGS: The nasogastric tube has a normal course, distal side port and tip at the level of the gastric fundus.   Cholecystectomy clips are present. Small bowel and colon are gaseous. No free air is seen. Nasogastric tube is in normal position. Ileus pattern is again noted. Scheduled Meds:   iohexol        metoprolol  5 mg IntraVENous Q8H    dorzolamide  1 drop Both Eyes BID    And    timolol  1 drop Both Eyes Nightly    [Held by provider] b complex-C-folic acid  1 mg Oral Daily with breakfast    [Held by provider] fenofibrate  54 mg Oral Daily    [Held by provider] folic acid  1 mg Oral Daily    latanoprost  1 drop Both Eyes Nightly    [Held by provider] therapeutic multivitamin-minerals  1 tablet Oral Daily    Netarsudil-Latanoprost  1 drop Ophthalmic Nightly    insulin lispro  0-6 Units Subcutaneous TID WC    insulin lispro  0-3 Units Subcutaneous Nightly    sodium chloride flush  5-40 mL IntraVENous 2 times per day    enoxaparin  40 mg Subcutaneous Daily    pantoprazole  40 mg IntraVENous Daily    aspirin  300 mg Rectal Daily    hydrALAZINE  10 mg IntraVENous BID     Continuous Infusions:   dextrose Stopped (08/21/21 9716)    sodium chloride      dextrose 5 % and 0.45 % NaCl 100 mL/hr at 08/24/21 0338     PRN Meds:.artificial tears, glucose, dextrose, glucagon (rDNA), dextrose, sodium chloride flush, sodium chloride, ondansetron **OR** ondansetron, acetaminophen **OR** acetaminophen, ketorolac      Assessment:  Small bowel obstruction verses ileus  Status-post laparoscopic lysis of adhesions, repair of ventral hernia with mesh with Dr. Erin Cortez on 8/9/2021  Diabetes      Plan:  1. Pain and bloating improving, passing flatus and now stool; AXR yesterday consistent with ileus, check small bowel follow through today  2. NPO with NGT decompression; monitor bowel function  3. IV hydration; monitor and correct electrolytes  4. Activity as tolerated, ambulate TID  5. PRN analgesics and antiemetics--minimizing narcotics as tolerated  6.  Management of medical comorbid etiologies per primary team and consulting services    EDUCATION:  Educated patient on plan of care and disease process--all questions answered. Plans discussed with patient and nursing. Reviewed and discussed with Dr. Karl Rand.       Signed:  YAZMIN Varela CNP  8/24/2021 1:24 PM     20-year-old female seen in follow-up for postop ileus versus small bowel obstruction  Doing better today  Passing flatus and had a bowel movement  Small bowel follow-through in process (anticipate an additional x-ray this afternoon)  If small bowel follow-through shows progression of contrast into colon, will remove NG tube and start a clear liquid diet

## 2021-08-24 NOTE — PROGRESS NOTES
Physical Therapy      Pt declines PT tx at this time stating that she is preparing for transfer to X-ray. Will continue to follow and re-attempt as schedule permits. Thank you.         Kevin Thomas PT, DPT [id number]

## 2021-08-24 NOTE — PROGRESS NOTES
pursuits  Treatment Diagnosis: Decreased ADL/IADL status, functional mobility, and functional transfers d/t Ileus ([address]  Prognosis: Good  OT Education: OT Role;Plan of Care;Energy Conservation  Patient Education: pt verbalized understanding  Barriers to Learning: none  REQUIRES OT FOLLOW UP: Yes  Activity Tolerance  Activity Tolerance: Patient Tolerated treatment well;Patient limited by fatigue  Safety Devices  Safety Devices in place: Yes  Type of devices: Left in chair;Call light within reach;Nurse notified         Patient Diagnosis(es): The encounter diagnosis was Ileus ([address] has a past medical history of Angina, Bilateral carpal tunnel syndrome, Chronic kidney disease, Fibromyalgia, Hyperlipidemia, Hypertension, MVP (mitral valve prolapse), s, Small bowel obstruction ([address] and Type II or unspecified type diabetes mellitus without mention of complication, not stated as uncontrolled. has a past surgical history that includes Coronary angioplasty with stent; Hysterectomy; Cholecystectomy; lumbar discectomy (10/03/12); Small intestine surgery; Colonoscopy (N/A, 8/13/2019); hernia repair; and ventral hernia repair (N/A, 8/9/2021). Restrictions  Restrictions/Precautions  Restrictions/Precautions: Fall Risk, Contact Precautions (mod fall risk, cdiff r/o)  Required Braces or Orthoses?: No  Position Activity Restriction  Other position/activity restrictions: Aniyah Jaimes is a 80 y.o. female with past medical history of chronic kidney disease, hyperlipidemia, hypertension, diabetes, fibromyalgia and previous small bowel obstruction who presents to the ED with complaint of abdominal pain. Patient states she was just recently seen here in the emergency department for abdominal stanchion, nausea, vomiting and pain and diagnosed with a small bowel obstruction secondary to a hernia. Patient dates he had surgery on 8/9/2021 by general surgeon, Dr. Andre Melendez, for repair of hernia.   Patient states she was discharged home last Sunday. Patient states ever since she has been home she has had gradually increasing pain to her upper abdomen with associated distention. Patient states she had decreased oral intake with associated nausea and vomiting. Patient states she has had liquid stools. Patient dates she has been passing gas but states she has not had a formed bowel movement in about 4 weeks. Subjective   General  Chart Reviewed: Yes  Patient assessed for rehabilitation services?: Yes  Additional Pertinent Hx: PMH: Angina, Bilateral carpal tunnel syndrome (10/9/2018), Chronic kidney disease, Fibromyalgia, Hyperlipidemia, Hypertension, MVP (mitral valve prolapse), s (5/18/2017), Small bowel obstruction ([facility] (6/24/2019), and Type II or unspecified type diabetes mellitus without mention of complication, not stated as uncontrolled. Family / Caregiver Present: No  Referring Practitioner: YAZMIN Brown CNP  Diagnosis: Ileus ([facility] [facility]  Subjective  Subjective: Pt supine in bed upon arrival, pleasant and agreeable to OT treatment session  Vital Signs  Patient Currently in Pain: Denies     Orientation  Orientation  Overall Orientation Status: Within Functional Limits    Objective    ADL  Feeding: NPO (NG tube)  Grooming: Stand by assistance (hand hygiene completed in stance at sink)  UE Dressing: Setup (gown change)  LE Dressing: Stand by assistance (to don briefs)  Toileting: Stand by assistance  Additional Comments: Pt declined additional ADLs this date stating she completed prior to the start of therapy  Balance  Sitting Balance: Independent  Standing Balance: Stand by assistance  Standing Balance  Time: 10-12 minutes total  Activity: functional mobility/transfers, ADL completion  Comment: no LOB, use of RW  Functional Mobility  Functional - Mobility Device: Rolling Walker  Activity: Other; To/from bathroom  Assist Level: Stand by assistance  Functional Mobility Comments: Pt ambulated 200' in [facility] demo steady gait and good safety awareness with use of RW.   Bed mobility  Supine to Sit: Stand by assistance  Sit to Supine: Unable to assess  Scooting: Stand by assistance  Comment: Pt left seated in recliner chair at EOS  Transfers  Sit to stand: Stand by assistance  Stand to sit: Stand by assistance  Cognition  Overall Cognitive Status: [facility]  Times per week: 3-5  Times per day: Daily  Current Treatment Recommendations: Strengthening, Balance Training, Functional Mobility Training, Endurance Training, Safety Education & Training, Patient/Caregiver Education & Training, Self-Care / ADL, Equipment Evaluation, Education, & procurement    G-Code     OutComes Score                                                  AM-PAC Score        AM-PAC Inpatient Daily Activity Raw Score: 21 (08/24/21 0929)  AM-PAC Inpatient ADL T-Scale Score : 44.27 (08/24/21 0929)  ADL Inpatient CMS 0-100% Score: 32.79 (08/24/21 0929)  ADL Inpatient CMS G-Code Modifier : Veronica Houston (08/24/21 9198)    Goals  Short term goals  Time Frame for Short term goals: d/c  Short term goal 1: Pt will complete toileting at MOD I level-- not met, progressing 8/24  Short term goal 2: Pt will complete UB ADLs at MOD I level-- not met, progressing 8/24  Short term goal 3: Pt will complete functional transfer to ADL surface at MOD I level-- not met, progressing 8/24  Short term goal 4: Pt will complete toileting at MOD I level-- not met, progressing 8/24  Short term goal 5: Pt will tolerate at least 10 minutes in stance while completing functional task-- not met, progressing 8/24  Long term goals  Time Frame for Long term goals : LTG=STG  Patient Goals   Patient goals : to return home       Therapy Time   Individual Concurrent Group Co-treatment   Time In 0856         Time Out 0926         Minutes 30         Timed Code Treatment Minutes: Parveen, [address] [place] [address] [id number]

## 2021-08-24 NOTE — PLAN OF CARE
Problem: Falls - Risk of:  Goal: Will remain free from falls  Description: Will remain free from falls  Outcome: Met This Shift  Note: Patient is alert and oriented and does not attempt to get out of bed or chair unassisted. Goal: Absence of physical injury  Description: Absence of physical injury  Outcome: Ongoing     Problem: Pain:  Description: Pain management should include both nonpharmacologic and pharmacologic interventions. Goal: Pain level will decrease  Description: Pain level will decrease  Outcome: Ongoing  Note: Although pain not resolved, patient indicates that it is at an acceptable level.   Goal: Control of acute pain  Description: Control of acute pain  Outcome: Ongoing  Goal: Control of chronic pain  Description: Control of chronic pain  Outcome: Ongoing     Problem: Skin Integrity:  Goal: Will show no infection signs and symptoms  Description: Will show no infection signs and symptoms  Outcome: Ongoing  Goal: Absence of new skin breakdown  Description: Absence of new skin breakdown  Outcome: Ongoing     Problem: Musculor/Skeletal Functional Status  Goal: Highest potential functional level  Outcome: Ongoing  Goal: Absence of falls  Outcome: Ongoing

## 2021-08-25 LAB
ANION GAP SERPL CALCULATED.3IONS-SCNC: 9 MMOL/L (ref 3–16)
BASOPHILS ABSOLUTE: 0 K/UL (ref 0–0.2)
BASOPHILS RELATIVE PERCENT: 0.4 %
BUN BLDV-MCNC: 6 MG/DL (ref 7–20)
CALCIUM SERPL-MCNC: 8.7 MG/DL (ref 8.3–10.6)
CHLORIDE BLD-SCNC: 107 MMOL/L (ref 99–110)
CO2: 23 MMOL/L (ref 21–32)
CREAT SERPL-MCNC: 0.8 MG/DL (ref 0.6–1.2)
EOSINOPHILS ABSOLUTE: 0.2 K/UL (ref 0–0.6)
EOSINOPHILS RELATIVE PERCENT: 3.7 %
GFR AFRICAN AMERICAN: >60
GFR NON-AFRICAN AMERICAN: >60
GI BACTERIAL PATHOGENS BY PCR: NORMAL
GLUCOSE BLD-MCNC: 121 MG/DL (ref 70–99)
GLUCOSE BLD-MCNC: 130 MG/DL (ref 70–99)
GLUCOSE BLD-MCNC: 137 MG/DL (ref 70–99)
GLUCOSE BLD-MCNC: 139 MG/DL (ref 70–99)
GLUCOSE BLD-MCNC: 155 MG/DL (ref 70–99)
GLUCOSE BLD-MCNC: 163 MG/DL (ref 70–99)
HCT VFR BLD CALC: 21.2 % (ref 36–48)
HEMOGLOBIN: 7.1 G/DL (ref 12–16)
LYMPHOCYTES ABSOLUTE: 0.7 K/UL (ref 1–5.1)
LYMPHOCYTES RELATIVE PERCENT: 16.4 %
MAGNESIUM: 1.4 MG/DL (ref 1.8–2.4)
MCH RBC QN AUTO: 33.6 PG (ref 26–34)
MCHC RBC AUTO-ENTMCNC: 33.5 G/DL (ref 31–36)
MCV RBC AUTO: 100.3 FL (ref 80–100)
MONOCYTES ABSOLUTE: 0.5 K/UL (ref 0–1.3)
MONOCYTES RELATIVE PERCENT: 10.6 %
NEUTROPHILS ABSOLUTE: 3 K/UL (ref 1.7–7.7)
NEUTROPHILS RELATIVE PERCENT: 68.9 %
PDW BLD-RTO: 14.9 % (ref 12.4–15.4)
PERFORMED ON: ABNORMAL
PLATELET # BLD: 419 K/UL (ref 135–450)
PMV BLD AUTO: 6.6 FL (ref 5–10.5)
POTASSIUM SERPL-SCNC: 3.5 MMOL/L (ref 3.5–5.1)
RBC # BLD: 2.11 M/UL (ref 4–5.2)
SODIUM BLD-SCNC: 139 MMOL/L (ref 136–145)
WBC # BLD: 4.3 K/UL (ref 4–11)

## 2021-08-25 PROCEDURE — 36415 COLL VENOUS BLD VENIPUNCTURE: CPT

## 2021-08-25 PROCEDURE — 6370000000 HC RX 637 (ALT 250 FOR IP): Performed by: NURSE PRACTITIONER

## 2021-08-25 PROCEDURE — 6360000002 HC RX W HCPCS: Performed by: NURSE PRACTITIONER

## 2021-08-25 PROCEDURE — 6370000000 HC RX 637 (ALT 250 FOR IP)

## 2021-08-25 PROCEDURE — 83735 ASSAY OF MAGNESIUM: CPT

## 2021-08-25 PROCEDURE — 2580000003 HC RX 258: Performed by: NURSE PRACTITIONER

## 2021-08-25 PROCEDURE — APPNB30 APP NON BILLABLE TIME 0-30 MINS: Performed by: NURSE PRACTITIONER

## 2021-08-25 PROCEDURE — 6370000000 HC RX 637 (ALT 250 FOR IP): Performed by: INTERNAL MEDICINE

## 2021-08-25 PROCEDURE — C9113 INJ PANTOPRAZOLE SODIUM, VIA: HCPCS | Performed by: NURSE PRACTITIONER

## 2021-08-25 PROCEDURE — 99024 POSTOP FOLLOW-UP VISIT: CPT | Performed by: SURGERY

## 2021-08-25 PROCEDURE — 2580000003 HC RX 258: Performed by: INTERNAL MEDICINE

## 2021-08-25 PROCEDURE — APPSS15 APP SPLIT SHARED TIME 0-15 MINUTES: Performed by: NURSE PRACTITIONER

## 2021-08-25 PROCEDURE — 2500000003 HC RX 250 WO HCPCS: Performed by: NURSE PRACTITIONER

## 2021-08-25 PROCEDURE — 6360000002 HC RX W HCPCS: Performed by: INTERNAL MEDICINE

## 2021-08-25 PROCEDURE — 85025 COMPLETE CBC W/AUTO DIFF WBC: CPT

## 2021-08-25 PROCEDURE — 2060000000 HC ICU INTERMEDIATE R&B

## 2021-08-25 PROCEDURE — 80048 BASIC METABOLIC PNL TOTAL CA: CPT

## 2021-08-25 RX ORDER — MAGNESIUM SULFATE IN WATER 40 MG/ML
2000 INJECTION, SOLUTION INTRAVENOUS ONCE
Status: COMPLETED | OUTPATIENT
Start: 2021-08-25 | End: 2021-08-25

## 2021-08-25 RX ORDER — METOCLOPRAMIDE HYDROCHLORIDE 5 MG/ML
5 INJECTION INTRAMUSCULAR; INTRAVENOUS EVERY 6 HOURS
Status: DISCONTINUED | OUTPATIENT
Start: 2021-08-25 | End: 2021-08-26 | Stop reason: HOSPADM

## 2021-08-25 RX ORDER — ASPIRIN 81 MG/1
81 TABLET ORAL DAILY
Status: DISCONTINUED | OUTPATIENT
Start: 2021-08-25 | End: 2021-08-26 | Stop reason: HOSPADM

## 2021-08-25 RX ORDER — HYDRALAZINE HYDROCHLORIDE 25 MG/1
25 TABLET, FILM COATED ORAL 2 TIMES DAILY
Status: DISCONTINUED | OUTPATIENT
Start: 2021-08-25 | End: 2021-08-26 | Stop reason: HOSPADM

## 2021-08-25 RX ORDER — ASPIRIN 81 MG/1
TABLET, CHEWABLE ORAL
Status: COMPLETED
Start: 2021-08-25 | End: 2021-08-25

## 2021-08-25 RX ADMIN — ASPIRIN 81 MG: 81 TABLET, CHEWABLE ORAL at 09:42

## 2021-08-25 RX ADMIN — DEXTROSE AND SODIUM CHLORIDE: 5; 450 INJECTION, SOLUTION INTRAVENOUS at 06:18

## 2021-08-25 RX ADMIN — KETOROLAC TROMETHAMINE 15 MG: 30 INJECTION, SOLUTION INTRAMUSCULAR; INTRAVENOUS at 21:16

## 2021-08-25 RX ADMIN — KETOROLAC TROMETHAMINE 15 MG: 30 INJECTION, SOLUTION INTRAMUSCULAR; INTRAVENOUS at 00:33

## 2021-08-25 RX ADMIN — LATANOPROST 1 DROP: 50 SOLUTION OPHTHALMIC at 21:15

## 2021-08-25 RX ADMIN — PANTOPRAZOLE SODIUM 40 MG: 40 INJECTION, POWDER, FOR SOLUTION INTRAVENOUS at 09:41

## 2021-08-25 RX ADMIN — METOPROLOL TARTRATE 25 MG: 25 TABLET, FILM COATED ORAL at 21:16

## 2021-08-25 RX ADMIN — KETOROLAC TROMETHAMINE 15 MG: 30 INJECTION, SOLUTION INTRAMUSCULAR; INTRAVENOUS at 06:15

## 2021-08-25 RX ADMIN — Medication 10 ML: at 06:16

## 2021-08-25 RX ADMIN — INSULIN LISPRO 1 UNITS: 100 INJECTION, SOLUTION INTRAVENOUS; SUBCUTANEOUS at 21:09

## 2021-08-25 RX ADMIN — Medication 10 ML: at 16:21

## 2021-08-25 RX ADMIN — HYDRALAZINE HYDROCHLORIDE 25 MG: 25 TABLET, FILM COATED ORAL at 21:16

## 2021-08-25 RX ADMIN — ASPIRIN 81 MG: 81 TABLET, COATED ORAL at 09:44

## 2021-08-25 RX ADMIN — MAGNESIUM SULFATE 2000 MG: 2 INJECTION INTRAVENOUS at 16:34

## 2021-08-25 RX ADMIN — ACETAMINOPHEN 650 MG: 325 TABLET ORAL at 21:16

## 2021-08-25 RX ADMIN — DORZOLAMIDE HCL 1 DROP: 20 SOLUTION/ DROPS OPHTHALMIC at 09:44

## 2021-08-25 RX ADMIN — HYDRALAZINE HYDROCHLORIDE 25 MG: 25 TABLET, FILM COATED ORAL at 09:43

## 2021-08-25 RX ADMIN — METOCLOPRAMIDE 5 MG: 5 INJECTION, SOLUTION INTRAMUSCULAR; INTRAVENOUS at 16:22

## 2021-08-25 RX ADMIN — ENOXAPARIN SODIUM 40 MG: 40 INJECTION SUBCUTANEOUS at 09:42

## 2021-08-25 RX ADMIN — Medication 10 ML: at 21:16

## 2021-08-25 RX ADMIN — METOCLOPRAMIDE 5 MG: 5 INJECTION, SOLUTION INTRAMUSCULAR; INTRAVENOUS at 22:11

## 2021-08-25 RX ADMIN — Medication 10 ML: at 09:41

## 2021-08-25 RX ADMIN — DORZOLAMIDE HCL 1 DROP: 20 SOLUTION/ DROPS OPHTHALMIC at 21:08

## 2021-08-25 RX ADMIN — Medication 10 ML: at 00:34

## 2021-08-25 RX ADMIN — METOPROLOL TARTRATE 25 MG: 25 TABLET, FILM COATED ORAL at 09:43

## 2021-08-25 RX ADMIN — METOPROLOL TARTRATE 5 MG: 5 INJECTION INTRAVENOUS at 00:34

## 2021-08-25 ASSESSMENT — PAIN DESCRIPTION - ONSET
ONSET: ON-GOING

## 2021-08-25 ASSESSMENT — PAIN DESCRIPTION - LOCATION
LOCATION: RIB CAGE
LOCATION: ABDOMEN

## 2021-08-25 ASSESSMENT — PAIN DESCRIPTION - PROGRESSION
CLINICAL_PROGRESSION: NOT CHANGED

## 2021-08-25 ASSESSMENT — PAIN SCALES - GENERAL
PAINLEVEL_OUTOF10: 6
PAINLEVEL_OUTOF10: 0
PAINLEVEL_OUTOF10: 5
PAINLEVEL_OUTOF10: 7
PAINLEVEL_OUTOF10: 7
PAINLEVEL_OUTOF10: 6
PAINLEVEL_OUTOF10: 7
PAINLEVEL_OUTOF10: 7

## 2021-08-25 ASSESSMENT — PAIN DESCRIPTION - DESCRIPTORS
DESCRIPTORS: ACHING
DESCRIPTORS: STABBING

## 2021-08-25 ASSESSMENT — PAIN DESCRIPTION - PAIN TYPE
TYPE: ACUTE PAIN
TYPE: ACUTE PAIN
TYPE: SURGICAL PAIN
TYPE: ACUTE PAIN

## 2021-08-25 ASSESSMENT — PAIN DESCRIPTION - FREQUENCY
FREQUENCY: CONTINUOUS

## 2021-08-25 ASSESSMENT — PAIN DESCRIPTION - ORIENTATION
ORIENTATION: MID
ORIENTATION: LEFT
ORIENTATION: LOWER;LEFT;RIGHT

## 2021-08-25 NOTE — FLOWSHEET NOTE
Message sent to NP; pt can swallow pills, can we change meds to PO? Did have PO Tylenol on night shift and did well.

## 2021-08-25 NOTE — PROGRESS NOTES
[address] and Laparoscopic Surgery        Post-op Note    Pt Name: Sana Gardner  MRN: [id number]  [place]: 1940  Date of evaluation: 2021    Subjective:    No acute events overnight  Pain improving  Passing flatus and stool  No nausea or vomiting since NGT removed, tolerating clear liquids  Resting in bed at this time      Vital Signs:  Patient Vitals for the past 24 hrs:   BP Temp Temp src Pulse Resp SpO2 Weight   21 1125 (!) 142/75 98.5 °F (36.9 °C) Oral 77 15 98 % --   21 0915 (!) 162/69 98.9 °F (37.2 °C) Oral 80 16 99 % --   21 0715 -- -- -- -- -- -- 160 lb 14.4 oz (73 kg)   21 0614 (!) 156/68 -- -- 79 18 97 % --   21 0252 (!) 153/72 98.5 °F (36.9 °C) Oral 88 20 97 % --   21 0205 (!) 149/75 99.2 °F (37.3 °C) Oral 81 20 97 % --   21 0030 (!) 152/67 99.1 °F (37.3 °C) Oral 96 20 96 % --   21 2250 (!) 170/65 -- -- -- -- -- --   21 (!) 181/75 98.6 °F (37 °C) Oral 86 18 98 % --   21 1715 (!) 177/75 -- -- -- -- -- --   21 1714 -- -- -- 96 -- -- --      TEMPERATURE HISTORY 24H: Temp (24hrs), Av.8 °F (37.1 °C), Min:98.5 °F (36.9 °C), Max:99.2 °F (37.3 °C)    BLOOD PRESSURE HISTORY: Systolic (02FFE), MNV:296 , Min:134 , WDL:731    Diastolic (62VCS), BDX:30, Min:61, Max:75      Intake/Output:    I/O last 3 completed shifts: In: 30 [I.V.:30]  Out: -   I/O this shift:  In: 2155.3 [P.O.:240;  I.V.:1915.3]  Out: -   Drain/tube Output:           Physical Exam:  General: awake, alert, oriented to person, place, time  Lungs: unlabored respirations  Abdomen: soft, mild distension, appropriate incisional tenderness, incision, active bowel sounds  Skin/Wound: healing well, small opening along upper portion of incision--scant amount of old bloody drainage/maroon, no erythema, otherwise well approximated--dressing changed    Labs:  CBC:    Recent Labs     21  0504 21  0504 21  0524   WBC 5.3 5.0 4.3   HGB 7.2* 7. 4* 7.1*   HCT 21.8* 22.4* 21.2*    435 419     BMP:    Recent Labs     08/23/21  0504 08/24/21  0504 08/25/21  0524    140 139   K 3.6 3.6 3.5    106 107   CO2 21 22 23   BUN 8 5* 6*   CREATININE 0.8 0.9 0.8   GLUCOSE 156* 167* 139*     Hepatic:   Recent Labs     08/23/21  0504 08/24/21  0504   AST 27 16   ALT 27 22   BILITOT 0.5 0.5   ALKPHOS 69 65     Amylase: No results for input(s): AMYLASE in the last 72 hours. Lipase:   No results for input(s): LIPASE in the last 72 hours. Mag:      Recent Labs     08/25/21  0524   MG 1.40*      Phos:     No results for input(s): PHOS in the last 72 hours. Coags:   No results for input(s): INR, APTT in the last 72 hours. Cultures:  Anaerobic culture  No results for input(s): LABANAE in the last 72 hours. Blood culture  No results for input(s): BC in the last 72 hours. Blood culture 2  No results for input(s): BLOODCULT2 in the last 72 hours. Body fluid culture  No results for input(s): BLOODCULT2 in the last 72 hours. Surgical culture  No results for input(s): CXSURG in the last 72 hours. Fecal occult  No results for input(s): OCCULTBLDFEC in the last 72 hours. Gram stain  No results for input(s): LABGRAM in the last 72 hours. Stool culture 1  No results for input(s): CXST in the last 72 hours. Stool culture 2  No results for input(s): STOOLCULT2 in the last 72 hours. Urine culture  No results for input(s): LABURIN in the last 72 hours. Wound abscess  No results for input(s): WNDABS in the last 72 hours. C Diff   No results for input(s): CDIFFTOXAB in the last 72 hours. Imaging:  I have personally reviewed the following films:    FL SMALL BOWEL FOLLOW THROUGH ONLY    Result Date: 8/24/2021  EXAMINATION: SMALL BOWEL FOLLOW THROUGH SERIES 8/24/2021 TECHNIQUE: Small bowel follow through series was performed with overhead images and spot images.  FLUOROSCOPY DOSE AND TYPE OR TIME AND EXPOSURES: Fluoroscopy was not used for the study. COMPARISON: Abdominal CT 08/21/2021, abdominal plain films dated 08/22/2021 and 08/23/2021. HISTORY: ORDERING SYSTEM PROVIDED HISTORY: Eval SBO vs ileus TECHNOLOGIST PROVIDED HISTORY: Reason for exam:->Eval SBO vs ileus Reason for Exam: Eval SBO vs ileus Acuity: Unknown Type of Exam: Initial FINDINGS: An initial  image of the abdomen and pelvis was performed which demonstrates moderately dilated small bowel throughout the abdomen. Multiple surgical clips are noted. An NG tube overlies the left upper quadrant, likely within the stomach. 200 cc of Omnipaque contrast was then instilled into the stomach via the patient's NG tube and overhead images of the small bowel were performed. The stomach is normal in appearance, without evidence of filling defect or ulcer. There was a delay in transit of contrast through the small bowel into the colon, with contrast reaching the colon at 3 hours, mildly delayed. There remains moderate dilatation of multiple proximal small bowel loops within the left upper quadrant, with more distal small bowel loops remaining normal in caliber. The bowel gas pattern remains consistent with an improving partial small bowel obstruction or mild-to-moderate small bowel ileus. There is no evidence of an abnormal small bowel stricture, filling defect, mass, or ulcer. Persistent dilatation of proximal small bowel loops, with the bowel gas pattern most consistent with an improving small bowel obstruction or small-bowel ileus. There is no evidence of high-grade obstruction. XR ABDOMEN (2 VIEWS)    Result Date: 8/23/2021  EXAMINATION: TWO XRAY VIEWS OF THE ABDOMEN 8/23/2021 4:10 pm COMPARISON: None.  HISTORY: ORDERING SYSTEM PROVIDED HISTORY: F/u SBO/ileus TECHNOLOGIST PROVIDED HISTORY: Reason for exam:->F/u SBO/ileus Reason for Exam: F/u SBO/ileus Acuity: Unknown Type of Exam: Unknown FINDINGS: The nasogastric tube has a normal course, distal side port and tip at the level of the gastric fundus. Cholecystectomy clips are present. Small bowel and colon are gaseous. No free air is seen. Nasogastric tube is in normal position. Ileus pattern is again noted. Scheduled Meds:   hydrALAZINE  25 mg Oral BID    metoprolol tartrate  25 mg Oral BID    aspirin  81 mg Oral Daily    magnesium sulfate  2,000 mg IntraVENous Once    metoclopramide  5 mg IntraVENous Q6H    dorzolamide  1 drop Both Eyes BID    And    timolol  1 drop Both Eyes Nightly    [Held by provider] b complex-C-folic acid  1 mg Oral Daily with breakfast    [Held by provider] fenofibrate  54 mg Oral Daily    [Held by provider] folic acid  1 mg Oral Daily    latanoprost  1 drop Both Eyes Nightly    [Held by provider] therapeutic multivitamin-minerals  1 tablet Oral Daily    Netarsudil-Latanoprost  1 drop Ophthalmic Nightly    insulin lispro  0-6 Units Subcutaneous TID WC    insulin lispro  0-3 Units Subcutaneous Nightly    sodium chloride flush  5-40 mL IntraVENous 2 times per day    enoxaparin  40 mg Subcutaneous Daily    pantoprazole  40 mg IntraVENous Daily     Continuous Infusions:   dextrose Stopped (08/21/21 0304)    sodium chloride       PRN Meds:.artificial tears, glucose, dextrose, glucagon (rDNA), dextrose, sodium chloride flush, sodium chloride, ondansetron **OR** ondansetron, acetaminophen **OR** acetaminophen, ketorolac      Assessment:  Small bowel obstruction verses ileus  Status-post laparoscopic lysis of adhesions, repair of ventral hernia with mesh with Dr. Khalif Bishop on 8/9/2021  Diabetes      Plan:  1. Pain and bloating improving, passing flatus and stool; small bowel follow through yesterday demonstrates persistent dilatation of proximal small bowel loops, with the bowel gas pattern most consistent with an improving small bowel obstruction or ileus; no plans for surgical intervention, add Reglan if patient can tolerate  2.  Advance to low fiber diet as tolerated--dietitian consulted for diet education; monitor bowel function  3. Stop IV hydration; monitor and correct electrolytes  4. Activity as tolerated, ambulate TID, up to chair for meals  5. PRN analgesics and antiemetics--minimizing narcotics as tolerated, transition to PO  6. Management of medical comorbid etiologies per primary team and consulting services  7. Disposition: Anticipate discharge home tomorrow from a surgical perspective    EDUCATION:  Educated patient on plan of care and disease process--all questions answered. Plans discussed with patient and nursing. Reviewed and discussed with Dr. Alek Albert.       Signed:  YAZMIN Ogden - CNP  8/25/2021 2:07 PM     25-year-old female seen in follow-up for postop ileus versus partial small bowel obstruction  Patient is clinically improving  Contrast reaches the colon on small bowel follow-through yesterday  Tolerating NG tube out in clear liquids  Will advance to a low residue diet  Home in a.m. if doing well

## 2021-08-25 NOTE — PROGRESS NOTES
[facility] PROGRESS NOTE    8/25/2021 7:59 AM        Name: Rama Patterson . Admitted: 8/21/2021  Primary Care Provider: Corey Rai MD (Tel: [phone])      Chief Complaint   Patient presents with    Abdominal Pain     pt states hernia surgery on the 9th. now with increased pain and nausea. states also having loose, uncontrollable stools. Brief History: Patient is an 81 yo female with hx CAD/stents, CKD, HTN, HLD, DM2. She was hospitalized 8/9-8/15 with bowel obstruction and underwent laparoscopic lysis of adhesions and mesh repair ventral hernia 8/9. Patient returned to hospital with increased abdominal pain, nausea, decreased po intake and loose stools. CT abd/pelvis consistent with ongoing postoperative ileus vs persistent sbo. Subjective:  Presently resting in bed. NGT has been removed, tolerating clear liquid diet. Continues to note generalized abdominal pain, distention is less, no nausea. Reports several liquid BMs overnight. Denies shortness of breath, chest pain, chills.      Reviewed interval ancillary notes    Current Medications  metoprolol (LOPRESSOR) injection 5 mg, Q8H  dorzolamide (TRUSOPT) 2 % ophthalmic solution 1 drop, BID   And  timolol (TIMOPTIC) 0.5 % ophthalmic solution 1 drop, Nightly  lubrifresh P.M. (artificial tears) ophthalmic ointment, PRN  [Held by provider] b complex-C-folic acid (NEPHROCAPS) capsule 1 mg, Daily with breakfast  [Held by provider] fenofibrate (TRICOR) tablet 54 mg, Daily  [Held by provider] folic acid (FOLVITE) tablet 1 mg, Daily  latanoprost (XALATAN) 0.005 % ophthalmic solution 1 drop, Nightly  [Held by provider] therapeutic multivitamin-minerals 1 tablet, Daily  Netarsudil-Latanoprost 0.02-0.005 % SOLN 1 drop PATIENT SUPPLIED, Nightly  insulin lispro (1 Unit Dial) 0-6 Units, TID WC  insulin lispro (1 Unit Dial) 0-3 Units, Nightly  glucose (GLUTOSE) 40 % oral gel 15 g, PRN  dextrose 50 % IV solution, PRN  glucagon (rDNA) injection 1 mg, PRN  dextrose 5 % solution, PRN  sodium chloride flush 0.9 % injection 5-40 mL, 2 times per day  sodium chloride flush 0.9 % injection 10 mL, PRN  0.9 % sodium chloride infusion, PRN  enoxaparin (LOVENOX) injection 40 mg, Daily  ondansetron (ZOFRAN-ODT) disintegrating tablet 4 mg, Q8H PRN   Or  ondansetron (ZOFRAN) injection 4 mg, Q6H PRN  acetaminophen (TYLENOL) tablet 650 mg, Q6H PRN   Or  acetaminophen (TYLENOL) suppository 650 mg, Q6H PRN  ketorolac (TORADOL) injection 15 mg, Q6H PRN  pantoprazole (PROTONIX) injection 40 mg, Daily  aspirin suppository 300 mg, Daily  hydrALAZINE (APRESOLINE) injection 10 mg, BID  dextrose 5 % and 0.45 % sodium chloride infusion, Continuous        Objective:  BP (!) 156/68   Pulse 79   Temp 98.5 °F (36.9 °C) (Oral)   Resp 18   Ht 5' 3\" (1.6 m)   Wt 160 lb 14.4 oz (73 kg)   SpO2 97%   BMI 28.50 kg/m²     Intake/Output Summary (Last 24 hours) at 8/25/2021 0759  Last data filed at 8/25/2021 0615  Gross per 24 hour   Intake 30 ml   Output --   Net 30 ml      Wt Readings from Last 3 Encounters:   08/25/21 160 lb 14.4 oz (73 kg)   08/13/21 162 lb 4 oz (73.6 kg)   06/08/21 172 lb (78 kg)     General:  Awake, alert, oriented in NAD  Skin:  Warm and dry. No unusual bruising or rash  Neck:  Supple. No JVD appreciated  Chest:  Normal effort.   Clear to auscultation, no wheezes/rhonchi/rales  Cardiovascular:  RRR, normal S1/S2, no murmur/gallop/rub  Abdomen:  Soft, , mild distention, diffusely tender, +bowel sounds, dressing intact  Extremities:  No edema  Neurological: No focal deficits  Psychological: Normal mood and affect      Labs and Tests:  CBC:   Recent Labs     08/23/21  0504 08/24/21  0504 08/25/21  0524   WBC 5.3 5.0 4.3   HGB 7.2* 7.4* 7.1*    435 419     BMP:    Recent Labs     08/23/21  0504 08/24/21  0504 08/25/21  0524    140 139   K 3.6 3.6 3.5    106 107   CO2 21 22 23   BUN 8 5* 6*   CREATININE 0.8 0.9 0.8   GLUCOSE 156* 167* 139*     Hepatic:   Recent Labs     08/23/21  0504 08/24/21  0504   AST 27 16   ALT 27 22   BILITOT 0.5 0.5   ALKPHOS 69 65       Results for Sudhakar Mcbride (MRN [id number]) as of 8/25/2021 08:05   Ref. Range 8/24/2021 07:25 8/24/2021 13:09 8/24/2021 16:50 8/24/2021 20:02 8/25/2021 01:57   POC Glucose Latest Ref Range: 70 - 99 mg/dl 177 (H) 130 (H) 111 (H) 139 (H) 137 (H)         CT Abd/Pelvis 8/21/2021: Interval surgical reduction of right supraumbilical herniation previously   containing small bowel loops.       Persisting scattered small bowel dilatation with fluid-filled bowel loops   proximal to the surgical site consistent with ongoing postoperative ileus   versus persisting small bowel obstruction.  Differential diagnostic   considerations include possible presence of persisting scarring/adhesions in   region of the ventral abdominal wall surgical site.  Small bowel dilatation   has worsened in comparison with prior preoperative study.  Recommend surgical   consultation. Haven Crawley suboptimally evaluated in absence of intravenous   iodinated contrast administration. Xray Abdomen 8/22/2021:  Mild pulmonary vascular congestion.       Loops of mildly dilated bowel in the visualized upper abdomen, likely related   to ileus. Xray Abdomen 8/23/2021:  Nasogastric tube is in normal position.       Ileus pattern is again noted. Small Bowel Follow Through 8/24/2021:  Persistent dilatation of proximal small bowel loops, with the bowel gas   pattern most consistent with an improving small bowel obstruction or   small-bowel ileus. Celso Fuelling is no evidence of high-grade obstruction. Problem List  Active Problems:    Ileus ([address]  Resolved Problems:    * No resolved hospital problems. *       Assessment & Plan:   1. Ileus vs SBO. CT abdomen suggests ongoing postop ileus vs persisting small bowel obstruction.  Small bowel follow through series yesterday showed improvement. NG tube has been removed, tolerating clear liquid diet. Surgery on board, recommend continued conservative management. Encourage ambulation and minimize narcotics. Decrease IV fluids to 50 ml/hr. 2. Hypokalemia/hypomagnesemia. Replaced. Secondary to GI losses. Potassium 3.5 today, add on magnesium level. Continue to monitor. 3. DM2. Takes Lantus nightly at home. Being covered with low dose correction. BG values controlled. 4. Macrocytic anemia. Appears to be chronic issue but Hgb much lower than baseline most likely secondary to recent abdominal surgery. Iron studies lower limits normal, no deficiency vitamin B12 and folate on recent labs (8/10). Continue to follow. 5. HTN. Reasonably controlled. Has been receiving IV antihypertensive medications while NPO. Now tolerating clear liquids. Abd films yesterday show improvement. Resume hydralazine and metoprolol as taking at home. 6. Deconditioning. PT/OT have evaluated, recommend home care on DC to address ADL and functional mobility deficits. Disposition: Anticipate home with [facility] when improved. Orders placed for home health.      Diet: ADULT DIET;  Clear Liquid  Code:Full Code  DVT PPX: enoxaparin      YAZMIN Correia CNP   8/25/2021 7:59 AM

## 2021-08-26 VITALS
RESPIRATION RATE: 18 BRPM | DIASTOLIC BLOOD PRESSURE: 71 MMHG | SYSTOLIC BLOOD PRESSURE: 160 MMHG | WEIGHT: 160.2 LBS | OXYGEN SATURATION: 97 % | HEIGHT: 63 IN | HEART RATE: 81 BPM | TEMPERATURE: 97.5 F | BODY MASS INDEX: 28.39 KG/M2

## 2021-08-26 LAB
ANION GAP SERPL CALCULATED.3IONS-SCNC: 11 MMOL/L (ref 3–16)
BASOPHILS ABSOLUTE: 0 K/UL (ref 0–0.2)
BASOPHILS RELATIVE PERCENT: 0.4 %
BUN BLDV-MCNC: 4 MG/DL (ref 7–20)
CALCIUM SERPL-MCNC: 8.9 MG/DL (ref 8.3–10.6)
CHLORIDE BLD-SCNC: 108 MMOL/L (ref 99–110)
CO2: 23 MMOL/L (ref 21–32)
CREAT SERPL-MCNC: 0.9 MG/DL (ref 0.6–1.2)
EOSINOPHILS ABSOLUTE: 0.2 K/UL (ref 0–0.6)
EOSINOPHILS RELATIVE PERCENT: 4 %
GFR AFRICAN AMERICAN: >60
GFR NON-AFRICAN AMERICAN: >60
GLUCOSE BLD-MCNC: 111 MG/DL (ref 70–99)
GLUCOSE BLD-MCNC: 117 MG/DL (ref 70–99)
GLUCOSE BLD-MCNC: 145 MG/DL (ref 70–99)
HCT VFR BLD CALC: 22.6 % (ref 36–48)
HEMOGLOBIN: 7.7 G/DL (ref 12–16)
LYMPHOCYTES ABSOLUTE: 0.6 K/UL (ref 1–5.1)
LYMPHOCYTES RELATIVE PERCENT: 14.6 %
MAGNESIUM: 1.6 MG/DL (ref 1.8–2.4)
MCH RBC QN AUTO: 34.4 PG (ref 26–34)
MCHC RBC AUTO-ENTMCNC: 34.1 G/DL (ref 31–36)
MCV RBC AUTO: 100.8 FL (ref 80–100)
MONOCYTES ABSOLUTE: 0.4 K/UL (ref 0–1.3)
MONOCYTES RELATIVE PERCENT: 8.7 %
NEUTROPHILS ABSOLUTE: 3 K/UL (ref 1.7–7.7)
NEUTROPHILS RELATIVE PERCENT: 72.3 %
PDW BLD-RTO: 15.7 % (ref 12.4–15.4)
PERFORMED ON: ABNORMAL
PERFORMED ON: ABNORMAL
PHOSPHORUS: 3.1 MG/DL (ref 2.5–4.9)
PLATELET # BLD: 478 K/UL (ref 135–450)
PMV BLD AUTO: 6.7 FL (ref 5–10.5)
POTASSIUM SERPL-SCNC: 3.7 MMOL/L (ref 3.5–5.1)
RBC # BLD: 2.24 M/UL (ref 4–5.2)
SODIUM BLD-SCNC: 142 MMOL/L (ref 136–145)
WBC # BLD: 4.1 K/UL (ref 4–11)

## 2021-08-26 PROCEDURE — 97530 THERAPEUTIC ACTIVITIES: CPT

## 2021-08-26 PROCEDURE — 83735 ASSAY OF MAGNESIUM: CPT

## 2021-08-26 PROCEDURE — 6360000002 HC RX W HCPCS: Performed by: NURSE PRACTITIONER

## 2021-08-26 PROCEDURE — APPNB30 APP NON BILLABLE TIME 0-30 MINS: Performed by: NURSE PRACTITIONER

## 2021-08-26 PROCEDURE — 6370000000 HC RX 637 (ALT 250 FOR IP): Performed by: INTERNAL MEDICINE

## 2021-08-26 PROCEDURE — 6370000000 HC RX 637 (ALT 250 FOR IP): Performed by: NURSE PRACTITIONER

## 2021-08-26 PROCEDURE — 2580000003 HC RX 258: Performed by: INTERNAL MEDICINE

## 2021-08-26 PROCEDURE — 36415 COLL VENOUS BLD VENIPUNCTURE: CPT

## 2021-08-26 PROCEDURE — 80048 BASIC METABOLIC PNL TOTAL CA: CPT

## 2021-08-26 PROCEDURE — 6360000002 HC RX W HCPCS: Performed by: INTERNAL MEDICINE

## 2021-08-26 PROCEDURE — 99024 POSTOP FOLLOW-UP VISIT: CPT | Performed by: SURGERY

## 2021-08-26 PROCEDURE — C9113 INJ PANTOPRAZOLE SODIUM, VIA: HCPCS | Performed by: NURSE PRACTITIONER

## 2021-08-26 PROCEDURE — 97116 GAIT TRAINING THERAPY: CPT

## 2021-08-26 PROCEDURE — APPSS15 APP SPLIT SHARED TIME 0-15 MINUTES: Performed by: NURSE PRACTITIONER

## 2021-08-26 PROCEDURE — 84100 ASSAY OF PHOSPHORUS: CPT

## 2021-08-26 PROCEDURE — 85025 COMPLETE CBC W/AUTO DIFF WBC: CPT

## 2021-08-26 RX ORDER — METOCLOPRAMIDE 5 MG/1
5 TABLET ORAL 4 TIMES DAILY
Qty: 120 TABLET | Refills: 1 | Status: SHIPPED | OUTPATIENT
Start: 2021-08-26

## 2021-08-26 RX ORDER — MAGNESIUM SULFATE IN WATER 40 MG/ML
4000 INJECTION, SOLUTION INTRAVENOUS ONCE
Status: COMPLETED | OUTPATIENT
Start: 2021-08-26 | End: 2021-08-26

## 2021-08-26 RX ADMIN — PANTOPRAZOLE SODIUM 40 MG: 40 INJECTION, POWDER, FOR SOLUTION INTRAVENOUS at 10:11

## 2021-08-26 RX ADMIN — METOCLOPRAMIDE 5 MG: 5 INJECTION, SOLUTION INTRAMUSCULAR; INTRAVENOUS at 04:05

## 2021-08-26 RX ADMIN — HYDRALAZINE HYDROCHLORIDE 25 MG: 25 TABLET, FILM COATED ORAL at 10:11

## 2021-08-26 RX ADMIN — METOPROLOL TARTRATE 25 MG: 25 TABLET, FILM COATED ORAL at 10:11

## 2021-08-26 RX ADMIN — METOCLOPRAMIDE 5 MG: 5 INJECTION, SOLUTION INTRAMUSCULAR; INTRAVENOUS at 10:12

## 2021-08-26 RX ADMIN — ENOXAPARIN SODIUM 40 MG: 40 INJECTION SUBCUTANEOUS at 10:11

## 2021-08-26 RX ADMIN — Medication 10 ML: at 02:19

## 2021-08-26 RX ADMIN — ASPIRIN 81 MG: 81 TABLET, COATED ORAL at 10:11

## 2021-08-26 RX ADMIN — MAGNESIUM SULFATE HEPTAHYDRATE 4000 MG: 40 INJECTION, SOLUTION INTRAVENOUS at 13:26

## 2021-08-26 RX ADMIN — DORZOLAMIDE HCL 1 DROP: 20 SOLUTION/ DROPS OPHTHALMIC at 10:11

## 2021-08-26 RX ADMIN — KETOROLAC TROMETHAMINE 15 MG: 30 INJECTION, SOLUTION INTRAMUSCULAR; INTRAVENOUS at 02:19

## 2021-08-26 RX ADMIN — ACETAMINOPHEN 650 MG: 325 TABLET ORAL at 02:19

## 2021-08-26 RX ADMIN — KETOROLAC TROMETHAMINE 15 MG: 30 INJECTION, SOLUTION INTRAMUSCULAR; INTRAVENOUS at 11:33

## 2021-08-26 RX ADMIN — Medication 10 ML: at 10:12

## 2021-08-26 RX ADMIN — Medication 10 ML: at 04:06

## 2021-08-26 ASSESSMENT — PAIN DESCRIPTION - LOCATION
LOCATION: ABDOMEN
LOCATION: ABDOMEN
LOCATION: RIB CAGE
LOCATION: ABDOMEN
LOCATION: RIB CAGE

## 2021-08-26 ASSESSMENT — PAIN DESCRIPTION - PAIN TYPE
TYPE: SURGICAL PAIN
TYPE: ACUTE PAIN
TYPE: SURGICAL PAIN

## 2021-08-26 ASSESSMENT — PAIN SCALES - GENERAL
PAINLEVEL_OUTOF10: 7
PAINLEVEL_OUTOF10: 7
PAINLEVEL_OUTOF10: 0
PAINLEVEL_OUTOF10: 7
PAINLEVEL_OUTOF10: 7
PAINLEVEL_OUTOF10: 5
PAINLEVEL_OUTOF10: 6

## 2021-08-26 ASSESSMENT — PAIN DESCRIPTION - ONSET
ONSET: ON-GOING
ONSET: ON-GOING

## 2021-08-26 ASSESSMENT — PAIN DESCRIPTION - FREQUENCY
FREQUENCY: CONTINUOUS
FREQUENCY: CONTINUOUS

## 2021-08-26 ASSESSMENT — PAIN DESCRIPTION - ORIENTATION
ORIENTATION: LEFT
ORIENTATION: LEFT

## 2021-08-26 ASSESSMENT — PAIN DESCRIPTION - DESCRIPTORS
DESCRIPTORS: STABBING;CONSTANT
DESCRIPTORS: CONSTANT

## 2021-08-26 NOTE — DISCHARGE SUMMARY
[facility] DISCHARGE SUMMARY    Patient Demographics    Patient. Cristina Collier  Date of Birth. 1940  MRN. [id number]     Primary care provider. Erica Cottrell MD  (Tel: [phone])    Admit date: 8/21/2021    Discharge date (blank if same as Note Date): Note Date: 8/26/2021     Reason for Hospitalization. Chief Complaint   Patient presents with    Abdominal Pain     pt states hernia surgery on the 9th. now with increased pain and nausea. states also having loose, uncontrollable stools. Significant Findings. Active Problems:    Ileus ([address]  Resolved Problems:    * No resolved hospital problems. *       Problems and results from this hospitalization that need follow up. 1. Surgery follow up in 1 week. 2. Recommend recheck CBC, BMP, magnesium in 1 week. Home health to draw. Significant test results and incidental findings. CT Abd/Pelvis 8/21/2021: Interval surgical reduction of right supraumbilical herniation previously   containing small bowel loops.       Persisting scattered small bowel dilatation with fluid-filled bowel loops   proximal to the surgical site consistent with ongoing postoperative ileus   versus persisting small bowel obstruction.  Differential diagnostic   considerations include possible presence of persisting scarring/adhesions in   region of the ventral abdominal wall surgical site.  Small bowel dilatation   has worsened in comparison with prior preoperative study.  Recommend surgical   consultation. Kapil Alatorrel suboptimally evaluated in absence of intravenous   iodinated contrast administration.      Xray Abdomen 8/22/2021:  Mild pulmonary vascular congestion.       Loops of mildly dilated bowel in the visualized upper abdomen, likely related   to ileus.      Xray Abdomen 8/23/2021:  Nasogastric tube is in normal position.       Ileus pattern is again noted.    Small Bowel Follow Through 8/24/2021:  Persistent dilatation of proximal small bowel loops, with the bowel gas   pattern most consistent with an improving small bowel obstruction or   small-bowel ileus. Arnetta Bloch is no evidence of high-grade obstruction.          Invasive procedures and treatments. 1. None     Problem-based Hospital Course. Patient is an 81 yo female with hx CAD/stents, CKD, HTN, HLD, DM2. She was hospitalized 8/9-8/15 with bowel obstruction and underwent laparoscopic lysis of adhesions and mesh repair ventral hernia 8/9. Patient returned to hospital with increased abdominal pain, nausea, decreased po intake and loose stools. CT abd/pelvis consistent with ongoing postoperative ileus vs persistent sbo. 1. Ileus vs SBO. CT abdomen suggests ongoing postop ileus vs persisting small bowel obstruction. Surgery evaluated, treated conservatively. Small bowel follow through series 8/24 showed improvement. NG tube removed and diet advanced and tolerated. Patient passing stool and flatus. 2. Hypokalemia/hypomagnesemia. Replaced. Secondary to GI losses. Potassium 3.7 on DC, magnesium 1.6 and she was given IV magnesium prior to DC. Home health to recheck level within 1 week. 3. DM2. Covered with low dose correction in hospital. Resumed Lantus on DC. 4. Macrocytic anemia. Appears to be chronic issue but Hgb lower than baseline most likely secondary to recent abdominal surgery. Iron studies lower limits normal, no deficiency vitamin B12 and folate on recent labs (8/10). 5. HTN. Controlled. Initially received IV antihypertensive medications while NPO. Home meds resumed once NG tube removed. Spironolactone held on admission and discharge. Resume on follow up visit if renal numbers stable. 6. Deconditioning. PT/OT have evaluated, recommend home care on DC to address ADL and functional mobility deficits. Reviewed DC plans, follow up and medications reviewed with patient.  Expresses understanding. Questions answered. Consults. IP CONSULT TO GENERAL SURGERY  IP CONSULT TO HOSPITALIST  IP CONSULT TO HOME CARE NEEDS  IP CONSULT TO DIETITIAN    Physical examination on discharge day. BP (!) 160/71   Pulse 81   Temp 97.5 °F (36.4 °C) (Oral)   Resp 18   Ht 5' 3\" (1.6 m)   Wt 160 lb 3.2 oz (72.7 kg)   SpO2 97%   BMI 28.38 kg/m²   General appearance. Alert. Looks comfortable. HEENT. Sclera clear. Moist mucus membranes. Cardiovascular. Regular rate and rhythm, normal S1, S2. No murmur. Respiratory. Not using accessory muscles. Clear to auscultation bilaterally, no wheeze. Gastrointestinal. Abdomen soft, non-tender, not distended, normal bowel sounds, dressing intact  Neurology. Facial symmetry. No speech deficits. Moving all extremities equally. Extremities. No edema in lower extremities. Skin. Warm, dry, normal turgor    Condition at time of discharge stable    Medication instructions provided to patient at discharge.      Medication List      START taking these medications    metoclopramide 5 MG tablet  Commonly known as: Reglan  Take 1 tablet by mouth 4 times daily  Notes to patient: Use: GERD, acid reflux  Side effects: diarrhea, sleepiness        CONTINUE taking these medications    acetaminophen 325 MG tablet  Commonly known as: TYLENOL  Notes to patient: Pain      aspirin 81 MG EC tablet  Notes to patient: Prevents stroke/heart attack     B complex-vitamin C-folic acid 1 MG tablet  Take 1 tablet by mouth daily (with breakfast)  Notes to patient: Supplement      B-12 500 MCG Tabs  Notes to patient: Supplement      Betimol 0.5 % ophthalmic solution  Generic drug: timolol     CoQ10 100 MG Caps  Notes to patient: Supplement      dorzolamide-timolol 22.3-6.8 MG/ML ophthalmic solution  Commonly known as: COSOPT     ezetimibe 10 MG tablet  Commonly known as: ZETIA  TAKE ONE TABLET BY MOUTH DAILY  Notes to patient: Cholesterol      fenofibrate 48 MG tablet  Commonly known as: Tricor  Take 1 tablet by mouth daily  Notes to patient: Cholesterol      fluticasone-umeclidin-vilant 100-62.5-25 MCG/INH Aepb  Commonly known as: Trelegy Ellipta  Inhale 1 puff into the lungs daily Expiration date 7/ 2021  Lot [id number]  Notes to patient: Breathing      folic acid 1 MG tablet  Commonly known as: FOLVITE  Take 1 tablet by mouth daily  Notes to patient: Supplement      hydrALAZINE 25 MG tablet  Commonly known as: APRESOLINE  Take 1 tablet by mouth 2 times daily  Notes to patient: Blood pressure      [employer] Pen Needles 31G 31G X 8 MM Misc  Generic drug: Insulin Pen Needle  1 each by Does not apply route daily     Lantus SoloStar 100 UNIT/ML injection pen  Generic drug: insulin glargine  Inject 22 Units into the skin nightly Indications: sliding scale up to 22 units Lease refill 8/23/18 per patient requests     latanoprost 0.005 % ophthalmic solution  Commonly known as: XALATAN     metoprolol tartrate 25 MG tablet  Commonly known as: LOPRESSOR  Notes to patient: Blood pressure      Netarsudil-Latanoprost 0.02-0.005 % Soln     Ocuvite Eye + Multi Tabs     Prevacid 15 MG delayed release capsule  Generic drug: lansoprazole  Notes to patient: Acid reflux     sennosides-docusate sodium 8.6-50 MG tablet  Commonly known as: SENOKOT-S  Take 1 tablet by mouth daily  Notes to patient: Constipation      vitamin D 1.25 MG (40372 UT) Caps capsule  Commonly known as: ERGOCALCIFEROL  Notes to patient: Supplement         STOP taking these medications    spironolactone 25 MG tablet  Commonly known as: ALDACTONE           Where to Get Your Medications      These medications were sent to [address] [place] [address] Dr [address], [address] [place] [address]  [facility], [address]    Phone: [phone]   · metoclopramide 5 MG tablet         Discharge recommendations given to patient. Follow Up. Surgery in 1 week, PCP in 2-3 weeks   Disposition. Home with home care  Activity. activity as tolerated  Diet: ADULT DIET; Regular; Low Fiber  Adult Oral Nutrition Supplement; Low Calorie/High Protein Oral Supplement      Spent 40 minutes in discharge process.     Signed:  YAZMIN Alicea CNP     8/26/2021 1:48 PM

## 2021-08-26 NOTE — DISCHARGE INSTR - COC
Continuity of Care Form    Patient Name: Geni Esparza   :    MRN:  [id number]    Admit date:  2021  Discharge date:  2021    Code Status Order: Full Code   Advance Directives:      Admitting Physician:  Eliana Frank MD  PCP: Nelson Saleem MD    Discharging Nurse: Hayden Vogelq 23 Unit/Room#: 3RG-4937/0904-60  Discharging Unit Phone Number: [phone]    Emergency Contact:   Extended Emergency Contact Information  Primary Emergency Contact: Ryley Epps  Home Phone: [phone]  Relation: Child  Secondary Emergency Contact: Tarik Epps  Address: [address] [place] [address] Phone: [phone]  Relation: Child    Past Surgical History:  Past Surgical History:   Procedure Laterality Date    CHOLECYSTECTOMY      COLONOSCOPY N/A 2019    COLONOSCOPY DIAGNOSTIC performed by Navjot Car MD at [place] [address] stents    HERNIA REPAIR      HYSTERECTOMY      LUMBAR DISCECTOMY  10/03/12    lumbar discectomy, 3-4 left    SMALL INTESTINE SURGERY      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 performed by Tigre Desai MD at [address] [place] [address] History:   Immunization History   Administered Date(s) Administered    COVID-19, Moderna, PF, 100mcg/0.5mL 2021, 2021    Influenza Vaccine, unspecified formulation 2007    Influenza Virus Vaccine 10/04/2012    Influenza, Quadv, adjuvanted, 65 yrs +, IM, PF (Fluad) 10/15/2020    Pneumococcal Conjugate 13-valent ([id number]) 2019    Pneumococcal Polysaccharide ([id number]) 2007    Zoster Recombinant (Shingrix) 2020       Active Problems:  Patient Active Problem List   Diagnosis Code    Herniated lumbar disc without myelopathy M51.26    CKD (chronic kidney disease) stage 3, GFR 30-59 ml/min ([facility]) N18.30    Vitamin D deficiency E55.9    Fibromyalgia M79.7    Type 2 diabetes mellitus with kidney complication, with long-term current use of insulin ([facility]) E11.29, Z79.4    Bilateral carpal tunnel syndrome G56.03    Intestinal obstruction ([facility]) K56.609    Chronic abdominal pain R10.9, G89.29    Trigger middle finger of right hand M65.331    Carpal tunnel syndrome, left G56.02    Bacterial overgrowth syndrome K63.89    Plantar fasciitis M72.2    Primary open angle glaucoma H40.1190    Renal insufficiency syndrome N28.9    Retrocalcaneal bursitis M77.50    S/P angioplasty with stent Z95.820    Ulcerative colitis ([facility] K51.90    Myalgia M79.10    Other neutropenia ([facility]) D70.8    Mixed hyperlipidemia E78.2    Coronary artery disease without angina pectoris I25.10    Essential hypertension I10    Incarcerated ventral hernia K43.6    Ileus ([facility]) K56.7       Isolation/Infection:   Isolation            No Isolation          Patient Infection Status       Infection Onset Added Last Indicated Last Indicated By Review Planned Expiration Resolved Resolved By    None active    Resolved    C-diff Rule Out 08/21/21 08/21/21 08/24/21 GI Bacterial Pathogens By PCR (Ordered)   08/25/21 Rule-Out Test Resulted    C-diff Rule Out  12/11/19 12/11/19 C DIFF TOXIN/ANTIGEN (Ordered)   12/12/19 Rule-Out Test Resulted            Nurse Assessment:  Last Vital Signs: BP (!) 160/71   Pulse 81   Temp 97.5 °F (36.4 °C) (Oral)   Resp 18   Ht 5' 3\" (1.6 m)   Wt 160 lb 3.2 oz (72.7 kg)   SpO2 97%   BMI 28.38 kg/m²     Last documented pain score (0-10 scale): Pain Level: 5  Last Weight:   Wt Readings from Last 1 Encounters:   08/26/21 160 lb 3.2 oz (72.7 kg)     Mental Status:  oriented and alert    IV Access:  - None    Nursing Mobility/ADLs:  Walking   Assisted  Transfer  Assisted  Bathing  Assisted  Dressing  Assisted  Toileting Assisted  Feeding  Independent  Med Admin  Assisted  Med Delivery   whole    Wound Care Documentation and Therapy:        Elimination:  Continence:   · Bowel: Yes  · Bladder: Yes  Urinary Catheter: None   Colostomy/Ileostomy/Ileal Conduit: No       Date of Last BM: 8/26/2021    Intake/Output Summary (Last 24 hours) at 8/26/2021 1352  Last data filed at 8/26/2021 0408  Gross per 24 hour   Intake 707.03 ml   Output --   Net 707.03 ml     I/O last 3 completed shifts: In: 3102.3 [P.O.:840; I.V.:2224.2; IV Piggyback:38.1]  Out: -     Safety Concerns:      At Risk for Falls    Impairments/Disabilities:      None    Nutrition Therapy:  Current Nutrition Therapy:   General; low fiber    Routes of Feeding: Oral  Liquids: No Restrictions  Daily Fluid Restriction: no  Last Modified Barium Swallow with Video (Video Swallowing Test): not done    Treatments at the Time of Hospital Discharge:   Respiratory Treatments: ***  Oxygen Therapy:  {Therapy; copd oxygen:33650:::0}  Ventilator:    { CC Vent List:[id number]:::0}    Rehab Therapies: Physical Therapy and Occupational Therapy  Weight Bearing Status/Restrictions: No weight bearing restirctions  Other Medical Equipment (for information only, NOT a DME order):  walker  Other Treatments: none    Patient's personal belongings (please select all that are sent with patient):  belongings pt came in with    RN SIGNATURE:  Electronically signed by José Miguel Cummings RN on 8/26/21 at 5:39 PM EDT    CASE MANAGEMENT/SOCIAL WORK SECTION    Inpatient Status Date: ***    Readmission Risk Assessment Score:  Readmission Risk              Risk of Unplanned Readmission:  14           Discharging to Facility/ Agency   · Name:   · Address:  · Phone:  · Fax:    Dialysis Facility (if applicable)   · Name:  · Address:  · Dialysis Schedule:  · Phone:  · Fax:    / signature: {Esignature:[id number]:::0}    PHYSICIAN SECTION    Prognosis: Good    Condition at Discharge: Stable    Rehab Potential (if transferring to Rehab): Good    Recommended Labs or Other Treatments After Discharge: PT/OT, check labs (CBC, BMP, magnesium) in 1 week, results to PCP. Physician Certification: I certify the above information and transfer of Sadia Larios  is necessary for the continuing treatment of the diagnosis listed and that she requires [address] for greater 30 days.      Update Admission H&P: No change in H&P    PHYSICIAN SIGNATURE:  Electronically signed by YAZMIN Oviedo CNP on 8/26/21 at 1:53 PM EDT

## 2021-08-26 NOTE — PLAN OF CARE
Nutrition Problem #1: Inadequate oral intake  Intervention: Food and/or Nutrient Delivery: Continue Current Diet, Start Oral Nutrition Supplement  Nutritional Goals: Consume 50% or greater of 3 meals per day and ONS without any GI distress

## 2021-08-26 NOTE — PROGRESS NOTES
CLINICAL PHARMACY NOTE: MEDS TO BEDS    Total # of Prescriptions Filled: 1   The following medications were delivered to the patient:  · Metoclopramide 5mg    Additional Documentation:  Medication delivered- patient signed  Lawrence Simms

## 2021-08-26 NOTE — PROGRESS NOTES
Physical Therapy  Facility/Department: [address]  Daily Treatment Note  NAME: Yash Chilel  : 1940  MRN: [id number]    Date of Service: 2021    Discharge Recommendations:Helena Marmolejo scored a 21/24 on the AM-PAC short mobility form. Current research shows that an AM-PAC score of 18 or greater is typically associated with a discharge to the patient's home setting. Based on the patient's AM-PAC score and their current functional mobility deficits, it is recommended that the patient have 2-3 sessions per week of Physical Therapy at d/c to increase the patient's independence. At this time, this patient demonstrates the endurance and safety to discharge home with home PT and a follow up treatment frequency of 2-3x/wk. Please see assessment section for further patient specific details. HOME HEALTH CARE: LEVEL 1 STANDARD    - Initial home health evaluation to occur within 24-48 hours, in patient home   - Therapy to evaluate with goal of regaining prior level of functioning   - Therapy to evaluate if patient has [address] needs for personal care  If patient discharges prior to next session this note will serve as a discharge summary. Please see below for the latest assessment towards goals. PT Equipment Recommendations  Equipment Needed: No (has RW)    Assessment   Body structures, Functions, Activity limitations: Decreased functional mobility ; Decreased strength;Decreased endurance;Decreased posture; Increased pain  Assessment: Pt mod I with bed mob and transfers, supervision wtih gait. Pt sister coming to stay with pt in home. Other supportive family members live nearby. Pt with decreased mobility, strength, endurance and increased pain. Pt needing skilled PT services to address these issues. Treatment Diagnosis: weakness, difficulty with gait.   Prognosis: Good  Decision Making: Medium Complexity  PT Education: PT Role;Plan of Care;Transfer Training;Functional Mobility Training;Home Exercise Program  Patient Education: Pt verbalized good understanding of POC. Barriers to Learning: none  REQUIRES PT FOLLOW UP: Yes  Activity Tolerance  Activity Tolerance: Patient Tolerated treatment well     Patient Diagnosis(es): The encounter diagnosis was Ileus ([address] has a past medical history of Angina, Bilateral carpal tunnel syndrome, Chronic kidney disease, Fibromyalgia, Hyperlipidemia, Hypertension, MVP (mitral valve prolapse), s, Small bowel obstruction ([address] and Type II or unspecified type diabetes mellitus without mention of complication, not stated as uncontrolled. has a past surgical history that includes Coronary angioplasty with stent; Hysterectomy; Cholecystectomy; lumbar discectomy (10/03/12); Small intestine surgery; Colonoscopy (N/A, 8/13/2019); hernia repair; and ventral hernia repair (N/A, 8/9/2021). Restrictions  Restrictions/Precautions  Restrictions/Precautions: Fall Risk (mod fall risk)  Required Braces or Orthoses?: No  Position Activity Restriction  Other position/activity restrictions: Hipolito Dacosta is a 80 y.o. female with past medical history of chronic kidney disease, hyperlipidemia, hypertension, diabetes, fibromyalgia and previous small bowel obstruction who presents to the ED with complaint of abdominal pain. Patient states she was just recently seen here in the emergency department for abdominal stanchion, nausea, vomiting and pain and diagnosed with a small bowel obstruction secondary to a hernia. Patient dates he had surgery on 8/9/2021 by general surgeon, Dr. Khalif Bishop, for repair of hernia. Patient states she was discharged home last Sunday. Patient states ever since she has been home she has had gradually increasing pain to her upper abdomen with associated distention. Patient states she had decreased oral intake with associated nausea and vomiting. Patient states she has had liquid stools.   Patient dates she has been passing gas but states she has not had a formed bowel movement in about 4 weeks. Subjective   General  Chart Reviewed: Yes  Response To Previous Treatment: Patient with no complaints from previous session. Family / Caregiver Present: No  Subjective  Subjective: Pt reporting abdomen pain. Pt wanting to use bathroom. General Comment  Comments: Pt supine in bed upon arrival.  Pain Screening  Patient Currently in Pain: Yes  Pain Assessment  Pain Assessment: 0-10  Pain Level: 7  Pain Type: Acute pain  Pain Location: Abdomen  Vital Signs  Patient Currently in Pain: Yes       Orientation  Orientation  Overall Orientation Status: Within Functional Limits  Cognition      Objective   Bed mobility  Supine to Sit: Modified independent  Scooting: Modified independent  Comment: Pt performed log roll with good technique. Transfers  Sit to Stand: Modified independent (from EOB x 2, and toilet)  Stand to sit: Modified independent  Comment: Pt amb to bathroom and urinated. Pt changed her own brief and performed hygiene. Stood at sink for ADLs with good balance. Ambulation  Ambulation?: Yes  Ambulation 1  Surface: level tile  Device: Rolling Walker  Other Apparatus:  (NG tube, IV.)  Assistance: Supervision  Quality of Gait: good posture with bilat UE support of RW. Gait Deviations: Slow Threese  Distance: Pt amb with RW and SBA for 10 ft x 2 and 350 ft. Comments: Pt amb to bathroom and urinated, performed her own hygiene and brief change, stood at sink with good balance for hand washing. Pt sat EOB to rest before amb in hallway with RW. Pt returned to sit in recliner chair at end of amb.   Stairs/Curb  Stairs?: No     Balance  Posture: Fair  Sitting - Static: Good  Sitting - Dynamic: Good  Standing - Static: Good (with and without RW)  Standing - Dynamic: Good (with RW)  Exercises  Hip Flexion: x 10 bilat seated marching  Knee Long Arc Quad: x 10 bilat         Comment: toileting, ADLs              G-Code     OutComes Score AM-PAC Score  AM-PAC Inpatient Mobility Raw Score : 21 (08/26/21 1059)  AM-PAC Inpatient T-Scale Score : 50.25 (08/26/21 1059)  Mobility Inpatient CMS 0-100% Score: 28.97 (08/26/21 1059)  Mobility Inpatient CMS G-Code Modifier : CJ (08/26/21 1059)          Goals  Short term goals  Time Frame for Short term goals: To be met by DC. Short term goal 1: Pt to perform bed mob mod I. - Goal met 8/26  Short term goal 2: Pt to perform transfers mod I. - Goal met 8/26  Short term goal 3: Pt to amb with AAD and mod I for 200 ft. Long term goals  Time Frame for Long term goals : LTGs=STGs  Patient Goals   Patient goals : To return home. Plan    Plan  Times per week: 3-5x/week  Times per day: Daily  Current Treatment Recommendations: Strengthening, Balance Training, Functional Mobility Training, Transfer Training, Endurance Training, Gait Training, Pain Management, Home Exercise Program, Safety Education & Training, Patient/Caregiver Education & Training, Equipment Evaluation, Education, & procurement  Safety Devices  Type of devices:  All fall risk precautions in place, Call light within reach, Gait belt, Left in chair, Nurse notified  Restraints  Initially in place: No     Therapy Time   Individual Concurrent Group Co-treatment   Time In 1019         Time Out 1049         Minutes 30         Timed Code Treatment Minutes: [place] [address], [id number]

## 2021-08-26 NOTE — PROGRESS NOTES
Data- discharge order received, pt verbalized agreement to discharge, needs for [facility] with Cory Holloway, DANIELLE reviewed and signed by MD, to be completed by RN. Action- AVS prepared, discharge instructions prepared and given to pt, medication information packet given r/t NEW or CHANGED prescriptions, pt verbalized understanding further self-review. D/C instruction summary: Diet- general, low fiber, Activity- as tolerated, follow up with Primary Care Physician Isadora Rojas [phone] appointment to be scheduled by pt, immunizations reviewed, medications prescriptions to be filled in outpatient pharmacy. Contact information provided to above agencies used. Response- Case Management/ reported faxing completed DANIELLE and AVS to needed HHC/DME services stated above. Pt belongings gathered, IV removed, pt dressed. Disposition is home with HHC/DME as stated above, transported with son, taken to lobby via w/c, no complications. 1. WEIGHT: Admit Weight: 162 lb (73.5 kg) (08/21/21 0246)        Today  Weight: 160 lb 3.2 oz (72.7 kg) (08/26/21 2537)       2.  O2 SAT.: SpO2: 97 % (08/26/21 1230)

## 2021-08-26 NOTE — PROGRESS NOTES
[facility]        Post-op Note    Pt Name: Alice Donnelly  MRN: [id number]  [place]: 1940  Date of evaluation: 2021    Subjective:    No acute events overnight  Pain improving, controlled  Passing flatus and stool  No nausea or vomiting, tolerating low fiber diet  Up to chair at this time      Vital Signs:  Patient Vitals for the past 24 hrs:   BP Temp Temp src Pulse Resp SpO2 Height Weight   21 1149 -- -- -- -- -- -- 5' 3\" (1.6 m) --   21 0845 (!) 174/63 98.8 °F (37.1 °C) Oral 77 16 98 % -- --   21 0757 -- -- -- -- -- -- -- 160 lb 3.2 oz (72.7 kg)   21 0530 (!) 156/73 98.6 °F (37 °C) Oral 83 16 98 % -- --   21 0430 -- -- -- 73 -- -- -- --   21 0032 (!) 153/54 98.7 °F (37.1 °C) Oral 88 16 96 % -- --   21 2105 (!) 164/69 98.3 °F (36.8 °C) Oral 88 16 96 % -- --   21 -- -- -- 84 -- -- -- --   21 1714 (!) 160/70 98.5 °F (36.9 °C) Oral 79 15 98 % -- --      TEMPERATURE HISTORY 24H: Temp (24hrs), Av.6 °F (37 °C), Min:98.3 °F (36.8 °C), Max:98.8 °F (37.1 °C)    BLOOD PRESSURE HISTORY: Systolic (95JEK), QVL:796 , Min:142 , VPP:512    Diastolic (62JUB), PCN:34, Min:54, Max:75      Intake/Output:    I/O last 3 completed shifts: In: 3102.3 [P.O.:840; I.V.:2224.2; IV Piggyback:38.1]  Out: -   No intake/output data recorded.   Drain/tube Output:           Physical Exam:  General: awake, alert, oriented to person, place, time  Lungs: unlabored respirations  Abdomen: soft, non-distended, appropriate incisional tenderness, incision, active bowel sounds  Skin/Wound: healing well, small opening along upper portion of incision--scant amount of old bloody drainage/maroon, no erythema, otherwise well approximated    Labs:  CBC:    Recent Labs     21  0504 21  0524 21  0522   WBC 5.0 4.3 4.1   HGB 7.4* 7.1* 7.7*   HCT 22.4* 21.2* 22.6*    419 478*     BMP:    Recent Labs     21  0504 08/25/21  0524 08/26/21  0522    139 142   K 3.6 3.5 3.7    107 108   CO2 22 23 23   BUN 5* 6* 4*   CREATININE 0.9 0.8 0.9   GLUCOSE 167* 139* 111*     Hepatic:   Recent Labs     08/24/21  0504   AST 16   ALT 22   BILITOT 0.5   ALKPHOS 65     Amylase: No results for input(s): AMYLASE in the last 72 hours. Lipase:   No results for input(s): LIPASE in the last 72 hours. Mag:      Recent Labs     08/25/21  0524 08/26/21 0522   MG 1.40* 1.60*      Phos:     Recent Labs     08/26/21 0522   PHOS 3.1      Coags:   No results for input(s): INR, APTT in the last 72 hours. Cultures:  Anaerobic culture  No results for input(s): LABANAE in the last 72 hours. Blood culture  No results for input(s): BC in the last 72 hours. Blood culture 2  No results for input(s): BLOODCULT2 in the last 72 hours. Body fluid culture  No results for input(s): BLOODCULT2 in the last 72 hours. Surgical culture  No results for input(s): CXSURG in the last 72 hours. Fecal occult  No results for input(s): OCCULTBLDFEC in the last 72 hours. Gram stain  No results for input(s): LABGRAM in the last 72 hours. Stool culture 1  No results for input(s): CXST in the last 72 hours. Stool culture 2  No results for input(s): STOOLCULT2 in the last 72 hours. Urine culture  No results for input(s): LABURIN in the last 72 hours. Wound abscess  No results for input(s): WNDABS in the last 72 hours. C Diff   No results for input(s): CDIFFTOXAB in the last 72 hours. Imaging:  I have personally reviewed the following films:     FL SMALL BOWEL FOLLOW THROUGH ONLY    Result Date: 8/24/2021  EXAMINATION: SMALL BOWEL FOLLOW THROUGH SERIES 8/24/2021 TECHNIQUE: Small bowel follow through series was performed with overhead images and spot images. FLUOROSCOPY DOSE AND TYPE OR TIME AND EXPOSURES: Fluoroscopy was not used for the study. COMPARISON: Abdominal CT 08/21/2021, abdominal plain films dated 08/22/2021 and 08/23/2021. HISTORY: ORDERING SYSTEM PROVIDED HISTORY: Eval SBO vs ileus TECHNOLOGIST PROVIDED HISTORY: Reason for exam:->Eval SBO vs ileus Reason for Exam: Eval SBO vs ileus Acuity: Unknown Type of Exam: Initial FINDINGS: An initial  image of the abdomen and pelvis was performed which demonstrates moderately dilated small bowel throughout the abdomen. Multiple surgical clips are noted. An NG tube overlies the left upper quadrant, likely within the stomach. 200 cc of Omnipaque contrast was then instilled into the stomach via the patient's NG tube and overhead images of the small bowel were performed. The stomach is normal in appearance, without evidence of filling defect or ulcer. There was a delay in transit of contrast through the small bowel into the colon, with contrast reaching the colon at 3 hours, mildly delayed. There remains moderate dilatation of multiple proximal small bowel loops within the left upper quadrant, with more distal small bowel loops remaining normal in caliber. The bowel gas pattern remains consistent with an improving partial small bowel obstruction or mild-to-moderate small bowel ileus. There is no evidence of an abnormal small bowel stricture, filling defect, mass, or ulcer. Persistent dilatation of proximal small bowel loops, with the bowel gas pattern most consistent with an improving small bowel obstruction or small-bowel ileus. There is no evidence of high-grade obstruction.      Scheduled Meds:   magnesium sulfate  4,000 mg IntraVENous Once    hydrALAZINE  25 mg Oral BID    metoprolol tartrate  25 mg Oral BID    aspirin  81 mg Oral Daily    metoclopramide  5 mg IntraVENous Q6H    dorzolamide  1 drop Both Eyes BID    And    timolol  1 drop Both Eyes Nightly    [Held by provider] b complex-C-folic acid  1 mg Oral Daily with breakfast    [Held by provider] fenofibrate  54 mg Oral Daily    [Held by provider] folic acid  1 mg Oral Daily    latanoprost  1 drop Both Eyes Nightly    [Held by provider] therapeutic multivitamin-minerals  1 tablet Oral Daily    Netarsudil-Latanoprost  1 drop Ophthalmic Nightly    insulin lispro  0-6 Units Subcutaneous TID WC    insulin lispro  0-3 Units Subcutaneous Nightly    sodium chloride flush  5-40 mL IntraVENous 2 times per day    enoxaparin  40 mg Subcutaneous Daily    pantoprazole  40 mg IntraVENous Daily     Continuous Infusions:   dextrose Stopped (08/21/21 9671)    sodium chloride       PRN Meds:.artificial tears, glucose, dextrose, glucagon (rDNA), dextrose, sodium chloride flush, sodium chloride, ondansetron **OR** ondansetron, acetaminophen **OR** acetaminophen, ketorolac      Assessment:  Small bowel obstruction verses ileus  Status-post laparoscopic lysis of adhesions, repair of ventral hernia with mesh with Dr. Sadie Moon on 8/9/2021  Diabetes      Plan:  1. Pain and bloating improving, passing flatus and stool; no plans for surgical intervention, patient tolerating Reglan  2. Low fiber diet as tolerated--dietitian following for diet education; monitor bowel function  3. Monitor and correct electrolytes  4. Activity as tolerated, ambulate TID, up to chair for meals  5. PRN analgesics and antiemetics--minimizing narcotics as tolerated, transition to PO  6. Management of medical comorbid etiologies per primary team and consulting services  7. Disposition: Okay for discharge home today from a surgical perspective; follow up with Dr. Sadie Moon in 1 week    EDUCATION:  Educated patient on plan of care and disease process--all questions answered. Plans discussed with patient and nursing. Reviewed and discussed with Dr. Sadie Moon.       Signed:  YAZMIN Lerma CNP  8/26/2021 12:23 PM     Continues to improve  Tolerating a general diet  Okay for discharge home today

## 2021-08-26 NOTE — CONSULTS
Nutrition Education  Consulted for low fiber diet education. Provided patient with information on fiber restricted diets and slowly increasing fiber at home. Discussed limiting whole grains and peels/ skins of fruits/ vegetables. Pt reports she will miss green vegetables but compliant to changes. No other questions at this time. · Verbally reviewed information with Patient  · Educated on Low fiber diet  · Written educational materials provided. · Contact name and number provided. · Refer to Patient Education activity for more details.     Electronically signed by Joellen Pritchett MS, RD, LD on 8/26/21 at 11:46 AM EDT    Contact: SILVERIO/ Jono Luna

## 2021-08-26 NOTE — PLAN OF CARE
Problem: Pain:  Goal: Control of acute pain  Description: Control of acute pain  Outcome: Ongoing     Gave Toradol and Tylenol at 0219 per pt request for c/o continued pain; see MAR & all flowsheets. Will continue to monitor.

## 2021-08-26 NOTE — PROGRESS NOTES
Late entry   Spoke with GI/Deisy on telephone today. Reglan ordered d/t increased GI discomfort/gas and bloating. Emily Orr stated that pt has this medication listed in cart as an \"intolerance reaction\" but no details. Requested I discuss with pt and if pt has no memory of \"reaction\" and willing to take medication then okay to give and monitor pt for possible side effects. If pt refuses medication call Deisy back and she will further evaluate next best option at that time. Discussed reglan with pt prior to administration, pt stated she \"has no recollection of of reaction to Reglan, has not had medication in years\". Education of  medication performed, pt stated benefits is better option and will take med at this time. Instructed pt to notify nurse immediately if any changes possibly r/t Reglan. Pt verbalized acceptance. Medication administered and pt had no immediate reactions. Call light within reach. 1800 pt stated she was dizzy and recalled this was the reaction, stomach feels much better but has dizziness. I asked pt if she would like to have medication discontinued and pt refused stating dizziness tolerable as a side effect, will be able to rest and sleep for a while. Would prefer to continue medication  d/t it being effective on ABD gas/bloating discomfort.

## 2021-08-26 NOTE — FLOWSHEET NOTE
Called [phone] at 2012 and LM with answering service, Vida Nelson's request per day RN - pt reports dizziness as a side effect of the reglan but reports that it helped with pain at incision site\"

## 2021-08-26 NOTE — PLAN OF CARE
Problem: Falls - Risk of:  Goal: Will remain free from falls  Description: Will remain free from falls  Outcome: Ongoing  Note: No falls on AM shift, amb with walker and SBA x1. Goal: Absence of physical injury  Description: Absence of physical injury  Outcome: Ongoing     Problem: Pain:  Goal: Pain level will decrease  Description: Pain level will decrease  Outcome: Ongoing  Note: Providing pain medication, repositioning and distraction to manage pain. Goal: Control of acute pain  Description: Control of acute pain  Outcome: Ongoing     Problem: Skin Integrity:  Goal: Will show no infection signs and symptoms  Description: Will show no infection signs and symptoms  Outcome: Ongoing  Note: ABD sx incision well approximated, dry dressing in place d/t sm amount of breakthrough bleeding from prior shift. AM shift today bleeding resolved, GI aware and new dry dressing in place to protect sx site scabbing. No s/s of infection, educated pt on s/s of infection of sx incision. Goal: Absence of new skin breakdown  Description: Absence of new skin breakdown  Outcome: Ongoing  Note: Other that existing, pre admission sx incision skin remains clean/dry/intact. Pt bathed and skin care preformed on this shift, no evidence of new areas of impaired skin integrity noted. Problem: Musculor/Skeletal Functional Status  Goal: Highest potential functional level  Outcome: Ongoing  Note: Pt demonstrated steady gait with walker, SBA for support if needed, pt call to toilet at all times, call light remain within reach and pt pathways clear and well lith to decrease r/o trip/fall. Tolerates activity well this shift.

## 2021-08-26 NOTE — PROGRESS NOTES
Comprehensive Nutrition Assessment    Type and Reason for Visit:  Initial, RD Nutrition Re-Screen/LOS    Nutrition Recommendations/Plan:   1. Continue low fiber diet and encourage PO intake  2. RD to add ensure HP TID for extra protein and calories. 3. Continue to monitor weights  4. Monitor nutrition adequacy, pertinent labs, bowel habits, wt changes, and clinical progress    Nutrition Assessment:  LOS assessment: 79 yo female with hx CAD/stents, CKD, HTN, HLD, DM2. Hospitalized 8/9-8/15 w/ SBO with ventral hernia repair on 8/9. Patient returned to hospital with increased abdominal pain, nausea, decreased po intake and loose stools. CT abd/pelvis shows postoperative ileus vs persistent sbo. Diet advanced to low fiber diet from clear liquid diet yesterday. Reports poor PO intake PTA and since admission, improving with diet advancement. Pt reports some frequent nausea but no emesis. Reports UBW of 164 lb, currently 160 lb. Downward trend of weight loss over the past 3 months, pt was 172 lb on 6/8/21 (-7% weight change x 3 months). Willing to trial ONS, RD to add ensure HP with meals (low fiber supplement). Provided low fiber diet education. Will continue to monitor. Malnutrition Assessment:  Malnutrition Status:  Mild malnutrition    Context:  Acute Illness     Findings of the 6 clinical characteristics of malnutrition:  Energy Intake:  7 - 50% or less of estimated energy requirements for 5 or more days  Weight Loss:  1 - 7.5% over 3 months       Estimated Daily Nutrient Needs:  Energy (kcal):  0167-2441 kcals/day; Weight Used for Energy Requirements:  Ideal (53 kg)     Protein (g):  53-64 g/day; Weight Used for Protein Requirements:  Ideal (1-1.2 g/kg)         Method Used for Fluid Requirements:  1 ml/kcal      Nutrition Related Findings:  Reports liquid BMs since tuesday. Significant weight loss noted. DM dx, BG below 180 mg/dl since admission.       Wounds:  Surgical Incision       Current Nutrition Therapies:    ADULT DIET; Regular; Low Fiber    Anthropometric Measures:  · Height: 5' 3\" (160 cm)  · Current Body Weight: 160 lb (72.6 kg)   · Admission Body Weight: 162 lb (73.5 kg)    · Usual Body Weight: 164 lb (74.4 kg)     · Ideal Body Weight: 115 lbs; % Ideal Body Weight 139.1 %   · BMI: 28.4    · BMI Categories: Overweight (BMI 25.0-29. 9)       Nutrition Diagnosis:   · Inadequate oral intake related to altered GI function as evidenced by intake 26-50%, weight loss    Nutrition Interventions:   Food and/or Nutrient Delivery:  Continue Current Diet, Start Oral Nutrition Supplement  Nutrition Education/Counseling:  Education completed   Coordination of Nutrition Care:  Continue to monitor while inpatient    Goals:  Consume 50% or greater of 3 meals per day and ONS without any GI distress     Nutrition Monitoring and Evaluation:   Behavioral-Environmental Outcomes:  None Identified   Food/Nutrient Intake Outcomes:  Food and Nutrient Intake, Supplement Intake, Diet Advancement/Tolerance  Physical Signs/Symptoms Outcomes:  Biochemical Data, Diarrhea, Weight     Discharge Planning:    Continue current diet     Electronically signed by Karely Eller MS, RD, LD on 8/26/21 at 12:26 PM EDT    Contact: SILVERIO/ Jono Luna

## 2021-08-27 ENCOUNTER — TELEPHONE (OUTPATIENT)
Dept: INTERNAL MEDICINE CLINIC | Age: 81
End: 2021-08-27

## 2021-08-27 ENCOUNTER — CARE COORDINATION (OUTPATIENT)
Dept: CASE MANAGEMENT | Age: 81
End: 2021-08-27

## 2021-08-27 DIAGNOSIS — K56.7 ILEUS (HCC): Primary | ICD-10-CM

## 2021-08-27 PROCEDURE — 1111F DSCHRG MED/CURRENT MED MERGE: CPT | Performed by: INTERNAL MEDICINE

## 2021-08-27 NOTE — CARE COORDINATION
I[facility] Transitions Initial Follow Up Call:  Patient reports that she feels a little dizzy, attributes the feeling to the Reglan that she has started. CTN explained that she may need to let Dr. Renan Butts know if she is confident this is the cause of the vertigo. She reports that she has been on this medication before and had the same side effects. Discussed discharge instructions and reviewed medications, 1111F completed. CTN will continue with outreach follow up calls. CTN reached out to [facility] to confirm they were aware of patient's discharge. Left  for \"Alyson\", with contact information and requesting return call. Call within 2 business days of discharge: Yes    Patient: Mariya Drew Patient : 1940   MRN: [id number]  Reason for Admission: SBO v. ileus, no surgery  Discharge Date: 21 RARS: Readmission Risk Score: 18      Last Discharge [facility]       Complaint Diagnosis Description Type Department Provider    21 Abdominal Pain Ileus [facility]) ED to Hosp-Admission (Discharged) (ADMITTED) [facility] 3T Alona Ibarra MD; Meaghan Vásquez... Spoke with: Mariya Drew      Transitions of Care Initial Call    Was this an external facility discharge? No Discharge Facility:     Challenges to be reviewed by the provider   Additional needs identified to be addressed with provider: No  none             Method of communication with provider : none      Advance Care Planning:   Does patient have an Advance Directive: reviewed and current, reviewed and needs to be updated, not on file; education provided, not on file, patient declined education, decision maker updated and referral to internal ACP facilitator. Was this a readmission? Yes  Patient stated reason for admission: Ileus  Patients top risk factors for readmission: medical condition-.    Care Transition Nurse (CTN) contacted the patient by telephone to perform post hospital discharge assessment.  Verified name and  with patient as identifiers. Provided introduction to self, and explanation of the CTN role. CTN reviewed discharge instructions, medical action plan and red flags with patient who verbalized understanding. Patient given an opportunity to ask questions and does not have any further questions or concerns at this time. Were discharge instructions available to patient? Yes. Reviewed appropriate site of care based on symptoms and resources available to patient including: PCP, Urgent care clinics, Home health and When to call 911. The patient agrees to contact the PCP office for questions related to their healthcare. Medication reconciliation was performed with patient, who verbalizes understanding of administration of home medications. Advised obtaining a 90-day supply of all daily and as-needed medications. Reviewed and educated patient on any new and changed medications related to discharge diagnosis. Was patient discharged with a pulse oximeter? No  CTN provided contact information. Plan for follow-up call in 5-7 days based on severity of symptoms and risk factors. Plan for next call: symptom management-., self management-. and follow up appointment-.           Non-face-to-face services provided:  Obtained and reviewed discharge summary and/or continuity of care documents    Care Transitions 24 Hour Call    Do you have any ongoing symptoms?: No  Do you have a copy of your discharge instructions?: Yes  Do you have all of your prescriptions and are they filled?: Yes  Have you been contacted by a [employer] [address]?: No  Have you scheduled your follow up appointment?: Yes  How are you going to get to your appointment?: Car - family or friend to transport  Were you discharged with any Home Care or Post Acute Services: No  Post Acute Services: Home Health  Do you feel like you have everything you need to keep you well at home?: Yes  Care Transitions Interventions         Follow Up  Future Appointments   Date Time Provider [facility] [place]ia   9/20/2021  1:00 PM YAZMIN Craft - CNP F MEL[facility]  [place] - DYD   11/8/2021  1:00 PM Katerine River MD [address] [place] [address] YUAN [place]

## 2021-08-27 NOTE — TELEPHONE ENCOUNTER
Pt is having an adverse reaction to Reglan. Pt is experiencing excessive dizziness, blurred vision, and trouble walking. Pt was given this medication before being discharged at [facility] for pain due to hernia repair surgery. She told them she has reactions to Reglan but they still told her to take it to assist with the healing. She wants it changed to another medication.

## 2021-08-30 ENCOUNTER — CARE COORDINATION (OUTPATIENT)
Dept: CASE MANAGEMENT | Age: 81
End: 2021-08-30

## 2021-08-30 NOTE — CARE COORDINATION
\"Alyson\" from [facility] called and confirmed that they do have orders for [address].

## 2021-09-02 ENCOUNTER — OFFICE VISIT (OUTPATIENT)
Dept: SURGERY | Age: 81
End: 2021-09-02

## 2021-09-02 VITALS — WEIGHT: 160 LBS | BODY MASS INDEX: 28.34 KG/M2

## 2021-09-02 DIAGNOSIS — K43.6 VENTRAL HERNIA WITH OBSTRUCTION AND WITHOUT GANGRENE: Primary | ICD-10-CM

## 2021-09-02 DIAGNOSIS — K56.7 ILEUS (HCC): ICD-10-CM

## 2021-09-02 PROCEDURE — 99024 POSTOP FOLLOW-UP VISIT: CPT | Performed by: SURGERY

## 2021-09-02 RX ORDER — HYDROCODONE BITARTRATE AND ACETAMINOPHEN 5; 325 MG/1; MG/1
1-2 TABLET ORAL EVERY 4 HOURS PRN
Qty: 15 TABLET | Refills: 0 | Status: SHIPPED | OUTPATIENT
Start: 2021-09-02 | End: 2021-09-05

## 2021-09-02 NOTE — LETTER
[address]  [address] [address]  Phone: [phone]  Fax: [phone]    September 2, 2021    Patient: Stephy Ocampo  MRN:  [id number]  YOB: 1940  Date of Visit: 9/2/2021    Dear Darren Price you for the request for consultation for Stephy Ocampo. Below are the relevant portions of my assessment and plan of care. Assessment:  80-year-old female status post ventral hernia repair with mesh for a hernia which was causing a small bowel obstruction. She required readmission to the hospital for a postop ileus versus partial small bowel obstruction. She is still having some pain but is overall doing better. Plan: We will check a CBC and BMP today. Norco as needed for pain. Follow-up again in 1 week. If you have questions, please do not hesitate to call me. I look forward to following Helena along with you.     Sincerely,    Ceferino Quintero MD    CC providers:    Gigi Torre  [facility] [facility] [address]

## 2021-09-02 NOTE — PROGRESS NOTES
Subjective:      Patient ID: Mireya He is a 80 y.o. female. HPI    Review of Systems    Objective:   Physical Exam  Abdomen soft  Incisions healed  Assessment:      70-year-old female status post ventral hernia repair with mesh for a hernia which was causing a small bowel obstruction. She required readmission to the hospital for a postop ileus versus partial small bowel obstruction. She is still having some pain but is overall doing better. Plan: We will check a CBC and BMP today. Norco as needed for pain. Follow-up again in 1 week.         Samantha Rosario MD

## 2021-09-03 ENCOUNTER — TELEPHONE (OUTPATIENT)
Dept: SURGERY | Age: 81
End: 2021-09-03

## 2021-09-03 NOTE — TELEPHONE ENCOUNTER
I spoke to PT and results were given.  PT states she is starting to feel better and has less pain.     ----- Message from Yolanda Veronica MD sent at 9/3/2021  9:18 AM EDT -----  Please let her know that her labs look okay  ----- Message -----  From: Manuela Su Incoming Lab Results From Soft (Patsy Mccall)  Sent: 9/2/2021  10:33 PM EDT  To: Yolanda Veronica MD

## 2021-09-09 ENCOUNTER — OFFICE VISIT (OUTPATIENT)
Dept: SURGERY | Age: 81
End: 2021-09-09

## 2021-09-09 VITALS — DIASTOLIC BLOOD PRESSURE: 68 MMHG | WEIGHT: 153 LBS | BODY MASS INDEX: 27.1 KG/M2 | SYSTOLIC BLOOD PRESSURE: 138 MMHG

## 2021-09-09 DIAGNOSIS — K43.6 VENTRAL HERNIA WITH OBSTRUCTION AND WITHOUT GANGRENE: Primary | ICD-10-CM

## 2021-09-09 PROCEDURE — 99024 POSTOP FOLLOW-UP VISIT: CPT | Performed by: SURGERY

## 2021-09-09 NOTE — LETTER
[address]  [address] [facility] [address]  Phone: [phone]  Fax: [phone]    September 15, 2021    Patient: Sp Huber  MRN:  [id number]  YOB: 1940  Date of Visit: 9/9/2021    Dear Dr Hobson ref. provider found: Thank you for the request for consultation for Sp Huber. Below are the relevant portions of my assessment and plan of care. Assessment:  51-year-old female status post repair of an incarcerated ventral hernia which was causing a small bowel obstruction. She did require a postoperative admission for an ileus versus a partial small bowel obstruction. This resolved with conservative management only. In follow-up today, she continues to slowly improve. Plan:  Continue diet as tolerated. Follow-up as needed. If you have questions, please do not hesitate to call me.      Sincerely,    Oumar Mandel MD     providers:    Gigi Thakkar  [address] [place]

## 2021-09-10 NOTE — PROGRESS NOTES
Physician Progress Note      Rm Puckett  [facility] #:                  [id number]  :                       1940  ADMIT DATE:       2021 2:36 AM  DISCH DATE:        2021 6:30 PM  RESPONDING  PROVIDER #:        Krish Butler MD          QUERY TEXT:    Pt admitted with Ileus. Pt noted to have had Laparoscopic Lysis of Adhesions,   Repair of Ventral Hernia with Mesh on . If possible, please   document in progress notes and discharge summary:    The medical record reflects the following:  Risk Factors: Age, s/p Laparoscopic Lysis of Adhesions, Repair of Ventral   Hernia w/Mesh, DM, Anemia  Clinical Indicators: Per IM progress note 21 \"Ileus vs SBO. CT abdomen   suggests ongoing postop ileus vs persisting small bowel obstruction. \"  Treatment: Gen Surgery Consult, KUB, CT Abd/Pel, FL SB, NGT Decompression, ivf   D5 1/2NS, monitor labs  Options provided:  -- Ileus as a postoperative complication  -- Ileus as an expected/inherent condition that occurred postoperatively and   not a complication  -- Ileus related to other incidental risk factor (please specify) occurring   following surgery and not a complication, Please document incidental risk   factor. -- Other - I will add my own diagnosis  -- Disagree - Not applicable / Not valid  -- Disagree - Clinically unable to determine / Unknown  -- Refer to Clinical Documentation Reviewer    PROVIDER RESPONSE TEXT:    Provider disagreed with this query.     Query created by: Zach Perla on 2021 9:05 AM      Electronically signed by:  Krish Butler MD 9/10/2021 9:28 AM

## 2021-09-20 ENCOUNTER — OFFICE VISIT (OUTPATIENT)
Dept: INTERNAL MEDICINE CLINIC | Age: 81
End: 2021-09-20
Payer: MEDICARE

## 2021-09-20 VITALS
SYSTOLIC BLOOD PRESSURE: 146 MMHG | HEART RATE: 69 BPM | DIASTOLIC BLOOD PRESSURE: 80 MMHG | OXYGEN SATURATION: 99 % | BODY MASS INDEX: 27.63 KG/M2 | WEIGHT: 156 LBS | TEMPERATURE: 96.9 F

## 2021-09-20 DIAGNOSIS — R14.0 FLATULENCE/GAS PAIN/BELCHING: Primary | ICD-10-CM

## 2021-09-20 PROCEDURE — 99214 OFFICE O/P EST MOD 30 MIN: CPT | Performed by: NURSE PRACTITIONER

## 2021-09-20 RX ORDER — SIMETHICONE 80 MG
80 TABLET,CHEWABLE ORAL 4 TIMES DAILY PRN
Qty: 180 TABLET | Refills: 1 | Status: CANCELLED | OUTPATIENT
Start: 2021-09-20

## 2021-09-20 RX ORDER — SIMETHICONE 80 MG
80 TABLET,CHEWABLE ORAL 4 TIMES DAILY PRN
Qty: 180 TABLET | Refills: 0 | Status: SHIPPED | OUTPATIENT
Start: 2021-09-20 | End: 2021-09-27 | Stop reason: SDUPTHER

## 2021-09-20 ASSESSMENT — ENCOUNTER SYMPTOMS
ABDOMINAL PAIN: 1
RESPIRATORY NEGATIVE: 1
ALLERGIC/IMMUNOLOGIC NEGATIVE: 1
NAUSEA: 1
EYES NEGATIVE: 1

## 2021-09-20 NOTE — PROGRESS NOTES
Daryle Forester (:  1940) is a 80 y.o. female,Established patient, here for evaluation of the following chief complaint(s):  Follow-Up from Hospital (patient had surgery on 21 and afer that she went hospital twice, pt still having pain and some nausea )         ASSESSMENT/PLAN:  1. Flatulence  Drink at least at least 48 ounces of water per day  Reduce fiber in diet until gas pains decrease  Walk for exercise 20 minutes at least 3 times per week to help relieve gas pain      Subjective   SUBJECTIVE/OBJECTIVE:  HPI  Patient presents for status post ventral hernia repair with mesh and small bowel obstruction on 8/3/4, complicated by ileus. Patient reporting pains at right flank that travels to RUQ, RLQ and pelvic region. Review of Systems   Constitutional: Negative. HENT: Negative. Eyes: Negative. Respiratory: Negative. Cardiovascular: Negative. Gastrointestinal: Positive for abdominal pain and nausea. Endocrine: Negative. Genitourinary: Negative. Musculoskeletal: Negative. Skin: Negative. Allergic/Immunologic: Negative. Neurological: Negative. Hematological: Negative. Psychiatric/Behavioral: Negative. Vitals:    21 1315   BP: (!) 146/80   Pulse: 69   Temp: 96.9 °F (36.1 °C)   SpO2: 99%     Wt Readings from Last 3 Encounters:   21 156 lb (70.8 kg)   21 153 lb (69.4 kg)   21 160 lb (72.6 kg)     BP Readings from Last 3 Encounters:   21 (!) 146/80   21 138/68   21 (!) 160/71       Objective   Physical Exam  Vitals reviewed. Constitutional:       General: She is awake. Appearance: Normal appearance. She is well-groomed. She is obese. HENT:      Head: Normocephalic and atraumatic. Cardiovascular:      Rate and Rhythm: Normal rate and regular rhythm. Heart sounds: Normal heart sounds. Pulmonary:      Effort: Pulmonary effort is normal.      Breath sounds: Normal breath sounds and air entry.    Abdominal: General: Abdomen is protuberant. Bowel sounds are increased. Palpations: Abdomen is soft. Tenderness: There is generalized abdominal tenderness and tenderness in the epigastric area. Comments: Patient reporting right flank pain, RUQ, RLQ, supraumbilical   Skin:     General: Skin is warm and dry. Neurological:      Mental Status: She is alert and oriented to person, place, and time. Psychiatric:         Attention and Perception: Attention normal.         Mood and Affect: Mood normal.         Speech: Speech normal.         Behavior: Behavior normal. Behavior is cooperative. Cognition and Memory: Cognition normal.                  An electronic signature was used to authenticate this note.     --Aidee Quijano RN

## 2021-09-20 NOTE — PATIENT INSTRUCTIONS
Drink at least at least 48 ounces of water per day  Reduce fiber in diet until gas pains decrease  Walk for exercise 20 minutes at least 3 times per week

## 2021-09-23 ENCOUNTER — CARE COORDINATION (OUTPATIENT)
Dept: CASE MANAGEMENT | Age: 81
End: 2021-09-23

## 2021-09-23 NOTE — CARE COORDINATION
Ivon 45 Transitions Follow Up Call    2021    Patient: Erin Najera  Patient : 1940   MRN: [id number]  Reason for Admission: SBO v. ileus, no surgery  Discharge Date: 21 RARS: Readmission Risk Score: 18         Spoke with: Nathaniel 52 Transitions Subsequent and Final Call    Subsequent and Final Calls  Do you have any ongoing symptoms?: No  Have your medications changed?: No  Do you have any questions related to your medications?: No  Do you currently have any active services?: No  Are you currently active with any services?: Home Health  Do you have any needs or concerns that I can assist you with?: No  Identified Barriers: None  Care Transitions Interventions  Other Interventions: Follow Up: Final outreach call:  Patient reports that she is not doing well. She reports that she took one reglan and now she is unable to walk and very weak for the past few weeks. She has followed up with Dr. Ramos De La Rosa and Jr Carlton NP 21. CTN encouraged PT/OT, she declined. CTN encouraged her to call provider with any questions or concerns. No further outreach calls indicated.       Future Appointments   Date Time Provider Usama Vargas   2021  1:00 PM Usama Sahu RN

## 2021-09-25 ENCOUNTER — TELEPHONE (OUTPATIENT)
Dept: INTERNAL MEDICINE CLINIC | Age: 81
End: 2021-09-25

## 2021-09-25 DIAGNOSIS — R14.0 FLATULENCE/GAS PAIN/BELCHING: ICD-10-CM

## 2021-09-25 NOTE — TELEPHONE ENCOUNTER
----- Message from Duane Janine sent at 9/23/2021  1:05 PM EDT -----  Subject: Message to Provider    QUESTIONS  Information for Provider? Patient saw nurse Annabel Dickens and was told to get   Gas-X 80 mg. [facility] and [facility] dont have this strength. What should she   do now? Please reach out to patient.  ---------------------------------------------------------------------------  --------------  CALL BACK INFO  What is the best way for the office to contact you? OK to leave message on   voicemail  Preferred Call Back Phone Number? [phone]  ---------------------------------------------------------------------------  --------------  SCRIPT ANSWERS  Relationship to Patient?  Self

## 2021-10-01 ENCOUNTER — NURSE TRIAGE (OUTPATIENT)
Dept: OTHER | Facility: CLINIC | Age: 81
End: 2021-10-01

## 2021-10-01 NOTE — TELEPHONE ENCOUNTER
Reason for Disposition   Patient wants to be seen    Answer Assessment - Initial Assessment Questions  1. BLOOD PRESSURE: \"What is the blood pressure? \" \"Did you take at least two measurements 5 minutes apart?\"      197/76 at the nephrologist office yesterday  And  164/74 this morning    2. ONSET: \"When did you take your blood pressure? \"      Yesterday and this morning    3. HOW: \"How did you obtain the blood pressure? \" (e.g., visiting nurse, automatic home BP monitor)      197/76 at the MD Office,   The 164/74 was a home monitor    4. HISTORY: \"Do you have a history of high blood pressure? \"      Yes    5. MEDICATIONS: Tan Shannon you taking any medications for blood pressure? \" \"Have you missed any doses recently? \"      Yes, Metoprolol and Hydralazine. Denies missing a dose    6. OTHER SYMPTOMS: \"Do you have any symptoms? \" (e.g., headache, chest pain, blurred vision, difficulty breathing, weakness)       Blurred vision(since taking Reglan), right side abdomen pain(where largest incision)    7. PREGNANCY: \"Is there any chance you are pregnant? \" \"When was your last menstrual period? \"      No    Protocols used: HIGH BLOOD PRESSURE-ADULT-[place]    Received call from Rand Portillo at [facility] with Red Flag Complaint. Brief description of triage: elevated B/P, pain from hernia surgery    Triage indicates for patient to be seen. Today. Patient does not have a ride today, will have to arrange one for later. Instructed patient to go to ED or [facility] if office unable to help patient or if patient gets worse. Care advice provided, patient verbalizes understanding; denies any other questions or concerns; instructed to call back for any new or worsening symptoms. Writer provided warm transfer to [facility] at [facility] for appointment scheduling. Attention Provider: Thank you for allowing me to participate in the care of your patient.   The patient was connected to triage in response to information provided to the ECC/PSC. Please do not respond through this encounter as the response is not directed to a shared pool.

## 2021-10-11 ENCOUNTER — TELEPHONE (OUTPATIENT)
Dept: INTERNAL MEDICINE CLINIC | Age: 81
End: 2021-10-11

## 2021-10-11 NOTE — TELEPHONE ENCOUNTER
This medicine is prescribed by nephrology.  Please let patient know they should contact nephrologist.

## 2021-10-13 ENCOUNTER — TELEPHONE (OUTPATIENT)
Dept: INTERNAL MEDICINE CLINIC | Age: 81
End: 2021-10-13

## 2021-10-13 NOTE — TELEPHONE ENCOUNTER
Freddy Mederos from [address] is calling in to get a alternative medication for Nephro-Beltran tablets that have been discontinued.  Please follow up with a alternative for them

## 2021-11-08 ENCOUNTER — OFFICE VISIT (OUTPATIENT)
Dept: INTERNAL MEDICINE CLINIC | Age: 81
End: 2021-11-08
Payer: MEDICARE

## 2021-11-08 VITALS
WEIGHT: 153 LBS | SYSTOLIC BLOOD PRESSURE: 130 MMHG | RESPIRATION RATE: 14 BRPM | TEMPERATURE: 98 F | OXYGEN SATURATION: 99 % | HEIGHT: 63 IN | HEART RATE: 65 BPM | DIASTOLIC BLOOD PRESSURE: 70 MMHG | BODY MASS INDEX: 27.11 KG/M2

## 2021-11-08 DIAGNOSIS — R29.898 WEAKNESS OF EXTREMITY: ICD-10-CM

## 2021-11-08 DIAGNOSIS — Z78.0 ASYMPTOMATIC MENOPAUSAL STATE: ICD-10-CM

## 2021-11-08 DIAGNOSIS — K51.812 OTHER ULCERATIVE COLITIS WITH INTESTINAL OBSTRUCTION (HCC): ICD-10-CM

## 2021-11-08 DIAGNOSIS — K56.50 INTESTINAL ADHESIONS WITH OBSTRUCTION, UNSPECIFIED WHETHER PARTIAL OR COMPLETE (HCC): ICD-10-CM

## 2021-11-08 DIAGNOSIS — Z23 NEED FOR INFLUENZA VACCINATION: Primary | ICD-10-CM

## 2021-11-08 DIAGNOSIS — N18.30 TYPE 2 DIABETES MELLITUS WITH STAGE 3 CHRONIC KIDNEY DISEASE, WITH LONG-TERM CURRENT USE OF INSULIN, UNSPECIFIED WHETHER STAGE 3A OR 3B CKD (HCC): ICD-10-CM

## 2021-11-08 DIAGNOSIS — E11.22 TYPE 2 DIABETES MELLITUS WITH STAGE 3 CHRONIC KIDNEY DISEASE, WITH LONG-TERM CURRENT USE OF INSULIN, UNSPECIFIED WHETHER STAGE 3A OR 3B CKD (HCC): ICD-10-CM

## 2021-11-08 DIAGNOSIS — N18.30 STAGE 3 CHRONIC KIDNEY DISEASE, UNSPECIFIED WHETHER STAGE 3A OR 3B CKD (HCC): ICD-10-CM

## 2021-11-08 DIAGNOSIS — Z00.00 ROUTINE GENERAL MEDICAL EXAMINATION AT A HEALTH CARE FACILITY: ICD-10-CM

## 2021-11-08 DIAGNOSIS — D70.8 OTHER NEUTROPENIA (HCC): ICD-10-CM

## 2021-11-08 DIAGNOSIS — Z79.4 TYPE 2 DIABETES MELLITUS WITH STAGE 3 CHRONIC KIDNEY DISEASE, WITH LONG-TERM CURRENT USE OF INSULIN, UNSPECIFIED WHETHER STAGE 3A OR 3B CKD (HCC): ICD-10-CM

## 2021-11-08 DIAGNOSIS — K56.7 ILEUS (HCC): ICD-10-CM

## 2021-11-08 PROCEDURE — G0438 PPPS, INITIAL VISIT: HCPCS | Performed by: INTERNAL MEDICINE

## 2021-11-08 PROCEDURE — G0008 ADMIN INFLUENZA VIRUS VAC: HCPCS | Performed by: INTERNAL MEDICINE

## 2021-11-08 PROCEDURE — 90694 VACC AIIV4 NO PRSRV 0.5ML IM: CPT | Performed by: INTERNAL MEDICINE

## 2021-11-08 ASSESSMENT — PATIENT HEALTH QUESTIONNAIRE - PHQ9
SUM OF ALL RESPONSES TO PHQ9 QUESTIONS 1 & 2: 0
1. LITTLE INTEREST OR PLEASURE IN DOING THINGS: 0
SUM OF ALL RESPONSES TO PHQ QUESTIONS 1-9: 0
2. FEELING DOWN, DEPRESSED OR HOPELESS: 0

## 2021-11-08 ASSESSMENT — LIFESTYLE VARIABLES
AUDIT-C TOTAL SCORE: INCOMPLETE
AUDIT TOTAL SCORE: INCOMPLETE
HOW OFTEN DO YOU HAVE A DRINK CONTAINING ALCOHOL: NEVER
HOW OFTEN DO YOU HAVE A DRINK CONTAINING ALCOHOL: 0

## 2021-11-08 NOTE — PATIENT INSTRUCTIONS
Personalized Preventive Plan for Mireya Service - 11/8/2021  Medicare offers a range of preventive health benefits. Some of the tests and screenings are paid in full while other may be subject to a deductible, co-insurance, and/or copay. Some of these benefits include a comprehensive review of your medical history including lifestyle, illnesses that may run in your family, and various assessments and screenings as appropriate. After reviewing your medical record and screening and assessments performed today your provider may have ordered immunizations, labs, imaging, and/or referrals for you. A list of these orders (if applicable) as well as your Preventive Care list are included within your After Visit Summary for your review. Other Preventive Recommendations:    · A preventive eye exam performed by an eye specialist is recommended every 1-2 years to screen for glaucoma; cataracts, macular degeneration, and other eye disorders. · A preventive dental visit is recommended every 6 months. · Try to get at least 150 minutes of exercise per week or 10,000 steps per day on a pedometer . · Order or download the FREE \"Exercise & Physical Activity: Your Everyday Guide\" from [employer]. Call 4-169.866.2547 or search [employer] online. · You need 0148-9203 mg of calcium and 4166-5535 IU of vitamin D per day. It is possible to meet your calcium requirement with diet alone, but a vitamin D supplement is usually necessary to meet this goal.  · When exposed to the sun, use a sunscreen that protects against both UVA and UVB radiation with an SPF of 30 or greater. Reapply every 2 to 3 hours or after sweating, drying off with a towel, or swimming. · Always wear a seat belt when traveling in a car. Always wear a helmet when riding a bicycle or motorcycle. Heart-Healthy Diet   Sodium, Fat, and Cholesterol Controlled Diet       What Is a Heart Healthy Diet?    A heart-healthy diet is one that limits sodium , certain types of fat , and cholesterol . This type of diet is recommended for:   People with any form of cardiovascular disease (eg, coronary heart disease , peripheral vascular disease , previous heart attack , previous stroke )   People with risk factors for cardiovascular disease, such as high blood pressure , high cholesterol , or diabetes   Anyone who wants to lower their risk of developing cardiovascular disease   Sodium    Sodium is a mineral found in many foods. In general, most people consume much more sodium than they need. Diets high in sodium can increase blood pressure and lead to edema (water retention). On a heart-healthy diet, you should consume no more than 2,300 mg (milligrams) of sodium per dayabout the amount in one teaspoon of table salt. The foods highest in sodium include table salt (about 50% sodium), processed foods, convenience foods, and preserved foods. Cholesterol    Cholesterol is a fat-like, waxy substance in your blood. Our bodies make some cholesterol. It is also found in animal products, with the highest amounts in fatty meat, egg yolks, whole milk, cheese, shellfish, and organ meats. On a heart-healthy diet, you should limit your cholesterol intake to less than 200 mg per day. It is normal and important to have some cholesterol in your bloodstream. But too much cholesterol can cause plaque to build up within your arteries, which can eventually lead to a heart attack or stroke. The two types of cholesterol that are most commonly referred to are:   Low-density lipoprotein (LDL) cholesterol  Also known as bad cholesterol, this is the cholesterol that tends to build up along your arteries. Bad cholesterol levels are increased by eating fats that are saturated or hydrogenated. Optimal level of this cholesterol is less than 100. Over 130 starts to get risky for heart disease.    High-density lipoprotein (HDL) cholesterol  Also known as good cholesterol, this type of cholesterol actually carries cholesterol away from your arteries and may, therefore, help lower your risk of having a heart attack. You want this level to be high (ideally greater than 60). It is a risk to have a level less than 40. You can raise this good cholesterol by eating olive oil, canola oil, avocados, or nuts. Exercise raises this level, too. Fat    Fat is calorie dense and packs a lot of calories into a small amount of food. Even though fats should be limited due to their high calorie content, not all fats are bad. In fact, some fats are quite healthful. Fat can be broken down into four main types. The good-for-you fats are:   Monounsaturated fat  found in oils such as olive and canola, avocados, and nuts and natural nut butters; can decrease cholesterol levels, while keeping levels of HDL cholesterol high   Polyunsaturated fat  found in oils such as safflower, sunflower, soybean, corn, and sesame; can decrease total cholesterol and LDL cholesterol   Omega-3 fatty acids  particularly those found in fatty fish (such as salmon, trout, tuna, mackerel, herring, and sardines); can decrease risk of arrhythmias, decrease triglyceride levels, and slightly lower blood pressure   The fats that you want to limit are:   Saturated fat  found in animal products, many fast foods, and a few vegetables; increases total blood cholesterol, including LDL levels   Animal fats that are saturated include: butter, lard, whole-milk dairy products, meat fat, and poultry skin   Vegetable fats that are saturated include: hydrogenated shortening, palm oil, coconut oil, cocoa butter   Hydrogenated or trans fat  found in margarine and vegetable shortening, most shelf stable snack foods, and fried foods; increases LDL and decreases HDL     It is generally recommended that you limit your total fat for the day to less than 30% of your total calories.  If you follow an 1800-calorie heart healthy diet, for example, this would mean 60 grams of fat or less per day. Saturated fat and trans fat in your diet raises your blood cholesterol the most, much more than dietary cholesterol does. For this reason, on a heart-healthy diet, less than 7% of your calories should come from saturated fat and ideally 0% from trans fat. On an 1800-calorie diet, this translates into less than 14 grams of saturated fat per day, leaving 46 grams of fat to come from mono- and polyunsaturated fats.    Food Choices on a Heart Healthy Diet   Food Category   Foods Recommended   Foods to Avoid   Grains   Breads and rolls without salted tops Most dry and cooked cereals Unsalted crackers and breadsticks Low-sodium or homemade breadcrumbs or stuffing All rice and pastas   Breads, rolls, and crackers with salted tops High-fat baked goods (eg, muffins, donuts, pastries) Quick breads, self-rising flour, and biscuit mixes Regular bread crumbs Instant hot cereals Commercially prepared rice, pasta, or stuffing mixes   Vegetables   Most fresh, frozen, and low-sodium canned vegetables Low-sodium and salt-free vegetable juices Canned vegetables if unsalted or rinsed   Regular canned vegetables and juices, including sauerkraut and pickled vegetables Frozen vegetables with sauces Commercially prepared potato and vegetable mixes   Fruits   Most fresh, frozen, and canned fruits All fruit juices   Fruits processed with salt or sodium   Milk   Nonfat or low-fat (1%) milk Nonfat or low-fat yogurt Cottage cheese, low-fat ricotta, cheeses labeled as low-fat and low-sodium   Whole milk Reduced-fat (2%) milk Malted and chocolate milk Full fat yogurt Most cheeses (unless low-fat and low salt) Buttermilk (no more than 1 cup per week)   Meats and Beans   Lean cuts of fresh or frozen beef, veal, lamb, or pork (look for the word loin) Fresh or frozen poultry without the skin Fresh or frozen fish and some shellfish Egg whites and egg substitutes (Limit whole eggs to three per week) Tofu Nuts or seeds (unsalted, dry-roasted), low-sodium peanut butter Dried peas, beans, and lentils   Any smoked, cured, salted, or canned meat, fish, or poultry (including tony, chipped beef, cold cuts, hot dogs, sausages, sardines, and anchovies) Poultry skins Breaded and/or fried fish or meats Canned peas, beans, and lentils Salted nuts   Fats and Oils   Olive oil and canola oil Low-sodium, low-fat salad dressings and mayonnaise   Butter, margarine, coconut and palm oils, tony fat   Snacks, Sweets, and Condiments   Low-sodium or unsalted versions of broths, soups, soy sauce, and condiments Pepper, herbs, and spices; vinegar, lemon, or lime juice Low-fat frozen desserts (yogurt, sherbet, fruit bars) Sugar, cocoa powder, honey, syrup, jam, and preserves Low-fat, trans-fat free cookies, cakes, and pies Lakhwinder and animal crackers, fig bars, joey snaps   High-fat desserts Broth, soups, gravies, and sauces, made from instant mixes or other high-sodium ingredients Salted snack foods Canned olives Meat tenderizers, seasoning salt, and most flavored vinegars   Beverages   Low-sodium carbonated beverages Tea and coffee in moderation Soy milk   Commercially softened water   Suggestions   Make whole grains, fruits, and vegetables the base of your diet. Choose heart-healthy fats such as canola, olive, and flaxseed oil, and foods high in heart-healthy fats, such as nuts, seeds, soybeans, tofu, and fish. Eat fish at least twice per week; the fish highest in omega-3 fatty acids and lowest in mercury include salmon, herring, mackerel, sardines, and canned chunk light tuna. If you eat fish less than twice per week or have high triglycerides, talk to your doctor about taking fish oil supplements. Read food labels.    For products low in fat and cholesterol, look for fat free, low-fat, cholesterol free, saturated fat free, and trans fat freeAlso scan the Nutrition Facts Label, which lists saturated fat, trans fat, and cholesterol amounts. For products low in sodium, look for sodium free, very low sodium, low sodium, no added salt, and unsalted   Skip the salt when cooking or at the table; if food needs more flavor, get creative and try out different herbs and spices. Garlic and onion also add substantial flavor to foods. Trim any visible fat off meat and poultry before cooking, and drain the fat off after dyer. Use cooking methods that require little or no added fat, such as grilling, boiling, baking, poaching, broiling, roasting, steaming, stir-frying, and sauting. Avoid fast food and convenience food. They tend to be high in saturated and trans fat and have a lot of added salt. Talk to a registered dietitian for individualized diet advice. Last Reviewed: March 2011 Felisa Acosta MS, MPH, RD   Updated: 3/29/2011   ·     Preventing Osteoporosis: After Your Visit  Your Care Instructions  Osteoporosis means the bones are weak and thin enough that they can break easily. The older you are, the more likely you are to get osteoporosis. But with plenty of calcium, vitamin D, and exercise, you can help prevent osteoporosis. The preteen and teen years are a key time for bone building. With the help of calcium, vitamin D, and exercise in those early years and beyond, the bones reach their peak density and strength by age 27. After age 27, your bones naturally start to thin and weaken. The stronger your bones are at around age 27, the lower your risk for osteoporosis. But no matter what your age and risk are, your bones still need calcium, vitamin D, and exercise to stay strong. Also avoid smoking, and limit alcohol. Smoking and heavy alcohol use can make your bones thinner. Talk to your doctor about any special risks you might have, such as having a close relative with osteoporosis or taking a medicine that can weaken bones. Your doctor can tell you the best ways to protect your bones from thinning.   Follow-up alcohol. Drink no more than 1 alcohol drink a day if you are a woman. Drink no more than 2 alcohol drinks a day if you are a man. Do not smoke. Smoking can make bones thin faster. If you need help quitting, talk to your doctor about stop-smoking programs and medicines. These can increase your chances of quitting for good. When should you call for help? Watch closely for changes in your health, and be sure to contact your doctor if:  You need help with a healthy eating plan. You need help with an exercise plan    © 1901-3990 [employer]. Care instructions adapted under license by [facility]. This care instruction is for use with your licensed healthcare professional. If you have questions about a medical condition or this instruction, always ask your healthcare professional. [address] any warranty or liability for your use of this information. Content Version: 9.4.35703; Last Revised: June 20, 2011              ·     Keep Your Memory Chris Alonso       Many factors can affect your ability to remembera hectic lifestyle, aging, stress, chronic disease, and certain medicines. But, there are steps you can take to sharpen your mind and help preserve your memory. Challenge Your Brain   Regularly challenging your mind may help keeps it in top shape. Good mental exercises include:   Crossword puzzlesUse a dictionary if you need it; you will learn more that way. Brainteasers Try some! Crafts, such as wood working and sewing   Hobbies, such as gardening and building model airplanes   SocializingVisit old friends or join groups to meet new ones.    Reading   Learning a new language   Taking a class, whether it be art history or alo chi   TravelingExperience the food, history, and culture of your destination   Learning to use a computer   Going to museums, the theater, or thought-provoking movies   Changing things in your daily life, such as reversing your pattern in the grocery store or brushing your teeth using your nondominant hand   Use Memory Aids   There is no need to remember every detail on your own. These memory aids can help:   Calendars and day planners   Electronic organizers to store all sorts of helpful informationThese devices can \"beep\" to remind you of appointments. A book of days to record birthdays, anniversaries, and other occasions that occur on the same date every year   Detailed \"to-do\" lists and strategically placed sticky notes   Quick \"study\" sessionsBefore a gathering, review who will be there so their names will be fresh in your mind. Establish routinesFor example, keep your keys, wallet, and umbrella in the same place all the time or take medicine with your 8:00 AM glass of juice   Live a Healthy Life   Many actions that will keep your body strong will do the same for your mind. For example:   Talk to Your Doctor About Herbs and Supplements    Malnutrition and vitamin deficiencies can impair your mental function. For example, vitamin B12 deficiency can cause a range of symptoms, including confusion. But, what if your nutritional needs are being met? Can herbs and supplements still offer a benefit? Researchers have investigated a range of natural remedies, such as ginkgo , ginseng , and the supplement phosphatidylserine (PS). So far, though, the evidence is inconsistent as to whether these products can improve memory or thinking. If you are interested in taking herbs and supplements, talk to your doctor first because they may interact with other medicines that you are taking. Exercise Regularly    Among the many benefits of regular exercise are increased blood flow to the brain and decreased risk of certain diseases that can interfere with memory function. One study found that even moderate exercise has a beneficial effect.  Examples of \"moderate\" exercise include:   Playing 18 holes of golf once a week, without a cart   Playing tennis twice a week Walking one mile per day   Manage Stress    It can be tough to remember what is important when your mind is cluttered. Make time for relaxation. Choose activities that calm you down, and make it routine. Manage Chronic Conditions    Side effects of high blood pressure , diabetes, and heart disease can interfere with mental function. Many of the lifestyle steps discussed here can help manage these conditions. Strive to eat a healthy diet, exercise regularly, get stress under control, and follow your doctor's advice for your condition. Minimize Medications    Talk to your doctor about the medicines that you take. Some may be unnecessary. Also, healthy lifestyle habits may lower the need for certain drugs. Last Reviewed: April 2010 Ana De La Cruz MD   Updated: 4/13/2010   ·     [address] [place] [address]       As we get older, changes in balance, gait, strength, vision, hearing, and cognition make even the most youthful senior more prone to accidents. Falls are one of the leading health risks for older people. This increased risk of falling is related to:   Aging process (eg, decreased muscle strength, slowed reflexes)   Higher incidence of chronic health problems (eg, arthritis, diabetes) that may limit mobility, agility or sensory awareness   Side effects of medicine (eg, dizziness, blurred vision)especially medicines like prescription pain medicines and drugs used to treat mental health conditions   Depending on the brittleness of your bones, the consequences of a fall can be serious and long lasting. Home Life   Research by the Association of Aging [facility]) shows that some home accidents among older adults can be prevented by making simple lifestyle changes and basic modifications and repairs to the home environment. Here are some lifestyle changes that experts recommend:   Have your hearing and vision checked regularly. Be sure to wear prescription glasses that are right for you.    Speak to your doctor or pharmacist about the possible side effects of your medicines. A number of medicines can cause dizziness. If you have problems with sleep, talk to your doctor. Limit your intake of alcohol. If necessary, use a cane or walker to help maintain your balance. Wear supportive, rubber-soled shoes, even at home. If you live in a region that gets wintry weather, you may want to put special cleats on your shoes to prevent you from slipping on the snow and ice. Exercise regularly to help maintain muscle tone, agility, and balance. Always hold the banister when going up or down stairs. Also, use  bars when getting in or out of the bath or shower, or using the toilet. To avoid dizziness, get up slowly from a lying down position. Sit up first, dangling your legs for a minute or two before rising to a standing position. Overall Home Safety Check   According to the Consumer [employer] Safety [employer]'s \"Older Consumer Home Safety Checklist,\" it is important to check for potential hazards in each room. And remember, proper lighting is an essential factor in home safety. If you cannot see clearly, you are more likely to fall. Important questions to ask yourself include:   Are lamp, electric, extension, and telephone cords placed out of the flow of traffic and maintained in good condition? Have frayed cords been replaced? Are all small rugs and runners slip resistant? If not, you can secure them to the floor with a special double-sided carpet tape. Are smoke detectors properly locatedone on every floor of your home and one outside of every sleeping area? Are they in good working order? Are batteries replaced at least once a year? Do you have a well-maintained carbon monoxide detector outside every sleeping are in your home? Does your furniture layout leave plenty of space to maneuver between and around chairs, tables, beds, and sofas? Are hallways, stairs and passages between rooms well lit?  Can you reach a lamp without getting out of bed? Are floor surfaces well maintained? Shag rugs, high-pile carpeting, tile floors, and polished wood floors can be particularly slippery. Stairs should always have handrails and be carpeted or fitted with a non-skid tread. Is your telephone easily reachable. Is the cord safely tucked away? Room by Room   According to the Association of Aging, bathrooms and dioni are the two most potentially hazardous rooms in your home. In the Kitchen    Be sure your stove is in proper working order and always make sure burners and the oven are off before you go out or go to sleep. Keep pots on the back burners, turn handles away from the front of the stove, and keep stove clean and free of grease build-up. Kitchen ventilation systems and range exhausts should be working properly. Keep flammable objects such as towels and pot holders away from the cooking area except when in use. Make sure kitchen curtains are tied back. Move cords and appliances away from the sink and hot surfaces. If extension cords are needed, install wiring guides so they do not hang over the sink, range, or working areas. Look for coffee pots, kettles and toaster ovens with automatic shut-offs. Keep a mop handy in the kitchen so you can wipe up spills instantly. You should also have a small fire extinguisher. Arrange your kitchen with frequently used items on lower shelves to avoid the need to stand on a stepstool to reach them. Make sure countertops are well-lit to avoid injuries while cutting and preparing food. In the Bathroom    Use a non-slip mat or decals in the tub and shower, since wet, soapy tile or porcelain surfaces are extremely slippery. Make sure bathroom rugs are non-skid or tape them firmly to the floor. Bathtubs should have at least one, preferably two, grab bars, firmly attached to structural supports in the wall.  (Do not use built-in soap holders or glass shower doors as grab bars.)    Tub seats fitted with non-slip material on the legs allow you to wash sitting down. For people with limited mobility, bathtub transfer benches allow you to slide safely into the tub. Raised toilet seats and toilet safety rails are helpful for those with knee or hip problems. In the [facility]    Make sure you use a nightlight and that the area around your bed is clear of potential obstacles. Be careful with electric blankets and never go to sleep with a heating pad, which can cause serious burns even if on a low setting. Use fire-resistant mattress covers and pillows, and NEVER smoke in bed. Keep a phone next to the bed that is programmed to dial 911 at the push of a button. If you have a chronic condition, you may want to sign on with an automatic call-in service. Typically the system includes a small pendant that connects directly to an emergency medical voice-response system. You should also make arrangements to stay in contact with someonefriend, neighbor, family memberon a regular schedule. Fire Prevention   According to the [employer] (Smoke Alarms for Every) [address] [place] [address], senior citizens are one of the two highest risk groups for death and serious injuries due to residential fires. When cooking, wear short-sleeved items, never a bulky long-sleeved robe. The [facility]'s Safety Checklist for Older Consumers emphasizes the importance of checking basements, garages, workshops and storage areas for fire hazards, such as volatile liquids, piles of old rags or clothing and overloaded circuits. Never smoke in bed or when lying down on a couch or recliner chair. Small portable electric or kerosene heaters are responsible for many home fires and should be used cautiously if at all. If you do use one, be sure to keep them away from flammable materials. In case of fire, make sure you have a pre-established emergency exit plan.     Have a professional check your

## 2021-11-08 NOTE — PROGRESS NOTES
Medicare Annual Wellness Visit  Name: Jaci Doan Date: 2021   MRN: [id number] Sex: Female   Age: 80 y.o. Ethnicity: Non- / Non    : 1940 Race: Isaias Mishra / Lenore Puente is here for Medicare AWV (discuss recent surgery; discuss medication Farxiga)    Screenings for behavioral, psychosocial and functional/safety risks, and cognitive dysfunction are all negative except as indicated below. These results, as well as other patient data from the [address] form, are documented in Flowsheets linked to this Encounter. Allergies   Allergen Reactions    Flagyl [Metronidazole]      ataxia    Sulfa Antibiotics Hives    Lisinopril-Hydrochlorothiazide Swelling and Angioedema     Lip And facial swelling and coughing    Ace Inhibitors Other (See Comments) and Angioedema     Edema lip swelling and coughing.  Actos [Pioglitazone Hydrochloride]     Demerol Other (See Comments)     \"Made her out of it\"    Elavil [Amitriptyline Hcl]     Estradiol      Estrogen patch, rapid weight gain, arm pain , dizziness, mentally not clear. Patient took estrace 1 mg daily orally for years and insurance no longer covered so switched to patch and felt so bad , she discontinued.  Metformin     Metoclopramide     Metolazone Other (See Comments)    Neurontin [Gabapentin]     Niaspan [Niacin Er]     Nifedipine Itching    Nsaids Other (See Comments)     Renal doctor said to avoid NSAIDS    Oxycodone     Phenytoin Sodium Extended     Pioglitazone     Prednisone Nausea Only    Pregabalin     Simvastatin     Ciprofloxacin Rash    Doxycycline Rash    Iodine Rash     Rash all over. Pt does not want contrast anymore. Prior to Visit Medications    Medication Sig Taking?  Authorizing Provider   simethicone 41.667 MG CHEW 41.667 mg as needed for gas Yes YAZMIN Mejia - CNP   ezetimibe (ZETIA) 10 MG tablet TAKE ONE TABLET BY MOUTH DAILY Yes January Martínez Mat Levi MD   folic acid (FOLVITE) 1 MG tablet Take 1 tablet by mouth daily Yes Jerzy Rivera MD   B complex-vitamin C-folic acid (NEPHRO-BARNEY) 1 MG tablet Take 1 tablet by mouth daily (with breakfast) Yes Jerzy Rivera MD   Insulin Pen Needle ([employer] PEN NEEDLES 31G) 31G X 8 MM MISC 1 each by Does not apply route daily Yes Con Burns MD   insulin glargine (LANTUS SOLOSTAR) 100 UNIT/ML injection pen Inject 22 Units into the skin nightly Indications: sliding scale up to 22 units Lease refill 8/23/18 per patient requests  Patient taking differently: Inject 22 Units into the skin nightly Indications: sliding scale up to 22 units Patient takes PRN and not daily Yes Con Burns MD   Netarsudil-Latanoprost 0.02-0.005 % SOLN Apply 1 drop to eye nightly Yes Historical Provider, MD   timolol (BETIMOL) 0.5 % ophthalmic solution 1 drop 2 times daily Yes Historical Provider, MD   metoprolol tartrate (LOPRESSOR) 25 MG tablet Take 25 mg by mouth 2 times daily Yes Historical Provider, MD   hydrALAZINE (APRESOLINE) 25 MG tablet Take 1 tablet by mouth 2 times daily Yes Con Burns MD   aspirin 81 MG EC tablet Take 81 mg by mouth daily Yes Historical Provider, MD   vitamin D (ERGOCALCIFEROL) 45041 units CAPS capsule Take 50,000 Units by mouth every 14 days Yes Historical Provider, MD   acetaminophen (TYLENOL) 325 MG tablet Take 650 mg by mouth every 6 hours as needed for Pain Yes Historical Provider, MD   Multiple Vitamins-Minerals (OCUVITE EYE + MULTI) TABS Take 1 tablet by mouth daily Yes Historical Provider, MD   latanoprost (XALATAN) 0.005 % ophthalmic solution Place 1 drop into both eyes nightly  Yes Historical Provider, MD   Coenzyme Q10 (COQ10) 100 MG CAPS Take 1 capsule by mouth daily  Yes Historical Provider, MD   Cyanocobalamin (B-12) 500 MCG TABS Take 1 tablet by mouth once a week  Yes Historical Provider, MD   lansoprazole (PREVACID) 15 MG delayed release capsule Take 15 mg by mouth daily  Yes Historical Provider, MD   metoclopramide (REGLAN) 5 MG tablet Take 1 tablet by mouth 4 times daily  Patient not taking: Reported on 11/8/2021  YAZMIN Ward - LASHAY   sennosides-docusate sodium (SENOKOT-S) 8.6-50 MG tablet Take 1 tablet by mouth daily  Patient not taking: Reported on 11/8/2021  Vena Lafleur, PA-C   fenofibrate (TRICOR) 48 MG tablet Take 1 tablet by mouth daily  Patient not taking: Reported on 11/8/2021  Zoe Spring MD   dorzolamide-timolol (COSOPT) 22.3-6.8 MG/ML ophthalmic solution INSTILL 1 DROP INTO BOTH EYES EVERY 12 HOURS  Patient not taking: Reported on 11/8/2021  Historical Provider, MD   fluticasone-umeclidin-vilant (Darlis Rolling) 100-62.5-25 MCG/INH AEPB Inhale 1 puff into the lungs daily Expiration date 7/ 2021  Lot [id number]  Patient not taking: Reported on 11/8/2021  Zoe Spring MD       Past Medical History:   Diagnosis Date    Angina     Bilateral carpal tunnel syndrome 10/9/2018    Chronic kidney disease     Fibromyalgia     Hyperlipidemia     Hypertension     MVP (mitral valve prolapse)     s 5/18/2017    Small bowel obstruction ([email or web address] 6/24/2019    Type II or unspecified type diabetes mellitus without mention of complication, not stated as uncontrolled        Past Surgical History:   Procedure Laterality Date    CHOLECYSTECTOMY      COLONOSCOPY N/A 8/13/2019    COLONOSCOPY DIAGNOSTIC performed by Prasanna Osuna MD at [address] DISCECTOMY  10/03/12    lumbar discectomy, 3-4 left    SMALL INTESTINE SURGERY      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 performed by Mi Ansari MD at [facility]. History   Problem Relation Age of Onset    Vision Loss Sister     Heart Attack Mother     Colon Cancer Father     Ovarian Cancer Daughter        CareTeam (Including outside providers/suppliers regularly involved in providing care):   Patient Care Team:  Yasemin Contreras MD as PCP - General (Internal Medicine/Pediatrics)  Yasemin Contreras MD as PCP - [facility]    Wt Readings from Last 3 Encounters:   11/08/21 153 lb (69.4 kg)   09/20/21 156 lb (70.8 kg)   09/09/21 153 lb (69.4 kg)     Vitals:    11/08/21 1318   BP: 130/70   Pulse: 65   Resp: 14   Temp: 98 °F (36.7 °C)   SpO2: 99%   Weight: 153 lb (69.4 kg)   Height: 5' 3\" (1.6 m)     Body mass index is 27.1 kg/m². Based upon direct observation of the patient, evaluation of cognition reveals recent and remote memory intact.     General Appearance: alert and oriented to person, place and time, well developed and well- nourished, in no acute distress  Skin: warm and dry, no rash or erythema  Head: normocephalic and atraumatic  Eyes: pupils equal, round, and reactive to light, extraocular eye movements intact, conjunctivae normal  ENT: tympanic membrane, external ear and ear canal normal bilaterally, nose without deformity, nasal mucosa and turbinates normal without polyps  Neck: supple and non-tender without mass, no thyromegaly or thyroid nodules, no cervical lymphadenopathy  Pulmonary/Chest: clear to auscultation bilaterally- no wheezes, rales or rhonchi, normal air movement, no respiratory distress  Cardiovascular: normal rate, regular rhythm, normal S1 and S2, no murmurs, rubs, clicks, or gallops, distal pulses intact, no carotid bruits  Abdomen: soft, non-tender, non-distended, normal bowel sounds, no masses or organomegaly  Extremities: no cyanosis, clubbing or edema  Musculoskeletal: normal range of motion, no joint swelling, deformity or tenderness, generalized weakness    Neurologic: reflexes normal and symmetric, no cranial nerve deficit, gait, coordination and speech normal      Lab Review   Orders Only on 09/02/2021   Component Date Value    Sodium 09/02/2021 137     Potassium 09/02/2021 4.5     Chloride 09/02/2021 99     CO2 09/02/2021 27     Anion Gap 09/02/2021 11     Glucose 09/02/2021 146*    BUN 09/02/2021 15     CREATININE 09/02/2021 1.0     GFR Non- 09/02/2021 53*    GFR  09/02/2021 >60     Calcium 09/02/2021 9.5     WBC 09/02/2021 4.5     RBC 09/02/2021 2.58*    Hemoglobin 09/02/2021 8.8*    Hematocrit 09/02/2021 25.7*    MCV 09/02/2021 99.4     MCH 09/02/2021 34.0     MCHC 09/02/2021 34.2     RDW 09/02/2021 16.1*    Platelets 56/30/6694 426     MPV 09/02/2021 7.4     Neutrophils % 09/02/2021 64.2     Lymphocytes % 09/02/2021 22.0     Monocytes % 09/02/2021 6.6     Eosinophils % 09/02/2021 5.3     Basophils % 09/02/2021 1.9     Neutrophils Absolute 09/02/2021 2.9     Lymphocytes Absolute 09/02/2021 1.0     Monocytes Absolute 09/02/2021 0.3     Eosinophils Absolute 09/02/2021 0.2     Basophils Absolute 09/02/2021 0.1    No results displayed because visit has over 200 results. No results displayed because visit has over 200 results.       Hospital Outpatient Visit on 08/03/2021   Component Date Value    Rejected Test 08/04/2021 ob     Reason for Rejection 08/04/2021 see below     Rejected Test 08/04/2021 OB     Reason for Rejection 08/04/2021 see below     Rejected Test 08/04/2021 OB     Reason for Rejection 08/04/2021 see below    Hospital Outpatient Visit on 07/29/2021   Component Date Value    Hemoglobin A1C 07/29/2021 6.4     eAG 07/29/2021 137.0     Cholesterol, Total 07/29/2021 199     Triglycerides 07/29/2021 124     HDL 07/29/2021 58     LDL Calculated 07/29/2021 116*    VLDL Cholesterol Calcula* 07/29/2021 25     Vitamin B-12 07/29/2021 >2000*    Folate 07/29/2021 >20.00      Patient's complete Health Risk Assessment and screening values have been reviewed and are found in [facility]. The following problems were reviewed today and where indicated follow up appointments were made and/or referrals ordered. Positive Risk Factor Screenings with Interventions:            Hearing/Vision:  No exam data present  Hearing/Vision  Do you or your family notice any trouble with your hearing that hasn't been managed with hearing aids?: No  Do you have difficulty driving, watching TV, or doing any of your daily activities because of your eyesight?: (!) Yes  Have you had an eye exam within the past year?: Yes  Hearing/Vision Interventions:  · Vision concerns:  patient encouraged to make appointment with his/her eye specialist     ADL:  ADLs  In the past 7 days, did you need help from others to perform any of the following everyday activities? Eating, dressing, grooming, bathing, toileting, or walking/balance?: (!) Walking/Balance  In the past 7 days, did you need help from others to take care of any of the following?  Laundry, housekeeping, banking/finances, shopping, telephone use, food preparation, transportation, or taking medications?: (!) Transportation  ADL Interventions:  · Referred for [address]  · Referred to [facility]    Personalized Preventive Plan   Current Health Maintenance Status  Immunization History   Administered Date(s) Administered    [address], Primary or Immunocompromised, PF, 100mcg/0.5mL 10/29/2021    Influenza Vaccine, unspecified formulation 01/13/2007    Influenza Virus Vaccine 10/04/2012    Influenza, Quadv, adjuvanted, 65 yrs +, IM, PF (Fluad) 10/15/2020    Pneumococcal Conjugate 13-valent (Coenaom01) 04/19/2019    Pneumococcal Polysaccharide ([id number]) 01/13/2007    Zoster Recombinant (Shingrix) 11/14/2020        Health Maintenance   Topic Date Due    DTaP/Tdap/Td vaccine (1 - Tdap) Never done    Annual Wellness Visit (AWV)  Never done    Flu vaccine (1) 09/01/2021    Potassium monitoring  09/02/2022    Creatinine monitoring  09/02/2022    DEXA (modify frequency per FRAX score)  Completed    Pneumococcal 65+ years Vaccine  Completed    COVID-19 Vaccine  Completed    Hepatitis A vaccine  Aged Out    Hib vaccine  Aged Out    Meningococcal (ACWY) vaccine  Aged Out     Recommendations for [employer] Due: see orders and patient instructions/AVS.  . Recommended screening schedule for the next 5-10 years is provided to the patient in written form: see Patient Adrianna Hernandez was seen today for medicare aw. Diagnoses and all orders for this visit:    Need for influenza vaccination  -     INFLUENZA, QUADV, ADJUVANTED, 72 YRS =, IM, PF, PREFILL SYR, 0.5ML (FLUAD)    Stage 3 chronic kidney disease, unspecified whether stage 3a or 3b CKD (HCC)    Asymptomatic menopausal state    Routine general medical examination at a health care facility    Weakness of extremity  -     External Referral To Home Health    Intestinal adhesions with obstruction, unspecified whether partial or complete ([address]    Other ulcerative colitis with intestinal obstruction (HCC)    Other neutropenia ([address]  -     CBC WITH AUTO DIFFERENTIAL; Future    Ileus ([address]  -     CBC WITH AUTO DIFFERENTIAL; Future    Type 2 diabetes mellitus with stage 3 chronic kidney disease, with long-term current use of insulin, unspecified whether stage 3a or 3b CKD ([address]                   Cardiovascular Disease Risk Counseling: Assessed the patient's risk to develop cardiovascular disease and reviewed main risk factors.    Reviewed steps to reduce disease risk including:   · Quitting tobacco use, reducing amount smoked, or not starting the habit  · Making healthy food choices  · Being physically active and gradualy increasing activity levels   · Reduce weight and determine a healthy BMI goal  · Monitor blood pressure and treat if higher than 140/90 mmHg  · Maintain blood total cholesterol levels under 5 mmol/l or 190 mg/dl  · Maintain LDL cholesterol levels under 3.0 mmol/l or 115 mg/dl   · Control blood glucose levels  · Consider taking aspirin (75 mg daily), once blood pressure is controlled   Provided a follow up plan.   Time spent (minutes): 5

## 2021-11-08 NOTE — ASSESSMENT & PLAN NOTE
Chronic renal impairment, stage 3 (moderate), unspecified whether stage 3a or 3b CKD (HCC)  Overview:  Analgesic nephropathy/ hypertensive nephrosclerosis  No significant proteinuria  Baseline creatinine is 1.2-1.9 mg/dl, currently at 0.92   Lab Results   Component Value Date    CREATININE 1.0 09/02/2021    BUN 15 09/02/2021     09/02/2021    K 4.5 09/02/2021    CL 99 09/02/2021    CO2 27 09/02/2021

## 2021-12-09 DIAGNOSIS — I10 ESSENTIAL HYPERTENSION: ICD-10-CM

## 2021-12-09 NOTE — TELEPHONE ENCOUNTER
Patient called regarding the following refill:   hydrALAZINE (APRESOLINE) 25 MG tablet   1x daily     Patient is requesting refill to go to [facility] ([place])    Phone: [phone]

## 2021-12-13 RX ORDER — HYDRALAZINE HYDROCHLORIDE 25 MG/1
25 TABLET, FILM COATED ORAL 2 TIMES DAILY
Qty: 180 TABLET | Refills: 3 | Status: SHIPPED | OUTPATIENT
Start: 2021-12-13 | End: 2021-12-14 | Stop reason: SDUPTHER

## 2021-12-14 DIAGNOSIS — I10 ESSENTIAL HYPERTENSION: ICD-10-CM

## 2021-12-14 RX ORDER — HYDRALAZINE HYDROCHLORIDE 25 MG/1
25 TABLET, FILM COATED ORAL 2 TIMES DAILY
Qty: 60 TABLET | Refills: 0 | Status: SHIPPED | OUTPATIENT
Start: 2021-12-14

## 2021-12-14 NOTE — TELEPHONE ENCOUNTER
Patient called in requesting the following medication refill:  hydrALAZINE (APRESOLINE) 25 MG tablet   2x daily  30 day supply  Comment: patient is requesting 90 day supply    Patient is stating original refill order was prescribed by Dr. Nadege Young  -no longer pcp  -pharmacy has been trying to medication refill using wrong pcp    Patient is stating she currently does not have any medication left  -swellening  In ankle (bi-lateral)    Phone:[phone]

## 2022-03-14 RX ORDER — PEN NEEDLE, DIABETIC 31 GX5/16"
NEEDLE, DISPOSABLE MISCELLANEOUS
Qty: 90 EACH | Refills: 3 | OUTPATIENT
Start: 2022-03-14

## 2022-05-23 DIAGNOSIS — E78.00 PURE HYPERCHOLESTEROLEMIA: ICD-10-CM

## 2022-05-23 RX ORDER — FOLIC ACID 1 MG/1
TABLET ORAL
Qty: 30 TABLET | Refills: 0 | Status: SHIPPED | OUTPATIENT
Start: 2022-05-23

## 2022-06-13 DIAGNOSIS — E78.00 PURE HYPERCHOLESTEROLEMIA: ICD-10-CM

## 2022-06-13 NOTE — TELEPHONE ENCOUNTER
Requested Prescriptions     Pending Prescriptions Disp Refills    ezetimibe (ZETIA) 10 mg tablet [Pharmacy Med Name: EZETIMIBE TABS 10MG] 90 tablet 3     Sig: TAKE 1 TABLET DAILY     Recent Visits  Date Type Provider Dept   11/08/21 Office Visit Kerry Mcclellan MD [facility]   09/20/21 Office Visit Vida Borges, APRN - CNP [facility]   06/08/21 Office Visit Kerry Mcclellan MD [facility]   01/14/21 Office Visit Kayce Paiz MD [facility]   Showing recent visits within past 540 days with a meds authorizing provider and meeting all other requirements  Future Appointments  No visits were found meeting these conditions.   Showing future appointments within next 150 days with a meds authorizing provider and meeting all other requirements       LAST APPOINTMENT  11/08/2021    LAST REFILL ON MEDICATION  ezetimibe (ZETIA) 10 MG tablet [[id number]]     Order Details  Dose, Route, Frequency: As Directed   Dispense Quantity: 90 tablet Refills: 2          Sig: TAKE ONE TABLET BY MOUTH DAILY         Start Date: 06/26/21

## 2022-06-17 RX ORDER — EZETIMIBE 10 MG/1
TABLET ORAL
Qty: 90 TABLET | Refills: 3 | Status: SHIPPED | OUTPATIENT
Start: 2022-06-17

## 2022-06-17 NOTE — TELEPHONE ENCOUNTER
Refills given. Please call patient and confirm next follow up appointment. If none in last 6 months schedule.

## 2022-08-09 DIAGNOSIS — Z79.4 TYPE 2 DIABETES MELLITUS WITH COMPLICATION, WITH LONG-TERM CURRENT USE OF INSULIN (HCC): ICD-10-CM

## 2022-08-09 DIAGNOSIS — E11.8 TYPE 2 DIABETES MELLITUS WITH COMPLICATION, WITH LONG-TERM CURRENT USE OF INSULIN (HCC): ICD-10-CM

## 2022-08-09 RX ORDER — INSULIN GLARGINE 100 [IU]/ML
22 INJECTION, SOLUTION SUBCUTANEOUS NIGHTLY
Qty: 20 ML | Refills: 0 | Status: SHIPPED | OUTPATIENT
Start: 2022-08-09

## 2022-08-09 NOTE — TELEPHONE ENCOUNTER
----- Message from [facility] sent at 8/9/2022  4:07 PM EDT -----  Subject: Refill Request    QUESTIONS  Name of Medication? insulin glargine (LANTUS SOLOSTAR) 100 UNIT/ML   injection pen  Patient-reported dosage and instructions? lantus solostar 100unit penfil   with sliding scale once a day  How many days do you have left? 0  Preferred Pharmacy? [address]  phone number (if available)? [phone]  Additional Information for Provider? patient is on auto fill with [facility]   scripts  ---------------------------------------------------------------------------  --------------  CALL BACK INFO  What is the best way for the office to contact you? OK to leave message on   voicemail,Do not leave any message, patient will call back for answer  Preferred Call Back Phone Number? [phone]  ---------------------------------------------------------------------------  --------------  SCRIPT ANSWERS  Relationship to Patient?  Self

## 2022-08-09 NOTE — TELEPHONE ENCOUNTER
Recent Visits  Date Type Provider Dept   11/08/21 Office Visit Bill Paredes MD [facility]   09/20/21 Office Visit YAZMIN Nicholson - CNP [facility]   06/08/21 Office Visit Bill Paredes MD [facility]   Showing recent visits within past 540 days with a meds authorizing provider and meeting all other requirements  Future Appointments  No visits were found meeting these conditions.   Showing future appointments within next 150 days with a meds authorizing provider and meeting all other requirements     11/8/2021

## 2022-10-17 DIAGNOSIS — E11.8 TYPE 2 DIABETES MELLITUS WITH COMPLICATION, WITH LONG-TERM CURRENT USE OF INSULIN (HCC): ICD-10-CM

## 2022-10-17 DIAGNOSIS — Z79.4 TYPE 2 DIABETES MELLITUS WITH COMPLICATION, WITH LONG-TERM CURRENT USE OF INSULIN (HCC): ICD-10-CM

## 2022-10-19 RX ORDER — INSULIN GLARGINE 100 [IU]/ML
INJECTION, SOLUTION SUBCUTANEOUS
Qty: 15 ML | Refills: 4 | OUTPATIENT
Start: 2022-10-19

## 2022-11-09 NOTE — PATIENT INSTRUCTIONS
Information & Instructions   After Finger, Hand, Wrist, or Elbow Injection    Cecilia Cantrell MD    You have received an injection of local anesthetic (Bupivicaine without Epinephrine) for comfort & a steroid (Kenalog) for its strong anti-inflammatory effects. In order to give the medication a chance to reduce your inflammation and discomfort, it is recommended that you take it easy for a day or so. You may use your hand and arm as you feel comfortable, but you should avoid highly strenuous activity and heavy use for several days. Relief from the injection will often not begin for several days, and you may not feel full relief for up to one month. It is not uncommon to experience some local discomfort or pain at or around the injection site for a few days. To relieve these symptoms you may do the following if you feel necessary:       Apply ice to the affected area 20 minutes on and 20 minutes off. Do not apply ice directly to the skin. Use a thin layer (T-shirt, pillowcase, towel, etc.) to protect the skin. - If allowed by your other medical physicians, you may take -     2 Tylenol extra strength tablets every 4-6 hours       1-2 Aleve tablets twice a day     2-3 Advil tablets two to three times a day    If you are diabetic, the steroid medication may increase your blood sugar, so you are advised to monitor your sugar more closely so you can adjust it accordingly for a few days following your injection. If you need assistance with the control of you blood sugar, please contact you primary care physician for further advice. I will request that you please call the office one month after your injection at [phone] if you have not experienced relief of your symptoms (unless I have instructed you otherwise). If your injection has given you good relief of you symptoms as expected, then you only need to call the office if your symptoms return.
in ASU:

## 2022-12-19 DIAGNOSIS — N18.32 STAGE 3B CHRONIC KIDNEY DISEASE (HCC): ICD-10-CM

## 2022-12-19 NOTE — TELEPHONE ENCOUNTER
Recent Visits  Date Type Provider Dept   11/08/21 Office Visit Pallavi Bhakta MD [facility]   09/20/21 Office Visit YAZMIN Castro - CNP [facility]   Showing recent visits within past 540 days with a meds authorizing provider and meeting all other requirements  Future Appointments  No visits were found meeting these conditions.   Showing future appointments within next 150 days with a meds authorizing provider and meeting all other requirements     11/8/2021

## 2022-12-20 RX ORDER — VIT B COMP NO.3/FOLIC/C/BIOTIN 1 MG-60 MG
TABLET ORAL
Qty: 90 TABLET | Refills: 3 | Status: SHIPPED | OUTPATIENT
Start: 2022-12-20

## 2023-01-20 DIAGNOSIS — E78.00 PURE HYPERCHOLESTEROLEMIA: ICD-10-CM

## 2023-01-20 RX ORDER — EZETIMIBE 10 MG/1
TABLET ORAL
Qty: 90 TABLET | Refills: 3 | OUTPATIENT
Start: 2023-01-20

## 2023-01-20 NOTE — TELEPHONE ENCOUNTER
Medication:   Requested Prescriptions     Pending Prescriptions Disp Refills    ezetimibe (ZETIA) 10 MG tablet 90 tablet 3     Sig: TAKE 1 TABLET DAILY     Last Filled:      Last appt: 11/8/2021   Next appt: Visit date not found    Last Lipid:   Lab Results   Component Value Date/Time    CHOL 199 07/29/2021 10:40 AM    TRIG 124 07/29/2021 10:40 AM    HDL 58 07/29/2021 10:40 AM    LDLCALC 116 07/29/2021 10:40 AM

## 2023-04-13 ENCOUNTER — HOSPITAL ENCOUNTER (OUTPATIENT)
Dept: MAMMOGRAPHY | Age: 83
Discharge: HOME | End: 2023-04-18
Payer: MEDICARE

## 2023-04-13 DIAGNOSIS — Z12.31 VISIT FOR SCREENING MAMMOGRAM: ICD-10-CM

## 2023-04-13 PROCEDURE — 77063 BREAST TOMOSYNTHESIS BI: CPT

## 2023-08-04 DIAGNOSIS — E78.00 PURE HYPERCHOLESTEROLEMIA: ICD-10-CM

## 2023-08-04 RX ORDER — EZETIMIBE 10 MG/1
TABLET ORAL
Qty: 90 TABLET | Refills: 3 | OUTPATIENT
Start: 2023-08-04

## 2023-08-04 NOTE — TELEPHONE ENCOUNTER
Recent Visits  No visits were found meeting these conditions. Showing recent visits within past 540 days with a meds authorizing provider and meeting all other requirements  Future Appointments  No visits were found meeting these conditions.   Showing future appointments within next 150 days with a meds authorizing provider and meeting all other requirements     11/8/2021

## 2025-04-22 ENCOUNTER — TELEPHONE (OUTPATIENT)
Dept: INTERNAL MEDICINE CLINIC | Age: 85
End: 2025-04-22

## 2025-04-22 NOTE — TELEPHONE ENCOUNTER
Patient complains of legs burning since Dec., she has dementia.  I could not find any available appointments soon.    She was in a nursing home in Dec and they gave her an arthritis cream.  Not sure what is going on.  She is living with Ryley, her son now.     Please advise

## 2025-05-06 ENCOUNTER — OFFICE VISIT (OUTPATIENT)
Dept: INTERNAL MEDICINE CLINIC | Age: 85
End: 2025-05-06
Payer: MEDICARE

## 2025-05-06 VITALS
HEIGHT: 63 IN | TEMPERATURE: 98.1 F | SYSTOLIC BLOOD PRESSURE: 136 MMHG | BODY MASS INDEX: 27.21 KG/M2 | HEART RATE: 96 BPM | WEIGHT: 153.6 LBS | OXYGEN SATURATION: 99 % | DIASTOLIC BLOOD PRESSURE: 68 MMHG

## 2025-05-06 DIAGNOSIS — I25.10 CORONARY ARTERY DISEASE INVOLVING NATIVE CORONARY ARTERY OF NATIVE HEART WITHOUT ANGINA PECTORIS: ICD-10-CM

## 2025-05-06 DIAGNOSIS — Z79.4 TYPE 2 DIABETES MELLITUS WITH STAGE 3 CHRONIC KIDNEY DISEASE, WITH LONG-TERM CURRENT USE OF INSULIN, UNSPECIFIED WHETHER STAGE 3A OR 3B CKD (HCC): ICD-10-CM

## 2025-05-06 DIAGNOSIS — I10 ESSENTIAL HYPERTENSION: ICD-10-CM

## 2025-05-06 DIAGNOSIS — F02.811 DEMENTIA ASSOCIATED WITH OTHER UNDERLYING DISEASE, WITH AGITATION, UNSPECIFIED DEMENTIA SEVERITY (HCC): ICD-10-CM

## 2025-05-06 DIAGNOSIS — E11.22 TYPE 2 DIABETES MELLITUS WITH STAGE 3 CHRONIC KIDNEY DISEASE, WITH LONG-TERM CURRENT USE OF INSULIN, UNSPECIFIED WHETHER STAGE 3A OR 3B CKD (HCC): ICD-10-CM

## 2025-05-06 DIAGNOSIS — E78.5 DYSLIPIDEMIA DUE TO TYPE 2 DIABETES MELLITUS (HCC): ICD-10-CM

## 2025-05-06 DIAGNOSIS — E78.00 PURE HYPERCHOLESTEROLEMIA: ICD-10-CM

## 2025-05-06 DIAGNOSIS — N18.30 TYPE 2 DIABETES MELLITUS WITH STAGE 3 CHRONIC KIDNEY DISEASE, WITH LONG-TERM CURRENT USE OF INSULIN, UNSPECIFIED WHETHER STAGE 3A OR 3B CKD (HCC): ICD-10-CM

## 2025-05-06 DIAGNOSIS — E11.69 DYSLIPIDEMIA DUE TO TYPE 2 DIABETES MELLITUS (HCC): ICD-10-CM

## 2025-05-06 DIAGNOSIS — G62.9 PERIPHERAL POLYNEUROPATHY: Primary | ICD-10-CM

## 2025-05-06 PROCEDURE — G8399 PT W/DXA RESULTS DOCUMENT: HCPCS | Performed by: INTERNAL MEDICINE

## 2025-05-06 PROCEDURE — 99214 OFFICE O/P EST MOD 30 MIN: CPT | Performed by: INTERNAL MEDICINE

## 2025-05-06 PROCEDURE — 1123F ACP DISCUSS/DSCN MKR DOCD: CPT | Performed by: INTERNAL MEDICINE

## 2025-05-06 PROCEDURE — G8419 CALC BMI OUT NRM PARAM NOF/U: HCPCS | Performed by: INTERNAL MEDICINE

## 2025-05-06 PROCEDURE — G8427 DOCREV CUR MEDS BY ELIG CLIN: HCPCS | Performed by: INTERNAL MEDICINE

## 2025-05-06 PROCEDURE — 1090F PRES/ABSN URINE INCON ASSESS: CPT | Performed by: INTERNAL MEDICINE

## 2025-05-06 PROCEDURE — G2211 COMPLEX E/M VISIT ADD ON: HCPCS | Performed by: INTERNAL MEDICINE

## 2025-05-06 PROCEDURE — 3078F DIAST BP <80 MM HG: CPT | Performed by: INTERNAL MEDICINE

## 2025-05-06 PROCEDURE — 1036F TOBACCO NON-USER: CPT | Performed by: INTERNAL MEDICINE

## 2025-05-06 PROCEDURE — 3075F SYST BP GE 130 - 139MM HG: CPT | Performed by: INTERNAL MEDICINE

## 2025-05-06 RX ORDER — TRAZODONE HYDROCHLORIDE 50 MG/1
50 TABLET ORAL
COMMUNITY
Start: 2024-12-11 | End: 2025-05-06

## 2025-05-06 RX ORDER — POTASSIUM CHLORIDE 20MEQ/15ML
LIQUID (ML) ORAL
COMMUNITY
Start: 2025-03-29 | End: 2025-05-06

## 2025-05-06 RX ORDER — INSULIN ASPART 100 [IU]/ML
INJECTION, SOLUTION INTRAVENOUS; SUBCUTANEOUS
COMMUNITY
Start: 2025-03-21 | End: 2025-05-06

## 2025-05-06 RX ORDER — METOPROLOL SUCCINATE 25 MG/1
25 TABLET, EXTENDED RELEASE ORAL DAILY
Qty: 90 TABLET | Refills: 1 | Status: SHIPPED | OUTPATIENT
Start: 2025-05-06 | End: 2025-05-06

## 2025-05-06 RX ORDER — TRAMADOL HYDROCHLORIDE 50 MG/1
TABLET ORAL
COMMUNITY
Start: 2025-02-27 | End: 2025-05-06

## 2025-05-06 RX ORDER — FOLIC ACID 1 MG/1
1000 TABLET ORAL DAILY
Qty: 90 TABLET | Refills: 1 | Status: SHIPPED | OUTPATIENT
Start: 2025-05-06 | End: 2025-05-06

## 2025-05-06 RX ORDER — METOPROLOL SUCCINATE 25 MG/1
25 TABLET, EXTENDED RELEASE ORAL NIGHTLY
Qty: 90 TABLET | Refills: 1 | Status: SHIPPED | OUTPATIENT
Start: 2025-05-06 | End: 2025-05-07 | Stop reason: SDUPTHER

## 2025-05-06 RX ORDER — DIVALPROEX SODIUM 250 MG/1
500 TABLET, FILM COATED, EXTENDED RELEASE ORAL
Qty: 180 TABLET | Refills: 1 | Status: SHIPPED | OUTPATIENT
Start: 2025-05-06 | End: 2025-05-07 | Stop reason: SDUPTHER

## 2025-05-06 RX ORDER — DIVALPROEX SODIUM 250 MG/1
TABLET, FILM COATED, EXTENDED RELEASE ORAL
COMMUNITY
Start: 2025-03-19 | End: 2025-05-06 | Stop reason: SDUPTHER

## 2025-05-06 RX ORDER — HYDRALAZINE HYDROCHLORIDE 25 MG/1
25 TABLET, FILM COATED ORAL 3 TIMES DAILY
Qty: 270 TABLET | Refills: 1 | Status: SHIPPED | OUTPATIENT
Start: 2025-05-06 | End: 2025-05-07 | Stop reason: SDUPTHER

## 2025-05-06 RX ORDER — DAPAGLIFLOZIN 10 MG/1
10 TABLET, FILM COATED ORAL EVERY MORNING
Qty: 90 TABLET | Refills: 1 | Status: SHIPPED | OUTPATIENT
Start: 2025-05-06 | End: 2025-05-07 | Stop reason: SDUPTHER

## 2025-05-06 RX ORDER — FOLIC ACID 1 MG/1
1000 TABLET ORAL DAILY
Qty: 90 TABLET | Refills: 1 | Status: SHIPPED | OUTPATIENT
Start: 2025-05-06 | End: 2025-05-07 | Stop reason: SDUPTHER

## 2025-05-06 SDOH — ECONOMIC STABILITY: FOOD INSECURITY: WITHIN THE PAST 12 MONTHS, YOU WORRIED THAT YOUR FOOD WOULD RUN OUT BEFORE YOU GOT MONEY TO BUY MORE.: NEVER TRUE

## 2025-05-06 SDOH — ECONOMIC STABILITY: FOOD INSECURITY: WITHIN THE PAST 12 MONTHS, THE FOOD YOU BOUGHT JUST DIDN'T LAST AND YOU DIDN'T HAVE MONEY TO GET MORE.: NEVER TRUE

## 2025-05-06 ASSESSMENT — PATIENT HEALTH QUESTIONNAIRE - PHQ9
1. LITTLE INTEREST OR PLEASURE IN DOING THINGS: NOT AT ALL
SUM OF ALL RESPONSES TO PHQ QUESTIONS 1-9: 0
2. FEELING DOWN, DEPRESSED OR HOPELESS: NOT AT ALL

## 2025-05-06 NOTE — PROGRESS NOTES
2025    Helena Epps (:  1940) is a 84 y.o. female, here for evaluation of the following medical concerns:    Leg Pain   The incident occurred more than 1 week ago. The incident occurred at home. The injury mechanism is unknown. Pain location: bilateral lower extremities. The quality of the pain is described as burning and aching. The pain is at a severity of 6/10. The pain is moderate. The pain has been Fluctuating since onset. Associated symptoms include tingling. Pertinent negatives include no inability to bear weight or loss of sensation.   Memory Loss      Treatment Adherence:   Medication compliance:  compliant most of the time  Diet compliance:  compliant most of the time  Weight trend: stable  Current exercise: no regular exercise  Barriers: none    Diabetes Mellitus Type 2: Current symptoms/problems include none.    Home blood sugar records: patient does not test  Any episodes of hypoglycemia? no  Eye exam current (within one year): unknown  Tobacco history: She  reports that she has never smoked. She has never used smokeless tobacco.   Daily Aspirin? Yes    Hypertension:  Home blood pressure monitoring: No.  She is adherent to a low sodium diet. Patient denies shortness of breath, headache, and peripheral edema.  Antihypertensive medication side effects: no medication side effects noted.  Use of agents associated with hypertension: none.     Hyperlipidemia:  No new myalgias or GI upset on eztemibe (Zetia).       Lab Results   Component Value Date    LABA1C 6.7 08/10/2021    LABA1C 6.4 2021    LABA1C 6.6 2021     Lab Results   Component Value Date    CREATININE 1.0 2021     Lab Results   Component Value Date    ALT 22 2021    AST 16 2021     Lab Results   Component Value Date    CHOL 199 2021    TRIG 124 2021    HDL 58 2021        DEMENTIA:    She does have   Review of Systems   Neurological:  Positive for tingling.       Prior to Visit

## 2025-05-07 DIAGNOSIS — Z79.4 TYPE 2 DIABETES MELLITUS WITH STAGE 3 CHRONIC KIDNEY DISEASE, WITH LONG-TERM CURRENT USE OF INSULIN, UNSPECIFIED WHETHER STAGE 3A OR 3B CKD (HCC): Primary | ICD-10-CM

## 2025-05-07 DIAGNOSIS — E78.5 DYSLIPIDEMIA DUE TO TYPE 2 DIABETES MELLITUS (HCC): ICD-10-CM

## 2025-05-07 DIAGNOSIS — E11.22 TYPE 2 DIABETES MELLITUS WITH STAGE 3 CHRONIC KIDNEY DISEASE, WITH LONG-TERM CURRENT USE OF INSULIN, UNSPECIFIED WHETHER STAGE 3A OR 3B CKD (HCC): Primary | ICD-10-CM

## 2025-05-07 DIAGNOSIS — N18.30 TYPE 2 DIABETES MELLITUS WITH STAGE 3 CHRONIC KIDNEY DISEASE, WITH LONG-TERM CURRENT USE OF INSULIN, UNSPECIFIED WHETHER STAGE 3A OR 3B CKD (HCC): Primary | ICD-10-CM

## 2025-05-07 DIAGNOSIS — E11.69 DYSLIPIDEMIA DUE TO TYPE 2 DIABETES MELLITUS (HCC): ICD-10-CM

## 2025-05-07 RX ORDER — DIVALPROEX SODIUM 250 MG/1
500 TABLET, FILM COATED, EXTENDED RELEASE ORAL
Qty: 180 TABLET | Refills: 1 | Status: SHIPPED | OUTPATIENT
Start: 2025-05-07

## 2025-05-07 RX ORDER — DAPAGLIFLOZIN 10 MG/1
10 TABLET, FILM COATED ORAL EVERY MORNING
Qty: 90 TABLET | Refills: 1 | Status: SHIPPED | OUTPATIENT
Start: 2025-05-07

## 2025-05-07 RX ORDER — EZETIMIBE 10 MG/1
TABLET ORAL
Qty: 90 TABLET | Refills: 3 | Status: SHIPPED | OUTPATIENT
Start: 2025-05-07

## 2025-05-07 RX ORDER — FOLIC ACID 1 MG/1
1000 TABLET ORAL DAILY
Qty: 90 TABLET | Refills: 1 | Status: SHIPPED | OUTPATIENT
Start: 2025-05-07

## 2025-05-07 RX ORDER — HYDRALAZINE HYDROCHLORIDE 25 MG/1
25 TABLET, FILM COATED ORAL 3 TIMES DAILY
Qty: 270 TABLET | Refills: 1 | Status: SHIPPED | OUTPATIENT
Start: 2025-05-07

## 2025-05-07 RX ORDER — METOPROLOL SUCCINATE 25 MG/1
25 TABLET, EXTENDED RELEASE ORAL NIGHTLY
Qty: 90 TABLET | Refills: 1 | Status: SHIPPED | OUTPATIENT
Start: 2025-05-07

## 2025-05-09 DIAGNOSIS — E11.69 DYSLIPIDEMIA DUE TO TYPE 2 DIABETES MELLITUS (HCC): ICD-10-CM

## 2025-05-09 DIAGNOSIS — Z79.4 TYPE 2 DIABETES MELLITUS WITH STAGE 3 CHRONIC KIDNEY DISEASE, WITH LONG-TERM CURRENT USE OF INSULIN, UNSPECIFIED WHETHER STAGE 3A OR 3B CKD (HCC): ICD-10-CM

## 2025-05-09 DIAGNOSIS — N18.30 TYPE 2 DIABETES MELLITUS WITH STAGE 3 CHRONIC KIDNEY DISEASE, WITH LONG-TERM CURRENT USE OF INSULIN, UNSPECIFIED WHETHER STAGE 3A OR 3B CKD (HCC): ICD-10-CM

## 2025-05-09 DIAGNOSIS — E11.22 TYPE 2 DIABETES MELLITUS WITH STAGE 3 CHRONIC KIDNEY DISEASE, WITH LONG-TERM CURRENT USE OF INSULIN, UNSPECIFIED WHETHER STAGE 3A OR 3B CKD (HCC): ICD-10-CM

## 2025-05-09 DIAGNOSIS — E78.5 DYSLIPIDEMIA DUE TO TYPE 2 DIABETES MELLITUS (HCC): ICD-10-CM

## 2025-05-09 RX ORDER — DAPAGLIFLOZIN 10 MG/1
10 TABLET, FILM COATED ORAL EVERY MORNING
Qty: 90 TABLET | Refills: 1 | Status: SHIPPED | OUTPATIENT
Start: 2025-05-09

## 2025-05-09 NOTE — TELEPHONE ENCOUNTER
Recent Visits  Date Type Provider Dept   05/06/25 Office Visit Kerry Jo MD [facility]   Showing recent visits within past 540 days with a meds authorizing provider and meeting all other requirements  Future Appointments  Date Type Provider Dept   05/27/25 Appointment Kerry Jo MD [facility]   Showing future appointments within next 150 days with a meds authorizing provider and meeting all other requirements     5/6/2025

## 2025-05-13 RX ORDER — CANAGLIFLOZIN 300 MG/1
300 TABLET, FILM COATED ORAL
Qty: 90 TABLET | Refills: 1 | OUTPATIENT
Start: 2025-05-13 | End: 2025-11-09

## 2025-05-27 ENCOUNTER — OFFICE VISIT (OUTPATIENT)
Dept: INTERNAL MEDICINE CLINIC | Age: 85
End: 2025-05-27
Payer: MEDICARE

## 2025-05-27 VITALS
DIASTOLIC BLOOD PRESSURE: 88 MMHG | SYSTOLIC BLOOD PRESSURE: 122 MMHG | HEART RATE: 97 BPM | WEIGHT: 129.4 LBS | TEMPERATURE: 97.9 F | OXYGEN SATURATION: 98 % | BODY MASS INDEX: 23.22 KG/M2

## 2025-05-27 DIAGNOSIS — E11.22 TYPE 2 DIABETES MELLITUS WITH STAGE 3 CHRONIC KIDNEY DISEASE, WITH LONG-TERM CURRENT USE OF INSULIN, UNSPECIFIED WHETHER STAGE 3A OR 3B CKD (HCC): Primary | ICD-10-CM

## 2025-05-27 DIAGNOSIS — M79.18 RIGHT BUTTOCK PAIN: ICD-10-CM

## 2025-05-27 DIAGNOSIS — N18.30 TYPE 2 DIABETES MELLITUS WITH STAGE 3 CHRONIC KIDNEY DISEASE, WITH LONG-TERM CURRENT USE OF INSULIN, UNSPECIFIED WHETHER STAGE 3A OR 3B CKD (HCC): Primary | ICD-10-CM

## 2025-05-27 DIAGNOSIS — M53.3 COCCYDYNIA: ICD-10-CM

## 2025-05-27 DIAGNOSIS — Z79.4 TYPE 2 DIABETES MELLITUS WITH STAGE 3 CHRONIC KIDNEY DISEASE, WITH LONG-TERM CURRENT USE OF INSULIN, UNSPECIFIED WHETHER STAGE 3A OR 3B CKD (HCC): Primary | ICD-10-CM

## 2025-05-27 LAB — HBA1C MFR BLD: 6.4 %

## 2025-05-27 PROCEDURE — 3074F SYST BP LT 130 MM HG: CPT | Performed by: INTERNAL MEDICINE

## 2025-05-27 PROCEDURE — 1160F RVW MEDS BY RX/DR IN RCRD: CPT | Performed by: INTERNAL MEDICINE

## 2025-05-27 PROCEDURE — 1090F PRES/ABSN URINE INCON ASSESS: CPT | Performed by: INTERNAL MEDICINE

## 2025-05-27 PROCEDURE — 83036 HEMOGLOBIN GLYCOSYLATED A1C: CPT | Performed by: INTERNAL MEDICINE

## 2025-05-27 PROCEDURE — G8420 CALC BMI NORM PARAMETERS: HCPCS | Performed by: INTERNAL MEDICINE

## 2025-05-27 PROCEDURE — 3079F DIAST BP 80-89 MM HG: CPT | Performed by: INTERNAL MEDICINE

## 2025-05-27 PROCEDURE — G8399 PT W/DXA RESULTS DOCUMENT: HCPCS | Performed by: INTERNAL MEDICINE

## 2025-05-27 PROCEDURE — 1123F ACP DISCUSS/DSCN MKR DOCD: CPT | Performed by: INTERNAL MEDICINE

## 2025-05-27 PROCEDURE — 1036F TOBACCO NON-USER: CPT | Performed by: INTERNAL MEDICINE

## 2025-05-27 PROCEDURE — 3044F HG A1C LEVEL LT 7.0%: CPT | Performed by: INTERNAL MEDICINE

## 2025-05-27 PROCEDURE — G8427 DOCREV CUR MEDS BY ELIG CLIN: HCPCS | Performed by: INTERNAL MEDICINE

## 2025-05-27 PROCEDURE — G2211 COMPLEX E/M VISIT ADD ON: HCPCS | Performed by: INTERNAL MEDICINE

## 2025-05-27 PROCEDURE — 1159F MED LIST DOCD IN RCRD: CPT | Performed by: INTERNAL MEDICINE

## 2025-05-27 PROCEDURE — 99214 OFFICE O/P EST MOD 30 MIN: CPT | Performed by: INTERNAL MEDICINE

## 2025-05-27 RX ORDER — INSULIN ASPART 100 [IU]/ML
INJECTION, SOLUTION INTRAVENOUS; SUBCUTANEOUS
COMMUNITY

## 2025-05-27 RX ORDER — TRAMADOL HYDROCHLORIDE 50 MG/1
TABLET ORAL
COMMUNITY

## 2025-05-27 RX ORDER — CARBOXYMETHYLCELLULOSE SODIUM 5 MG/ML
SOLUTION/ DROPS OPHTHALMIC
COMMUNITY
Start: 2025-04-15

## 2025-05-27 RX ORDER — ASPIRIN 81 MG/1
81 TABLET ORAL DAILY
Qty: 30 TABLET | Refills: 0 | Status: SHIPPED | OUTPATIENT
Start: 2025-05-27

## 2025-05-27 RX ORDER — OMEPRAZOLE 20 MG/1
TABLET, DELAYED RELEASE ORAL
COMMUNITY
Start: 2025-04-24

## 2025-05-27 RX ORDER — METOPROLOL TARTRATE 25 MG/1
TABLET, FILM COATED ORAL
COMMUNITY
Start: 2025-05-06

## 2025-05-27 RX ORDER — CHOLECALCIFEROL (VITAMIN D3) 1250 MCG
1 CAPSULE ORAL
COMMUNITY

## 2025-05-27 RX ORDER — OMEGA-3 FATTY ACIDS/FISH OIL 300-1000MG
CAPSULE ORAL
COMMUNITY

## 2025-05-27 RX ORDER — LATANOPROST 50 UG/ML
SOLUTION/ DROPS OPHTHALMIC
COMMUNITY

## 2025-05-27 RX ORDER — TRAZODONE HYDROCHLORIDE 50 MG/1
TABLET ORAL
COMMUNITY

## 2025-05-27 NOTE — PROGRESS NOTES
Helena Epps (:  1940) is a 85 y.o. female,{New vs Established:[id number]::\"Established patient\"}, here for evaluation of the following chief complaint(s):  Coronary Artery Disease, Hypertension, and Diabetes      Assessment & Plan      No follow-ups on file.    Subjective   {?Quick Links Last Outpt Note  Snapshot  Edit RFV/CC  Edit Screenings:[id number]}    HPI  Review of Systems    {?Quick Review   Review Full History  Edit History    Meds -    carboxymethylcellulose (REFRESH TEARS) 0.5 % SOLN ophthalmic solution    metoprolol tartrate (LOPRESSOR) 25 MG tablet    omeprazole (PRILOSEC OTC) 20 MG tablet    Cholecalciferol (VITAMIN D3) 1.25 MG (02046 UT) CAPS    dextran 70-hypromellose, PF, (ARTIFICIAL TEARS PF) 0.1-0.3 % SOLN opthalmic solution    insulin aspart (NOVOLOG) 100 UNIT/ML injection vial    latanoprost (XALATAN) 0.005 % ophthalmic solution    traMADol (ULTRAM) 50 MG tablet    traZODone (DESYREL) 50 MG tablet    FARXIGA 10 MG tablet    empagliflozin (JARDIANCE) 25 MG tablet    dapagliflozin (FARXIGA) 10 MG tablet    divalproex (DEPAKOTE ER) 250 MG extended release tablet    ezetimibe (ZETIA) 10 MG tablet    folic acid (FOLVITE) 1 MG tablet    hydrALAZINE (APRESOLINE) 25 MG tablet    metoprolol succinate (TOPROL XL) 25 MG extended release tablet    Multiple Vitamins-Minerals (OCUVITE EYE + MULTI) TABS   ---  PMH -  Angina  Bilateral carpal tunnel syndrome  Chronic kidney disease  Fibromyalgia  Hyperlipidemia  Hypertension  MVP (mitral valve prolapse)  s  Small bowel obstruction (HCC)  Type II or unspecified type diabetes mellitus without mention of   complication, not stated as uncontrolled:[id number]}      {Screening Results (Optional):[id number]}  Objective   {?Quick Links  Add Vitals  Timeline  Labs  Imaging  Results Review  Trend Vitals   ?? Avoid pulling in long tables of results. Comment on relevant results to support your medical decision making.:[id number]}  /88   Pulse 97   Temp

## 2025-05-27 NOTE — PROGRESS NOTES
2025    Helena Epps (:  1940) is a 85 y.o. female, here for evaluation of the following medical concerns:    HPI  Treatment Adherence:   Medication compliance:  compliant most of the time  Diet compliance:  compliant most of the time  Weight trend: stable  Current exercise: no regular exercise  Barriers: impairment:  cognitive and overwhelmed by complexity of regimen    Diabetes Mellitus Type 2: Current symptoms/problems include none.    Home blood sugar records: patient does not test  Any episodes of hypoglycemia? no  Eye exam current (within one year): unknown  Tobacco history: She  reports that she has never smoked. She has never used smokeless tobacco.   Daily Aspirin? Yes    Hypertension:  Home blood pressure monitoring: No.  She is adherent to a low sodium diet. Patient denies headache and blurred vision.  Antihypertensive medication side effects: no medication side effects noted.  Use of agents associated with hypertension: none.     Hyperlipidemia:  No new myalgias or GI upset on eztemibe (Zetia).       Lab Results   Component Value Date    LABA1C 6.4 2025    LABA1C 6.7 08/10/2021    LABA1C 6.4 2021     Lab Results   Component Value Date    CREATININE 1.0 2021     Lab Results   Component Value Date    ALT 22 2021    AST 16 2021     Lab Results   Component Value Date    CHOL 199 2021    TRIG 124 2021    HDL 58 2021        Hemoglobin A1C   Date Value Ref Range Status   2025 6.4 % Final       Review of Systems    Prior to Visit Medications    Medication Sig Taking? Authorizing Provider   carboxymethylcellulose (REFRESH TEARS) 0.5 % SOLN ophthalmic solution Place 1-2 drops in each eye as needed for dryness Yes ProviderSincere MD   metoprolol tartrate (LOPRESSOR) 25 MG tablet  Yes Sincere Sage MD   omeprazole (PRILOSEC OTC) 20 MG tablet take 2 tabs once a day Yes Sincere Sage MD   Cholecalciferol (VITAMIN D3) 1.25 MG

## 2025-06-20 ENCOUNTER — TELEPHONE (OUTPATIENT)
Dept: ADMINISTRATIVE | Age: 85
End: 2025-06-20

## 2025-06-20 DIAGNOSIS — E78.5 DYSLIPIDEMIA DUE TO TYPE 2 DIABETES MELLITUS (HCC): ICD-10-CM

## 2025-06-20 DIAGNOSIS — E11.69 DYSLIPIDEMIA DUE TO TYPE 2 DIABETES MELLITUS (HCC): ICD-10-CM

## 2025-06-20 DIAGNOSIS — N18.30 TYPE 2 DIABETES MELLITUS WITH STAGE 3 CHRONIC KIDNEY DISEASE, WITH LONG-TERM CURRENT USE OF INSULIN, UNSPECIFIED WHETHER STAGE 3A OR 3B CKD (HCC): ICD-10-CM

## 2025-06-20 DIAGNOSIS — E11.22 TYPE 2 DIABETES MELLITUS WITH STAGE 3 CHRONIC KIDNEY DISEASE, WITH LONG-TERM CURRENT USE OF INSULIN, UNSPECIFIED WHETHER STAGE 3A OR 3B CKD (HCC): ICD-10-CM

## 2025-06-20 DIAGNOSIS — Z79.4 TYPE 2 DIABETES MELLITUS WITH STAGE 3 CHRONIC KIDNEY DISEASE, WITH LONG-TERM CURRENT USE OF INSULIN, UNSPECIFIED WHETHER STAGE 3A OR 3B CKD (HCC): ICD-10-CM

## 2025-06-20 NOTE — TELEPHONE ENCOUNTER
What medication needs a PA? Please advise.    If this requires a response please respond to the pool. (P [facility] MEDICINE Pre-Auth).    Please advise patient thank you.

## 2025-06-23 NOTE — TELEPHONE ENCOUNTER
Submitted PA for Farxiga 10MG tablets   [facility] [id number] STATUS: not sent    We received a Prior Authorization for Farxiga.      This team was made aware that Farxiga now comes in Generic (Dapagliflozin), but the brand name is still preferred on most insurances.      May we please get a new prescription for Farxiga submitted with DARA so the patient's treatment is not delayed with sending in a PA for a non-preferred drug?

## 2025-06-24 DIAGNOSIS — Z79.4 TYPE 2 DIABETES MELLITUS WITH STAGE 3B CHRONIC KIDNEY DISEASE, WITH LONG-TERM CURRENT USE OF INSULIN (HCC): ICD-10-CM

## 2025-06-24 DIAGNOSIS — N18.32 STAGE 3B CHRONIC KIDNEY DISEASE (HCC): Primary | ICD-10-CM

## 2025-06-24 DIAGNOSIS — N18.32 TYPE 2 DIABETES MELLITUS WITH STAGE 3B CHRONIC KIDNEY DISEASE, WITH LONG-TERM CURRENT USE OF INSULIN (HCC): ICD-10-CM

## 2025-06-24 DIAGNOSIS — E11.22 TYPE 2 DIABETES MELLITUS WITH STAGE 3B CHRONIC KIDNEY DISEASE, WITH LONG-TERM CURRENT USE OF INSULIN (HCC): ICD-10-CM

## 2025-06-24 RX ORDER — DAPAGLIFLOZIN 10 MG/1
10 TABLET, FILM COATED ORAL EVERY MORNING
Qty: 90 TABLET | Refills: 3 | Status: SHIPPED | OUTPATIENT
Start: 2025-06-24

## 2025-06-27 NOTE — TELEPHONE ENCOUNTER
The medication was DENIED; DENIAL letter is uploaded to MEDIA.    Generic Denial:  Other; please see Denial Letter.   DIABETIC MEDICATION DENIALS: Drug is not covered by the plan ( Plan Exclusion)         Note :    Farxiga is denied because it is not on your plan's Drug List (formulary). Medication authorization requires the following: (1) You need to try this covered drug: Invokana? OR (2) your doctor needs to give us specific medical reasons why the covered drug is not appropriate for you. Reviewed by: Kaity    If you want an APPEAL; please note in this encounter what new information you would like to APPEAL with.  Once complete route back to PA POOL.    If this requires a response please respond to the pool ( P [facility] MEDICATION PRE-AUTH).      Thank you please advise patient.

## 2025-07-01 RX ORDER — DAPAGLIFLOZIN 10 MG/1
10 TABLET, FILM COATED ORAL EVERY MORNING
Qty: 90 TABLET | Refills: 1 | Status: SHIPPED | OUTPATIENT
Start: 2025-07-01

## 2025-07-07 ENCOUNTER — OFFICE VISIT (OUTPATIENT)
Dept: INTERNAL MEDICINE CLINIC | Age: 85
End: 2025-07-07
Payer: MEDICARE

## 2025-07-07 VITALS
BODY MASS INDEX: 21.42 KG/M2 | HEART RATE: 96 BPM | TEMPERATURE: 97.7 F | DIASTOLIC BLOOD PRESSURE: 74 MMHG | SYSTOLIC BLOOD PRESSURE: 126 MMHG | OXYGEN SATURATION: 97 % | WEIGHT: 119.4 LBS

## 2025-07-07 DIAGNOSIS — N18.30 TYPE 2 DIABETES MELLITUS WITH STAGE 3 CHRONIC KIDNEY DISEASE, WITH LONG-TERM CURRENT USE OF INSULIN, UNSPECIFIED WHETHER STAGE 3A OR 3B CKD (HCC): Primary | ICD-10-CM

## 2025-07-07 DIAGNOSIS — E11.22 TYPE 2 DIABETES MELLITUS WITH STAGE 3 CHRONIC KIDNEY DISEASE, WITH LONG-TERM CURRENT USE OF INSULIN, UNSPECIFIED WHETHER STAGE 3A OR 3B CKD (HCC): Primary | ICD-10-CM

## 2025-07-07 DIAGNOSIS — F02.811 DEMENTIA ASSOCIATED WITH OTHER UNDERLYING DISEASE, WITH AGITATION, UNSPECIFIED DEMENTIA SEVERITY (HCC): ICD-10-CM

## 2025-07-07 DIAGNOSIS — G62.9 PERIPHERAL POLYNEUROPATHY: ICD-10-CM

## 2025-07-07 DIAGNOSIS — M16.11 PRIMARY OSTEOARTHRITIS OF RIGHT HIP: ICD-10-CM

## 2025-07-07 DIAGNOSIS — I10 ESSENTIAL HYPERTENSION: ICD-10-CM

## 2025-07-07 DIAGNOSIS — I25.10 CORONARY ARTERY DISEASE INVOLVING NATIVE CORONARY ARTERY OF NATIVE HEART WITHOUT ANGINA PECTORIS: ICD-10-CM

## 2025-07-07 DIAGNOSIS — Z79.4 TYPE 2 DIABETES MELLITUS WITH STAGE 3 CHRONIC KIDNEY DISEASE, WITH LONG-TERM CURRENT USE OF INSULIN, UNSPECIFIED WHETHER STAGE 3A OR 3B CKD (HCC): Primary | ICD-10-CM

## 2025-07-07 DIAGNOSIS — E11.69 DYSLIPIDEMIA DUE TO TYPE 2 DIABETES MELLITUS (HCC): ICD-10-CM

## 2025-07-07 DIAGNOSIS — M53.3 SI (SACROILIAC) PAIN: ICD-10-CM

## 2025-07-07 DIAGNOSIS — E78.5 DYSLIPIDEMIA DUE TO TYPE 2 DIABETES MELLITUS (HCC): ICD-10-CM

## 2025-07-07 DIAGNOSIS — M53.3 COCCYDYNIA: ICD-10-CM

## 2025-07-07 DIAGNOSIS — R63.4 UNINTENTIONAL WEIGHT LOSS OF MORE THAN 10 POUNDS IN 90 DAYS: ICD-10-CM

## 2025-07-07 PROCEDURE — 1090F PRES/ABSN URINE INCON ASSESS: CPT | Performed by: INTERNAL MEDICINE

## 2025-07-07 PROCEDURE — 3044F HG A1C LEVEL LT 7.0%: CPT | Performed by: INTERNAL MEDICINE

## 2025-07-07 PROCEDURE — 1036F TOBACCO NON-USER: CPT | Performed by: INTERNAL MEDICINE

## 2025-07-07 PROCEDURE — 1123F ACP DISCUSS/DSCN MKR DOCD: CPT | Performed by: INTERNAL MEDICINE

## 2025-07-07 PROCEDURE — 1160F RVW MEDS BY RX/DR IN RCRD: CPT | Performed by: INTERNAL MEDICINE

## 2025-07-07 PROCEDURE — 3074F SYST BP LT 130 MM HG: CPT | Performed by: INTERNAL MEDICINE

## 2025-07-07 PROCEDURE — 3078F DIAST BP <80 MM HG: CPT | Performed by: INTERNAL MEDICINE

## 2025-07-07 PROCEDURE — 1159F MED LIST DOCD IN RCRD: CPT | Performed by: INTERNAL MEDICINE

## 2025-07-07 PROCEDURE — G8399 PT W/DXA RESULTS DOCUMENT: HCPCS | Performed by: INTERNAL MEDICINE

## 2025-07-07 PROCEDURE — G8427 DOCREV CUR MEDS BY ELIG CLIN: HCPCS | Performed by: INTERNAL MEDICINE

## 2025-07-07 PROCEDURE — G8420 CALC BMI NORM PARAMETERS: HCPCS | Performed by: INTERNAL MEDICINE

## 2025-07-07 PROCEDURE — 99214 OFFICE O/P EST MOD 30 MIN: CPT | Performed by: INTERNAL MEDICINE

## 2025-07-07 RX ORDER — EMPAGLIFLOZIN 10 MG/1
TABLET, FILM COATED ORAL
COMMUNITY
Start: 2025-06-25

## 2025-07-07 RX ORDER — POLYETHYLENE GLYCOL 400, PROPYLENE GLYCOL 0.3%
1 KIT
Qty: 20 ML | Refills: 5 | Status: SHIPPED | OUTPATIENT
Start: 2025-07-07

## 2025-07-07 NOTE — PATIENT INSTRUCTIONS
PLEASE CALL NUMBER BELOW TO SCHEDULE FOR HOME PHYSICAL THERAPY    Dr. Caprice Royal PT, DPT  Geriatric Clinical Specialist   Certified Exercise Expert for the Aging Adult   LSVT \"BIG\" Certified   Tel: [phone]  Fax: [phone]  Email: [email or web address]        1) COOK 1 SIDE DISH  A DAY  2) CALL FOR HOME HEALTH PHYSICAL THERAPY  3) START THE PROGRAM AT [facility] WITH A GOAL TO GO 4 DAYS WEEK  4) DRINK BOOST  5) EAT DOWNSTAIRS AT THE TABLE  6) COME DOWN STAIRS AT LEAST 3 - 6 HOURS A DAY

## 2025-07-07 NOTE — PROGRESS NOTES
Occupational Stress Questionnaire     Feeling of Stress : To some extent   Social Connections: Moderately Integrated (6/8/2021)    Social Connection and Isolation Panel [NHANES]     Frequency of Communication with Friends and Family: More than three times a week     Frequency of Social Gatherings with Friends and Family: More than three times a week     Attends Jain Services: More than 4 times per year     Active Member of Clubs or Organizations: No     Attends Club or Organization Meetings: More than 4 times per year     Marital Status:    Intimate Partner Violence: Patient Unable To Answer (12/2/2024)    Received from [facility]    Interpersonal Safety     Do you feel physically or emotionally unsafe where you currently live?: Patient unable to answer     Within the past 12 months, have you been hit, slapped, kicked or otherwise physically hurt by anyone? : Patient unable to answer     Within the past 12 months, have you been humiliated or emotionally abused by anyone? : Patient unable to answer   Housing Stability: Low Risk  (5/6/2025)    Housing Stability Vital Sign     Unable to Pay for Housing in the Last Year: No     Number of Times Moved in the Last Year: 0     Homeless in the Last Year: No        Family History   Problem Relation Age of Onset    Vision Loss Sister     Heart Attack Mother     Colon Cancer Father     Ovarian Cancer Daughter        Vitals:    07/07/25 1507   BP: 126/74   Pulse: 96   Temp: 97.7 °F (36.5 °C)   SpO2: 97%   Weight: 54.2 kg (119 lb 6.4 oz)     Estimated body mass index is 21.42 kg/m² as calculated from the following:    Height as of 5/6/25: 1.59 m (5' 2.6\").    Weight as of this encounter: 54.2 kg (119 lb 6.4 oz).  Wt Readings from Last 3 Encounters:   07/07/25 54.2 kg (119 lb 6.4 oz)   05/27/25 58.7 kg (129 lb 6.4 oz)   05/06/25 69.7 kg (153 lb 9.6 oz)      Physical Exam  Vitals and nursing note reviewed.   Constitutional:       General:

## 2025-07-29 ENCOUNTER — OFFICE VISIT (OUTPATIENT)
Dept: INTERNAL MEDICINE CLINIC | Age: 85
End: 2025-07-29
Payer: MEDICARE

## 2025-07-29 VITALS
WEIGHT: 121.6 LBS | SYSTOLIC BLOOD PRESSURE: 130 MMHG | HEART RATE: 79 BPM | OXYGEN SATURATION: 99 % | DIASTOLIC BLOOD PRESSURE: 72 MMHG | TEMPERATURE: 97.4 F | BODY MASS INDEX: 21.82 KG/M2

## 2025-07-29 DIAGNOSIS — F02.811 DEMENTIA ASSOCIATED WITH OTHER UNDERLYING DISEASE, WITH AGITATION, UNSPECIFIED DEMENTIA SEVERITY (HCC): Primary | ICD-10-CM

## 2025-07-29 PROCEDURE — G8427 DOCREV CUR MEDS BY ELIG CLIN: HCPCS | Performed by: INTERNAL MEDICINE

## 2025-07-29 PROCEDURE — G8420 CALC BMI NORM PARAMETERS: HCPCS | Performed by: INTERNAL MEDICINE

## 2025-07-29 PROCEDURE — 1123F ACP DISCUSS/DSCN MKR DOCD: CPT | Performed by: INTERNAL MEDICINE

## 2025-07-29 PROCEDURE — 1090F PRES/ABSN URINE INCON ASSESS: CPT | Performed by: INTERNAL MEDICINE

## 2025-07-29 PROCEDURE — 3075F SYST BP GE 130 - 139MM HG: CPT | Performed by: INTERNAL MEDICINE

## 2025-07-29 PROCEDURE — 1036F TOBACCO NON-USER: CPT | Performed by: INTERNAL MEDICINE

## 2025-07-29 PROCEDURE — 3078F DIAST BP <80 MM HG: CPT | Performed by: INTERNAL MEDICINE

## 2025-07-29 PROCEDURE — G8399 PT W/DXA RESULTS DOCUMENT: HCPCS | Performed by: INTERNAL MEDICINE

## 2025-07-29 PROCEDURE — 99214 OFFICE O/P EST MOD 30 MIN: CPT | Performed by: INTERNAL MEDICINE

## 2025-07-29 PROCEDURE — 1159F MED LIST DOCD IN RCRD: CPT | Performed by: INTERNAL MEDICINE

## 2025-07-29 NOTE — PROGRESS NOTES
2025    Helena Epps (:  1940) is a 85 y.o. female, here for evaluation of the following medical concerns:    Pain  This is a recurrent problem. The current episode started more than 1 month ago. The problem occurs constantly. The problem has been gradually worsening. Pertinent negatives include no arthralgias, change in bowel habit, chest pain, chills, congestion, headaches, joint swelling, myalgias, nausea, neck pain, swollen glands, urinary symptoms, vertigo, visual change or vomiting. Nothing aggravates the symptoms. She has tried nothing for the symptoms. The treatment provided moderate relief.   Memory Loss    Patient reports onset of memory loss was more than 1 year ago. Onset quality is gradual.     Symptoms associated with memory loss include change in short-term memory, change in long-term memory, change in mood, difficulty recalling words, falls, sleep disturbance and stiffness. Symptoms do not include repetitive questions, impaired speech or orthostatic hypotension.    Behavorial problems for memory loss include suspiciousness. Patient does not have the following behavorial problems associated with memory loss: paranoia, hallucinations, delusions or agitation.Behavior problems comments: Mood changes, perseveration.     Family and/or patient concerns for memory loss include wandering.     The family monitors medication usage and family prepares medications.     Patient lives with adult children. Patient lives in a/an family member's home.    Additional narrative: SHe has dementia but no significant insight. She has cut back on eating. Not interested in cooking. Her son cooks most meals. They attend [employer] most Sundays.  He has considered an adult day care/senior program.  She has started participating in the [facility] senior program with a goal to go 2 times a week.History provided by PCP.    Treatment Adherence:   Medication compliance:  compliant most of the time  Diet

## 2025-07-31 ASSESSMENT — ENCOUNTER SYMPTOMS
SWOLLEN GLANDS: 0
VISUAL CHANGE: 0
CHANGE IN BOWEL HABIT: 0
VOMITING: 0
NAUSEA: 0

## 2025-08-02 ENCOUNTER — HOSPITAL ENCOUNTER (OUTPATIENT)
Dept: GENERAL RADIOLOGY | Age: 85
Discharge: HOME | End: 2025-08-02
Payer: MEDICARE

## 2025-08-02 ENCOUNTER — HOSPITAL ENCOUNTER (OUTPATIENT)
Age: 85
Discharge: HOME | End: 2025-08-02
Payer: MEDICARE

## 2025-08-02 DIAGNOSIS — M53.3 COCCYDYNIA: ICD-10-CM

## 2025-08-02 DIAGNOSIS — R52 PAIN: ICD-10-CM

## 2025-08-02 DIAGNOSIS — M79.18 RIGHT BUTTOCK PAIN: ICD-10-CM

## 2025-08-02 DIAGNOSIS — N18.30 TYPE 2 DIABETES MELLITUS WITH STAGE 3 CHRONIC KIDNEY DISEASE, WITH LONG-TERM CURRENT USE OF INSULIN, UNSPECIFIED WHETHER STAGE 3A OR 3B CKD (HCC): ICD-10-CM

## 2025-08-02 DIAGNOSIS — Z79.4 TYPE 2 DIABETES MELLITUS WITH STAGE 3 CHRONIC KIDNEY DISEASE, WITH LONG-TERM CURRENT USE OF INSULIN, UNSPECIFIED WHETHER STAGE 3A OR 3B CKD (HCC): ICD-10-CM

## 2025-08-02 DIAGNOSIS — E11.22 TYPE 2 DIABETES MELLITUS WITH STAGE 3 CHRONIC KIDNEY DISEASE, WITH LONG-TERM CURRENT USE OF INSULIN, UNSPECIFIED WHETHER STAGE 3A OR 3B CKD (HCC): ICD-10-CM

## 2025-08-02 PROCEDURE — 72220 X-RAY EXAM SACRUM TAILBONE: CPT

## 2025-08-02 PROCEDURE — 72100 X-RAY EXAM L-S SPINE 2/3 VWS: CPT

## 2025-08-02 PROCEDURE — 73522 X-RAY EXAM HIPS BI 3-4 VIEWS: CPT

## (undated) DEVICE — TROCAR: Brand: KII FIOS FIRST ENTRY

## (undated) DEVICE — PMI DISPOSABLE PUNCTURE CLOSURE DEVICE / SUTURE GRASPER: Brand: PMI

## (undated) DEVICE — SOLUTION IRRIG 1000ML 0.9% SOD CHL USP POUR PLAS BTL

## (undated) DEVICE — BINDER ABD SM M H8XL30 45IN UNISX STD E 4 PNL DISPOSABLE

## (undated) DEVICE — STAPLER INT DIA5MM 25 ABSRB STRP FIX DISP FOR HERN MESH

## (undated) DEVICE — 3M™ TEGADERM™ TRANSPARENT FILM DRESSING FRAME STYLE, 1628, 6 IN X 8 IN (15 CM X 20 CM), 10/CT 8CT/CASE: Brand: 3M™ TEGADERM™

## (undated) DEVICE — TROCAR: Brand: KII® SLEEVE

## (undated) DEVICE — MERCY FAIRFIELD TURNOVER KIT: Brand: MEDLINE INDUSTRIES, INC.

## (undated) DEVICE — SUTURE VCRL SZ 0 L27IN ABSRB VLT L22MM CT-3 1/2 CIR J329H

## (undated) DEVICE — [HIGH FLOW INSUFFLATOR,  DO NOT USE IF PACKAGE IS DAMAGED,  KEEP DRY,  KEEP AWAY FROM SUNLIGHT,  PROTECT FROM HEAT AND RADIOACTIVE SOURCES.]: Brand: PNEUMOSURE

## (undated) DEVICE — GAUZE,SPONGE,4"X4",8PLY,STRL,LF,10/TRAY: Brand: MEDLINE

## (undated) DEVICE — SYRINGE MED 10ML TRNSLUC BRL PLUNG BLK MRK POLYPR CTRL

## (undated) DEVICE — GAUZE,SPONGE,4"X4",16PLY,XRAY,STRL,LF: Brand: MEDLINE

## (undated) DEVICE — SEALER LAP L37CM MARYLAND JAW OPN NANO COAT MULTIFUNCTIONAL

## (undated) DEVICE — GOWN SIRUS NONREIN XL W/TWL: Brand: MEDLINE INDUSTRIES, INC.

## (undated) DEVICE — TROCAR: Brand: KII SLEEVE

## (undated) DEVICE — BW-412T DISP COMBO CLEANING BRUSH: Brand: SINGLE USE COMBINATION CLEANING BRUSH

## (undated) DEVICE — GOWN,SURGICAL,AURORA,SLEEVE: Brand: MEDLINE

## (undated) DEVICE — SPONGE LAP W18XL18IN WHT COT 4 PLY FLD STRUNG RADPQ DISP ST

## (undated) DEVICE — DRAPE,LAP,CHOLE,W/TROUGHS,STERILE: Brand: MEDLINE

## (undated) DEVICE — SET VLV 3 PC AWS DISPOSABLE GRDIAN SCOPEVALET

## (undated) DEVICE — STERILE POLYISOPRENE POWDER-FREE SURGICAL GLOVES: Brand: PROTEXIS

## (undated) DEVICE — SUTURE PROL SZ 0 L18IN NONABSORBABLE BLU MO-6 L26MM 1/2 CIR C845G

## (undated) DEVICE — Device

## (undated) DEVICE — BLADE CLIPPER GEN PURP NS

## (undated) DEVICE — APPLICATOR MEDICATED 26 CC SOLUTION HI LT ORNG CHLORAPREP

## (undated) DEVICE — SUTURE VCRL SZ 3-0 L27IN ABSRB UD L26MM SH 1/2 CIR J416H

## (undated) DEVICE — NEEDLE SPNL 22GA L3.5IN BLK HUB S STL REG WALL FIT STYL W/

## (undated) DEVICE — CORD ES L10FT MPLR LAP

## (undated) DEVICE — LAPAROSCOPY PACK: Brand: MEDLINE INDUSTRIES, INC.

## (undated) DEVICE — COVER LT HNDL BLU PLAS

## (undated) DEVICE — PROCEDURE KIT ENDOSCP CUST

## (undated) DEVICE — 60 ML SYRINGE,REGULAR TIP: Brand: MONOJECT

## (undated) DEVICE — SOLUTION IV IRRIG WATER 500ML POUR BRL ST 2F7113

## (undated) DEVICE — SUTURE MCRYL SZ 4-0 L27IN ABSRB UD L19MM PS-2 1/2 CIR PRIM Y426H

## (undated) DEVICE — SUTURE VCRL SZ 3-0 L18IN ABSRB UD L26MM SH 1/2 CIR J864D

## (undated) DEVICE — HYPODERMIC SAFETY NEEDLE: Brand: MAGELLAN

## (undated) DEVICE — PENCIL ES L3M BTTN SWCH S STL HEX LOK BLDE ELECTRD HOLSTER

## (undated) DEVICE — NEEDLE INSUF L120MM DIA2MM DISP FOR PNEUMOPERI ENDOPATH